# Patient Record
Sex: FEMALE | Race: BLACK OR AFRICAN AMERICAN | NOT HISPANIC OR LATINO | URBAN - METROPOLITAN AREA
[De-identification: names, ages, dates, MRNs, and addresses within clinical notes are randomized per-mention and may not be internally consistent; named-entity substitution may affect disease eponyms.]

---

## 2023-06-27 ENCOUNTER — EMERGENCY (EMERGENCY)
Facility: HOSPITAL | Age: 58
LOS: 1 days | Discharge: HOME | End: 2023-06-29
Attending: STUDENT IN AN ORGANIZED HEALTH CARE EDUCATION/TRAINING PROGRAM | Admitting: HOSPITALIST
Payer: MEDICAID

## 2023-06-27 VITALS
RESPIRATION RATE: 26 BRPM | DIASTOLIC BLOOD PRESSURE: 96 MMHG | OXYGEN SATURATION: 94 % | SYSTOLIC BLOOD PRESSURE: 174 MMHG | HEIGHT: 69 IN | TEMPERATURE: 97 F | HEART RATE: 98 BPM | WEIGHT: 214.95 LBS

## 2023-06-27 DIAGNOSIS — J45.901 UNSPECIFIED ASTHMA WITH (ACUTE) EXACERBATION: ICD-10-CM

## 2023-06-27 DIAGNOSIS — R06.2 WHEEZING: ICD-10-CM

## 2023-06-27 DIAGNOSIS — R06.02 SHORTNESS OF BREATH: ICD-10-CM

## 2023-06-27 DIAGNOSIS — R03.0 ELEVATED BLOOD-PRESSURE READING, WITHOUT DIAGNOSIS OF HYPERTENSION: ICD-10-CM

## 2023-06-27 DIAGNOSIS — M79.89 OTHER SPECIFIED SOFT TISSUE DISORDERS: ICD-10-CM

## 2023-06-27 DIAGNOSIS — Z82.5 FAMILY HISTORY OF ASTHMA AND OTHER CHRONIC LOWER RESPIRATORY DISEASES: ICD-10-CM

## 2023-06-27 DIAGNOSIS — Z20.822 CONTACT WITH AND (SUSPECTED) EXPOSURE TO COVID-19: ICD-10-CM

## 2023-06-27 PROBLEM — Z00.00 ENCOUNTER FOR PREVENTIVE HEALTH EXAMINATION: Status: ACTIVE | Noted: 2023-06-27

## 2023-06-27 LAB
ALBUMIN SERPL ELPH-MCNC: 3.8 G/DL — SIGNIFICANT CHANGE UP (ref 3.3–5)
ALP SERPL-CCNC: 87 U/L — SIGNIFICANT CHANGE UP (ref 40–120)
ALT FLD-CCNC: 38 U/L — SIGNIFICANT CHANGE UP (ref 12–78)
ANION GAP SERPL CALC-SCNC: 4 MMOL/L — LOW (ref 5–17)
AST SERPL-CCNC: 40 U/L — HIGH (ref 15–37)
BASOPHILS # BLD AUTO: 0.07 K/UL — SIGNIFICANT CHANGE UP (ref 0–0.2)
BASOPHILS NFR BLD AUTO: 0.8 % — SIGNIFICANT CHANGE UP (ref 0–2)
BILIRUB SERPL-MCNC: 0.3 MG/DL — SIGNIFICANT CHANGE UP (ref 0.2–1.2)
BUN SERPL-MCNC: 13 MG/DL — SIGNIFICANT CHANGE UP (ref 7–23)
CALCIUM SERPL-MCNC: 9.4 MG/DL — SIGNIFICANT CHANGE UP (ref 8.5–10.1)
CHLORIDE SERPL-SCNC: 102 MMOL/L — SIGNIFICANT CHANGE UP (ref 96–108)
CO2 SERPL-SCNC: 31 MMOL/L — SIGNIFICANT CHANGE UP (ref 22–31)
CREAT SERPL-MCNC: 0.94 MG/DL — SIGNIFICANT CHANGE UP (ref 0.5–1.3)
EGFR: 70 ML/MIN/1.73M2 — SIGNIFICANT CHANGE UP
EOSINOPHIL # BLD AUTO: 0.95 K/UL — HIGH (ref 0–0.5)
EOSINOPHIL NFR BLD AUTO: 10.3 % — HIGH (ref 0–6)
GLUCOSE SERPL-MCNC: 128 MG/DL — HIGH (ref 70–99)
HCT VFR BLD CALC: 48.4 % — HIGH (ref 34.5–45)
HGB BLD-MCNC: 15.5 G/DL — SIGNIFICANT CHANGE UP (ref 11.5–15.5)
IMM GRANULOCYTES NFR BLD AUTO: 0.4 % — SIGNIFICANT CHANGE UP (ref 0–0.9)
LYMPHOCYTES # BLD AUTO: 2.56 K/UL — SIGNIFICANT CHANGE UP (ref 1–3.3)
LYMPHOCYTES # BLD AUTO: 27.8 % — SIGNIFICANT CHANGE UP (ref 13–44)
MAGNESIUM SERPL-MCNC: 2.2 MG/DL — SIGNIFICANT CHANGE UP (ref 1.6–2.6)
MCHC RBC-ENTMCNC: 30 PG — SIGNIFICANT CHANGE UP (ref 27–34)
MCHC RBC-ENTMCNC: 32 G/DL — SIGNIFICANT CHANGE UP (ref 32–36)
MCV RBC AUTO: 93.8 FL — SIGNIFICANT CHANGE UP (ref 80–100)
MONOCYTES # BLD AUTO: 1.1 K/UL — HIGH (ref 0–0.9)
MONOCYTES NFR BLD AUTO: 12 % — SIGNIFICANT CHANGE UP (ref 2–14)
NEUTROPHILS # BLD AUTO: 4.48 K/UL — SIGNIFICANT CHANGE UP (ref 1.8–7.4)
NEUTROPHILS NFR BLD AUTO: 48.7 % — SIGNIFICANT CHANGE UP (ref 43–77)
NRBC # BLD: 0 /100 WBCS — SIGNIFICANT CHANGE UP (ref 0–0)
NT-PROBNP SERPL-SCNC: 16 PG/ML — SIGNIFICANT CHANGE UP (ref 0–125)
PLATELET # BLD AUTO: 230 K/UL — SIGNIFICANT CHANGE UP (ref 150–400)
POTASSIUM SERPL-MCNC: 5.2 MMOL/L — SIGNIFICANT CHANGE UP (ref 3.5–5.3)
POTASSIUM SERPL-SCNC: 5.2 MMOL/L — SIGNIFICANT CHANGE UP (ref 3.5–5.3)
PROT SERPL-MCNC: 8.3 GM/DL — SIGNIFICANT CHANGE UP (ref 6–8.3)
RAPID RVP RESULT: SIGNIFICANT CHANGE UP
RBC # BLD: 5.16 M/UL — SIGNIFICANT CHANGE UP (ref 3.8–5.2)
RBC # FLD: 13.6 % — SIGNIFICANT CHANGE UP (ref 10.3–14.5)
SARS-COV-2 RNA SPEC QL NAA+PROBE: SIGNIFICANT CHANGE UP
SODIUM SERPL-SCNC: 137 MMOL/L — SIGNIFICANT CHANGE UP (ref 135–145)
TROPONIN I, HIGH SENSITIVITY RESULT: 46.6 NG/L — SIGNIFICANT CHANGE UP
WBC # BLD: 9.2 K/UL — SIGNIFICANT CHANGE UP (ref 3.8–10.5)
WBC # FLD AUTO: 9.2 K/UL — SIGNIFICANT CHANGE UP (ref 3.8–10.5)

## 2023-06-27 PROCEDURE — 99222 1ST HOSP IP/OBS MODERATE 55: CPT

## 2023-06-27 PROCEDURE — 99233 SBSQ HOSP IP/OBS HIGH 50: CPT

## 2023-06-27 PROCEDURE — 99285 EMERGENCY DEPT VISIT HI MDM: CPT

## 2023-06-27 PROCEDURE — 71045 X-RAY EXAM CHEST 1 VIEW: CPT | Mod: 26

## 2023-06-27 RX ORDER — BUDESONIDE AND FORMOTEROL FUMARATE DIHYDRATE 160; 4.5 UG/1; UG/1
2 AEROSOL RESPIRATORY (INHALATION)
Refills: 0 | Status: DISCONTINUED | OUTPATIENT
Start: 2023-06-27 | End: 2023-06-29

## 2023-06-27 RX ORDER — ACETAMINOPHEN 500 MG
650 TABLET ORAL EVERY 6 HOURS
Refills: 0 | Status: DISCONTINUED | OUTPATIENT
Start: 2023-06-27 | End: 2023-06-29

## 2023-06-27 RX ORDER — IPRATROPIUM/ALBUTEROL SULFATE 18-103MCG
3 AEROSOL WITH ADAPTER (GRAM) INHALATION EVERY 4 HOURS
Refills: 0 | Status: DISCONTINUED | OUTPATIENT
Start: 2023-06-27 | End: 2023-06-28

## 2023-06-27 RX ORDER — IPRATROPIUM/ALBUTEROL SULFATE 18-103MCG
2 AEROSOL WITH ADAPTER (GRAM) INHALATION
Refills: 0 | DISCHARGE

## 2023-06-27 RX ORDER — IPRATROPIUM/ALBUTEROL SULFATE 18-103MCG
3 AEROSOL WITH ADAPTER (GRAM) INHALATION
Refills: 0 | Status: COMPLETED | OUTPATIENT
Start: 2023-06-27 | End: 2023-06-27

## 2023-06-27 RX ORDER — AMLODIPINE BESYLATE 2.5 MG/1
5 TABLET ORAL ONCE
Refills: 0 | Status: COMPLETED | OUTPATIENT
Start: 2023-06-27 | End: 2023-06-27

## 2023-06-27 RX ORDER — LANOLIN ALCOHOL/MO/W.PET/CERES
3 CREAM (GRAM) TOPICAL AT BEDTIME
Refills: 0 | Status: DISCONTINUED | OUTPATIENT
Start: 2023-06-27 | End: 2023-06-29

## 2023-06-27 RX ORDER — MAGNESIUM SULFATE 500 MG/ML
2 VIAL (ML) INJECTION ONCE
Refills: 0 | Status: COMPLETED | OUTPATIENT
Start: 2023-06-27 | End: 2023-06-27

## 2023-06-27 RX ADMIN — Medication 3 MILLILITER(S): at 05:04

## 2023-06-27 RX ADMIN — Medication 3 MILLILITER(S): at 11:30

## 2023-06-27 RX ADMIN — Medication 125 MILLIGRAM(S): at 04:58

## 2023-06-27 RX ADMIN — Medication 3 MILLILITER(S): at 16:00

## 2023-06-27 RX ADMIN — Medication 3 MILLILITER(S): at 04:47

## 2023-06-27 RX ADMIN — Medication 100 MILLIGRAM(S): at 19:56

## 2023-06-27 RX ADMIN — Medication 3 MILLILITER(S): at 05:00

## 2023-06-27 RX ADMIN — Medication 150 GRAM(S): at 05:16

## 2023-06-27 RX ADMIN — Medication 3 MILLILITER(S): at 07:01

## 2023-06-27 RX ADMIN — Medication 20 MILLIGRAM(S): at 21:19

## 2023-06-27 RX ADMIN — Medication 100 MILLIGRAM(S): at 17:07

## 2023-06-27 RX ADMIN — Medication 3 MILLILITER(S): at 07:58

## 2023-06-27 RX ADMIN — Medication 20 MILLIGRAM(S): at 17:07

## 2023-06-27 RX ADMIN — Medication 3 MILLILITER(S): at 08:53

## 2023-06-27 RX ADMIN — AMLODIPINE BESYLATE 5 MILLIGRAM(S): 2.5 TABLET ORAL at 07:00

## 2023-06-27 NOTE — H&P ADULT - PROBLEM SELECTOR PLAN 2
Likely related respiratory distress  If asthma is control and BP remain elevated, will consider BP med

## 2023-06-27 NOTE — ED ADULT TRIAGE NOTE - CHIEF COMPLAINT QUOTE
asthma exacerbation never intubated has being sob x 2 weeks , 2 nebulizer given by EMS. Unable to speaks full sentences.

## 2023-06-27 NOTE — ED PROVIDER NOTE - OBJECTIVE STATEMENT
58 yo F PMH asthma, never intubated or ICU stays, here for 2 weeks of feeling unwell and asthma exacerbation despite nebs and prednisone. Pt saturating 90-92% on room air with EMS. Pt speaking full sentences. Pt with b/l LE edema. Denies chest pain. Pt denies hx htn but hypertensive 170s/110s with ems and here 190s/90s. Wheezing on exam but will need to r/o APE. No crackles on lung exam. B/l le equally edematous without overlying cellulitis or calf tenderness. No hx PE/DVT.

## 2023-06-27 NOTE — H&P ADULT - ASSESSMENT
57 years old female with h/o asthma present to ED with complain of SOB. Patient reported SOB for 3 weeks. Went to urgent care, treated with steroid for 5 days, felt a litter better but then gradually worsened for last 2 weeks, associated with wheezing and not relieved with Combivent use at home. No fever or chills. No known sick contact.  Slightly hypertensive, afebrile, sat well with NC. CXR image reviewed, no focal consolidation. No leukocytosis, plt 230, K 5.2, Cr 0.94, glucose 128, hsTnT 46.6, proBNP 16. RVP negative. EKG  ( I personally review) with NSR, VR 76, LVH    admitted with asthma exacerbation

## 2023-06-27 NOTE — H&P ADULT - HISTORY OF PRESENT ILLNESS
57 years old female with h/o asthma present to ED with complain of SOB. Patient reported SOB for 3 weeks. Went to urgent care, treated with steroid for 5 days, felt a litter better but then gradually worsened for last 2 weeks, associated with wheezing and not relieved with Combivent use at home. No fever or chills. No known sick contact.  Slightly hypertensive, afebrile, sat well with NC. CXR image reviewed, no focal consolidation. No leukocytosis, plt 230, K 5.2, Cr 0.94, glucose 128, hsTnT 46.6, proBNP 16. RVP negative. EKG with NSR, VR 76, LVH    SH: no toxic habits  FH: family h/o asthma

## 2023-06-27 NOTE — ED PROVIDER NOTE - TEMPLATE, MLM
Pt aware    ----- Message from Marko Calvert MD sent at 12/28/2018  9:37 AM EST -----  The blood work is essentially all normal and PSA level is pending currently  Will call with results when available  Thanks 
Respiratory

## 2023-06-27 NOTE — CONSULT NOTE ADULT - ASSESSMENT
Initial evaluation/Pulmonary Critical Care consultation requested on  6/27/2023 by Dr CHEN    from Dr Lobo   Patient examined chart reviewed    HOSPITAL ADMISSION   PATIENT CAME  FROM (if information available)      REASON FOR VISIT  .. Management of problems listed below      NOTEWORTHY FINDINGS.    REVIEW OF SYMPTOMS   Able to give ROS  Yes     RELIABILITY +/-   CONSTITUTIONAL Weakness Yes    ENDOCRINE  No heat or cold intolerance    ALLERGY No hives  Sore throat No stridor  RESP Shortness of breath YES   NEURO New weakness No   CARDIAC   Palpitations No         PHYSICAL EXAM    HEENT Unremarkable  atraumatic   RESP Fair air entry  Harsh breath sound   CARDIAC S1 S2 No S3     NO JVD    ABDOMEN No hepatosplenomegaly   PEDAL EDEMA present No calf tenderness  NO rash       GENERAL DATA .     GOC.    .. 6/27/2023 full code  ICU STAY.   .. none  COVID.   ..  scv2 6/27/2023 scv2 (-)     BEST PRACTICE ISSUES.    HOB ELEVATN.   .. Yes  DVT PPLX.   ..      STRESS ULCER PPLX.   ..      INFECTION PPLX.   ..    SP SW ANEL.    ..        DIET.    ..  6/27/2023 regl   IV fl.  ..      PROCEDURES.  ..   PAST PROCEDURES.    ..     GENERAL DATA .     ALLGY.  ..    6/27/2023 nka                     WT.  ..   6/27/2023 97   BMI.  ..   6/27/2023 31      ABGS.    VS/ PO/IO/ VENT/ DRIPS.   6/27/2023 afeb 96 160/80   6/27/2023 ra 100%      CC/DOA.  . 6/27/2023 asthma exacerbation never intubated has being sob x 2 weeks , 2 nebulizer given by EMS. Unable to speaks full sentences.    PRESENTATION.  . 6/27/2023 58 yo F PMH asthma, never intubated or ICU stays, here for 2 weeks of feeling unwell and asthma exacerbation despite nebs and prednisone. Pt saturating 90-92% on room air with EMS. Pt speaking full sentences. Pt with b/l LE edema. Denies chest pain. Pt denies hx htn but hypertensive 170s/110s with ems and here 190s/90s. Wheezing on exam but will need to r/o APE. No crackles on lung exam. B/l le equally edematous without overlying cellulitis or calf tenderness. No hx PE/DVT.    PMH.  . Hytn     HOME MEDS.   . Combiveny     CC/DOA.  . 6/27/2023 Asthma exacerbation Pulm consulted     PROBLEMS/ASSESSMENT/RECOMMENDATIONS (A/R).  PULMONARY.  . GAS EXCHANGE.  .. A/R. Monitor and target po 90-95%    . ASTHMA EX 6/27/2023  .. 6/27/2023 duoneb.6   .. 6/27/2023 symbicort   .. 6/27/2023 solumed 20.3   .. 6/27/2023 A/R Cont present rx     ID.  . INFECTION.  .. w 6/27/2023 w 9.2   .. cxr 6/27/2023 old l rib fx   . MICRO.  .. RVP 6/27/2023 Rvp (-)    . PLAN.  .. No active infection suspecetd     CARDIO.  . CAD.   .. Tr 6/27/2023 Tr 46   .. A/R Continue to monitor     HEMAT.  .. Hb 6/27/2023 Hb 15   .. A/R continue to monitor     RENAL   .. nA 6/27/2023 nA 137   .. k 6/27/2023 k 5.2  .. CO2 6/27/2023 CO2 31   .. Cr 6/27/2023 Cr .9   .. A/R continue to monitor       . POST DISCHARGE  RECOMMENDATIONS (A/R).  .. ASTHMA 6/27/2023 Pt given my card and advised to see me as outpt for pulm followup        . CURRENT PROBLEMS.   .. ASTHMA EX       TIME SPENT   About 55 minutes aggregate care time spent on encounter; activities included   direct patient care, counseling and/or coordinating care reviewing notes, lab data/ imaging , discussion with multidisciplinary team/ patient  /family and explaining in detail risks, benefits, alternatives  of the recommendations     NIRANJAN RICHARDS 57 f 6/27/2023 1964 DR CIERRA CASTAÑEDA

## 2023-06-27 NOTE — CONSULT NOTE ADULT - SUBJECTIVE AND OBJECTIVE BOX
CHIEF COMPLAINT/ REASON FOR VISIT  .. Patient was seen to address the  issue listed under PROBLEM LIST which is located toward bottom of this note     MAURICE UREÑA    LVS ERHO TELE 02    Allergies    No Known Allergies    Intolerances        PAST MEDICAL & SURGICAL HISTORY:  Asthma          FAMILY HISTORY:      Home Medications:  Combivent 18 mcg-103 mcg-/inh inhalation aerosol: 2 inhaler(s) inhaled 4 times a day as needed for  bronchospasm (27 Jun 2023 10:00)      MEDICATIONS  (STANDING):  albuterol/ipratropium for Nebulization 3 milliLiter(s) Nebulizer every 4 hours  budesonide 160 MICROgram(s)/formoterol 4.5 MICROgram(s) Inhaler 2 Puff(s) Inhalation two times a day  methylPREDNISolone sodium succinate Injectable 20 milliGRAM(s) IV Push every 8 hours    MEDICATIONS  (PRN):  acetaminophen     Tablet .. 650 milliGRAM(s) Oral every 6 hours PRN Mild Pain (1 - 3), Moderate Pain (4 - 6)  melatonin 3 milliGRAM(s) Oral at bedtime PRN Insomnia      Diet, Regular (06-27-23 @ 09:57) [Active]          Vital Signs Last 24 Hrs  T(C): 36.1 (27 Jun 2023 04:32), Max: 36.1 (27 Jun 2023 04:32)  T(F): 97 (27 Jun 2023 04:32), Max: 97 (27 Jun 2023 04:32)  HR: 96 (27 Jun 2023 07:20) (69 - 98)  BP: 144/66 (27 Jun 2023 07:20) (144/66 - 174/96)  BP(mean): 92 (27 Jun 2023 07:20) (92 - 92)  RR: 17 (27 Jun 2023 07:20) (17 - 26)  SpO2: 98% (27 Jun 2023 07:20) (94% - 99%)    Parameters below as of 27 Jun 2023 07:20  Patient On (Oxygen Delivery Method): mask, aerosol  O2 Flow (L/min): 10                LABS:                        15.5   9.20  )-----------( 230      ( 27 Jun 2023 04:55 )             48.4     06-27    137  |  102  |  13  ----------------------------<  128<H>  5.2   |  31  |  0.94    Ca    9.4      27 Jun 2023 04:55  Mg     2.2     06-27    TPro  8.3  /  Alb  3.8  /  TBili  0.3  /  DBili  x   /  AST  40<H>  /  ALT  38  /  AlkPhos  87  06-27      Urinalysis Basic - ( 27 Jun 2023 04:55 )    Color: x / Appearance: x / SG: x / pH: x  Gluc: 128 mg/dL / Ketone: x  / Bili: x / Urobili: x   Blood: x / Protein: x / Nitrite: x   Leuk Esterase: x / RBC: x / WBC x   Sq Epi: x / Non Sq Epi: x / Bacteria: x            WBC:  WBC Count: 9.20 K/uL (06-27 @ 04:55)      MICROBIOLOGY:  RECENT CULTURES:                  Sodium:  Sodium: 137 mmol/L (06-27 @ 04:55)      0.94 mg/dL 06-27 @ 04:55      Hemoglobin:  Hemoglobin: 15.5 g/dL (06-27 @ 04:55)      Platelets: Platelet Count - Automated: 230 K/uL (06-27 @ 04:55)      LIVER FUNCTIONS - ( 27 Jun 2023 04:55 )  Alb: 3.8 g/dL / Pro: 8.3 gm/dL / ALK PHOS: 87 U/L / ALT: 38 U/L / AST: 40 U/L / GGT: x             Urinalysis Basic - ( 27 Jun 2023 04:55 )    Color: x / Appearance: x / SG: x / pH: x  Gluc: 128 mg/dL / Ketone: x  / Bili: x / Urobili: x   Blood: x / Protein: x / Nitrite: x   Leuk Esterase: x / RBC: x / WBC x   Sq Epi: x / Non Sq Epi: x / Bacteria: x        RADIOLOGY & ADDITIONAL STUDIES:      MICROBIOLOGY:  RECENT CULTURES:

## 2023-06-27 NOTE — ED PROVIDER NOTE - CLINICAL SUMMARY MEDICAL DECISION MAKING FREE TEXT BOX
Pt with asthma exacerbation but also noted to be hypertensive and b/l le edema. Will r/o pulmonary edema and end organ damage from elevated BPs. Will trend BPs. Pt with asthma exacerbation but also noted to be hypertensive and b/l le edema. Will r/o pulmonary edema and end organ damage from elevated BPs. Will trend BPs.  Pt reports feeling better and seen sittign up eating an orange, however still moderate wheezing b/l and peak flow 180. Will redose nebs (pt s/p 3 combi, mag, solumedrol) and reassess for dispo. If no significant improvement will need obs admission. Pt still moderately hypertensive. No signs of end organ damage. Will start on amlodipine 5mg. Pt with asthma exacerbation but also noted to be hypertensive and b/l le edema. Will r/o pulmonary edema and end organ damage from elevated BPs. Will trend BPs.  Pt reports feeling better and seen sittign up eating an orange, however still moderate wheezing b/l and peak flow 180. Will redose nebs (pt s/p 3 combi, mag, solumedrol) and reassess for dispo. If no significant improvement will need obs admission. Pt still moderately hypertensive. No signs of end organ damage. Will start on amlodipine 5mg.    Pt again audibly wheezing and when saturating 98% when resting but when moving around desaturates to 90% room air. Will obs

## 2023-06-27 NOTE — ED PROVIDER NOTE - NEURO NEGATIVE STATEMENT, MLM
Goals: 1. Use a protein shake from the Top 10 list- use the Premier shake for breakfast or the new high performance shake- Equate- 30 grams of protein, less than 7 grams of carbohydrate. 2. Replace carbohydrate snacks with protein snacks  3. Review packet for recommended foods and protein snack options. 4. Work to decrease totals for [employer] by about 25% per day. 5. Work to start exercising- chair exercises- on Weekends for sure. no loss of consciousness, no gait abnormality, no headache, no sensory deficits, and no weakness.

## 2023-06-27 NOTE — H&P ADULT - NSHPPHYSICALEXAM_GEN_ALL_CORE
CONSTITUTIONAL: alert and cooperative, mod respiratory distress.  EYES: PERRL, no scleral icterus  ENT: Mucosa moist, tongue normal  NECK: Neck supple, trachea midline, non-tender  CARDIAC: Normal S1 and S2. Regular rate and rhythms. No Pedal edema. Peripheral pulses intact  LUNGS: Equal air entry both lungs. Diffused expiratory wheezing. Increase respiratory effort.   ABDOMEN: Soft, nondistended, nontender. No guarding or rebound tenderness. No hepatomegaly or splenomegaly. Bowel sound normal.  MUSCULOSKELETAL: Normocephalic, atraumatic. No significant deformity or joint abnormality  NEUROLOGICAL: No gross motor or sensory deficits  SKIN: no lesions or eruptions. Normal turgor  PSYCHIATRIC: A&O x 3, appropriate mood and affect

## 2023-06-27 NOTE — ED PROVIDER NOTE - PHYSICAL EXAMINATION
General: well appearing in nad, sitting up in stretcher comfortably  HEENT: NC/AT, PERRL  Cards: RRR no m/r/g  Pulm: diffuse wheezing b/l  Abd: soft, nd/nt no rebound or guarding  Ext: 2+ pitting edema b/l mid shins. dp/pt 2+ pulses b/l  Neuro: Awake, Alert x3, face symmetric, speaking full sentences, moving all extremities

## 2023-06-27 NOTE — H&P ADULT - MLM HIDDEN
yes Include Z78.9 (Other Specified Conditions Influencing Health Status) As An Associated Diagnosis?: No

## 2023-06-27 NOTE — H&P ADULT - PROBLEM SELECTOR PLAN 1
SOB, dry cough, wheezing  Borderline hypoxic, sat 90% at RA, improved with 2L NC  CXR image reviewed  ( I personally review) , no focal consolidation  RVP negative  Still have bilateral diffused expiratory wheezing and mod respiratory distress  Received IV solumedrol 125mg in ED  continue IV solumedrol 20mg q8hr, if improve, transition to oral prednisone tomorrow  Duoneb q4hr, if improved, transition to q6hr hr  Add Symbicort  Pulmonary consulted--> Dr Lobo, will benefit from outpatient pul follow up  Closely monitor respiratory status, wean off NC oxygen as tolerate

## 2023-06-28 PROBLEM — J45.909 UNSPECIFIED ASTHMA, UNCOMPLICATED: Chronic | Status: ACTIVE | Noted: 2023-06-27

## 2023-06-28 LAB
A1C WITH ESTIMATED AVERAGE GLUCOSE RESULT: 6.3 % — HIGH (ref 4–5.6)
ALBUMIN SERPL ELPH-MCNC: 3.5 G/DL — SIGNIFICANT CHANGE UP (ref 3.3–5)
ALP SERPL-CCNC: 81 U/L — SIGNIFICANT CHANGE UP (ref 40–120)
ALT FLD-CCNC: 35 U/L — SIGNIFICANT CHANGE UP (ref 12–78)
ANION GAP SERPL CALC-SCNC: 5 MMOL/L — SIGNIFICANT CHANGE UP (ref 5–17)
AST SERPL-CCNC: 24 U/L — SIGNIFICANT CHANGE UP (ref 15–37)
BILIRUB SERPL-MCNC: 0.3 MG/DL — SIGNIFICANT CHANGE UP (ref 0.2–1.2)
BUN SERPL-MCNC: 14 MG/DL — SIGNIFICANT CHANGE UP (ref 7–23)
CALCIUM SERPL-MCNC: 9.2 MG/DL — SIGNIFICANT CHANGE UP (ref 8.5–10.1)
CHLORIDE SERPL-SCNC: 104 MMOL/L — SIGNIFICANT CHANGE UP (ref 96–108)
CHOLEST SERPL-MCNC: 181 MG/DL — SIGNIFICANT CHANGE UP
CO2 SERPL-SCNC: 29 MMOL/L — SIGNIFICANT CHANGE UP (ref 22–31)
CREAT SERPL-MCNC: 0.85 MG/DL — SIGNIFICANT CHANGE UP (ref 0.5–1.3)
EGFR: 79 ML/MIN/1.73M2 — SIGNIFICANT CHANGE UP
ESTIMATED AVERAGE GLUCOSE: 134 MG/DL — HIGH (ref 68–114)
GLUCOSE SERPL-MCNC: 130 MG/DL — HIGH (ref 70–99)
HCT VFR BLD CALC: 45.4 % — HIGH (ref 34.5–45)
HCV AB S/CO SERPL IA: 0.12 S/CO — SIGNIFICANT CHANGE UP (ref 0–0.99)
HCV AB SERPL-IMP: SIGNIFICANT CHANGE UP
HDLC SERPL-MCNC: 80 MG/DL — SIGNIFICANT CHANGE UP
HGB BLD-MCNC: 14.9 G/DL — SIGNIFICANT CHANGE UP (ref 11.5–15.5)
LIPID PNL WITH DIRECT LDL SERPL: 87 MG/DL — SIGNIFICANT CHANGE UP
MAGNESIUM SERPL-MCNC: 2.4 MG/DL — SIGNIFICANT CHANGE UP (ref 1.6–2.6)
MCHC RBC-ENTMCNC: 30.4 PG — SIGNIFICANT CHANGE UP (ref 27–34)
MCHC RBC-ENTMCNC: 32.8 G/DL — SIGNIFICANT CHANGE UP (ref 32–36)
MCV RBC AUTO: 92.7 FL — SIGNIFICANT CHANGE UP (ref 80–100)
NON HDL CHOLESTEROL: 101 MG/DL — SIGNIFICANT CHANGE UP
NRBC # BLD: 0 /100 WBCS — SIGNIFICANT CHANGE UP (ref 0–0)
PHOSPHATE SERPL-MCNC: 3.3 MG/DL — SIGNIFICANT CHANGE UP (ref 2.5–4.5)
PLATELET # BLD AUTO: 303 K/UL — SIGNIFICANT CHANGE UP (ref 150–400)
POTASSIUM SERPL-MCNC: 4.4 MMOL/L — SIGNIFICANT CHANGE UP (ref 3.5–5.3)
POTASSIUM SERPL-SCNC: 4.4 MMOL/L — SIGNIFICANT CHANGE UP (ref 3.5–5.3)
PROT SERPL-MCNC: 7.8 GM/DL — SIGNIFICANT CHANGE UP (ref 6–8.3)
RBC # BLD: 4.9 M/UL — SIGNIFICANT CHANGE UP (ref 3.8–5.2)
RBC # FLD: 14 % — SIGNIFICANT CHANGE UP (ref 10.3–14.5)
SODIUM SERPL-SCNC: 138 MMOL/L — SIGNIFICANT CHANGE UP (ref 135–145)
TRIGL SERPL-MCNC: 68 MG/DL — SIGNIFICANT CHANGE UP
WBC # BLD: 15.02 K/UL — HIGH (ref 3.8–10.5)
WBC # FLD AUTO: 15.02 K/UL — HIGH (ref 3.8–10.5)

## 2023-06-28 PROCEDURE — 99282 EMERGENCY DEPT VISIT SF MDM: CPT

## 2023-06-28 PROCEDURE — 99232 SBSQ HOSP IP/OBS MODERATE 35: CPT

## 2023-06-28 RX ORDER — MONTELUKAST 4 MG/1
10 TABLET, CHEWABLE ORAL DAILY
Refills: 0 | Status: DISCONTINUED | OUTPATIENT
Start: 2023-06-28 | End: 2023-06-29

## 2023-06-28 RX ORDER — IPRATROPIUM/ALBUTEROL SULFATE 18-103MCG
3 AEROSOL WITH ADAPTER (GRAM) INHALATION EVERY 6 HOURS
Refills: 0 | Status: DISCONTINUED | OUTPATIENT
Start: 2023-06-28 | End: 2023-06-29

## 2023-06-28 RX ORDER — TIOTROPIUM BROMIDE 18 UG/1
2 CAPSULE ORAL; RESPIRATORY (INHALATION) DAILY
Refills: 0 | Status: DISCONTINUED | OUTPATIENT
Start: 2023-06-28 | End: 2023-06-29

## 2023-06-28 RX ORDER — GUAIFENESIN/DEXTROMETHORPHAN 600MG-30MG
10 TABLET, EXTENDED RELEASE 12 HR ORAL EVERY 6 HOURS
Refills: 0 | Status: DISCONTINUED | OUTPATIENT
Start: 2023-06-28 | End: 2023-06-28

## 2023-06-28 RX ADMIN — Medication 3 MILLILITER(S): at 17:56

## 2023-06-28 RX ADMIN — Medication 40 MILLIGRAM(S): at 21:56

## 2023-06-28 RX ADMIN — Medication 3 MILLILITER(S): at 23:39

## 2023-06-28 RX ADMIN — Medication 20 MILLIGRAM(S): at 13:43

## 2023-06-28 RX ADMIN — Medication 20 MILLIGRAM(S): at 05:20

## 2023-06-28 RX ADMIN — TIOTROPIUM BROMIDE 2 PUFF(S): 18 CAPSULE ORAL; RESPIRATORY (INHALATION) at 16:49

## 2023-06-28 RX ADMIN — Medication 3 MILLILITER(S): at 11:55

## 2023-06-28 RX ADMIN — Medication 3 MILLILITER(S): at 05:30

## 2023-06-28 RX ADMIN — Medication 100 MILLIGRAM(S): at 13:46

## 2023-06-28 RX ADMIN — BUDESONIDE AND FORMOTEROL FUMARATE DIHYDRATE 2 PUFF(S): 160; 4.5 AEROSOL RESPIRATORY (INHALATION) at 17:42

## 2023-06-28 RX ADMIN — MONTELUKAST 10 MILLIGRAM(S): 4 TABLET, CHEWABLE ORAL at 11:56

## 2023-06-28 RX ADMIN — BUDESONIDE AND FORMOTEROL FUMARATE DIHYDRATE 2 PUFF(S): 160; 4.5 AEROSOL RESPIRATORY (INHALATION) at 05:21

## 2023-06-28 NOTE — PROGRESS NOTE ADULT - PROBLEM SELECTOR PLAN 1
SOB, dry cough, wheezing although NOW ON ROOM AIR  RVP negative  Duoneb q4hr, if improved, transition to q6hr hr  Add Symbicort and sprivia  increase steroid to 40mg iv Q8h

## 2023-06-28 NOTE — PROGRESS NOTE ADULT - NSPROGADDITIONALINFOA_GEN_ALL_CORE
patient refusing  discharge  has some wheezing  refusing mucinex   will dc tele / pulse ox  monitor 24 hours

## 2023-06-29 ENCOUNTER — TRANSCRIPTION ENCOUNTER (OUTPATIENT)
Age: 58
End: 2023-06-29

## 2023-06-29 VITALS — OXYGEN SATURATION: 98 %

## 2023-06-29 PROCEDURE — 99232 SBSQ HOSP IP/OBS MODERATE 35: CPT

## 2023-06-29 PROCEDURE — 99282 EMERGENCY DEPT VISIT SF MDM: CPT

## 2023-06-29 RX ORDER — SODIUM CHLORIDE 0.65 %
2 AEROSOL, SPRAY (ML) NASAL ONCE
Refills: 0 | Status: COMPLETED | OUTPATIENT
Start: 2023-06-29 | End: 2023-06-29

## 2023-06-29 RX ORDER — BUDESONIDE AND FORMOTEROL FUMARATE DIHYDRATE 160; 4.5 UG/1; UG/1
2 AEROSOL RESPIRATORY (INHALATION)
Qty: 1 | Refills: 0
Start: 2023-06-29 | End: 2023-07-28

## 2023-06-29 RX ORDER — PANTOPRAZOLE SODIUM 20 MG/1
40 TABLET, DELAYED RELEASE ORAL
Refills: 0 | Status: DISCONTINUED | OUTPATIENT
Start: 2023-06-29 | End: 2023-06-29

## 2023-06-29 RX ORDER — MONTELUKAST 4 MG/1
1 TABLET, CHEWABLE ORAL
Qty: 30 | Refills: 3
Start: 2023-06-29 | End: 2023-10-26

## 2023-06-29 RX ORDER — SUCRALFATE 1 G
1 TABLET ORAL EVERY 6 HOURS
Refills: 0 | Status: DISCONTINUED | OUTPATIENT
Start: 2023-06-29 | End: 2023-06-29

## 2023-06-29 RX ORDER — TIOTROPIUM BROMIDE 18 UG/1
2 CAPSULE ORAL; RESPIRATORY (INHALATION)
Qty: 1 | Refills: 0
Start: 2023-06-29 | End: 2023-07-28

## 2023-06-29 RX ORDER — PANTOPRAZOLE SODIUM 20 MG/1
1 TABLET, DELAYED RELEASE ORAL
Qty: 14 | Refills: 0
Start: 2023-06-29 | End: 2023-07-12

## 2023-06-29 RX ORDER — MAGNESIUM SULFATE 500 MG/ML
2 VIAL (ML) INJECTION ONCE
Refills: 0 | Status: COMPLETED | OUTPATIENT
Start: 2023-06-29 | End: 2023-06-29

## 2023-06-29 RX ORDER — PANTOPRAZOLE SODIUM 20 MG/1
1 TABLET, DELAYED RELEASE ORAL
Qty: 56 | Refills: 0
Start: 2023-06-29 | End: 2023-07-26

## 2023-06-29 RX ORDER — SUCRALFATE 1 G
1 TABLET ORAL
Qty: 112 | Refills: 0
Start: 2023-06-29 | End: 2023-07-26

## 2023-06-29 RX ADMIN — Medication 40 MILLIGRAM(S): at 05:25

## 2023-06-29 RX ADMIN — Medication 3 MILLILITER(S): at 11:47

## 2023-06-29 RX ADMIN — PANTOPRAZOLE SODIUM 40 MILLIGRAM(S): 20 TABLET, DELAYED RELEASE ORAL at 10:17

## 2023-06-29 RX ADMIN — MONTELUKAST 10 MILLIGRAM(S): 4 TABLET, CHEWABLE ORAL at 10:17

## 2023-06-29 RX ADMIN — BUDESONIDE AND FORMOTEROL FUMARATE DIHYDRATE 2 PUFF(S): 160; 4.5 AEROSOL RESPIRATORY (INHALATION) at 05:26

## 2023-06-29 RX ADMIN — Medication 25 GRAM(S): at 11:44

## 2023-06-29 RX ADMIN — TIOTROPIUM BROMIDE 2 PUFF(S): 18 CAPSULE ORAL; RESPIRATORY (INHALATION) at 10:29

## 2023-06-29 RX ADMIN — Medication 3 MILLILITER(S): at 05:52

## 2023-06-29 RX ADMIN — Medication 1 GRAM(S): at 11:45

## 2023-06-29 RX ADMIN — Medication 2 SPRAY(S): at 14:00

## 2023-06-29 NOTE — DISCHARGE NOTE PROVIDER - NSDCMRMEDTOKEN_GEN_ALL_CORE_FT
benzonatate 100 mg oral capsule: 1 cap(s) orally 3 times a day as needed for Cough  budesonide-formoterol 160 mcg-4.5 mcg/inh inhalation aerosol: 2 puff(s) inhaled 2 times a day  Combivent 18 mcg-103 mcg-/inh inhalation aerosol: 2 inhaler(s) inhaled 4 times a day as needed for  bronchospasm  guaiFENesin 600 mg oral tablet, extended release: 1 tab(s) orally every 12 hours  montelukast 10 mg oral tablet: 1 tab(s) orally once a day  predniSONE 10 mg oral tablet: 1 tab(s) orally once a day Taper dose  : 40mg daily x 3 days, 30mg daily x 3 days; 20mg daily x 3 days; 10mg daily x 3 days  Protonix 40 mg oral delayed release tablet: 1 tab(s) orally once a day  tiotropium 2.5 mcg/inh inhalation aerosol: 2 puff(s) inhaled once a day

## 2023-06-29 NOTE — DISCHARGE NOTE PROVIDER - NSDCFUSCHEDAPPT_GEN_ALL_CORE_FT
Ricki Lobo Physician [facility]  [address]  Scheduled Appointment: 08/23/2023    Ricki Lobo [facility]  [address]  Scheduled Appointment: 09/20/2023

## 2023-06-29 NOTE — DISCHARGE NOTE PROVIDER - NSDCCPCAREPLAN_GEN_ALL_CORE_FT
PRINCIPAL DISCHARGE DIAGNOSIS  Diagnosis: Acute asthma exacerbation  Assessment and Plan of Treatment: You have been evaluated for an acute asthma exacerbation  We have consulted the Pulmonologist  Please take the following new medications  1. Prednisone taper outlined in the instructiojns  2. Symbicort Inhaler: 2 puffs Twice a day  3. Spiriva Capsule: once a day  4. Asthmanex at night  5. protonix 40mg daily while taking the steroids.

## 2023-06-29 NOTE — PROGRESS NOTE ADULT - SUBJECTIVE AND OBJECTIVE BOX
CHIEF COMPLAINT/ REASON FOR VISIT  .. Patient was seen to address the  issue listed under PROBLEM LIST which is located toward bottom of this note     MAURICE UREÑA    S 2D 266 D    Allergies    No Known Allergies    Intolerances        PAST MEDICAL & SURGICAL HISTORY:  Asthma          FAMILY HISTORY:      Home Medications:  Combivent 18 mcg-103 mcg-/inh inhalation aerosol: 2 inhaler(s) inhaled 4 times a day as needed for  bronchospasm (27 Jun 2023 10:00)      MEDICATIONS  (STANDING):  albuterol/ipratropium for Nebulization 3 milliLiter(s) Nebulizer every 6 hours  budesonide 160 MICROgram(s)/formoterol 4.5 MICROgram(s) Inhaler 2 Puff(s) Inhalation two times a day  guaiFENesin  milliGRAM(s) Oral every 12 hours  hydrocodone/homatropine Syrup 5 milliLiter(s) Oral every 12 hours  methylPREDNISolone sodium succinate Injectable 40 milliGRAM(s) IV Push every 8 hours  montelukast 10 milliGRAM(s) Oral daily  tiotropium 2.5 MICROgram(s) Inhaler 2 Puff(s) Inhalation daily    MEDICATIONS  (PRN):  acetaminophen     Tablet .. 650 milliGRAM(s) Oral every 6 hours PRN Mild Pain (1 - 3), Moderate Pain (4 - 6)  benzonatate 100 milliGRAM(s) Oral three times a day PRN Cough  melatonin 3 milliGRAM(s) Oral at bedtime PRN Insomnia      Diet, Regular (06-27-23 @ 09:57) [Active]          Vital Signs Last 24 Hrs  T(C): 36.9 (29 Jun 2023 04:54), Max: 36.9 (29 Jun 2023 04:54)  T(F): 98.5 (29 Jun 2023 04:54), Max: 98.5 (29 Jun 2023 04:54)  HR: 80 (29 Jun 2023 06:00) (76 - 85)  BP: 149/72 (29 Jun 2023 04:54) (134/75 - 149/72)  BP(mean): --  RR: 18 (29 Jun 2023 04:54) (18 - 18)  SpO2: 99% (29 Jun 2023 06:00) (95% - 99%)    Parameters below as of 29 Jun 2023 04:54  Patient On (Oxygen Delivery Method): nasal cannula          06-28-23 @ 07:01  -  06-29-23 @ 07:00  --------------------------------------------------------  IN: 1200 mL / OUT: 300 mL / NET: 900 mL              LABS:                        14.9   15.02 )-----------( 303      ( 28 Jun 2023 06:45 )             45.4     06-28    138  |  104  |  14  ----------------------------<  130<H>  4.4   |  29  |  0.85    Ca    9.2      28 Jun 2023 06:45  Phos  3.3     06-28  Mg     2.4     06-28    TPro  7.8  /  Alb  3.5  /  TBili  0.3  /  DBili  x   /  AST  24  /  ALT  35  /  AlkPhos  81  06-28      Urinalysis Basic - ( 28 Jun 2023 06:45 )    Color: x / Appearance: x / SG: x / pH: x  Gluc: 130 mg/dL / Ketone: x  / Bili: x / Urobili: x   Blood: x / Protein: x / Nitrite: x   Leuk Esterase: x / RBC: x / WBC x   Sq Epi: x / Non Sq Epi: x / Bacteria: x            WBC:  WBC Count: 15.02 K/uL (06-28 @ 06:45)  WBC Count: 9.20 K/uL (06-27 @ 04:55)      MICROBIOLOGY:  RECENT CULTURES:                  Sodium:  Sodium: 138 mmol/L (06-28 @ 06:45)  Sodium: 137 mmol/L (06-27 @ 04:55)      0.85 mg/dL 06-28 @ 06:45  0.94 mg/dL 06-27 @ 04:55      Hemoglobin:  Hemoglobin: 14.9 g/dL (06-28 @ 06:45)  Hemoglobin: 15.5 g/dL (06-27 @ 04:55)      Platelets: Platelet Count - Automated: 303 K/uL (06-28 @ 06:45)  Platelet Count - Automated: 230 K/uL (06-27 @ 04:55)      LIVER FUNCTIONS - ( 28 Jun 2023 06:45 )  Alb: 3.5 g/dL / Pro: 7.8 gm/dL / ALK PHOS: 81 U/L / ALT: 35 U/L / AST: 24 U/L / GGT: x             Urinalysis Basic - ( 28 Jun 2023 06:45 )    Color: x / Appearance: x / SG: x / pH: x  Gluc: 130 mg/dL / Ketone: x  / Bili: x / Urobili: x   Blood: x / Protein: x / Nitrite: x   Leuk Esterase: x / RBC: x / WBC x   Sq Epi: x / Non Sq Epi: x / Bacteria: x        RADIOLOGY & ADDITIONAL STUDIES:      MICROBIOLOGY:  RECENT CULTURES:          
    CHIEF COMPLAINT/ REASON FOR VISIT  .. Patient was seen to address the  issue listed under PROBLEM LIST which is located toward bottom of this note     MAURICE UREÑA    S 2D 266 D    Allergies    No Known Allergies    Intolerances        PAST MEDICAL & SURGICAL HISTORY:  Asthma          FAMILY HISTORY:      Home Medications:  Combivent 18 mcg-103 mcg-/inh inhalation aerosol: 2 inhaler(s) inhaled 4 times a day as needed for  bronchospasm (27 Jun 2023 10:00)      MEDICATIONS  (STANDING):  albuterol/ipratropium for Nebulization 3 milliLiter(s) Nebulizer every 4 hours  budesonide 160 MICROgram(s)/formoterol 4.5 MICROgram(s) Inhaler 2 Puff(s) Inhalation two times a day  methylPREDNISolone sodium succinate Injectable 20 milliGRAM(s) IV Push every 8 hours    MEDICATIONS  (PRN):  acetaminophen     Tablet .. 650 milliGRAM(s) Oral every 6 hours PRN Mild Pain (1 - 3), Moderate Pain (4 - 6)  benzonatate 100 milliGRAM(s) Oral three times a day PRN Cough  melatonin 3 milliGRAM(s) Oral at bedtime PRN Insomnia      Diet, Regular (06-27-23 @ 09:57) [Active]          Vital Signs Last 24 Hrs  T(C): 36.6 (28 Jun 2023 05:04), Max: 36.7 (27 Jun 2023 11:18)  T(F): 97.8 (28 Jun 2023 05:04), Max: 98.1 (27 Jun 2023 11:18)  HR: 79 (28 Jun 2023 05:04) (79 - 99)  BP: 146/79 (28 Jun 2023 05:04) (136/79 - 168/86)  BP(mean): 130 (27 Jun 2023 11:18) (92 - 130)  RR: 18 (28 Jun 2023 05:04) (17 - 24)  SpO2: 96% (28 Jun 2023 05:04) (94% - 100%)    Parameters below as of 28 Jun 2023 05:04  Patient On (Oxygen Delivery Method): nasal cannula  O2 Flow (L/min): 4                LABS:                        15.5   9.20  )-----------( 230      ( 27 Jun 2023 04:55 )             48.4     06-27    137  |  102  |  13  ----------------------------<  128<H>  5.2   |  31  |  0.94    Ca    9.4      27 Jun 2023 04:55  Mg     2.2     06-27    TPro  8.3  /  Alb  3.8  /  TBili  0.3  /  DBili  x   /  AST  40<H>  /  ALT  38  /  AlkPhos  87  06-27      Urinalysis Basic - ( 27 Jun 2023 04:55 )    Color: x / Appearance: x / SG: x / pH: x  Gluc: 128 mg/dL / Ketone: x  / Bili: x / Urobili: x   Blood: x / Protein: x / Nitrite: x   Leuk Esterase: x / RBC: x / WBC x   Sq Epi: x / Non Sq Epi: x / Bacteria: x            WBC:  WBC Count: 9.20 K/uL (06-27 @ 04:55)      MICROBIOLOGY:  RECENT CULTURES:                  Sodium:  Sodium: 137 mmol/L (06-27 @ 04:55)      0.94 mg/dL 06-27 @ 04:55      Hemoglobin:  Hemoglobin: 15.5 g/dL (06-27 @ 04:55)      Platelets: Platelet Count - Automated: 230 K/uL (06-27 @ 04:55)      LIVER FUNCTIONS - ( 27 Jun 2023 04:55 )  Alb: 3.8 g/dL / Pro: 8.3 gm/dL / ALK PHOS: 87 U/L / ALT: 38 U/L / AST: 40 U/L / GGT: x             Urinalysis Basic - ( 27 Jun 2023 04:55 )    Color: x / Appearance: x / SG: x / pH: x  Gluc: 128 mg/dL / Ketone: x  / Bili: x / Urobili: x   Blood: x / Protein: x / Nitrite: x   Leuk Esterase: x / RBC: x / WBC x   Sq Epi: x / Non Sq Epi: x / Bacteria: x        RADIOLOGY & ADDITIONAL STUDIES:      MICROBIOLOGY:  RECENT CULTURES:          
Patient seen and examined  reports she doesnt feel well, reports wheezing cough  refusing discharge. also refusing cough   OFF 02.   wheezing on exam  Review of Systems:  General:denies fever chills, headache, weakness  HEENT: denies blurry vision,diffculty swallowing, difficulty hearing, tinnitus  Cardiovascular: denies chest pain  ,palpitations  Pulmonary:denies shortness of breath, cough, wheezing, hemoptysis  Gastrointestinal: denies abdominal pain, constipation, diarrhea,nausea , vomiting, hematochezia  : denies hematuria, dysuria, or incontinence  Neurological: denies weakness, numbness , tingling, dizziness, tremors  MSK: denies muscle pain, difficulty ambulating, swelling, back pain  skin: denies skin rash, itching, burning, or  skin lesions  Psychiatrical: denies mood disturbances, anxierty, feeling depressed, depression , or difficulty sleeping    Objective:  Vitals  T(C): 36.7 (06-28-23 @ 16:25), Max: 36.7 (06-28-23 @ 16:25)  HR: 84 (06-28-23 @ 17:56) (79 - 99)  BP: 134/75 (06-28-23 @ 16:25) (134/75 - 146/79)  RR: 18 (06-28-23 @ 16:25) (18 - 22)  SpO2: 96% (06-28-23 @ 17:56) (94% - 98%)    Physical Exam:  General: comfortable, no acute distress  HEENT: Atraumatic, no LAD, trachea midline, PERRLA  Cardiovascular: normal s1s2, no murmurs, gallops or fricition rubs  Pulmonary: ++wheezing , rhonchi  Gastrointestinal: soft non tender non distended, no masses felt, no organomegally  Muscloskeletal: no lower extremity edema, intact bilateral lower extremity pulses  Neurological: CN II-12 intact. No focal weakness  Psychiatrical: normal mood, cooperative  SKIN: no rash, lesions or ulcers    Labs:                          14.9   15.02 )-----------( 303      ( 28 Jun 2023 06:45 )             45.4     06-28    138  |  104  |  14  ----------------------------<  130<H>  4.4   |  29  |  0.85    Ca    9.2      28 Jun 2023 06:45  Phos  3.3     06-28  Mg     2.4     06-28    TPro  7.8  /  Alb  3.5  /  TBili  0.3  /  DBili  x   /  AST  24  /  ALT  35  /  AlkPhos  81  06-28    LIVER FUNCTIONS - ( 28 Jun 2023 06:45 )  Alb: 3.5 g/dL / Pro: 7.8 gm/dL / ALK PHOS: 81 U/L / ALT: 35 U/L / AST: 24 U/L / GGT: x                 Active Medications  MEDICATIONS  (STANDING):  albuterol/ipratropium for Nebulization 3 milliLiter(s) Nebulizer every 6 hours  budesonide 160 MICROgram(s)/formoterol 4.5 MICROgram(s) Inhaler 2 Puff(s) Inhalation two times a day  guaiFENesin  milliGRAM(s) Oral every 12 hours  hydrocodone/homatropine Syrup 5 milliLiter(s) Oral every 12 hours  methylPREDNISolone sodium succinate Injectable 40 milliGRAM(s) IV Push every 8 hours  montelukast 10 milliGRAM(s) Oral daily  tiotropium 2.5 MICROgram(s) Inhaler 2 Puff(s) Inhalation daily    MEDICATIONS  (PRN):  acetaminophen     Tablet .. 650 milliGRAM(s) Oral every 6 hours PRN Mild Pain (1 - 3), Moderate Pain (4 - 6)  benzonatate 100 milliGRAM(s) Oral three times a day PRN Cough  melatonin 3 milliGRAM(s) Oral at bedtime PRN Insomnia

## 2023-06-29 NOTE — DISCHARGE NOTE NURSING/CASE MANAGEMENT/SOCIAL WORK - PATIENT PORTAL LINK FT
You can access the FollowMyHealth Patient Portal offered by [facility] by registering at the following website: [email or web address]. By joining [facility]’s FollowMyHealth portal, you will also be able to view your health information using other applications (apps) compatible with our system.

## 2023-06-29 NOTE — PROGRESS NOTE ADULT - ASSESSMENT
REASON FOR VISIT  .. Management of problems listed below      NOTEWORTHY FINDINGS.    REVIEW OF SYMPTOMS   Able to give ROS  Yes     RELIABILITY +/-   CONSTITUTIONAL Weakness Yes    ENDOCRINE  No heat or cold intolerance    ALLERGY No hives  Sore throat No stridor  RESP Shortness of breath YES   NEURO New weakness No   CARDIAC   Palpitations No         PHYSICAL EXAM    HEENT Unremarkable  atraumatic   RESP Fair air entry  Harsh breath sound   CARDIAC S1 S2 No S3     NO JVD    ABDOMEN No hepatosplenomegaly   PEDAL EDEMA present No calf tenderness  NO rash       GENERAL DATA .     GOC.    .. 6/27/2023 full code  ICU STAY.   .. none  COVID.   ..  scv2 6/27/2023 scv2 (-)     BEST PRACTICE ISSUES.    HOB ELEVATN.   .. Yes  DVT PPLX.   ..      STRESS ULCER PPLX.   ..      INFECTION PPLX.   ..    SP SW ANEL.    ..        DIET.    ..  6/27/2023 regl   IV fl.  ..      PROCEDURES.  ..   PAST PROCEDURES.    ..     GENERAL DATA .     ALLGY.  ..    6/27/2023 nka                     WT.  ..   6/27/2023 97   BMI.  ..   6/27/2023 31          ABGS.    VS/ PO/IO/ VENT/ DRIPS.  6/29/2023 afeb 80 140/70   6/29/2023 ra 96%    CC/DOA.  . 6/27/2023 asthma exacerbation never intubated has being sob x 2 weeks , 2 nebulizer given by EMS. Unable to speaks full sentences.    PRESENTATION.  . 6/27/2023 56 yo F PMH asthma, never intubated or ICU stays, here for 2 weeks of feeling unwell and asthma exacerbation despite nebs and prednisone. Pt saturating 90-92% on room air with EMS. Pt speaking full sentences. Pt with b/l LE edema. Denies chest pain. Pt denies hx htn but hypertensive 170s/110s with ems and here 190s/90s. Wheezing on exam but will need to r/o APE. No crackles on lung exam. B/l le equally edematous without overlying cellulitis or calf tenderness. No hx PE/DVT.    PMH.  . Hytn     HOME MEDS.   . Combiveny       CC/DOA.  . 6/27/2023 Asthma exacerbation Pulm consulted     PROBLEMS/ASSESSMENT/RECOMMENDATIONS (A/R).  PULMONARY.  . GAS EXCHANGE.  .. A/R.   .. 6/29/2023 Monitor and target po 90-95%    . ASTHMA EX 6/27/2023  .. 6/27/2023 duoneb.6   .. 6/27/2023 symbicort   .. 6/27/2023 solumed 20.3 -> solumed 40.3 Dr Castañeda  .. 6/28/2023 montelukast 10   .. 6/28/2023 spiriva    .. A/R  .. 6/29/2023 Cont present rx     ID.  . INFECTION.  .. w 6/27-6/28/2023 w 9.2- 15    .. cxr 6/27/2023 old l rib fx   . MICRO.  .. RVP 6/27/2023 Rvp (-)    . PLAN.  .. 6/29/2023 No active infection suspecetd     CARDIO.  . CAD.   .. Tr 6/27/2023 Tr 46   .. A/R   .. 6/29/2023 Continue to monitor     HEMAT.  .. Hb 6/27-6/28/2023 Hb 15 - 14.9   .. A/R  .. 6/29/2023 continue to monitor     RENAL   .. nA 6/27/2023 nA 137   .. k 6/27-6/28/2023 k 5.2- 4.4   .. CO2 6/27/2023 CO2 31   .. Cr 6/27/2023 Cr .9   .. A/R   .. 6/29/2023 continue to monitor       . POST DISCHARGE  RECOMMENDATIONS (A/R).  .. ASTHMA 6/27/2023 Pt given my card and advised to see me as outpt for pulm followup        . CURRENT PROBLEMS.   .. ASTHMA EX       TIME SPENT   About 36 minutes aggregate care time spent on encounter; activities included   direct patient care, counseling and/or coordinating care reviewing notes, lab data/ imaging , discussion with multidisciplinary team/ patient  /family and explaining in detail risks, benefits, alternatives  of the recommendations     NIRANJAN RICHARDS 57 f 6/27/2023 1965 DR CIERRA CASTAÑEDA   
  REASON FOR VISIT  .. Management of problems listed below      NOTEWORTHY FINDINGS.    REVIEW OF SYMPTOMS   Able to give ROS  Yes     RELIABILITY +/-   CONSTITUTIONAL Weakness Yes    ENDOCRINE  No heat or cold intolerance    ALLERGY No hives  Sore throat No stridor  RESP Shortness of breath YES   NEURO New weakness No   CARDIAC   Palpitations No         PHYSICAL EXAM    HEENT Unremarkable  atraumatic   RESP Fair air entry  Harsh breath sound   CARDIAC S1 S2 No S3     NO JVD    ABDOMEN No hepatosplenomegaly   PEDAL EDEMA present No calf tenderness  NO rash       GENERAL DATA .     GOC.    .. 6/27/2023 full code  ICU STAY.   .. none  COVID.   ..  scv2 6/27/2023 scv2 (-)     BEST PRACTICE ISSUES.    HOB ELEVATN.   .. Yes  DVT PPLX.   ..      STRESS ULCER PPLX.   ..      INFECTION PPLX.   ..    SP SW ANEL.    ..        DIET.    ..  6/27/2023 regl   IV fl.  ..      PROCEDURES.  ..   PAST PROCEDURES.    ..     GENERAL DATA .     ALLGY.  ..    6/27/2023 nka                     WT.  ..   6/27/2023 97   BMI.  ..   6/27/2023 31        ABGS.    VS/ PO/IO/ VENT/ DRIPS.  6/28/2023 afeb 79 140/70   6/28/2023 4l 96%      CC/DOA.  . 6/27/2023 asthma exacerbation never intubated has being sob x 2 weeks , 2 nebulizer given by EMS. Unable to speaks full sentences.    PRESENTATION.  . 6/27/2023 56 yo F PMH asthma, never intubated or ICU stays, here for 2 weeks of feeling unwell and asthma exacerbation despite nebs and prednisone. Pt saturating 90-92% on room air with EMS. Pt speaking full sentences. Pt with b/l LE edema. Denies chest pain. Pt denies hx htn but hypertensive 170s/110s with ems and here 190s/90s. Wheezing on exam but will need to r/o APE. No crackles on lung exam. B/l le equally edematous without overlying cellulitis or calf tenderness. No hx PE/DVT.    PMH.  . Hytn     HOME MEDS.   . Combiveny       CC/DOA.  . 6/27/2023 Asthma exacerbation Pulm consulted     PROBLEMS/ASSESSMENT/RECOMMENDATIONS (A/R).  PULMONARY.  . GAS EXCHANGE.  .. A/R.   .. 6/28/2023 Monitor and target po 90-95%    . ASTHMA EX 6/27/2023  .. 6/27/2023 duoneb.6   .. 6/27/2023 symbicort   .. 6/27/2023 solumed 20.3 -> solumed 40.3 Dr Castañeda  .. 6/28/2023 montelukast 10   .. 6/28/2023 spiriva    .. A/R  .. 6/28/2023 Cont present rx     ID.  . INFECTION.  .. w 6/27-6/28/2023 w 9.2- 15    .. cxr 6/27/2023 old l rib fx   . MICRO.  .. RVP 6/27/2023 Rvp (-)    . PLAN.  .. 6/28/2023 No active infection suspecetd     CARDIO.  . CAD.   .. Tr 6/27/2023 Tr 46   .. A/R   .. 6/28/2023 Continue to monitor     HEMAT.  .. Hb 6/27-6/28/2023 Hb 15 - 14.9   .. A/R  .. 6/28/2023 continue to monitor     RENAL   .. nA 6/27/2023 nA 137   .. k 6/27-6/28/2023 k 5.2- 4.4   .. CO2 6/27/2023 CO2 31   .. Cr 6/27/2023 Cr .9   .. A/R   .. 6/28/2023 continue to monitor         . POST DISCHARGE  RECOMMENDATIONS (A/R).  .. ASTHMA 6/27/2023 Pt given my card and advised to see me as outpt for pulm followup        . CURRENT PROBLEMS.   .. ASTHMA EX       TIME SPENT   About 36 minutes aggregate care time spent on encounter; activities included   direct patient care, counseling and/or coordinating care reviewing notes, lab data/ imaging , discussion with multidisciplinary team/ patient  /family and explaining in detail risks, benefits, alternatives  of the recommendations     NIRANJAN RICHARDS 57 f 6/27/2023 1965 DR CIERRA CASTAÑEDA   
57 years old female with h/o asthma present to ED with complain of SOB. Patient reported SOB for 3 weeks. Went to urgent care, treated with steroid for 5 days, felt a litter better but then gradually worsened for last 2 weeks, associated with wheezing and not relieved with Combivent use at home. No fever or chills. No known sick contact.  Slightly hypertensive, afebrile, sat well with NC. CXR image reviewed, no focal consolidation. No leukocytosis, plt 230, K 5.2, Cr 0.94, glucose 128, hsTnT 46.6, proBNP 16. RVP negative. EKG  ( I personally review) with NSR, VR 76, LVH    admitted with asthma exacerbation

## 2023-06-29 NOTE — DISCHARGE NOTE PROVIDER - HOSPITAL COURSE
57 years old female with h/o asthma present to ED with complain of SOB. Patient reported SOB for 3 weeks. Went to urgent care, treated with steroid for 5 days, felt a litter better but then gradually worsened for last 2 weeks, associated with wheezing and not relieved with Combivent use at home. No fever or chills. No known sick contact.  Slightly hypertensive, afebrile, sat well with NC. CXR image reviewed, no focal consolidation. No leukocytosis, plt 230, K 5.2, Cr 0.94, glucose 128, hsTnT 46.6, proBNP 16. RVP negative. EKG  ( I personally review) with NSR, VR 76, LVH    admitted with asthma exacerbation       Problem/Plan - 1:  ·  Problem: Acute asthma exacerbation.   ·  Plan: SOB, dry cough, wheezing although NOW ON ROOM AIR  RVP negative  Duoneb q4hr, if improved, transition to q6hr hr  Add Symbicort and sprivia  increase steroid to 40mg iv Q8h.     Problem/Plan - 2:  ·  Problem: Elevated BP without diagnosis of hypertension.   ·  Plan: Likely related respiratory distress  stable.       Additional Information:  Additional Information: patient refusing  discharge  has some wheezing  refusing mucinex   will dc tele / pulse ox  monitor 24 hours

## 2023-08-23 ENCOUNTER — APPOINTMENT (OUTPATIENT)
Dept: PULMONOLOGY | Facility: CLINIC | Age: 58
End: 2023-08-23

## 2023-09-20 ENCOUNTER — APPOINTMENT (OUTPATIENT)
Dept: PULMONOLOGY | Facility: CLINIC | Age: 58
End: 2023-09-20

## 2023-10-25 ENCOUNTER — APPOINTMENT (OUTPATIENT)
Dept: PULMONOLOGY | Facility: CLINIC | Age: 58
End: 2023-10-25

## 2024-10-02 ENCOUNTER — TRANSCRIPTION ENCOUNTER (OUTPATIENT)
Age: 59
End: 2024-10-02

## 2024-10-07 ENCOUNTER — APPOINTMENT (OUTPATIENT)
Dept: PULMONOLOGY | Facility: CLINIC | Age: 59
End: 2024-10-07
Payer: MEDICAID

## 2024-10-07 DIAGNOSIS — R73.03 PREDIABETES.: ICD-10-CM

## 2024-10-07 DIAGNOSIS — J45.21 MILD INTERMITTENT ASTHMA WITH (ACUTE) EXACERBATION: ICD-10-CM

## 2024-10-07 DIAGNOSIS — Z86.39 PERSONAL HISTORY OF OTHER ENDOCRINE, NUTRITIONAL AND METABOLIC DISEASE: ICD-10-CM

## 2024-10-07 DIAGNOSIS — J45.20 MILD INTERMITTENT ASTHMA, UNCOMPLICATED: ICD-10-CM

## 2024-10-07 PROCEDURE — 99496 TRANSJ CARE MGMT HIGH F2F 7D: CPT

## 2024-10-07 RX ORDER — BUDESONIDE AND FORMOTEROL FUMARATE DIHYDRATE 160; 4.5 UG/1; UG/1
160-4.5 AEROSOL RESPIRATORY (INHALATION) TWICE DAILY
Qty: 1 | Refills: 1 | Status: ACTIVE | COMMUNITY
Start: 2024-10-07 | End: 1900-01-01

## 2024-11-21 ENCOUNTER — NON-APPOINTMENT (OUTPATIENT)
Age: 59
End: 2024-11-21

## 2024-12-02 ENCOUNTER — RX RENEWAL (OUTPATIENT)
Age: 59
End: 2024-12-02

## 2025-03-28 ENCOUNTER — INPATIENT (INPATIENT)
Facility: HOSPITAL | Age: 60
LOS: 20 days | Discharge: HOME HEALTH | End: 2025-04-18
Attending: STUDENT IN AN ORGANIZED HEALTH CARE EDUCATION/TRAINING PROGRAM | Admitting: STUDENT IN AN ORGANIZED HEALTH CARE EDUCATION/TRAINING PROGRAM
Payer: MEDICAID

## 2025-03-28 VITALS
DIASTOLIC BLOOD PRESSURE: 74 MMHG | RESPIRATION RATE: 24 BRPM | OXYGEN SATURATION: 92 % | SYSTOLIC BLOOD PRESSURE: 87 MMHG | HEART RATE: 124 BPM

## 2025-03-28 DIAGNOSIS — J45.901 UNSPECIFIED ASTHMA WITH (ACUTE) EXACERBATION: ICD-10-CM

## 2025-03-28 DIAGNOSIS — Z78.9 OTHER SPECIFIED HEALTH STATUS: Chronic | ICD-10-CM

## 2025-03-28 LAB
ADD ON TEST-SPECIMEN IN LAB: SIGNIFICANT CHANGE UP
ADD ON TEST-SPECIMEN IN LAB: SIGNIFICANT CHANGE UP
ALBUMIN SERPL ELPH-MCNC: 3.8 G/DL — SIGNIFICANT CHANGE UP (ref 3.3–5)
ALP SERPL-CCNC: 77 U/L — SIGNIFICANT CHANGE UP (ref 40–120)
ALT FLD-CCNC: 58 U/L — HIGH (ref 4–33)
ANION GAP SERPL CALC-SCNC: 14 MMOL/L — SIGNIFICANT CHANGE UP (ref 7–14)
APPEARANCE UR: ABNORMAL
APTT BLD: 39.4 SEC — HIGH (ref 24.5–35.6)
AST SERPL-CCNC: 77 U/L — HIGH (ref 4–32)
B PERT DNA SPEC QL NAA+PROBE: SIGNIFICANT CHANGE UP
B PERT+PARAPERT DNA PNL SPEC NAA+PROBE: SIGNIFICANT CHANGE UP
BACTERIA # UR AUTO: NEGATIVE /HPF — SIGNIFICANT CHANGE UP
BASOPHILS # BLD AUTO: 0.14 K/UL — SIGNIFICANT CHANGE UP (ref 0–0.2)
BASOPHILS NFR BLD AUTO: 1.2 % — SIGNIFICANT CHANGE UP (ref 0–2)
BILIRUB SERPL-MCNC: <0.2 MG/DL — SIGNIFICANT CHANGE UP (ref 0.2–1.2)
BILIRUB UR-MCNC: NEGATIVE — SIGNIFICANT CHANGE UP
BLOOD GAS ARTERIAL - LYTES,HGB,ICA,LACT RESULT: SIGNIFICANT CHANGE UP
BLOOD GAS ARTERIAL COMPREHENSIVE RESULT: SIGNIFICANT CHANGE UP
BLOOD GAS ARTERIAL COMPREHENSIVE RESULT: SIGNIFICANT CHANGE UP
BUN SERPL-MCNC: 8 MG/DL — SIGNIFICANT CHANGE UP (ref 7–23)
C PNEUM DNA SPEC QL NAA+PROBE: SIGNIFICANT CHANGE UP
CALCIUM SERPL-MCNC: 8.7 MG/DL — SIGNIFICANT CHANGE UP (ref 8.4–10.5)
CAST: 4 /LPF — SIGNIFICANT CHANGE UP (ref 0–4)
CHLORIDE SERPL-SCNC: 101 MMOL/L — SIGNIFICANT CHANGE UP (ref 98–107)
CO2 SERPL-SCNC: 18 MMOL/L — LOW (ref 22–31)
COLOR SPEC: YELLOW — SIGNIFICANT CHANGE UP
CREAT SERPL-MCNC: 0.92 MG/DL — SIGNIFICANT CHANGE UP (ref 0.5–1.3)
DIFF PNL FLD: ABNORMAL
EGFR: 71 ML/MIN/1.73M2 — SIGNIFICANT CHANGE UP
EGFR: 71 ML/MIN/1.73M2 — SIGNIFICANT CHANGE UP
EOSINOPHIL # BLD AUTO: 0.55 K/UL — HIGH (ref 0–0.5)
EOSINOPHIL NFR BLD AUTO: 4.9 % — SIGNIFICANT CHANGE UP (ref 0–6)
FLUAV AG NPH QL: SIGNIFICANT CHANGE UP
FLUAV SUBTYP SPEC NAA+PROBE: SIGNIFICANT CHANGE UP
FLUBV AG NPH QL: SIGNIFICANT CHANGE UP
FLUBV RNA SPEC QL NAA+PROBE: SIGNIFICANT CHANGE UP
GAS PNL BLDV: SIGNIFICANT CHANGE UP
GLUCOSE BLDC GLUCOMTR-MCNC: 244 MG/DL — HIGH (ref 70–99)
GLUCOSE BLDC GLUCOMTR-MCNC: 370 MG/DL — HIGH (ref 70–99)
GLUCOSE SERPL-MCNC: 359 MG/DL — HIGH (ref 70–99)
GLUCOSE UR QL: NEGATIVE MG/DL — SIGNIFICANT CHANGE UP
HADV DNA SPEC QL NAA+PROBE: SIGNIFICANT CHANGE UP
HCOV 229E RNA SPEC QL NAA+PROBE: SIGNIFICANT CHANGE UP
HCOV HKU1 RNA SPEC QL NAA+PROBE: SIGNIFICANT CHANGE UP
HCOV NL63 RNA SPEC QL NAA+PROBE: SIGNIFICANT CHANGE UP
HCOV OC43 RNA SPEC QL NAA+PROBE: SIGNIFICANT CHANGE UP
HCT VFR BLD CALC: 48.4 % — HIGH (ref 34.5–45)
HGB BLD-MCNC: 15.2 G/DL — SIGNIFICANT CHANGE UP (ref 11.5–15.5)
HMPV RNA SPEC QL NAA+PROBE: SIGNIFICANT CHANGE UP
HPIV1 RNA SPEC QL NAA+PROBE: SIGNIFICANT CHANGE UP
HPIV2 RNA SPEC QL NAA+PROBE: SIGNIFICANT CHANGE UP
HPIV3 RNA SPEC QL NAA+PROBE: SIGNIFICANT CHANGE UP
HPIV4 RNA SPEC QL NAA+PROBE: SIGNIFICANT CHANGE UP
IANC: 7.42 K/UL — HIGH (ref 1.8–7.4)
IMM GRANULOCYTES NFR BLD AUTO: 2.9 % — HIGH (ref 0–0.9)
INR BLD: 1.02 RATIO — SIGNIFICANT CHANGE UP (ref 0.85–1.16)
KETONES UR-MCNC: ABNORMAL MG/DL
LEUKOCYTE ESTERASE UR-ACNC: NEGATIVE — SIGNIFICANT CHANGE UP
LYMPHOCYTES # BLD AUTO: 2.31 K/UL — SIGNIFICANT CHANGE UP (ref 1–3.3)
LYMPHOCYTES # BLD AUTO: 20.6 % — SIGNIFICANT CHANGE UP (ref 13–44)
M PNEUMO DNA SPEC QL NAA+PROBE: SIGNIFICANT CHANGE UP
MCHC RBC-ENTMCNC: 30.7 PG — SIGNIFICANT CHANGE UP (ref 27–34)
MCHC RBC-ENTMCNC: 31.4 G/DL — LOW (ref 32–36)
MCV RBC AUTO: 97.8 FL — SIGNIFICANT CHANGE UP (ref 80–100)
MONOCYTES # BLD AUTO: 0.46 K/UL — SIGNIFICANT CHANGE UP (ref 0–0.9)
MONOCYTES NFR BLD AUTO: 4.1 % — SIGNIFICANT CHANGE UP (ref 2–14)
NEUTROPHILS # BLD AUTO: 7.42 K/UL — HIGH (ref 1.8–7.4)
NEUTROPHILS NFR BLD AUTO: 66.3 % — SIGNIFICANT CHANGE UP (ref 43–77)
NITRITE UR-MCNC: NEGATIVE — SIGNIFICANT CHANGE UP
NRBC # BLD AUTO: 0 K/UL — SIGNIFICANT CHANGE UP (ref 0–0)
NRBC # FLD: 0 K/UL — SIGNIFICANT CHANGE UP (ref 0–0)
NRBC BLD AUTO-RTO: 0 /100 WBCS — SIGNIFICANT CHANGE UP (ref 0–0)
PH UR: 7.5 — SIGNIFICANT CHANGE UP (ref 5–8)
PLATELET # BLD AUTO: 225 K/UL — SIGNIFICANT CHANGE UP (ref 150–400)
POTASSIUM SERPL-MCNC: SIGNIFICANT CHANGE UP MMOL/L (ref 3.5–5.3)
POTASSIUM SERPL-SCNC: SIGNIFICANT CHANGE UP MMOL/L (ref 3.5–5.3)
PROT SERPL-MCNC: 7.7 G/DL — SIGNIFICANT CHANGE UP (ref 6–8.3)
PROT UR-MCNC: 300 MG/DL
PROTHROM AB SERPL-ACNC: 12.1 SEC — SIGNIFICANT CHANGE UP (ref 9.9–13.4)
RAPID RVP RESULT: SIGNIFICANT CHANGE UP
RBC # BLD: 4.95 M/UL — SIGNIFICANT CHANGE UP (ref 3.8–5.2)
RBC # FLD: 14.3 % — SIGNIFICANT CHANGE UP (ref 10.3–14.5)
RBC CASTS # UR COMP ASSIST: 20 /HPF — HIGH (ref 0–4)
REVIEW: SIGNIFICANT CHANGE UP
RSV RNA NPH QL NAA+NON-PROBE: SIGNIFICANT CHANGE UP
RSV RNA SPEC QL NAA+PROBE: SIGNIFICANT CHANGE UP
RV+EV RNA SPEC QL NAA+PROBE: SIGNIFICANT CHANGE UP
SARS-COV-2 RNA SPEC QL NAA+PROBE: SIGNIFICANT CHANGE UP
SARS-COV-2 RNA SPEC QL NAA+PROBE: SIGNIFICANT CHANGE UP
SODIUM SERPL-SCNC: 133 MMOL/L — LOW (ref 135–145)
SOURCE RESPIRATORY: SIGNIFICANT CHANGE UP
SP GR SPEC: 1.02 — SIGNIFICANT CHANGE UP (ref 1–1.03)
SQUAMOUS # UR AUTO: 0 /HPF — SIGNIFICANT CHANGE UP (ref 0–5)
TROPONIN T, HIGH SENSITIVITY RESULT: 53 NG/L — CRITICAL HIGH
UROBILINOGEN FLD QL: 0.2 MG/DL — SIGNIFICANT CHANGE UP (ref 0.2–1)
WBC # BLD: 11.21 K/UL — HIGH (ref 3.8–10.5)
WBC # FLD AUTO: 11.21 K/UL — HIGH (ref 3.8–10.5)
WBC UR QL: 2 /HPF — SIGNIFICANT CHANGE UP (ref 0–5)

## 2025-03-28 RX ORDER — FENTANYL CITRATE-0.9 % NACL/PF 100MCG/2ML
0.5 SYRINGE (ML) INTRAVENOUS
Qty: 2500 | Refills: 0 | Status: DISCONTINUED | OUTPATIENT
Start: 2025-03-28 | End: 2025-03-30

## 2025-03-28 RX ORDER — PROPOFOL 10 MG/ML
50 INJECTION, EMULSION INTRAVENOUS ONCE
Refills: 0 | Status: DISCONTINUED | OUTPATIENT
Start: 2025-03-28 | End: 2025-03-31

## 2025-03-28 RX ORDER — ALBUTEROL SULFATE 2.5 MG/3ML
10 VIAL, NEBULIZER (ML) INHALATION
Qty: 100 | Refills: 0 | Status: DISCONTINUED | OUTPATIENT
Start: 2025-03-28 | End: 2025-03-29

## 2025-03-28 RX ORDER — INSULIN LISPRO 100 U/ML
INJECTION, SOLUTION INTRAVENOUS; SUBCUTANEOUS
Refills: 0 | Status: DISCONTINUED | OUTPATIENT
Start: 2025-03-28 | End: 2025-03-30

## 2025-03-28 RX ORDER — DEXTROSE 50 % IN WATER 50 %
25 SYRINGE (ML) INTRAVENOUS ONCE
Refills: 0 | Status: DISCONTINUED | OUTPATIENT
Start: 2025-03-28 | End: 2025-04-18

## 2025-03-28 RX ORDER — SODIUM CHLORIDE 9 G/1000ML
1000 INJECTION, SOLUTION INTRAVENOUS
Refills: 0 | Status: DISCONTINUED | OUTPATIENT
Start: 2025-03-28 | End: 2025-04-18

## 2025-03-28 RX ORDER — DEXTROSE 50 % IN WATER 50 %
15 SYRINGE (ML) INTRAVENOUS ONCE
Refills: 0 | Status: DISCONTINUED | OUTPATIENT
Start: 2025-03-28 | End: 2025-04-18

## 2025-03-28 RX ORDER — CEFTRIAXONE 500 MG/1
1000 INJECTION, POWDER, FOR SOLUTION INTRAMUSCULAR; INTRAVENOUS EVERY 24 HOURS
Refills: 0 | Status: DISCONTINUED | OUTPATIENT
Start: 2025-03-28 | End: 2025-03-28

## 2025-03-28 RX ORDER — CISATRACURIUM BESYLATE 10 MG/5ML
3 INJECTION, SOLUTION INTRAVENOUS
Qty: 200 | Refills: 0 | Status: DISCONTINUED | OUTPATIENT
Start: 2025-03-28 | End: 2025-03-30

## 2025-03-28 RX ORDER — PROPOFOL 10 MG/ML
20 INJECTION, EMULSION INTRAVENOUS
Qty: 1000 | Refills: 0 | Status: DISCONTINUED | OUTPATIENT
Start: 2025-03-28 | End: 2025-03-28

## 2025-03-28 RX ORDER — MAGNESIUM SULFATE 500 MG/ML
2 SYRINGE (ML) INJECTION ONCE
Refills: 0 | Status: COMPLETED | OUTPATIENT
Start: 2025-03-28 | End: 2025-03-28

## 2025-03-28 RX ORDER — PROPOFOL 10 MG/ML
50.07 INJECTION, EMULSION INTRAVENOUS
Qty: 1000 | Refills: 0 | Status: DISCONTINUED | OUTPATIENT
Start: 2025-03-28 | End: 2025-04-07

## 2025-03-28 RX ORDER — INFLUENZA A VIRUS A/IDAHO/07/2018 (H1N1) ANTIGEN (MDCK CELL DERIVED, PROPIOLACTONE INACTIVATED, INFLUENZA A VIRUS A/INDIANA/08/2018 (H3N2) ANTIGEN (MDCK CELL DERIVED, PROPIOLACTONE INACTIVATED), INFLUENZA B VIRUS B/SINGAPORE/INFTT-16-0610/2016 ANTIGEN (MDCK CELL DERIVED, PROPIOLACTONE INACTIVATED), INFLUENZA B VIRUS B/IOWA/06/2017 ANTIGEN (MDCK CELL DERIVED, PROPIOLACTONE INACTIVATED) 15; 15; 15; 15 UG/.5ML; UG/.5ML; UG/.5ML; UG/.5ML
0.5 INJECTION, SUSPENSION INTRAMUSCULAR ONCE
Refills: 0 | Status: DISCONTINUED | OUTPATIENT
Start: 2025-03-28 | End: 2025-04-18

## 2025-03-28 RX ORDER — ROCURONIUM BROMIDE 10 MG/ML
100 INJECTION, SOLUTION INTRAVENOUS ONCE
Refills: 0 | Status: COMPLETED | OUTPATIENT
Start: 2025-03-28 | End: 2025-03-28

## 2025-03-28 RX ORDER — CEFTRIAXONE 500 MG/1
1000 INJECTION, POWDER, FOR SOLUTION INTRAMUSCULAR; INTRAVENOUS EVERY 24 HOURS
Refills: 0 | Status: COMPLETED | OUTPATIENT
Start: 2025-03-29 | End: 2025-04-02

## 2025-03-28 RX ORDER — NOREPINEPHRINE BITARTRATE 8 MG
0.05 SOLUTION INTRAVENOUS
Qty: 16 | Refills: 0 | Status: DISCONTINUED | OUTPATIENT
Start: 2025-03-28 | End: 2025-03-30

## 2025-03-28 RX ORDER — PROPOFOL 10 MG/ML
20 INJECTION, EMULSION INTRAVENOUS
Qty: 500 | Refills: 0 | Status: DISCONTINUED | OUTPATIENT
Start: 2025-03-28 | End: 2025-03-28

## 2025-03-28 RX ORDER — INSULIN LISPRO 100 U/ML
INJECTION, SOLUTION INTRAVENOUS; SUBCUTANEOUS AT BEDTIME
Refills: 0 | Status: DISCONTINUED | OUTPATIENT
Start: 2025-03-28 | End: 2025-03-30

## 2025-03-28 RX ORDER — GLUCAGON 3 MG/1
1 POWDER NASAL ONCE
Refills: 0 | Status: DISCONTINUED | OUTPATIENT
Start: 2025-03-28 | End: 2025-04-18

## 2025-03-28 RX ORDER — AZITHROMYCIN 250 MG
500 CAPSULE ORAL ONCE
Refills: 0 | Status: COMPLETED | OUTPATIENT
Start: 2025-03-28 | End: 2025-03-28

## 2025-03-28 RX ORDER — BUDESONIDE 90 UG/1
0.5 AEROSOL, POWDER RESPIRATORY (INHALATION)
Refills: 0 | Status: DISCONTINUED | OUTPATIENT
Start: 2025-03-28 | End: 2025-04-15

## 2025-03-28 RX ORDER — KETAMINE HCL IN 0.9 % NACL 50 MG/5 ML
0.25 SYRINGE (ML) INTRAVENOUS
Qty: 1000 | Refills: 0 | Status: DISCONTINUED | OUTPATIENT
Start: 2025-03-28 | End: 2025-04-02

## 2025-03-28 RX ORDER — LEVOTHYROXINE SODIUM 300 MCG
1 TABLET ORAL
Refills: 0 | DISCHARGE

## 2025-03-28 RX ORDER — METFORMIN HYDROCHLORIDE 850 MG/1
1 TABLET ORAL
Refills: 0 | DISCHARGE

## 2025-03-28 RX ORDER — DEXTROSE 50 % IN WATER 50 %
12.5 SYRINGE (ML) INTRAVENOUS ONCE
Refills: 0 | Status: DISCONTINUED | OUTPATIENT
Start: 2025-03-28 | End: 2025-04-18

## 2025-03-28 RX ORDER — IPRATROPIUM BROMIDE AND ALBUTEROL SULFATE .5; 2.5 MG/3ML; MG/3ML
1 SOLUTION RESPIRATORY (INHALATION)
Refills: 0 | DISCHARGE

## 2025-03-28 RX ORDER — AZITHROMYCIN 250 MG
CAPSULE ORAL
Refills: 0 | Status: COMPLETED | OUTPATIENT
Start: 2025-03-30 | End: 2025-03-30

## 2025-03-28 RX ORDER — METHYLPREDNISOLONE ACETATE 80 MG/ML
60 INJECTION, SUSPENSION INTRA-ARTICULAR; INTRALESIONAL; INTRAMUSCULAR; SOFT TISSUE EVERY 12 HOURS
Refills: 0 | Status: DISCONTINUED | OUTPATIENT
Start: 2025-03-28 | End: 2025-03-31

## 2025-03-28 RX ORDER — AZITHROMYCIN 250 MG
500 CAPSULE ORAL EVERY 24 HOURS
Refills: 0 | Status: COMPLETED | OUTPATIENT
Start: 2025-03-29 | End: 2025-03-30

## 2025-03-28 RX ORDER — HEPARIN SODIUM 1000 [USP'U]/ML
5000 INJECTION INTRAVENOUS; SUBCUTANEOUS EVERY 8 HOURS
Refills: 0 | Status: DISCONTINUED | OUTPATIENT
Start: 2025-03-28 | End: 2025-04-03

## 2025-03-28 RX ADMIN — Medication 7 UNIT(S): at 23:17

## 2025-03-28 RX ADMIN — Medication 1000 MILLILITER(S): at 17:29

## 2025-03-28 RX ADMIN — Medication 8 MG/HR: at 22:05

## 2025-03-28 RX ADMIN — Medication 500 MICROGRAM(S): at 22:03

## 2025-03-28 RX ADMIN — Medication 2.33 MG/KG/HR: at 20:06

## 2025-03-28 RX ADMIN — INSULIN LISPRO 3: 100 INJECTION, SOLUTION INTRAVENOUS; SUBCUTANEOUS at 22:46

## 2025-03-28 RX ADMIN — Medication 150 GRAM(S): at 22:46

## 2025-03-28 RX ADMIN — CISATRACURIUM BESYLATE 18 MICROGRAM(S)/KG/MIN: 10 INJECTION, SOLUTION INTRAVENOUS at 19:29

## 2025-03-28 RX ADMIN — Medication 4.66 MICROGRAM(S)/KG/HR: at 18:53

## 2025-03-28 RX ADMIN — BUDESONIDE 0.5 MILLIGRAM(S): 90 AEROSOL, POWDER RESPIRATORY (INHALATION) at 22:03

## 2025-03-28 RX ADMIN — Medication 500 MICROGRAM(S): at 19:24

## 2025-03-28 RX ADMIN — ROCURONIUM BROMIDE 100 MILLIGRAM(S): 10 INJECTION, SOLUTION INTRAVENOUS at 17:29

## 2025-03-28 RX ADMIN — CISATRACURIUM BESYLATE 18 MICROGRAM(S)/KG/MIN: 10 INJECTION, SOLUTION INTRAVENOUS at 18:11

## 2025-03-28 RX ADMIN — CEFTRIAXONE 100 MILLIGRAM(S): 500 INJECTION, POWDER, FOR SOLUTION INTRAMUSCULAR; INTRAVENOUS at 19:55

## 2025-03-28 RX ADMIN — HEPARIN SODIUM 5000 UNIT(S): 1000 INJECTION INTRAVENOUS; SUBCUTANEOUS at 22:45

## 2025-03-28 RX ADMIN — Medication 250 MILLIGRAM(S): at 19:54

## 2025-03-28 RX ADMIN — METHYLPREDNISOLONE ACETATE 60 MILLIGRAM(S): 80 INJECTION, SUSPENSION INTRA-ARTICULAR; INTRALESIONAL; INTRAMUSCULAR; SOFT TISSUE at 18:53

## 2025-03-28 RX ADMIN — PROPOFOL 28 MICROGRAM(S)/KG/MIN: 10 INJECTION, EMULSION INTRAVENOUS at 19:28

## 2025-03-28 RX ADMIN — PROPOFOL 12 MICROGRAM(S)/KG/MIN: 10 INJECTION, EMULSION INTRAVENOUS at 18:11

## 2025-03-28 RX ADMIN — INSULIN LISPRO 2: 100 INJECTION, SOLUTION INTRAVENOUS; SUBCUTANEOUS at 19:58

## 2025-03-28 RX ADMIN — Medication 4.66 MICROGRAM(S)/KG/HR: at 19:29

## 2025-03-28 NOTE — H&P ADULT - HISTORY OF PRESENT ILLNESS
59[facility] with DM, HTN, [facility] worsening SOB x few daysrequiring in field intubation by EMS with manual ventilation.     Per chart review, afebrile in ED. -124. SBP 87/74 and repeat 143/90. On MV RR 22-24 with Spo2 %. WBC 11.21. POCT 331. AST 77 and ALT 58. VBG pH 6/98/125/189/29/ 95.6 saturation. In ED got NS 1 L x1, continous albuterol nebulizer 4 mL/hr, iptratropimum 500 mg nebulizer x1, rocurinium 100 mg IV x1. Started on propofol drip 12 mL/hr and cisatrocium infusion 18 mL/hr.     Patient unable to engage in interview.     Per collateral with daughter, patient had been having worsening SOB for past few days and some cough. Has been using inhalers and prednisone without improvement. However SOB got worse and on day of presentation was unable to perform ADLs with significant dyspnea and daughter called EMS and when EMS was present, patient had LOC and was intubated on site and had IO placed. Denies fever, chills, nausea/vomit, diarrhea/constipation. Patient had 2 past inpatient admissions for asthma exacerbations within past 3 years but has no previous history of intubation.  59F with DM, HTN, hypothyroid. [facility] worsening SOB x few daysrequiring in field intubation by EMS with manual ventilation.     Per chart review, afebrile in ED. -124. SBP 87/74 and repeat 143/90. On MV RR 22-24 with Spo2 %. WBC 11.21. POCT 331. AST 77 and ALT 58. VBG pH 6/98/125/189/29/ 95.6 saturation. In ED got NS 1 L x1, continous albuterol nebulizer 4 mL/hr, iptratropimum 500 mg nebulizer x1, rocurinium 100 mg IV x1. Started on propofol drip 12 mL/hr and cisatrocium infusion 18 mL/hr.     Patient unable to engage in interview.     Per collateral with daughter, patient had been having worsening SOB for past few days and some cough. Has been using inhalers and prednisone without improvement. However SOB got worse and on day of presentation was unable to perform ADLs with significant dyspnea and daughter called EMS and when EMS was present, patient had LOC and was intubated on site and had IO placed. Denies fever, chills, nausea/vomit, diarrhea/constipation. Patient had 2 past inpatient admissions for asthma exacerbations within past 3 years but has no previous history of intubation.

## 2025-03-28 NOTE — H&P ADULT - ATTENDING COMMENTS
59F with HTN, DM2, hypothyroid, and asthma here for status asthmaticus, found to be in acute hypercapnic respiratory failure.  vent support, serial blood gas, titrate vent settings as needed, serial blood gas. A line placed  sedated with propofol, fentanyl, ketamine, paralyzed with nimbex   continuous nebs, atrovent, steroids, Pulmicort  ceftriaxone and azithro, Full RVP, pancx, procal, trend lactate given continuous nebs  ecmo aware   TTE, trend CE  jonas strict I and O  RISS   DVT ppx heparin   Full code

## 2025-03-28 NOTE — H&P ADULT - NSICDXPASTMEDICALHX_GEN_ALL_CORE_FT
PAST MEDICAL HISTORY:  Asthma     Diabetes mellitus     Ectopic pregnancy     Hypertension      PAST MEDICAL HISTORY:  Asthma     Diabetes mellitus     Ectopic pregnancy     Hypertension     Hypothyroid

## 2025-03-28 NOTE — ED PROVIDER NOTE - ATTENDING CONTRIBUTION TO CARE
59F h/o DM HTN Asthma p/w notification BIBEMS.  Per EMS pt was feeling SOB the last 3 days and was using nebs frequently.  Pt had visited [facility] 1 month ago (different MRN), and was discharged.  When EMS called pt was hypoxic to 85% on nebulizer.  Pt received IM epi.  Pt became altered and EMS intubated pt due to "swollen airway" - they report no rash and no history of anaphylaxis.  EMS reports NKDA.  Intubation was difficult and they were only able to get a 6.5Fr ETT in.  Her capnography at the time was 99.  They also gave magnesium 2g, dexamethasone 12mg, IM versed, etomidate 30mg for intubation, fentanyl 100mcg after intubation.  EMS reports FS was 350.  They also obtained EKG and were concerned due to possible МАРИНА v1v2 with STD laterally.  EKG here ST at 123 no марина.  mild STD v4-v6.  qtc 481.  Likely demand ischemia.  On exam intubated and sedated.  Tachypneic to 30-40.  Sat adequate.  Somewhat difficult bagging.  Bilat breath rise and fall, diffuse wheezing. Nontender abd, no LE edema.  Impression severe asthma exacerbation.  AMS likely r/t CO2 narcosis.  Pt placed on vent with high peak pressures ~ 100. MICU called immediately.  CXR clear lungs - ETT looks in place.  MICU recc 1:3 I : E ratio, low RR.  Rx fluids.  If BP low would consider Epi gtt.  Admit to MICU.  MICU will perform tube exchange.    NOTE INCOMPLETE 59F h/o DM HTN Asthma p/w notification BIBEMS.  Per EMS pt was feeling SOB the last 3 days and was using nebs frequently.  Pt had visited [facility] 1 month ago (different MRN), and was discharged.  When EMS called pt was hypoxic to 85% on nebulizer.  Pt received IM epi.  Pt became altered and EMS intubated pt due to "swollen airway" - they report no rash and no history of anaphylaxis.  EMS reports NKDA.  Intubation was difficult and they were only able to get a 6.5Fr ETT in.  Her capnography at the time was 99.  They also gave magnesium 2g, dexamethasone 12mg, IM versed, etomidate 30mg for intubation, fentanyl 100mcg after intubation.  EMS reports FS was 350.  They also obtained EKG and were concerned due to possible МАРИНА v1v2 with STD laterally.  EKG here ST at 123 no марина.  mild STD v4-v6.  qtc 481.  Likely demand ischemia.  On exam intubated and sedated.  Tachypneic to 30-40.  Sat adequate.  Somewhat difficult bagging.  Bilat breath rise and fall, diffuse wheezing. Nontender abd, no LE edema.  Impression severe asthma exacerbation.  AMS likely r/t CO2 narcosis.  Pt placed on vent with high peak pressures ~ 100. MICU called immediately.  CXR clear lungs - ETT looks in place.  MICU recc 1:3 I : E ratio, low RR.  Rx fluids.  If BP low would consider Epi gtt.  MICU recc paralysis and sedation to facilitate ventilation, done with MICU in room.  Continuous nebs via ETT.  Admit to MICU.  MICU will perform tube exchange.    VS:  tachycardia tachypnea    GEN - Intubated, sedated, no purposeful movement, no response to voice.  Eyes closed.  Withdraws all 4 to pain.  Moderately increased respiratory effort despite intubation.  Difficult to bag.    HEAD - NC/AT     ENT - PEERL, EOMI, mucous membranes    moist , no discharge      NECK: Neck supple, non-tender without lymphadenopathy, no masses, no JVD  PULM -Bilateral wheezing,  symmetric breath sounds  COR -  normal heart sounds    ABD - , ND, NT, soft,  BACK - no CVA tenderness, nontender spine     EXTREMS - no edema, no deformity, warm and well perfused    SKIN - no rash    or bruising      NEUROLOGIC - Intubated, sedated, no purposeful movement, no response to voice.  Eyes closed.  Withdraws all 4 to pain.    Upon my evaluation, this patient had a high probability of imminent or life-threatening deterioration due to status asthmaticus with hypercarbic respiratory failure, which required my direct attention, intervention, and personal management.  The patient has a  medical condition that impairs one or more vital organ systems.  Frequent personal assessment and adjustment of medical interventions was performed.      I have personally provided 70 minutes of critical care time exclusive of time spent on separately billable procedures. Time includes review of laboratory data, radiology results, discussion with consultants, patient and family; monitoring for potential decompensation, as well as time spent retrieving data and reviewing the chart and documenting the visit. Interventions were performed as documented above.

## 2025-03-28 NOTE — ED PROVIDER NOTE - CARE PLAN
1 Principal Discharge DX:	Asthma without status asthmaticus with acute exacerbation   Principal Discharge DX:	Unspecified asthma with status asthmaticus  Secondary Diagnosis:	Acute respiratory failure with hypercapnia

## 2025-03-28 NOTE — ED PROVIDER NOTE - PHYSICAL EXAMINATION
VS:  tachycardia tachypnea    GEN - Intubated, sedated, no purposeful movement, no response to voice.  Eyes closed.  Withdraws all 4 to pain.  Moderately increased respiratory effort despite intubation.  Difficult to bag.    HEAD - NC/AT     ENT - PEERL, EOMI, mucous membranes    moist , no discharge      NECK: Neck supple, non-tender without lymphadenopathy, no masses, no JVD  PULM -Bilateral wheezing,  symmetric breath sounds  COR -  normal heart sounds    ABD - , ND, NT, soft,  BACK - no CVA tenderness, nontender spine     EXTREMS - no edema, no deformity, warm and well perfused    SKIN - no rash    or bruising      NEUROLOGIC - Intubated, sedated, no purposeful movement, no response to voice.  Eyes closed.  Withdraws all 4 to pain.

## 2025-03-28 NOTE — ED ADULT TRIAGE NOTE - CHIEF COMPLAINT QUOTE
notification for SOB and seizure like activity after receiving 0.3 mg of epi IM for Asthma exacerbation, pt is intubated, manual ventilations by EMS ongoing, sent directly to [facility]

## 2025-03-28 NOTE — ED PROVIDER NOTE - OBJECTIVE STATEMENT
59F h/o DM HTN Asthma p/w notification BIBEMS.  Per EMS pt was feeling SOB the last 3 days and was using nebs frequently.  Pt had visited [facility] 1 month ago (different MRN), and was discharged.  When EMS called pt was hypoxic to 85% on nebulizer.  Pt received IM epi.  Pt became altered and EMS intubated pt due to "swollen airway" - they report no rash and no history of anaphylaxis.  EMS reports NKDA.  Intubation was difficult and they were only able to get a 6.5Fr ETT in.  Her capnography at the time was 99.  They also gave magnesium 2g, dexamethasone 12mg, IM versed, etomidate 30mg for intubation, fentanyl 100mcg after intubation.  EMS reports FS was 350.  They also obtained EKG and were concerned due to possible МАРИНА v1v2 with STD laterally.  EKG here ST at 123 no марина.  mild STD v4-v6.  qtc 481.  Likely demand ischemia.  On exam intubated and sedated.  Tachypneic to 30-40.  Sat adequate.  Somewhat difficult bagging.  Bilat breath rise and fall, diffuse wheezing. Nontender abd, no LE edema.  Impression severe asthma exacerbation.  AMS likely r/t CO2 narcosis.  Pt placed on vent with high peak pressures ~ 100. MICU called immediately.  CXR clear lungs - ETT looks in place.  MICU recc 1:3 I : E ratio, low RR.  Rx fluids.  If BP low would consider Epi gtt.  Admit to MICU.  MICU will perform tube exchange.    NOTE INCOMPLETE 59F h/o DM HTN Asthma p/w notification BIBEMS.  Per EMS pt was feeling SOB the last 3 days and was using nebs frequently.  Pt had visited [facility] 1 month ago (different MRN), and was discharged.  When EMS called pt was hypoxic to 85% on nebulizer.  Pt received IM epi.  Pt became altered and EMS intubated pt due to "swollen airway" - they report no rash and no history of anaphylaxis.  EMS reports NKDA.  Intubation was difficult and they were only able to get a 6.5Fr ETT in.  Her capnography at the time was 99.  They also gave magnesium 2g, dexamethasone 12mg, IM versed, etomidate 30mg for intubation, fentanyl 100mcg after intubation.  EMS reports FS was 350.  They also obtained EKG and were concerned due to possible МАРИНА v1v2 with STD laterally.  EKG here ST at 123 no марина.  mild STD v4-v6.  qtc 481.  Likely demand ischemia.  On exam intubated and sedated.  Tachypneic to 30-40.  Sat adequate.  Somewhat difficult bagging.  Bilat breath rise and fall, diffuse wheezing. Nontender abd, no LE edema.  Impression severe asthma exacerbation.  AMS likely r/t CO2 narcosis.  Pt placed on vent with high peak pressures ~ 100. MICU called immediately.  CXR clear lungs - ETT looks in place.  MICU recc 1:3 I : E ratio, low RR.  Rx fluids.  If BP low would consider Epi gtt.  Admit to MICU.  MICU will perform tube exchange.

## 2025-03-28 NOTE — H&P ADULT - ASSESSMENT
59F with HTN, DM2 and asthma here for status asthmaticus, unknown etiology.     NEUROLOGIC   Not at baseline mental status. On sedation and medical paralysis.   -Baseline AAOx3   -C/w propofol; wean as tolerated   -C/w cisatracurium; wean as tolerated     CARDIOVASCULAR   -JS     #HTN   Normotensive.     RESPIRATORY   #Status asthmaticus   Intubated.   -C/w mechanical ventilator  -Daily spontaneous breathing trials   -Initiate methylprednisolone 32 mg IV qd (start 3/28)   -C/w albuterol-ipratropium 3 mL nebulizer q4 for bronchodilation      GASTROINTESTINAL   -JS     GENITOURINARY/RENAL   -JS     ENDOCRINE   #DM   -NPO   -Low ISS, q6 fingersticks     INFECTIOUS DISEASE   Not meeting criteria for SIRS. However unclear trigger for asthma.   -WBC on admission 11.21 and afebrile   -F/u blood cx   -F/u UA and urine cx   -Negative viral panel     HEMATOLOGIC/ONCOLOGIC   -JS     PROPHYLACTIC   -DVT ppx: Enoxaparin 40 mg sq qd     ETHICS   -HCP daughter   -Full code    59F with HTN, DM2 and asthma here for status asthmaticus, unknown etiology.     NEUROLOGIC   Not at baseline mental status. On sedation and medical paralysis.   -Baseline AAOx3   -C/w propofol; wean as tolerated   -C/w cisatracurium; wean as tolerated     CARDIOVASCULAR   -JS     #HTN   Normotensive.     RESPIRATORY   #Status asthmaticus   Intubated.   -C/w mechanical ventilator  -Daily spontaneous breathing trials   -Initiate methylprednisolone 32 mg IV qd (start 3/28)   -C/w albuterol-ipratropium 3 mL nebulizer q4 for bronchodilation      GASTROINTESTINAL   -JS     GENITOURINARY/RENAL   #Respiratory acidosis 2/2 hypercarbia   -On admission, VBG pH 6.98 and PCO2 125   -Treat with mechanical ventilation for exhalation     ENDOCRINE   #DM   -NPO   -Low ISS, q6 fingersticks     INFECTIOUS DISEASE   Not meeting criteria for SIRS. However unclear trigger for asthma.   -WBC on admission 11.21 and afebrile   -F/u blood cx   -F/u UA and urine cx   -Negative viral panel     HEMATOLOGIC/ONCOLOGIC   -JS     PROPHYLACTIC   -DVT ppx: Enoxaparin 40 mg sq qd     ETHICS   -HCP daughter   -Full code    59F with HTN, DM2, hypothyroid, and asthma here for status asthmaticus, unknown etiology.     NEUROLOGIC   Not at baseline mental status. On sedation and medical paralysis.   -Baseline AAOx3   -C/w propofol; wean as tolerated   -C/w cisatracurium; wean as tolerated   -C/w fentanyl; wean as tolerated   -C/w ketamine; wean as tolerated     CARDIOVASCULAR   -JS     #HTN   Normotensive.   -Hold home lisionpril 10 mg po qd   -S/p 1 L bolus in ED     RESPIRATORY   #Status asthmaticus   Intubated.   -C/w mechanical ventilator  -Daily spontaneous breathing trials   -Initiate methylprednisolone 60 mg IV qd (start 3/28)   -C/w albuterol nebulizer continous     GASTROINTESTINAL   -JS   -NPO     GENITOURINARY/RENAL   #Respiratory acidosis 2/2 hypercarbia   -On admission, VBG pH 6.98 and PCO2 125   -Treat with mechanical ventilation for exhalation   -Serial ABGs     ENDOCRINE   #DM   -NPO   -Low ISS, q6 fingersticks   -Hold home metformin 500 mg po qd     #Hypothyroid   -Hold home levothyroxine 75 cg po qd     INFECTIOUS DISEASE   Not meeting criteria for SIRS. However unclear trigger for asthma.   -WBC on admission 11.21 and afebrile   -F/u blood cx   -F/u UA and urine cx   -Negative viral panel   -Empiric ceftriaxone 1000 mg IV qd x 5 days (started 3/28)   -Empiric azithromycin 500 mg IV qd x 3 days (started 3/28)     HEMATOLOGIC/ONCOLOGIC   -JS     PROPHYLACTIC   -DVT ppx: heparin 5000U sq q8     ETHICS   -HCP daughter   -Full code    59F with HTN, DM2, hypothyroid, and asthma here for status asthmaticus, unknown etiology.     NEUROLOGIC   Not at baseline mental status. On sedation and medical paralysis.   -Baseline AAOx3   -C/w propofol; wean as tolerated   -C/w cisatracurium; wean as tolerated   -C/w fentanyl; wean as tolerated   -C/w ketamine; wean as tolerated     CARDIOVASCULAR   -JS     #HTN   Normotensive.   -Hold home lisionpril 10 mg po qd   -S/p 1 L bolus in ED     RESPIRATORY   #Status asthmaticus   Intubated.   -C/w mechanical ventilator  -Daily spontaneous breathing trials   -Initiate methylprednisolone 60 mg IV q12h(start 3/28)   -C/w albuterol nebulizer continous     GASTROINTESTINAL   -JS   -NPO     GENITOURINARY/RENAL   #Respiratory acidosis 2/2 hypercarbia   -On admission, VBG pH 6.98 and PCO2 125   -Treat with mechanical ventilation for exhalation   -Serial ABGs     ENDOCRINE   #DM   -NPO   -Low ISS, q6 fingersticks   -Hold home metformin 500 mg po qd     #Hypothyroid   -Hold home levothyroxine 75 cg po qd     INFECTIOUS DISEASE   Not meeting criteria for SIRS. However unclear trigger for asthma.   -WBC on admission 11.21 and afebrile   -F/u blood cx   -F/u UA and urine cx   -Negative viral panel   -Empiric ceftriaxone 1000 mg IV qd x 5 days (started 3/28)   -Empiric azithromycin 500 mg IV qd x 3 days (started 3/28)     HEMATOLOGIC/ONCOLOGIC   -JS     PROPHYLACTIC   -DVT ppx: heparin 5000U sq q8     ETHICS   -HCP daughter   -Full code

## 2025-03-28 NOTE — ED PROVIDER NOTE - PRINCIPAL DIAGNOSIS
Asthma without status asthmaticus with acute exacerbation Unspecified asthma with status asthmaticus

## 2025-03-28 NOTE — ED PROVIDER NOTE - CLINICAL SUMMARY MEDICAL DECISION MAKING FREE TEXT BOX
59F h/o DM HTN Asthma p/w notification BIBEMS.  Per EMS pt was feeling SOB the last 3 days and was using nebs frequently.  Pt had visited [facility] 1 month ago (different MRN), and was discharged.  When EMS called pt was hypoxic to 85% on nebulizer.  Pt received IM epi.  Pt became altered and EMS intubated pt due to "swollen airway" - they report no rash and no history of anaphylaxis.  EMS reports NKDA.  Intubation was difficult and they were only able to get a 6.5Fr ETT in.  Her capnography at the time was 99.  They also gave magnesium 2g, dexamethasone 12mg, IM versed, etomidate 30mg for intubation, fentanyl 100mcg after intubation.  EMS reports FS was 350.  They also obtained EKG and were concerned due to possible МАРИНА v1v2 with STD laterally.  EKG here ST at 123 no марина.  mild STD v4-v6.  qtc 481.  Likely demand ischemia.  On exam intubated and sedated.  Tachypneic to 30-40.  Sat adequate.  Somewhat difficult bagging.  Bilat breath rise and fall, diffuse wheezing. Nontender abd, no LE edema.  Impression severe asthma exacerbation.  AMS likely r/t CO2 narcosis.  Pt placed on vent with high peak pressures ~ 100. MICU called immediately.  CXR clear lungs - ETT looks in place.  MICU recc 1:3 I : E ratio, low RR.  Rx fluids.  If BP low would consider Epi gtt.  Admit to MICU.  MICU will perform tube exchange.    NOTE INCOMPLETE 59F h/o DM HTN Asthma p/w notification BIBEMS.  Per EMS pt was feeling SOB the last 3 days and was using nebs frequently.  Pt had visited [facility] 1 month ago (different MRN), and was discharged.  When EMS called pt was hypoxic to 85% on nebulizer.  Pt received IM epi.  Pt became altered and EMS intubated pt due to "swollen airway" - they report no rash and no history of anaphylaxis.  EMS reports NKDA.  Intubation was difficult and they were only able to get a 6.5Fr ETT in.  Her capnography at the time was 99.  They also gave magnesium 2g, dexamethasone 12mg, IM versed, etomidate 30mg for intubation, fentanyl 100mcg after intubation.  EMS reports FS was 350.  They also obtained EKG and were concerned due to possible МАРИНА v1v2 with STD laterally.  EKG here ST at 123 no марина.  mild STD v4-v6.  qtc 481.  Likely demand ischemia.  On exam intubated and sedated.  Tachypneic to 30-40.  Sat adequate.  Somewhat difficult bagging.  Bilat breath rise and fall, diffuse wheezing. Nontender abd, no LE edema.  Impression severe asthma exacerbation.  AMS likely r/t CO2 narcosis.  Pt placed on vent with high peak pressures ~ 100. MICU called immediately.  CXR clear lungs - ETT looks in place.  MICU recc 1:3 I : E ratio, low RR.  Rx fluids.  If BP low would consider Epi gtt.  Admit to MICU.  MICU will perform tube exchange.

## 2025-03-28 NOTE — ED ADULT NURSE NOTE - OBJECTIVE STATEMENT
pt received to [facility] , a&ox 4 at baseline arrives intubated and sedated  , ambulatory  at baseline , phx of HTN DM 2 and Asthma  , p/w SOB x 3 day with self medication of nebulization with out improvement  .  pt IO placed L tibia given duoneb, 0.3 epi IM, 12mg decadron, 30mg etomidate, 2 mag iv, 100mcg fentanyl, 5mg versed in the field . pt was intubated in the field with ET tube 6.5 with 22 at the lip. NSR on cardiac monitor.  IV placed R thumb and L foot by RN . Labs collected and sent. EKG in chart. pending lab R and   MICU eval .

## 2025-03-28 NOTE — H&P ADULT - NSHPLABSRESULTS_GEN_ALL_CORE
15.2   11.21 )-----------( 225      ( 28 Mar 2025 16:39 )             48.4       03-28    133[L]  |  101  |  8   ----------------------------<  359[H]  TNP   |  18[L]  |  0.92    Ca    8.7      28 Mar 2025 16:39    TPro  7.7  /  Alb  3.8  /  TBili  <0.2  /  DBili  x   /  AST  77[H]  /  ALT  58[H]  /  AlkPhos  77  03-28              Urinalysis Basic - ( 28 Mar 2025 16:39 )    Color: x / Appearance: x / SG: x / pH: x  Gluc: 359 mg/dL / Ketone: x  / Bili: x / Urobili: x   Blood: x / Protein: x / Nitrite: x   Leuk Esterase: x / RBC: x / WBC x   Sq Epi: x / Non Sq Epi: x / Bacteria: x        PT/INR - ( 28 Mar 2025 16:39 )   PT: 12.1 sec;   INR: 1.02 ratio         PTT - ( 28 Mar 2025 16:39 )  PTT:39.4 sec    Lactate Trend            CAPILLARY BLOOD GLUCOSE      POCT Blood Glucose.: 331 mg/dL (28 Mar 2025 17:03)

## 2025-03-28 NOTE — H&P ADULT - NSHPPHYSICALEXAM_GEN_ALL_CORE
General: intubated, sedation, overweight body habitus   Neuro: sedated   HEENT: intubated   Heart: S1/S2   Lungs: diffuse wheezing   Abd: soft, nonTTP, nondistended   Ext: no pretibial edema   Lines/tubes/drains: A line and central line   Psych: limited

## 2025-03-28 NOTE — ED PROVIDER NOTE - NSICDXPASTMEDICALHX_GEN_ALL_CORE_FT
PAST MEDICAL HISTORY:  Asthma     Diabetes mellitus     Ectopic pregnancy     Hypertension     Hypothyroid

## 2025-03-29 ENCOUNTER — RESULT REVIEW (OUTPATIENT)
Age: 60
End: 2025-03-29

## 2025-03-29 LAB
ADD ON TEST-SPECIMEN IN LAB: SIGNIFICANT CHANGE UP
ADD ON TEST-SPECIMEN IN LAB: SIGNIFICANT CHANGE UP
ALBUMIN SERPL ELPH-MCNC: 3.5 G/DL — SIGNIFICANT CHANGE UP (ref 3.3–5)
ALBUMIN SERPL ELPH-MCNC: 4.1 G/DL — SIGNIFICANT CHANGE UP (ref 3.3–5)
ALBUMIN SERPL ELPH-MCNC: 4.1 G/DL — SIGNIFICANT CHANGE UP (ref 3.3–5)
ALP SERPL-CCNC: 55 U/L — SIGNIFICANT CHANGE UP (ref 40–120)
ALP SERPL-CCNC: 67 U/L — SIGNIFICANT CHANGE UP (ref 40–120)
ALP SERPL-CCNC: 72 U/L — SIGNIFICANT CHANGE UP (ref 40–120)
ALT FLD-CCNC: 48 U/L — HIGH (ref 4–33)
ALT FLD-CCNC: 60 U/L — HIGH (ref 4–33)
ALT FLD-CCNC: 62 U/L — HIGH (ref 4–33)
ANION GAP SERPL CALC-SCNC: 11 MMOL/L — SIGNIFICANT CHANGE UP (ref 7–14)
ANION GAP SERPL CALC-SCNC: 19 MMOL/L — HIGH (ref 7–14)
ANION GAP SERPL CALC-SCNC: 19 MMOL/L — HIGH (ref 7–14)
APTT BLD: 28.7 SEC — SIGNIFICANT CHANGE UP (ref 24.5–35.6)
AST SERPL-CCNC: 36 U/L — HIGH (ref 4–32)
AST SERPL-CCNC: 60 U/L — HIGH (ref 4–32)
AST SERPL-CCNC: 73 U/L — HIGH (ref 4–32)
B-OH-BUTYR SERPL-SCNC: <0 MMOL/L — SIGNIFICANT CHANGE UP (ref 0–0.4)
BILIRUB SERPL-MCNC: 0.2 MG/DL — SIGNIFICANT CHANGE UP (ref 0.2–1.2)
BILIRUB SERPL-MCNC: 0.3 MG/DL — SIGNIFICANT CHANGE UP (ref 0.2–1.2)
BILIRUB SERPL-MCNC: <0.2 MG/DL — SIGNIFICANT CHANGE UP (ref 0.2–1.2)
BLD GP AB SCN SERPL QL: NEGATIVE — SIGNIFICANT CHANGE UP
BLOOD GAS ARTERIAL COMPREHENSIVE RESULT: SIGNIFICANT CHANGE UP
BUN SERPL-MCNC: 10 MG/DL — SIGNIFICANT CHANGE UP (ref 7–23)
BUN SERPL-MCNC: 10 MG/DL — SIGNIFICANT CHANGE UP (ref 7–23)
BUN SERPL-MCNC: 9 MG/DL — SIGNIFICANT CHANGE UP (ref 7–23)
CALCIUM SERPL-MCNC: 8 MG/DL — LOW (ref 8.4–10.5)
CALCIUM SERPL-MCNC: 8.4 MG/DL — SIGNIFICANT CHANGE UP (ref 8.4–10.5)
CALCIUM SERPL-MCNC: 8.5 MG/DL — SIGNIFICANT CHANGE UP (ref 8.4–10.5)
CHLORIDE SERPL-SCNC: 100 MMOL/L — SIGNIFICANT CHANGE UP (ref 98–107)
CHLORIDE SERPL-SCNC: 111 MMOL/L — HIGH (ref 98–107)
CHLORIDE SERPL-SCNC: 99 MMOL/L — SIGNIFICANT CHANGE UP (ref 98–107)
CK MB BLD-MCNC: 3.5 % — HIGH (ref 0–2.5)
CK MB CFR SERPL CALC: 11.7 NG/ML — HIGH
CK MB CFR SERPL CALC: 8.8 NG/ML — HIGH
CK SERPL-CCNC: 252 U/L — HIGH (ref 25–170)
CO2 SERPL-SCNC: 17 MMOL/L — LOW (ref 22–31)
CO2 SERPL-SCNC: 18 MMOL/L — LOW (ref 22–31)
CO2 SERPL-SCNC: 19 MMOL/L — LOW (ref 22–31)
CREAT SERPL-MCNC: 0.92 MG/DL — SIGNIFICANT CHANGE UP (ref 0.5–1.3)
CREAT SERPL-MCNC: 1.13 MG/DL — SIGNIFICANT CHANGE UP (ref 0.5–1.3)
CREAT SERPL-MCNC: 1.16 MG/DL — SIGNIFICANT CHANGE UP (ref 0.5–1.3)
EGFR: 54 ML/MIN/1.73M2 — LOW
EGFR: 54 ML/MIN/1.73M2 — LOW
EGFR: 56 ML/MIN/1.73M2 — LOW
EGFR: 56 ML/MIN/1.73M2 — LOW
EGFR: 71 ML/MIN/1.73M2 — SIGNIFICANT CHANGE UP
EGFR: 71 ML/MIN/1.73M2 — SIGNIFICANT CHANGE UP
GLUCOSE BLDC GLUCOMTR-MCNC: 125 MG/DL — HIGH (ref 70–99)
GLUCOSE BLDC GLUCOMTR-MCNC: 188 MG/DL — HIGH (ref 70–99)
GLUCOSE BLDC GLUCOMTR-MCNC: 226 MG/DL — HIGH (ref 70–99)
GLUCOSE SERPL-MCNC: 134 MG/DL — HIGH (ref 70–99)
GLUCOSE SERPL-MCNC: 239 MG/DL — HIGH (ref 70–99)
GLUCOSE SERPL-MCNC: 428 MG/DL — HIGH (ref 70–99)
HCT VFR BLD CALC: 44.7 % — SIGNIFICANT CHANGE UP (ref 34.5–45)
HGB BLD-MCNC: 13.9 G/DL — SIGNIFICANT CHANGE UP (ref 11.5–15.5)
INR BLD: 0.98 RATIO — SIGNIFICANT CHANGE UP (ref 0.85–1.16)
LEGIONELLA AG UR QL: NEGATIVE — SIGNIFICANT CHANGE UP
MAGNESIUM SERPL-MCNC: 2.7 MG/DL — HIGH (ref 1.6–2.6)
MAGNESIUM SERPL-MCNC: 3.2 MG/DL — HIGH (ref 1.6–2.6)
MCHC RBC-ENTMCNC: 31 PG — SIGNIFICANT CHANGE UP (ref 27–34)
MCHC RBC-ENTMCNC: 31.1 G/DL — LOW (ref 32–36)
MCV RBC AUTO: 99.8 FL — SIGNIFICANT CHANGE UP (ref 80–100)
NRBC # BLD AUTO: 0 K/UL — SIGNIFICANT CHANGE UP (ref 0–0)
NRBC # FLD: 0 K/UL — SIGNIFICANT CHANGE UP (ref 0–0)
NRBC BLD AUTO-RTO: 0 /100 WBCS — SIGNIFICANT CHANGE UP (ref 0–0)
NT-PROBNP SERPL-SCNC: 2281 PG/ML — HIGH
PHOSPHATE SERPL-MCNC: 2.9 MG/DL — SIGNIFICANT CHANGE UP (ref 2.5–4.5)
PHOSPHATE SERPL-MCNC: 3.8 MG/DL — SIGNIFICANT CHANGE UP (ref 2.5–4.5)
PLATELET # BLD AUTO: 258 K/UL — SIGNIFICANT CHANGE UP (ref 150–400)
POTASSIUM SERPL-MCNC: 3.4 MMOL/L — LOW (ref 3.5–5.3)
POTASSIUM SERPL-MCNC: 3.8 MMOL/L — SIGNIFICANT CHANGE UP (ref 3.5–5.3)
POTASSIUM SERPL-MCNC: 4.2 MMOL/L — SIGNIFICANT CHANGE UP (ref 3.5–5.3)
POTASSIUM SERPL-SCNC: 3.4 MMOL/L — LOW (ref 3.5–5.3)
POTASSIUM SERPL-SCNC: 3.8 MMOL/L — SIGNIFICANT CHANGE UP (ref 3.5–5.3)
POTASSIUM SERPL-SCNC: 4.2 MMOL/L — SIGNIFICANT CHANGE UP (ref 3.5–5.3)
PROT SERPL-MCNC: 6 G/DL — SIGNIFICANT CHANGE UP (ref 6–8.3)
PROT SERPL-MCNC: 6.9 G/DL — SIGNIFICANT CHANGE UP (ref 6–8.3)
PROT SERPL-MCNC: 7 G/DL — SIGNIFICANT CHANGE UP (ref 6–8.3)
PROTHROM AB SERPL-ACNC: 11.7 SEC — SIGNIFICANT CHANGE UP (ref 9.9–13.4)
RBC # BLD: 4.48 M/UL — SIGNIFICANT CHANGE UP (ref 3.8–5.2)
RBC # FLD: 14 % — SIGNIFICANT CHANGE UP (ref 10.3–14.5)
RH IG SCN BLD-IMP: POSITIVE — SIGNIFICANT CHANGE UP
SODIUM SERPL-SCNC: 136 MMOL/L — SIGNIFICANT CHANGE UP (ref 135–145)
SODIUM SERPL-SCNC: 136 MMOL/L — SIGNIFICANT CHANGE UP (ref 135–145)
SODIUM SERPL-SCNC: 141 MMOL/L — SIGNIFICANT CHANGE UP (ref 135–145)
TROPONIN T, HIGH SENSITIVITY RESULT: 180 NG/L — CRITICAL HIGH
TROPONIN T, HIGH SENSITIVITY RESULT: 254 NG/L — CRITICAL HIGH
WBC # BLD: 23.39 K/UL — HIGH (ref 3.8–10.5)
WBC # FLD AUTO: 23.39 K/UL — HIGH (ref 3.8–10.5)

## 2025-03-29 RX ORDER — ALBUTEROL SULFATE 2.5 MG/3ML
2.5 VIAL, NEBULIZER (ML) INHALATION
Refills: 0 | Status: DISCONTINUED | OUTPATIENT
Start: 2025-03-29 | End: 2025-03-29

## 2025-03-29 RX ORDER — ALBUTEROL SULFATE 2.5 MG/3ML
2.5 VIAL, NEBULIZER (ML) INHALATION ONCE
Refills: 0 | Status: COMPLETED | OUTPATIENT
Start: 2025-03-29 | End: 2025-03-29

## 2025-03-29 RX ORDER — MIDAZOLAM IN 0.9 % SOD.CHLORID 1 MG/ML
0.02 PLASTIC BAG, INJECTION (ML) INTRAVENOUS
Qty: 100 | Refills: 0 | Status: DISCONTINUED | OUTPATIENT
Start: 2025-03-29 | End: 2025-04-05

## 2025-03-29 RX ORDER — LEVOTHYROXINE SODIUM 300 MCG
55 TABLET ORAL AT BEDTIME
Refills: 0 | Status: DISCONTINUED | OUTPATIENT
Start: 2025-03-29 | End: 2025-03-31

## 2025-03-29 RX ORDER — ROCURONIUM BROMIDE 10 MG/ML
100 INJECTION, SOLUTION INTRAVENOUS ONCE
Refills: 0 | Status: COMPLETED | OUTPATIENT
Start: 2025-03-29 | End: 2025-03-29

## 2025-03-29 RX ORDER — SODIUM CHLORIDE 9 G/1000ML
500 INJECTION, SOLUTION INTRAVENOUS ONCE
Refills: 0 | Status: COMPLETED | OUTPATIENT
Start: 2025-03-29 | End: 2025-03-29

## 2025-03-29 RX ORDER — CISATRACURIUM BESYLATE 10 MG/5ML
20 INJECTION, SOLUTION INTRAVENOUS ONCE
Refills: 0 | Status: COMPLETED | OUTPATIENT
Start: 2025-03-29 | End: 2025-03-29

## 2025-03-29 RX ORDER — BUMETANIDE 1 MG/1
1 TABLET ORAL ONCE
Refills: 0 | Status: COMPLETED | OUTPATIENT
Start: 2025-03-29 | End: 2025-03-29

## 2025-03-29 RX ORDER — MIDAZOLAM IN 0.9 % SOD.CHLORID 1 MG/ML
4 PLASTIC BAG, INJECTION (ML) INTRAVENOUS ONCE
Refills: 0 | Status: DISCONTINUED | OUTPATIENT
Start: 2025-03-29 | End: 2025-03-29

## 2025-03-29 RX ADMIN — ROCURONIUM BROMIDE 100 MILLIGRAM(S): 10 INJECTION, SOLUTION INTRAVENOUS at 05:15

## 2025-03-29 RX ADMIN — BUMETANIDE 1 MILLIGRAM(S): 1 TABLET ORAL at 10:39

## 2025-03-29 RX ADMIN — PROPOFOL 28 MICROGRAM(S)/KG/MIN: 10 INJECTION, EMULSION INTRAVENOUS at 03:32

## 2025-03-29 RX ADMIN — Medication 7 UNIT(S): at 06:07

## 2025-03-29 RX ADMIN — Medication 2.33 MG/KG/HR: at 19:05

## 2025-03-29 RX ADMIN — CISATRACURIUM BESYLATE 20 MILLIGRAM(S): 10 INJECTION, SOLUTION INTRAVENOUS at 04:41

## 2025-03-29 RX ADMIN — Medication 1.86 MG/KG/HR: at 04:42

## 2025-03-29 RX ADMIN — Medication 500 MICROGRAM(S): at 19:12

## 2025-03-29 RX ADMIN — NOREPINEPHRINE BITARTRATE 4.37 MICROGRAM(S)/KG/MIN: 8 SOLUTION at 07:39

## 2025-03-29 RX ADMIN — Medication 4.66 MICROGRAM(S)/KG/HR: at 19:05

## 2025-03-29 RX ADMIN — METHYLPREDNISOLONE ACETATE 60 MILLIGRAM(S): 80 INJECTION, SUSPENSION INTRA-ARTICULAR; INTRALESIONAL; INTRAMUSCULAR; SOFT TISSUE at 05:09

## 2025-03-29 RX ADMIN — Medication 1 APPLICATION(S): at 05:08

## 2025-03-29 RX ADMIN — Medication 100 MILLIEQUIVALENT(S): at 07:45

## 2025-03-29 RX ADMIN — NOREPINEPHRINE BITARTRATE 4.37 MICROGRAM(S)/KG/MIN: 8 SOLUTION at 03:32

## 2025-03-29 RX ADMIN — Medication 100 MILLIEQUIVALENT(S): at 06:34

## 2025-03-29 RX ADMIN — Medication 100 MILLIEQUIVALENT(S): at 11:41

## 2025-03-29 RX ADMIN — Medication 2.5 MILLIGRAM(S): at 08:28

## 2025-03-29 RX ADMIN — Medication 500 MICROGRAM(S): at 09:41

## 2025-03-29 RX ADMIN — BUDESONIDE 0.5 MILLIGRAM(S): 90 AEROSOL, POWDER RESPIRATORY (INHALATION) at 08:28

## 2025-03-29 RX ADMIN — Medication 2.33 MG/KG/HR: at 07:42

## 2025-03-29 RX ADMIN — Medication 15 MILLILITER(S): at 05:09

## 2025-03-29 RX ADMIN — HEPARIN SODIUM 5000 UNIT(S): 1000 INJECTION INTRAVENOUS; SUBCUTANEOUS at 05:09

## 2025-03-29 RX ADMIN — BUDESONIDE 0.5 MILLIGRAM(S): 90 AEROSOL, POWDER RESPIRATORY (INHALATION) at 19:12

## 2025-03-29 RX ADMIN — Medication 4 MILLIGRAM(S): at 04:41

## 2025-03-29 RX ADMIN — Medication 1.86 MG/KG/HR: at 19:05

## 2025-03-29 RX ADMIN — INSULIN LISPRO 1: 100 INJECTION, SOLUTION INTRAVENOUS; SUBCUTANEOUS at 11:38

## 2025-03-29 RX ADMIN — PROPOFOL 28 MICROGRAM(S)/KG/MIN: 10 INJECTION, EMULSION INTRAVENOUS at 07:40

## 2025-03-29 RX ADMIN — CISATRACURIUM BESYLATE 18 MICROGRAM(S)/KG/MIN: 10 INJECTION, SOLUTION INTRAVENOUS at 07:58

## 2025-03-29 RX ADMIN — Medication 250 MILLIGRAM(S): at 17:58

## 2025-03-29 RX ADMIN — INSULIN LISPRO 2: 100 INJECTION, SOLUTION INTRAVENOUS; SUBCUTANEOUS at 06:07

## 2025-03-29 RX ADMIN — Medication 15 MILLILITER(S): at 17:58

## 2025-03-29 RX ADMIN — METHYLPREDNISOLONE ACETATE 60 MILLIGRAM(S): 80 INJECTION, SUSPENSION INTRA-ARTICULAR; INTRALESIONAL; INTRAMUSCULAR; SOFT TISSUE at 17:58

## 2025-03-29 RX ADMIN — Medication 2.5 MILLIGRAM(S): at 10:58

## 2025-03-29 RX ADMIN — Medication 100 MILLIEQUIVALENT(S): at 10:39

## 2025-03-29 RX ADMIN — Medication 105 MILLIGRAM(S): at 14:28

## 2025-03-29 RX ADMIN — Medication 2.5 MILLIGRAM(S): at 06:58

## 2025-03-29 RX ADMIN — Medication 1.86 MG/KG/HR: at 07:41

## 2025-03-29 RX ADMIN — Medication 500 MICROGRAM(S): at 15:35

## 2025-03-29 RX ADMIN — HEPARIN SODIUM 5000 UNIT(S): 1000 INJECTION INTRAVENOUS; SUBCUTANEOUS at 13:17

## 2025-03-29 RX ADMIN — NOREPINEPHRINE BITARTRATE 4.37 MICROGRAM(S)/KG/MIN: 8 SOLUTION at 19:05

## 2025-03-29 RX ADMIN — Medication 100 MILLIEQUIVALENT(S): at 04:42

## 2025-03-29 RX ADMIN — SODIUM CHLORIDE 1000 MILLILITER(S): 9 INJECTION, SOLUTION INTRAVENOUS at 01:51

## 2025-03-29 RX ADMIN — CEFTRIAXONE 100 MILLIGRAM(S): 500 INJECTION, POWDER, FOR SOLUTION INTRAMUSCULAR; INTRAVENOUS at 19:04

## 2025-03-29 RX ADMIN — Medication 2.5 MILLIGRAM(S): at 04:50

## 2025-03-29 RX ADMIN — Medication 500 MICROGRAM(S): at 04:50

## 2025-03-29 RX ADMIN — Medication 100 MILLIEQUIVALENT(S): at 12:54

## 2025-03-29 RX ADMIN — PROPOFOL 28 MICROGRAM(S)/KG/MIN: 10 INJECTION, EMULSION INTRAVENOUS at 19:04

## 2025-03-29 RX ADMIN — Medication 4.66 MICROGRAM(S)/KG/HR: at 07:40

## 2025-03-29 NOTE — DIETITIAN INITIAL EVALUATION ADULT - ADD RECOMMEND
1. Continue to monitor nutritional intake, labs, weights, BM, skin, clinical course. 2. Reinforce nutrition education as needed - RD remains available. 3. Follow pt as per protocol.

## 2025-03-29 NOTE — PROGRESS NOTE ADULT - SUBJECTIVE AND OBJECTIVE BOX
MICU PROGRESS NOTE     HISTORY OF PRESENT ILLNESS     Overnight, ETT tube echanged 6.2 to 7.5 due to high airway pressures. Albuterol continous to q2 changed. Afebrile, HR 80s-100s. SBPs 100s-130s. MV V A/C  mL PEEP 3 RR 24 FiO2 35%.     Seen and examined at bedside, patient unable to engage meaningfully in encounter. Appears comfortable.     ROS: All negative except as listed above.    VITAL SIGNS:  ICU Vital Signs Last 24 Hrs  T(C): 37.3 (29 Mar 2025 04:00), Max: 37.3 (29 Mar 2025 04:00)  T(F): 99.1 (29 Mar 2025 04:00), Max: 99.1 (29 Mar 2025 04:00)  HR: 99 (29 Mar 2025 06:59) (76 - 124)  BP: 122/64 (29 Mar 2025 00:00) (87/74 - 170/119)  BP(mean): 82 (29 Mar 2025 00:00) (80 - 104)  ABP: 112/61 (29 Mar 2025 06:00) (86/51 - 137/70)  ABP(mean): 77 (29 Mar 2025 06:00) (65 - 93)  RR: 24 (29 Mar 2025 06:00) (19 - 25)  SpO2: 99% (29 Mar 2025 06:59) (87% - 100%)    O2 Parameters below as of 29 Mar 2025 06:59  Patient On (Oxygen Delivery Method): ventilator          Mode: AC/ CMV (Assist Control/ Continuous Mandatory Ventilation), RR (machine): 24, TV (machine): 460, FiO2: 35, PEEP: 3, ITime: 0.8, MAP: 13, PIP: 36  Plateau pressure:   P/F ratio:     03-28 @ 07:01  -  03-29 @ 07:00  --------------------------------------------------------  IN: 1465.4 mL / OUT: 840 mL / NET: 625.4 mL      CAPILLARY BLOOD GLUCOSE      POCT Blood Glucose.: 226 mg/dL (29 Mar 2025 06:05)      ECG: reviewed.    PHYSICAL EXAM:    General: NAD, overweight habitus   Neuro: sedated, AAOx0, no grimace to noxious stimuli, no spontaneity   HEENT: PERRLA b/l, mildly moist mucous membranes  Chest: nonTTP   Heart: S1/S2, RRR    Lungs: diffuse expiratory wheezing, nonlabored breathing, MV   Abd: soft, nonTTP, nondistended   Ext: mild trace pitting edema pretibial  Pulses: radial 2+ b/l   Lines/tubes/drains: L IJV triple lumen, L axillary A line, jonas   Psych: unable to assess     MEDICATIONS:  MEDICATIONS  (STANDING):  albuterol   0.5% 2.5 milliGRAM(s) Nebulizer every 2 hours  azithromycin  IVPB      azithromycin  IVPB 500 milliGRAM(s) IV Intermittent every 24 hours  buDESOnide    Inhalation Suspension 0.5 milliGRAM(s) Inhalation two times a day  cefTRIAXone   IVPB 1000 milliGRAM(s) IV Intermittent every 24 hours  chlorhexidine 0.12% Liquid 15 milliLiter(s) Oral Mucosa every 12 hours  chlorhexidine 2% Cloths 1 Application(s) Topical <User Schedule>  cisatracurium Infusion 3 MICROgram(s)/kG/Min (18 mL/Hr) IV Continuous <Continuous>  dextrose 5%. 1000 milliLiter(s) (50 mL/Hr) IV Continuous <Continuous>  dextrose 5%. 1000 milliLiter(s) (100 mL/Hr) IV Continuous <Continuous>  dextrose 50% Injectable 25 Gram(s) IV Push once  dextrose 50% Injectable 12.5 Gram(s) IV Push once  dextrose 50% Injectable 25 Gram(s) IV Push once  fentaNYL   Infusion. 0.5 MICROgram(s)/kG/Hr (4.66 mL/Hr) IV Continuous <Continuous>  glucagon  Injectable 1 milliGRAM(s) IntraMuscular once  heparin   Injectable 5000 Unit(s) SubCutaneous every 8 hours  influenza   Vaccine 0.5 milliLiter(s) IntraMuscular once  insulin lispro (ADMELOG) corrective regimen sliding scale   SubCutaneous three times a day before meals  insulin lispro (ADMELOG) corrective regimen sliding scale   SubCutaneous at bedtime  insulin NPH human recombinant 7 Unit(s) SubCutaneous every 6 hours  ipratropium    for Nebulization 500 MICROGram(s) Nebulizer every 6 hours  ketamine Infusion. 0.25 mG/kG/Hr (2.33 mL/Hr) IV Continuous <Continuous>  methylPREDNISolone sodium succinate Injectable 60 milliGRAM(s) IV Push every 12 hours  midazolam Infusion 0.02 mG/kG/Hr (1.86 mL/Hr) IV Continuous <Continuous>  norepinephrine Infusion 0.05 MICROgram(s)/kG/Min (4.37 mL/Hr) IV Continuous <Continuous>  propofol Infusion 50.072 MICROgram(s)/kG/Min (28 mL/Hr) IV Continuous <Continuous>  propofol Injectable 50 milliGRAM(s) IV Push once    MEDICATIONS  (PRN):  dextrose Oral Gel 15 Gram(s) Oral once PRN Blood Glucose LESS THAN 70 milliGRAM(s)/deciliter      ALLERGIES:  Allergies    No Known Allergies    Intolerances        LABS:                        13.9   23.39 )-----------( 258      ( 29 Mar 2025 00:40 )             44.7     03-29    136  |  99  |  9   ----------------------------<  428[H]  3.4[L]   |  18[L]  |  0.92    Ca    8.4      29 Mar 2025 00:40  Phos  3.8     03-29  Mg     3.20     03-29    TPro  7.0  /  Alb  4.1  /  TBili  0.3  /  DBili  x   /  AST  73[H]  /  ALT  62[H]  /  AlkPhos  72  03-29    PT/INR - ( 29 Mar 2025 00:40 )   PT: 11.7 sec;   INR: 0.98 ratio         PTT - ( 29 Mar 2025 00:40 )  PTT:28.7 sec  Urinalysis Basic - ( 29 Mar 2025 00:40 )    Color: x / Appearance: x / SG: x / pH: x  Gluc: 428 mg/dL / Ketone: x  / Bili: x / Urobili: x   Blood: x / Protein: x / Nitrite: x   Leuk Esterase: x / RBC: x / WBC x   Sq Epi: x / Non Sq Epi: x / Bacteria: x      ABG:  pH, Arterial: 7.14 (03-29-25 @ 06:39)  pCO2, Arterial: 56 mmHg (03-29-25 @ 06:39)  pO2, Arterial: 158 mmHg (03-29-25 @ 06:39)  pH, Arterial: 7.09 (03-29-25 @ 05:30)  pCO2, Arterial: 70 mmHg (03-29-25 @ 05:30)  pO2, Arterial: 291 mmHg (03-29-25 @ 05:30)  pH, Arterial: 7.07 (03-29-25 @ 03:30)  pCO2, Arterial: 72 mmHg (03-29-25 @ 03:30)  pO2, Arterial: 101 mmHg (03-29-25 @ 03:30)  pH, Arterial: 7.12 (03-29-25 @ 00:40)  pCO2, Arterial: 68 mmHg (03-29-25 @ 00:40)  pO2, Arterial: 122 mmHg (03-29-25 @ 00:40)  pCO2, Arterial: 69 mmHg (03-28-25 @ 22:30)  pO2, Arterial: 130 mmHg (03-28-25 @ 22:30)  pH, Arterial: 7.12 (03-28-25 @ 22:30)  pO2, Arterial: 129 mmHg (03-28-25 @ 20:50)  pH, Arterial: 7.08 (03-28-25 @ 20:50)  pCO2, Arterial: 87 mmHg (03-28-25 @ 20:50)  pO2, Arterial: 280 mmHg (03-28-25 @ 14:50)  pH, Arterial: 7.10 (03-28-25 @ 14:50)  pCO2, Arterial: 90 mmHg (03-28-25 @ 14:50)      vBG:  pH, Venous: 6.98 (03-28-25 @ 16:39)  pO2, Venous: 189 mmHg (03-28-25 @ 16:39)  HCO3, Venous: 29 mmol/L (03-28-25 @ 16:39)  pCO2, Venous: 125 mmHg (03-28-25 @ 16:39)    Micro:     Urinalysis with Rflx Culture (collected 03-28-25 @ 16:34)         RADIOLOGY & ADDITIONAL TESTS: Reviewed.

## 2025-03-29 NOTE — DIETITIAN INITIAL EVALUATION ADULT - PERTINENT MEDS FT
MEDICATIONS  (STANDING):  azithromycin  IVPB      azithromycin  IVPB 500 milliGRAM(s) IV Intermittent every 24 hours  buDESOnide    Inhalation Suspension 0.5 milliGRAM(s) Inhalation two times a day  cefTRIAXone   IVPB 1000 milliGRAM(s) IV Intermittent every 24 hours  chlorhexidine 0.12% Liquid 15 milliLiter(s) Oral Mucosa every 12 hours  chlorhexidine 2% Cloths 1 Application(s) Topical <User Schedule>  cisatracurium Infusion 3 MICROgram(s)/kG/Min (18 mL/Hr) IV Continuous <Continuous>  dextrose 5%. 1000 milliLiter(s) (50 mL/Hr) IV Continuous <Continuous>  dextrose 5%. 1000 milliLiter(s) (100 mL/Hr) IV Continuous <Continuous>  dextrose 50% Injectable 25 Gram(s) IV Push once  dextrose 50% Injectable 12.5 Gram(s) IV Push once  dextrose 50% Injectable 25 Gram(s) IV Push once  fentaNYL   Infusion. 0.5 MICROgram(s)/kG/Hr (4.66 mL/Hr) IV Continuous <Continuous>  glucagon  Injectable 1 milliGRAM(s) IntraMuscular once  heparin   Injectable 5000 Unit(s) SubCutaneous every 8 hours  influenza   Vaccine 0.5 milliLiter(s) IntraMuscular once  insulin lispro (ADMELOG) corrective regimen sliding scale   SubCutaneous three times a day before meals  insulin lispro (ADMELOG) corrective regimen sliding scale   SubCutaneous at bedtime  insulin NPH human recombinant 7 Unit(s) SubCutaneous every 6 hours  ipratropium    for Nebulization 500 MICROGram(s) Nebulizer every 6 hours  ketamine Infusion. 0.25 mG/kG/Hr (2.33 mL/Hr) IV Continuous <Continuous>  levothyroxine Injectable 55 MICROGram(s) IV Push at bedtime  methylPREDNISolone sodium succinate Injectable 60 milliGRAM(s) IV Push every 12 hours  midazolam Infusion 0.02 mG/kG/Hr (1.86 mL/Hr) IV Continuous <Continuous>  norepinephrine Infusion 0.05 MICROgram(s)/kG/Min (4.37 mL/Hr) IV Continuous <Continuous>  propofol Infusion 50.072 MICROgram(s)/kG/Min (28 mL/Hr) IV Continuous <Continuous>  propofol Injectable 50 milliGRAM(s) IV Push once  thiamine IVPB 500 milliGRAM(s) IV Intermittent every 8 hours    MEDICATIONS  (PRN):  dextrose Oral Gel 15 Gram(s) Oral once PRN Blood Glucose LESS THAN 70 milliGRAM(s)/deciliter

## 2025-03-29 NOTE — DIETITIAN INITIAL EVALUATION ADULT - ENTERAL
Glucerna 1.5 @ goal rate of 50 mL/hr x 18 hrs  Please also order a multivitamin for micronutrient support.

## 2025-03-29 NOTE — PROGRESS NOTE ADULT - ATTENDING COMMENTS
59 year old female with past medical history of HTN, DM2, hypothyroid, admitted for status asthmaticus.    Found sedated, paralyzed on mechanical ventilation with peak pressures AutopEEP 15 improved ot 12 after I time adjustment.    Impression:  Status asthmaticus. Hypercapnia noted. ECMO team consulted overnight. No indication for ECMO at this time.  Acute hypoxemic and hypercapnic respiratory failure  High anion gap metabolic acidosis  Demand ischemia    Plan:  Mechanical ventilation. Optimize Ve and I:E  Solumedrol 60 mg q 12 hours  Continue q1 hour albuterol nebs  Albuterol ipratropium nebs q 6 hours  ABG  Empiric antibiotics  DVT prophylaxis heparin  GI prophylaxis PPI

## 2025-03-29 NOTE — PROGRESS NOTE ADULT - ASSESSMENT
59F with HTN, DM2, hypothyroid, and asthma here for status asthmaticus, unknown etiology.     NEUROLOGIC   Not at baseline mental status. On sedation and medical paralysis.   -Baseline AAOx3   -C/w propofol; wean as tolerated   -C/w cisatracurium; wean as tolerated   -C/w fentanyl; wean as tolerated   -C/w ketamine; wean as tolerated   -C/w midazolam; wean as tolerated     CARDIOVASCULAR   Hemodynamically stable. Requiring pressors.   -C/w levophed; wean as tolerated     #Elevated troponin   Uptrending tropnoins 50s to 252. No events on telemetry.   -Trend   -F/u EKG to r/o acute ischemic changes     #HTN   Normotensive.   -Hold home lisionpril 10 mg po qd   -S/p 1 L bolus in ED     RESPIRATORY   #Status asthmaticus   Intubated.   -C/w mechanical ventilator  -Daily spontaneous breathing trials   -C/w methylprednisolone 60 mg IV q12h(start 3/28)   -C/w albuterol nebulizer q2   -C/w ipratropium 500 mg nebulizer 16   -C/w budesonide 0.5 mcg neb BID   -Empiric abx for pna     GASTROINTESTINAL   -JS   -NPO     GENITOURINARY/RENAL   #Respiratory acidosis 2/2 hypercarbia   -On admission, VBG pH 6.98 and PCO2 125. Currently ABG ABG 7.05 and PCO2 70.   -Treat with mechanical ventilation for exhalation   -Serial ABGs     ENDOCRINE   #DM   -NPO   -Low ISS, q6 fingersticks   -Hold home metformin 500 mg po qd     #Hypothyroid   -Hold home levothyroxine 75 cg po qd     INFECTIOUS DISEASE   Not meeting criteria for SIRS. However unclear trigger for asthma.   -WBC on admission 11.21 and afebrile   -F/u blood cx   -F/u UA and urine cx   -Urine showing 300 proteinuria and 20 hematuria; no evidence of infection   -Negative viral panel   -Empiric ceftriaxone 1000 mg IV qd x 5 days (started 3/28)   -Empiric azithromycin 500 mg IV qd x 3 days (started 3/28)     HEMATOLOGIC/ONCOLOGIC   -JS     PROPHYLACTIC   -DVT ppx: heparin 5000U sq q8     ETHICS   -HCP daughter   -Full code    59F with HTN, DM2, hypothyroid, and asthma here for status asthmaticus, unknown etiology.     NEUROLOGIC   Not at baseline mental status. On sedation and medical paralysis.   -Baseline AAOx3   -C/w propofol; wean as tolerated   -C/w cisatracurium; wean as tolerated   -C/w fentanyl; wean as tolerated   -C/w ketamine; wean as tolerated   -C/w midazolam; wean as tolerated     CARDIOVASCULAR   Hemodynamically stable. Requiring pressors.   -C/w levophed; wean as tolerated     #Elevated troponin- 2/2 demand ischemia from vasopressors   Uptrending tropnoins 50s to 252 to 180s. No events on telemetry. Peaked and downtrending.   -MOnitor   -If increasing again or high clinical suspicion, can consider PE r/o       #HTN   Normotensive.   -Hold home lisionpril 10 mg po qd   -S/p 1 L bolus in ED     RESPIRATORY   #Status asthmaticus   Intubated.   -C/w mechanical ventilator  -Daily spontaneous breathing trials   -C/w methylprednisolone 60 mg IV q12h(start 3/28)   -Uptitrate albuterol nebulizer q2 to q1   -C/w ipratropium 500 mg nebulizer 16   -C/w budesonide 0.5 mcg neb BID   -Empiric abx for pna     GASTROINTESTINAL   -JS   -NPO   -Place OGT 3/29     GENITOURINARY/RENAL   #HAGMA 2/2 hypercarbia and lactic acidosis  -On admission, VBG pH 6.98 and PCO2 125. Currently ABG ABG 7.14 and PCO2 50s.   -Treat with mechanical ventilation for exhalation   -Serial ABGs   - Thiamine x 1     ENDOCRINE   #DM   -NPO   -Low ISS, q6 fingersticks   -Hold home metformin 500 mg po qd     #Hypothyroid   -Hold home levothyroxine 75 cg po qd   -Start levothyroxine 55 mg IV qd     INFECTIOUS DISEASE   Not meeting criteria for SIRS. However unclear trigger for asthma.   -WBC on admission 11.21 and afebrile   -F/u blood cx   -F/u urine cx   -Urine showing 300 proteinuria and 20 hematuria; no evidence of infection   -Negative viral panel   - Pending urine legionella   -Empiric ceftriaxone 1000 mg IV qd x 5 days (started 3/28)   -Empiric azithromycin 500 mg IV qd x 3 days (started 3/28)     HEMATOLOGIC/ONCOLOGIC   -JS     PROPHYLACTIC   -DVT ppx: heparin 5000U sq q8     ETHICS   -HCP daughter   -Full code    59F with HTN, DM2, hypothyroid, and asthma here for status asthmaticus, unknown etiology.     NEUROLOGIC   Not at baseline mental status. On sedation and medical paralysis.   -Baseline AAOx3   -C/w propofol; wean as tolerated   -C/w cisatracurium; wean as tolerated   -C/w fentanyl; wean as tolerated   -C/w ketamine; wean as tolerated   -C/w midazolam; wean as tolerated     CARDIOVASCULAR   Hemodynamically stable. Requiring pressors.   -C/w levophed; wean as tolerated     #Elevated troponin- 2/2 demand ischemia from vasopressors   Uptrending tropnoins 50s to 252 to 180s. No events on telemetry. Peaked and downtrending.   -MOnitor   -If increasing again or high clinical suspicion, can consider PE r/o       #HTN   Normotensive.   -Hold home lisionpril 10 mg po qd   -S/p 1 L bolus in ED     RESPIRATORY   #Status asthmaticus   Intubated.   -C/w mechanical ventilator  -Daily spontaneous breathing trials   -C/w methylprednisolone 60 mg IV q12h(start 3/28)   -Uptitrate albuterol nebulizer q2 to q1   -C/w ipratropium 500 mg nebulizer 16   -C/w budesonide 0.5 mcg neb BID   -Empiric abx for pna   -Pending CTA chest to r/o PE     GASTROINTESTINAL   -JS   -NPO   -Place OGT 3/29     GENITOURINARY/RENAL   #HAGMA 2/2 hypercarbia and lactic acidosis  -On admission, VBG pH 6.98 and PCO2 125. Currently ABG ABG 7.14 and PCO2 50s.   -Treat with mechanical ventilation for exhalation   -Serial ABGs   - Thiamine x 1     ENDOCRINE   #DM   -NPO   -Low ISS, q6 fingersticks   -Hold home metformin 500 mg po qd     #Hypothyroid   -Hold home levothyroxine 75 cg po qd   -Start levothyroxine 55 mg IV qd     INFECTIOUS DISEASE   Not meeting criteria for SIRS. However unclear trigger for asthma.   -WBC on admission 11.21 and afebrile   -F/u blood cx   -F/u urine cx   -Urine showing 300 proteinuria and 20 hematuria; no evidence of infection   -Negative viral panel   - Pending urine legionella   -Empiric ceftriaxone 1000 mg IV qd x 5 days (started 3/28)   -Empiric azithromycin 500 mg IV qd x 3 days (started 3/28)     HEMATOLOGIC/ONCOLOGIC   -JS     PROPHYLACTIC   -DVT ppx: heparin 5000U sq q8     ETHICS   -HCP daughter   -Full code

## 2025-03-29 NOTE — DIETITIAN INITIAL EVALUATION ADULT - PERTINENT LABORATORY DATA
03-29    136  |  100  |  10  ----------------------------<  239[H]  3.8   |  17[L]  |  1.16    Ca    8.5      29 Mar 2025 07:50  Phos  2.9     03-29  Mg     2.70     03-29    TPro  6.9  /  Alb  4.1  /  TBili  0.2  /  DBili  x   /  AST  60[H]  /  ALT  60[H]  /  AlkPhos  67  03-29  POCT Blood Glucose.: 188 mg/dL (03-29-25 @ 11:26)

## 2025-03-29 NOTE — DIETITIAN INITIAL EVALUATION ADULT - OTHER INFO
58 y/o female with hx DM, HTN and hypothyroidism admitted with acute exacerbation of asthma. Pt intubated with pressor support and sedated with fentanyl, ketamine, versed and propofol presently running @ 28 mL/hr and contributing approx 363 kcals over the past 24 hrs. Per Daughters at bedside, pt ate a generally good appetite/PO intake PTA, consuming a "low sugar diet" at baseline. Daughters confirmed NKFA of patient and denied difficulties chewing/swallowing. Pt lives at home with family and had been independent with ADLs PTA. Daughters reported a stable wt trend PTA with  and current admit wt of 205 lbs.    Pt presently NPO with IVF for hydration. Contacted MICU Resident who said that OGT had been placed and that enteral feeding would be initiated. Pt with hx DM and with elevated FS therefore would recommend the provision of Glucerna 1.5, started at 20 mL/hr and increased, as tolerated to goal of 50 mL/hr x 18 hrs. This would provide 1350 kcals, 74 gms protein, 683 mL free H2O in 900 mL total volume/day and would give pt 22 kcals/kg and 1.2 gms protein/kg of her IBW - 60.4 kg, with the addition of propofol sedation calories as titrated daily. Per RN, pt without any GI distress at present; no BMs as per nursing flowsheet records. Consider bowel regimen as appropriate. Labs notable for FS over the past 24 hrs 188 - 226 mg/dl with NPH and Admelog insulins ordered for coverage. Request Hb A1C lab taken to assess glycemic control PTA. Educated Daughters on need for enteral feeding with components of feeding to meet patient's nutrient needs, including vitamins and minerals. Additionally provided copy of "Meeting Nutrition Needs in the ICU" handout. Daughters were very appreciative of information distributed. Suggest giving pt a multivitamin for micronutrient support with enteral feeding as suggested. RDN services to remain available as needed.

## 2025-03-30 LAB
ALBUMIN SERPL ELPH-MCNC: 3.6 G/DL — SIGNIFICANT CHANGE UP (ref 3.3–5)
ALBUMIN SERPL ELPH-MCNC: 3.6 G/DL — SIGNIFICANT CHANGE UP (ref 3.3–5)
ALP SERPL-CCNC: 57 U/L — SIGNIFICANT CHANGE UP (ref 40–120)
ALP SERPL-CCNC: 58 U/L — SIGNIFICANT CHANGE UP (ref 40–120)
ALT FLD-CCNC: 47 U/L — HIGH (ref 4–33)
ALT FLD-CCNC: 49 U/L — HIGH (ref 4–33)
ANION GAP SERPL CALC-SCNC: 11 MMOL/L — SIGNIFICANT CHANGE UP (ref 7–14)
ANION GAP SERPL CALC-SCNC: 11 MMOL/L — SIGNIFICANT CHANGE UP (ref 7–14)
APTT BLD: 24.1 SEC — LOW (ref 24.5–35.6)
AST SERPL-CCNC: 28 U/L — SIGNIFICANT CHANGE UP (ref 4–32)
AST SERPL-CCNC: 29 U/L — SIGNIFICANT CHANGE UP (ref 4–32)
BILIRUB SERPL-MCNC: <0.2 MG/DL — SIGNIFICANT CHANGE UP (ref 0.2–1.2)
BILIRUB SERPL-MCNC: <0.2 MG/DL — SIGNIFICANT CHANGE UP (ref 0.2–1.2)
BLOOD GAS ARTERIAL COMPREHENSIVE RESULT: SIGNIFICANT CHANGE UP
BUN SERPL-MCNC: 12 MG/DL — SIGNIFICANT CHANGE UP (ref 7–23)
BUN SERPL-MCNC: 12 MG/DL — SIGNIFICANT CHANGE UP (ref 7–23)
CALCIUM SERPL-MCNC: 8.4 MG/DL — SIGNIFICANT CHANGE UP (ref 8.4–10.5)
CALCIUM SERPL-MCNC: 8.4 MG/DL — SIGNIFICANT CHANGE UP (ref 8.4–10.5)
CHLORIDE SERPL-SCNC: 106 MMOL/L — SIGNIFICANT CHANGE UP (ref 98–107)
CHLORIDE SERPL-SCNC: 109 MMOL/L — HIGH (ref 98–107)
CK MB BLD-MCNC: 2.7 % — HIGH (ref 0–2.5)
CK MB CFR SERPL CALC: 7.3 NG/ML — HIGH
CK SERPL-CCNC: 269 U/L — HIGH (ref 25–170)
CO2 SERPL-SCNC: 20 MMOL/L — LOW (ref 22–31)
CO2 SERPL-SCNC: 24 MMOL/L — SIGNIFICANT CHANGE UP (ref 22–31)
CREAT SERPL-MCNC: 1.21 MG/DL — SIGNIFICANT CHANGE UP (ref 0.5–1.3)
CREAT SERPL-MCNC: 1.21 MG/DL — SIGNIFICANT CHANGE UP (ref 0.5–1.3)
EGFR: 51 ML/MIN/1.73M2 — LOW
GAS PNL BLDA: SIGNIFICANT CHANGE UP
GAS PNL BLDA: SIGNIFICANT CHANGE UP
GLUCOSE BLDC GLUCOMTR-MCNC: 125 MG/DL — HIGH (ref 70–99)
GLUCOSE BLDC GLUCOMTR-MCNC: 131 MG/DL — HIGH (ref 70–99)
GLUCOSE BLDC GLUCOMTR-MCNC: 150 MG/DL — HIGH (ref 70–99)
GLUCOSE BLDC GLUCOMTR-MCNC: 94 MG/DL — SIGNIFICANT CHANGE UP (ref 70–99)
GLUCOSE SERPL-MCNC: 122 MG/DL — HIGH (ref 70–99)
GLUCOSE SERPL-MCNC: 158 MG/DL — HIGH (ref 70–99)
GRAM STN FLD: SIGNIFICANT CHANGE UP
HCT VFR BLD CALC: 39.2 % — SIGNIFICANT CHANGE UP (ref 34.5–45)
HCT VFR BLD CALC: 39.4 % — SIGNIFICANT CHANGE UP (ref 34.5–45)
HGB BLD-MCNC: 12.7 G/DL — SIGNIFICANT CHANGE UP (ref 11.5–15.5)
HGB BLD-MCNC: 12.8 G/DL — SIGNIFICANT CHANGE UP (ref 11.5–15.5)
INR BLD: 1.05 RATIO — SIGNIFICANT CHANGE UP (ref 0.85–1.16)
MAGNESIUM SERPL-MCNC: 2.3 MG/DL — SIGNIFICANT CHANGE UP (ref 1.6–2.6)
MAGNESIUM SERPL-MCNC: 2.5 MG/DL — SIGNIFICANT CHANGE UP (ref 1.6–2.6)
MCHC RBC-ENTMCNC: 30.6 PG — SIGNIFICANT CHANGE UP (ref 27–34)
MCHC RBC-ENTMCNC: 31.1 PG — SIGNIFICANT CHANGE UP (ref 27–34)
MCHC RBC-ENTMCNC: 32.2 G/DL — SIGNIFICANT CHANGE UP (ref 32–36)
MCHC RBC-ENTMCNC: 32.7 G/DL — SIGNIFICANT CHANGE UP (ref 32–36)
MCV RBC AUTO: 94.9 FL — SIGNIFICANT CHANGE UP (ref 80–100)
MCV RBC AUTO: 95.4 FL — SIGNIFICANT CHANGE UP (ref 80–100)
MRSA PCR RESULT.: SIGNIFICANT CHANGE UP
NRBC # BLD AUTO: 0 K/UL — SIGNIFICANT CHANGE UP (ref 0–0)
NRBC # BLD AUTO: 0 K/UL — SIGNIFICANT CHANGE UP (ref 0–0)
NRBC # FLD: 0 K/UL — SIGNIFICANT CHANGE UP (ref 0–0)
NRBC # FLD: 0 K/UL — SIGNIFICANT CHANGE UP (ref 0–0)
NRBC BLD AUTO-RTO: 0 /100 WBCS — SIGNIFICANT CHANGE UP (ref 0–0)
NRBC BLD AUTO-RTO: 0 /100 WBCS — SIGNIFICANT CHANGE UP (ref 0–0)
PHOSPHATE SERPL-MCNC: 1.6 MG/DL — LOW (ref 2.5–4.5)
PHOSPHATE SERPL-MCNC: 5 MG/DL — HIGH (ref 2.5–4.5)
PLATELET # BLD AUTO: 207 K/UL — SIGNIFICANT CHANGE UP (ref 150–400)
PLATELET # BLD AUTO: 225 K/UL — SIGNIFICANT CHANGE UP (ref 150–400)
POTASSIUM SERPL-MCNC: 4.5 MMOL/L — SIGNIFICANT CHANGE UP (ref 3.5–5.3)
POTASSIUM SERPL-MCNC: 4.8 MMOL/L — SIGNIFICANT CHANGE UP (ref 3.5–5.3)
POTASSIUM SERPL-SCNC: 4.5 MMOL/L — SIGNIFICANT CHANGE UP (ref 3.5–5.3)
POTASSIUM SERPL-SCNC: 4.8 MMOL/L — SIGNIFICANT CHANGE UP (ref 3.5–5.3)
PROT SERPL-MCNC: 6.1 G/DL — SIGNIFICANT CHANGE UP (ref 6–8.3)
PROT SERPL-MCNC: 6.4 G/DL — SIGNIFICANT CHANGE UP (ref 6–8.3)
PROTHROM AB SERPL-ACNC: 12.2 SEC — SIGNIFICANT CHANGE UP (ref 9.9–13.4)
RBC # BLD: 4.11 M/UL — SIGNIFICANT CHANGE UP (ref 3.8–5.2)
RBC # BLD: 4.15 M/UL — SIGNIFICANT CHANGE UP (ref 3.8–5.2)
RBC # FLD: 14.3 % — SIGNIFICANT CHANGE UP (ref 10.3–14.5)
RBC # FLD: 14.8 % — HIGH (ref 10.3–14.5)
S AUREUS DNA NOSE QL NAA+PROBE: SIGNIFICANT CHANGE UP
SODIUM SERPL-SCNC: 137 MMOL/L — SIGNIFICANT CHANGE UP (ref 135–145)
SODIUM SERPL-SCNC: 144 MMOL/L — SIGNIFICANT CHANGE UP (ref 135–145)
SPECIMEN SOURCE: SIGNIFICANT CHANGE UP
TROPONIN T, HIGH SENSITIVITY RESULT: 77 NG/L — CRITICAL HIGH
WBC # BLD: 26.01 K/UL — HIGH (ref 3.8–10.5)
WBC # BLD: 27.16 K/UL — HIGH (ref 3.8–10.5)
WBC # FLD AUTO: 26.01 K/UL — HIGH (ref 3.8–10.5)
WBC # FLD AUTO: 27.16 K/UL — HIGH (ref 3.8–10.5)

## 2025-03-30 RX ORDER — BUMETANIDE 1 MG/1
1 TABLET ORAL ONCE
Refills: 0 | Status: COMPLETED | OUTPATIENT
Start: 2025-03-30 | End: 2025-03-30

## 2025-03-30 RX ORDER — CISATRACURIUM BESYLATE 10 MG/5ML
1 INJECTION, SOLUTION INTRAVENOUS
Qty: 200 | Refills: 0 | Status: DISCONTINUED | OUTPATIENT
Start: 2025-03-30 | End: 2025-03-31

## 2025-03-30 RX ORDER — FENTANYL CITRATE-0.9 % NACL/PF 100MCG/2ML
0.5 SYRINGE (ML) INTRAVENOUS
Qty: 2500 | Refills: 0 | Status: DISCONTINUED | OUTPATIENT
Start: 2025-03-30 | End: 2025-04-01

## 2025-03-30 RX ORDER — IPRATROPIUM BROMIDE AND ALBUTEROL SULFATE .5; 2.5 MG/3ML; MG/3ML
3 SOLUTION RESPIRATORY (INHALATION) EVERY 6 HOURS
Refills: 0 | Status: DISCONTINUED | OUTPATIENT
Start: 2025-03-30 | End: 2025-03-30

## 2025-03-30 RX ORDER — NOREPINEPHRINE BITARTRATE 8 MG
0.05 SOLUTION INTRAVENOUS
Qty: 16 | Refills: 0 | Status: DISCONTINUED | OUTPATIENT
Start: 2025-03-30 | End: 2025-04-01

## 2025-03-30 RX ORDER — ALBUTEROL SULFATE 2.5 MG/3ML
2.5 VIAL, NEBULIZER (ML) INHALATION
Refills: 0 | Status: DISCONTINUED | OUTPATIENT
Start: 2025-03-30 | End: 2025-03-30

## 2025-03-30 RX ORDER — NOREPINEPHRINE BITARTRATE 8 MG
0.05 SOLUTION INTRAVENOUS
Qty: 8 | Refills: 0 | Status: DISCONTINUED | OUTPATIENT
Start: 2025-03-30 | End: 2025-03-30

## 2025-03-30 RX ORDER — CISATRACURIUM BESYLATE 10 MG/5ML
20 INJECTION, SOLUTION INTRAVENOUS ONCE
Refills: 0 | Status: COMPLETED | OUTPATIENT
Start: 2025-03-30 | End: 2025-03-30

## 2025-03-30 RX ORDER — ALBUTEROL SULFATE 2.5 MG/3ML
2.5 VIAL, NEBULIZER (ML) INHALATION
Refills: 0 | Status: COMPLETED | OUTPATIENT
Start: 2025-03-30 | End: 2025-03-30

## 2025-03-30 RX ORDER — FENTANYL CITRATE-0.9 % NACL/PF 100MCG/2ML
0.5 SYRINGE (ML) INTRAVENOUS
Qty: 5000 | Refills: 0 | Status: DISCONTINUED | OUTPATIENT
Start: 2025-03-30 | End: 2025-03-30

## 2025-03-30 RX ORDER — ALBUTEROL SULFATE 2.5 MG/3ML
7.5 VIAL, NEBULIZER (ML) INHALATION ONCE
Refills: 0 | Status: DISCONTINUED | OUTPATIENT
Start: 2025-03-30 | End: 2025-03-30

## 2025-03-30 RX ORDER — ALBUTEROL SULFATE 2.5 MG/3ML
10 VIAL, NEBULIZER (ML) INHALATION
Qty: 100 | Refills: 0 | Status: DISCONTINUED | OUTPATIENT
Start: 2025-03-30 | End: 2025-04-03

## 2025-03-30 RX ORDER — INSULIN LISPRO 100 U/ML
INJECTION, SOLUTION INTRAVENOUS; SUBCUTANEOUS EVERY 6 HOURS
Refills: 0 | Status: DISCONTINUED | OUTPATIENT
Start: 2025-03-30 | End: 2025-04-14

## 2025-03-30 RX ORDER — HYPROMELLOSE 0.4 %
1 DROPS OPHTHALMIC (EYE) DAILY
Refills: 0 | Status: DISCONTINUED | OUTPATIENT
Start: 2025-03-30 | End: 2025-04-11

## 2025-03-30 RX ADMIN — CEFTRIAXONE 100 MILLIGRAM(S): 500 INJECTION, POWDER, FOR SOLUTION INTRAMUSCULAR; INTRAVENOUS at 18:31

## 2025-03-30 RX ADMIN — PROPOFOL 28 MICROGRAM(S)/KG/MIN: 10 INJECTION, EMULSION INTRAVENOUS at 16:05

## 2025-03-30 RX ADMIN — Medication 2.5 MILLIGRAM(S): at 09:04

## 2025-03-30 RX ADMIN — Medication 85 MILLIMOLE(S): at 03:50

## 2025-03-30 RX ADMIN — NOREPINEPHRINE BITARTRATE 4.37 MICROGRAM(S)/KG/MIN: 8 SOLUTION at 16:07

## 2025-03-30 RX ADMIN — CISATRACURIUM BESYLATE 20 MILLIGRAM(S): 10 INJECTION, SOLUTION INTRAVENOUS at 12:50

## 2025-03-30 RX ADMIN — Medication 1.86 MG/KG/HR: at 21:07

## 2025-03-30 RX ADMIN — Medication 1 APPLICATION(S): at 05:15

## 2025-03-30 RX ADMIN — Medication 2.5 MILLIGRAM(S): at 09:03

## 2025-03-30 RX ADMIN — PROPOFOL 28 MICROGRAM(S)/KG/MIN: 10 INJECTION, EMULSION INTRAVENOUS at 13:58

## 2025-03-30 RX ADMIN — Medication 2.33 MG/KG/HR: at 08:59

## 2025-03-30 RX ADMIN — PROPOFOL 28 MICROGRAM(S)/KG/MIN: 10 INJECTION, EMULSION INTRAVENOUS at 10:12

## 2025-03-30 RX ADMIN — Medication 0.93 MICROGRAM(S)/KG/HR: at 13:58

## 2025-03-30 RX ADMIN — Medication 250 MILLIGRAM(S): at 21:06

## 2025-03-30 RX ADMIN — Medication 15 MILLILITER(S): at 05:16

## 2025-03-30 RX ADMIN — Medication 2.5 MILLIGRAM(S): at 12:12

## 2025-03-30 RX ADMIN — Medication 2.5 MILLIGRAM(S): at 13:02

## 2025-03-30 RX ADMIN — Medication 0.93 MICROGRAM(S)/KG/HR: at 10:12

## 2025-03-30 RX ADMIN — Medication 2.5 MILLIGRAM(S): at 16:37

## 2025-03-30 RX ADMIN — Medication 2.5 MILLIGRAM(S): at 09:11

## 2025-03-30 RX ADMIN — HEPARIN SODIUM 5000 UNIT(S): 1000 INJECTION INTRAVENOUS; SUBCUTANEOUS at 22:06

## 2025-03-30 RX ADMIN — Medication 0.93 MICROGRAM(S)/KG/HR: at 21:09

## 2025-03-30 RX ADMIN — PROPOFOL 28 MICROGRAM(S)/KG/MIN: 10 INJECTION, EMULSION INTRAVENOUS at 09:00

## 2025-03-30 RX ADMIN — METHYLPREDNISOLONE ACETATE 60 MILLIGRAM(S): 80 INJECTION, SUSPENSION INTRA-ARTICULAR; INTRALESIONAL; INTRAMUSCULAR; SOFT TISSUE at 05:16

## 2025-03-30 RX ADMIN — PROPOFOL 28 MICROGRAM(S)/KG/MIN: 10 INJECTION, EMULSION INTRAVENOUS at 09:32

## 2025-03-30 RX ADMIN — Medication 55 MICROGRAM(S): at 22:05

## 2025-03-30 RX ADMIN — Medication 2.33 MG/KG/HR: at 18:30

## 2025-03-30 RX ADMIN — BUMETANIDE 1 MILLIGRAM(S): 1 TABLET ORAL at 09:46

## 2025-03-30 RX ADMIN — METHYLPREDNISOLONE ACETATE 60 MILLIGRAM(S): 80 INJECTION, SUSPENSION INTRA-ARTICULAR; INTRALESIONAL; INTRAMUSCULAR; SOFT TISSUE at 17:46

## 2025-03-30 RX ADMIN — BUDESONIDE 0.5 MILLIGRAM(S): 90 AEROSOL, POWDER RESPIRATORY (INHALATION) at 07:41

## 2025-03-30 RX ADMIN — Medication 2.33 MG/KG/HR: at 16:06

## 2025-03-30 RX ADMIN — Medication 55 MICROGRAM(S): at 00:17

## 2025-03-30 RX ADMIN — PROPOFOL 28 MICROGRAM(S)/KG/MIN: 10 INJECTION, EMULSION INTRAVENOUS at 21:07

## 2025-03-30 RX ADMIN — Medication 105 MILLIGRAM(S): at 05:19

## 2025-03-30 RX ADMIN — Medication 1.86 MG/KG/HR: at 08:58

## 2025-03-30 RX ADMIN — BUDESONIDE 0.5 MILLIGRAM(S): 90 AEROSOL, POWDER RESPIRATORY (INHALATION) at 19:11

## 2025-03-30 RX ADMIN — Medication 1.86 MG/KG/HR: at 16:05

## 2025-03-30 RX ADMIN — Medication 2.5 MILLIGRAM(S): at 14:13

## 2025-03-30 RX ADMIN — HEPARIN SODIUM 5000 UNIT(S): 1000 INJECTION INTRAVENOUS; SUBCUTANEOUS at 00:19

## 2025-03-30 RX ADMIN — Medication 1 APPLICATION(S): at 13:56

## 2025-03-30 RX ADMIN — HEPARIN SODIUM 5000 UNIT(S): 1000 INJECTION INTRAVENOUS; SUBCUTANEOUS at 14:14

## 2025-03-30 RX ADMIN — Medication 2.5 MILLIGRAM(S): at 07:30

## 2025-03-30 RX ADMIN — Medication 2.5 MILLIGRAM(S): at 11:15

## 2025-03-30 RX ADMIN — Medication 2.5 MILLIGRAM(S): at 10:09

## 2025-03-30 RX ADMIN — Medication 15 MILLILITER(S): at 17:17

## 2025-03-30 RX ADMIN — Medication 500 MICROGRAM(S): at 09:04

## 2025-03-30 RX ADMIN — CISATRACURIUM BESYLATE 5.59 MICROGRAM(S)/KG/MIN: 10 INJECTION, SOLUTION INTRAVENOUS at 14:05

## 2025-03-30 RX ADMIN — Medication 500 MICROGRAM(S): at 19:10

## 2025-03-30 RX ADMIN — NOREPINEPHRINE BITARTRATE 4.37 MICROGRAM(S)/KG/MIN: 8 SOLUTION at 08:59

## 2025-03-30 RX ADMIN — Medication 2 MG/HR: at 17:20

## 2025-03-30 RX ADMIN — Medication 500 MICROGRAM(S): at 15:52

## 2025-03-30 RX ADMIN — HEPARIN SODIUM 5000 UNIT(S): 1000 INJECTION INTRAVENOUS; SUBCUTANEOUS at 05:15

## 2025-03-30 RX ADMIN — NOREPINEPHRINE BITARTRATE 4.37 MICROGRAM(S)/KG/MIN: 8 SOLUTION at 21:10

## 2025-03-30 RX ADMIN — Medication 1.86 MG/KG/HR: at 09:32

## 2025-03-30 RX ADMIN — Medication 2.5 MILLIGRAM(S): at 08:59

## 2025-03-30 RX ADMIN — INSULIN LISPRO 0: 100 INJECTION, SOLUTION INTRAVENOUS; SUBCUTANEOUS at 11:43

## 2025-03-30 RX ADMIN — Medication 40 MILLIGRAM(S): at 09:03

## 2025-03-30 RX ADMIN — Medication 2.33 MG/KG/HR: at 21:08

## 2025-03-30 RX ADMIN — Medication 500 MICROGRAM(S): at 03:37

## 2025-03-30 RX ADMIN — Medication 2.5 MILLIGRAM(S): at 15:52

## 2025-03-30 RX ADMIN — INSULIN LISPRO 0: 100 INJECTION, SOLUTION INTRAVENOUS; SUBCUTANEOUS at 17:47

## 2025-03-30 RX ADMIN — Medication 0.93 MICROGRAM(S)/KG/HR: at 16:06

## 2025-03-30 RX ADMIN — Medication 105 MILLIGRAM(S): at 00:17

## 2025-03-30 RX ADMIN — Medication 4.66 MICROGRAM(S)/KG/HR: at 08:59

## 2025-03-30 RX ADMIN — CISATRACURIUM BESYLATE 20 MILLIGRAM(S): 10 INJECTION, SOLUTION INTRAVENOUS at 04:37

## 2025-03-30 RX ADMIN — Medication 2.5 MILLIGRAM(S): at 04:50

## 2025-03-30 RX ADMIN — INSULIN LISPRO 0: 100 INJECTION, SOLUTION INTRAVENOUS; SUBCUTANEOUS at 00:19

## 2025-03-30 NOTE — PROGRESS NOTE ADULT - SUBJECTIVE AND OBJECTIVE BOX
***********************************************************************  Lars Reis M.D  Resident Physician  Department of Medicine  Available on [employer] Teams  ***********************************************************************  Patient is a 59y old  Female who presents with a chief complaint of Asthma with acute exacerbation     (29 Mar 2025 15:05)      OVERNIGHT EVENTS: Worsening tube leak requiring reintubation with 7.5mm tube. Patient desaturated to around 40% prior to intubation but improved significantly post intubation.     SUBJECTIVE: Patient seen and examined at bedside.     ADDITIONAL REVIEW OF SYSTEMS:    MEDICATIONS  (STANDING):  albuterol   0.5% 2.5 milliGRAM(s) Nebulizer every 2 hours  azithromycin  IVPB      azithromycin  IVPB 500 milliGRAM(s) IV Intermittent every 24 hours  buDESOnide    Inhalation Suspension 0.5 milliGRAM(s) Inhalation two times a day  cefTRIAXone   IVPB 1000 milliGRAM(s) IV Intermittent every 24 hours  chlorhexidine 0.12% Liquid 15 milliLiter(s) Oral Mucosa every 12 hours  chlorhexidine 2% Cloths 1 Application(s) Topical <User Schedule>  dextrose 5%. 1000 milliLiter(s) (50 mL/Hr) IV Continuous <Continuous>  dextrose 5%. 1000 milliLiter(s) (100 mL/Hr) IV Continuous <Continuous>  dextrose 50% Injectable 25 Gram(s) IV Push once  dextrose 50% Injectable 12.5 Gram(s) IV Push once  dextrose 50% Injectable 25 Gram(s) IV Push once  fentaNYL   Infusion. 0.5 MICROgram(s)/kG/Hr (4.66 mL/Hr) IV Continuous <Continuous>  glucagon  Injectable 1 milliGRAM(s) IntraMuscular once  heparin   Injectable 5000 Unit(s) SubCutaneous every 8 hours  influenza   Vaccine 0.5 milliLiter(s) IntraMuscular once  insulin lispro (ADMELOG) corrective regimen sliding scale   SubCutaneous three times a day before meals  insulin lispro (ADMELOG) corrective regimen sliding scale   SubCutaneous at bedtime  insulin NPH human recombinant 7 Unit(s) SubCutaneous every 6 hours  ipratropium    for Nebulization 500 MICROGram(s) Nebulizer every 6 hours  ketamine Infusion. 0.25 mG/kG/Hr (2.33 mL/Hr) IV Continuous <Continuous>  levothyroxine Injectable 55 MICROGram(s) IV Push at bedtime  methylPREDNISolone sodium succinate Injectable 60 milliGRAM(s) IV Push every 12 hours  midazolam Infusion 0.02 mG/kG/Hr (1.86 mL/Hr) IV Continuous <Continuous>  norepinephrine Infusion 0.05 MICROgram(s)/kG/Min (4.37 mL/Hr) IV Continuous <Continuous>  propofol Infusion 50.072 MICROgram(s)/kG/Min (28 mL/Hr) IV Continuous <Continuous>  propofol Injectable 50 milliGRAM(s) IV Push once  thiamine IVPB 500 milliGRAM(s) IV Intermittent every 8 hours    MEDICATIONS  (PRN):  dextrose Oral Gel 15 Gram(s) Oral once PRN Blood Glucose LESS THAN 70 milliGRAM(s)/deciliter      CAPILLARY BLOOD GLUCOSE      POCT Blood Glucose.: 150 mg/dL (30 Mar 2025 06:00)  POCT Blood Glucose.: 131 mg/dL (29 Mar 2025 23:56)  POCT Blood Glucose.: 125 mg/dL (29 Mar 2025 17:50)  POCT Blood Glucose.: 188 mg/dL (29 Mar 2025 11:26)    I&O's Summary    29 Mar 2025 07:01  -  30 Mar 2025 07:00  --------------------------------------------------------  IN: 3929.9 mL / OUT: 2460 mL / NET: 1469.9 mL        PHYSICAL EXAM:    Vital Signs Last 24 Hrs  T(C): 37.2 (30 Mar 2025 04:00), Max: 38.3 (29 Mar 2025 20:00)  T(F): 99 (30 Mar 2025 04:00), Max: 100.9 (29 Mar 2025 20:00)  HR: 90 (30 Mar 2025 06:00) (87 - 107)  BP: --  BP(mean): --  RR: 23 (30 Mar 2025 06:00) (20 - 25)  SpO2: 100% (30 Mar 2025 06:00) (95% - 100%)    Parameters below as of 30 Mar 2025 04:00  Patient On (Oxygen Delivery Method): ventilator    O2 Concentration (%): 100    CONSTITUTIONAL: NAD, well-developed, well-groomed  EYES: Conjunctiva and sclera clear  ENMT: Moist oral mucosa, no pharyngeal injection or exudates; normal dentition  NECK: Supple, no palpable masses; no thyromegaly  RESPIRATORY: Normal respiratory effort; lungs are clear to auscultation bilaterally  CARDIOVASCULAR: Regular rate and rhythm, normal S1 and S2, no murmur/rub/gallop  ABDOMEN: Soft, nontender to palpation, normoactive bowel sounds, no rebound/guarding  MUSCULOSKELETAL: No clubbing or cyanosis of digits; no joint swelling or tenderness to palpation  EXTREMITIES:  No lower extremity edema; Peripheral pulses are 2+ bilaterally  PSYCH: A+O to person, place, and time; affect appropriate  NEUROLOGY: no gross sensory deficits   SKIN: No rashes; no palpable lesions    LABS:                        12.7   27.16 )-----------( 225      ( 30 Mar 2025 00:50 )             39.4     03-30    137  |  106  |  12  ----------------------------<  158[H]  4.8   |  20[L]  |  1.21    Ca    8.4      30 Mar 2025 00:50  Phos  1.6     03-30  Mg     2.30     03-30    TPro  6.1  /  Alb  3.6  /  TBili  <0.2  /  DBili  x   /  AST  29  /  ALT  49[H]  /  AlkPhos  57  03-30    PT/INR - ( 30 Mar 2025 00:50 )   PT: 12.2 sec;   INR: 1.05 ratio         PTT - ( 30 Mar 2025 00:50 )  PTT:24.1 sec  CARDIAC MARKERS ( 30 Mar 2025 00:50 )  x     / x     / x     / x     / 7.3 ng/mL  CARDIAC MARKERS ( 29 Mar 2025 07:50 )  x     / x     / x     / x     / 11.7 ng/mL  CARDIAC MARKERS ( 29 Mar 2025 00:40 )  x     / x     / x     / x     / 8.8 ng/mL      Urinalysis Basic - ( 30 Mar 2025 00:50 )    Color: x / Appearance: x / SG: x / pH: x  Gluc: 158 mg/dL / Ketone: x  / Bili: x / Urobili: x   Blood: x / Protein: x / Nitrite: x   Leuk Esterase: x / RBC: x / WBC x   Sq Epi: x / Non Sq Epi: x / Bacteria: x        Culture - Blood (collected 28 Mar 2025 16:39)  Source: Blood Blood-Peripheral  Preliminary Report (29 Mar 2025 20:01):    No growth at 24 hours    Culture - Blood (collected 28 Mar 2025 16:34)  Source: Blood Blood-Peripheral  Preliminary Report (29 Mar 2025 20:01):    No growth at 24 hours    Urinalysis with Rflx Culture (collected 28 Mar 2025 16:34)        RADIOLOGY & ADDITIONAL TESTS:   Results Reviewed: Yes     ***********************************************************************  Lars Reis M.D  Resident Physician  Department of Medicine  Available on [employer] Teams  ***********************************************************************  Patient is a 59y old  Female who presents with a chief complaint of Asthma with acute exacerbation     (29 Mar 2025 15:05)      OVERNIGHT EVENTS: Worsening tube leak requiring reintubation with 7.5mm tube. Patient desaturated to around 40% prior to intubation but improved significantly post intubation. Needed nimbex for intubation.     SUBJECTIVE: Patient seen and examined at bedside.     ADDITIONAL REVIEW OF SYSTEMS:    MEDICATIONS  (STANDING):  albuterol   0.5% 2.5 milliGRAM(s) Nebulizer every 2 hours  azithromycin  IVPB      azithromycin  IVPB 500 milliGRAM(s) IV Intermittent every 24 hours  buDESOnide    Inhalation Suspension 0.5 milliGRAM(s) Inhalation two times a day  cefTRIAXone   IVPB 1000 milliGRAM(s) IV Intermittent every 24 hours  chlorhexidine 0.12% Liquid 15 milliLiter(s) Oral Mucosa every 12 hours  chlorhexidine 2% Cloths 1 Application(s) Topical <User Schedule>  dextrose 5%. 1000 milliLiter(s) (50 mL/Hr) IV Continuous <Continuous>  dextrose 5%. 1000 milliLiter(s) (100 mL/Hr) IV Continuous <Continuous>  dextrose 50% Injectable 25 Gram(s) IV Push once  dextrose 50% Injectable 12.5 Gram(s) IV Push once  dextrose 50% Injectable 25 Gram(s) IV Push once  fentaNYL   Infusion. 0.5 MICROgram(s)/kG/Hr (4.66 mL/Hr) IV Continuous <Continuous>  glucagon  Injectable 1 milliGRAM(s) IntraMuscular once  heparin   Injectable 5000 Unit(s) SubCutaneous every 8 hours  influenza   Vaccine 0.5 milliLiter(s) IntraMuscular once  insulin lispro (ADMELOG) corrective regimen sliding scale   SubCutaneous three times a day before meals  insulin lispro (ADMELOG) corrective regimen sliding scale   SubCutaneous at bedtime  insulin NPH human recombinant 7 Unit(s) SubCutaneous every 6 hours  ipratropium    for Nebulization 500 MICROGram(s) Nebulizer every 6 hours  ketamine Infusion. 0.25 mG/kG/Hr (2.33 mL/Hr) IV Continuous <Continuous>  levothyroxine Injectable 55 MICROGram(s) IV Push at bedtime  methylPREDNISolone sodium succinate Injectable 60 milliGRAM(s) IV Push every 12 hours  midazolam Infusion 0.02 mG/kG/Hr (1.86 mL/Hr) IV Continuous <Continuous>  norepinephrine Infusion 0.05 MICROgram(s)/kG/Min (4.37 mL/Hr) IV Continuous <Continuous>  propofol Infusion 50.072 MICROgram(s)/kG/Min (28 mL/Hr) IV Continuous <Continuous>  propofol Injectable 50 milliGRAM(s) IV Push once  thiamine IVPB 500 milliGRAM(s) IV Intermittent every 8 hours    MEDICATIONS  (PRN):  dextrose Oral Gel 15 Gram(s) Oral once PRN Blood Glucose LESS THAN 70 milliGRAM(s)/deciliter      CAPILLARY BLOOD GLUCOSE      POCT Blood Glucose.: 150 mg/dL (30 Mar 2025 06:00)  POCT Blood Glucose.: 131 mg/dL (29 Mar 2025 23:56)  POCT Blood Glucose.: 125 mg/dL (29 Mar 2025 17:50)  POCT Blood Glucose.: 188 mg/dL (29 Mar 2025 11:26)    I&O's Summary    29 Mar 2025 07:01  -  30 Mar 2025 07:00  --------------------------------------------------------  IN: 3929.9 mL / OUT: 2460 mL / NET: 1469.9 mL        PHYSICAL EXAM:    Vital Signs Last 24 Hrs  T(C): 37.2 (30 Mar 2025 04:00), Max: 38.3 (29 Mar 2025 20:00)  T(F): 99 (30 Mar 2025 04:00), Max: 100.9 (29 Mar 2025 20:00)  HR: 90 (30 Mar 2025 06:00) (87 - 107)  BP: --  BP(mean): --  RR: 23 (30 Mar 2025 06:00) (20 - 25)  SpO2: 100% (30 Mar 2025 06:00) (95% - 100%)    Parameters below as of 30 Mar 2025 04:00  Patient On (Oxygen Delivery Method): ventilator    O2 Concentration (%): 100    CONSTITUTIONAL: NAD, well-developed, well-groomed  EYES: Conjunctiva and sclera clear  ENMT: Moist oral mucosa, no pharyngeal injection or exudates; normal dentition  NECK: Supple, no palpable masses; no thyromegaly  RESPIRATORY: Normal respiratory effort; lungs are clear to auscultation bilaterally  CARDIOVASCULAR: Regular rate and rhythm, normal S1 and S2, no murmur/rub/gallop  ABDOMEN: Soft, nontender to palpation, normoactive bowel sounds, no rebound/guarding  MUSCULOSKELETAL: No clubbing or cyanosis of digits; no joint swelling or tenderness to palpation  EXTREMITIES:  No lower extremity edema; Peripheral pulses are 2+ bilaterally  PSYCH: A+O to person, place, and time; affect appropriate  NEUROLOGY: no gross sensory deficits   SKIN: No rashes; no palpable lesions    LABS:                        12.7   27.16 )-----------( 225      ( 30 Mar 2025 00:50 )             39.4     03-30    137  |  106  |  12  ----------------------------<  158[H]  4.8   |  20[L]  |  1.21    Ca    8.4      30 Mar 2025 00:50  Phos  1.6     03-30  Mg     2.30     03-30    TPro  6.1  /  Alb  3.6  /  TBili  <0.2  /  DBili  x   /  AST  29  /  ALT  49[H]  /  AlkPhos  57  03-30    PT/INR - ( 30 Mar 2025 00:50 )   PT: 12.2 sec;   INR: 1.05 ratio         PTT - ( 30 Mar 2025 00:50 )  PTT:24.1 sec  CARDIAC MARKERS ( 30 Mar 2025 00:50 )  x     / x     / x     / x     / 7.3 ng/mL  CARDIAC MARKERS ( 29 Mar 2025 07:50 )  x     / x     / x     / x     / 11.7 ng/mL  CARDIAC MARKERS ( 29 Mar 2025 00:40 )  x     / x     / x     / x     / 8.8 ng/mL      Urinalysis Basic - ( 30 Mar 2025 00:50 )    Color: x / Appearance: x / SG: x / pH: x  Gluc: 158 mg/dL / Ketone: x  / Bili: x / Urobili: x   Blood: x / Protein: x / Nitrite: x   Leuk Esterase: x / RBC: x / WBC x   Sq Epi: x / Non Sq Epi: x / Bacteria: x        Culture - Blood (collected 28 Mar 2025 16:39)  Source: Blood Blood-Peripheral  Preliminary Report (29 Mar 2025 20:01):    No growth at 24 hours    Culture - Blood (collected 28 Mar 2025 16:34)  Source: Blood Blood-Peripheral  Preliminary Report (29 Mar 2025 20:01):    No growth at 24 hours    Urinalysis with Rflx Culture (collected 28 Mar 2025 16:34)        RADIOLOGY & ADDITIONAL TESTS:   Results Reviewed: Yes

## 2025-03-30 NOTE — PROGRESS NOTE ADULT - ATTENDING COMMENTS
59 year old female with past medical history of HTN, DM2, hypothyroid, admitted for status asthmaticus 3/28. Complicated with shock. Also with tube exchange on early morning 3/30 secondary to balloon malfunction.     Found sedated, paralyzed on mechanical ventilation with peak pressures AutopEEP 15 improved ot 12 after I time adjustment.    Impression:  Status asthmaticus. Hypercapnia noted. ECMO team consulted overnight. No indication for ECMO at this time.  Acute hypoxemic and hypercapnic respiratory failure  Shock  High anion gap metabolic acidosis  Demand ischemia    Plan:  Mechanical ventilation. Optimize Ve and I:E  Solumedrol 60 mg q 12 hours  Continuous nebs  Albuterol ipratropium nebs q 6 hours  ABG  Empiric antibiotics  DVT prophylaxis heparin  GI prophylaxis PPI .     Patient is critically ill, requiring critical care services. 59 year old female with past medical history of HTN, DM2, hypothyroid, admitted for status asthmaticus 3/28. Complicated with shock. Also with tube exchange on early morning 3/30 secondary to cuff malfunction. Also complicated with subcutaneous emphysema.     Found sedated, paralyzed on mechanical ventilation with elevated peak pressure and Auto PEEP of 12, improved to 8 after adjusting I time and decreasing RR to 18    Later this morning, she was found with elevated peak pressures, not getting tidal volumes. Called at bedside. Bag ventilation done prior to my arrival, some resistance reported that resolved with suctioning. Bedside POCUS with bilateral lung sliding.  Subcutaneous emphysema noted to have increased. Peak 40-50s  decreased to low 40s. ETT noted to be 27 cms at the lip. Emergent bronchoscopy performed ETT noted to be in th right main bronchus. Retracted under direct visualization. Found with some mucoid secretions on the left lung but no mucus plugging. Paralysis resumed.    Currently sedated, paralysed on mechanical ventilation. Moving air with prolonged expiratory phase with expiratory wheezing. Flow scalar suggestive of airway obstruction. Subcutaneous emphysema on the chest.    Impression:  Status asthmaticus. ECMO team consulted the night of admission for hypercapnia. No indication for ECMO. Status asthmaticus improving  Acute hypoxemic and hypercapnic respiratory failure  Shock. Likely septic shock. Sepsis.   Pneumonia  Subcutaneous emphysema.  R/P pneumomediastinum. Needs CT chest but too unstable at this time to travel.    Asthma. Severe persistent. As per daughters, she has been hospitalized once a year in the past two years. First intubation.   Lactic acidosis, resolved.   Demand ischemia. Troponin trended down.    Plan:  Mechanical ventilation. Avoid dynamic hyperinflation.  Resume  tube feeds  Solumedrol 60 mg q 12 hours  Cisatracurium. TOF 2/4  Continuous nebs x4 hours and re assess  Switch to q 1 hour, if improved  Albuterol ipratropium nebs q 6 hours  Ceftriaxone  D/C Azithromycin  ILSS  ABG  Follow bronchial culture  CTA when stable.  Echo  DVT prophylaxis heparin  GI prophylaxis PPI .

## 2025-03-30 NOTE — PROCEDURE NOTE - NSBRONCHPROCDETAILS_GEN_A_CORE_FT
Bronchoscope inserted through ETT. ETT noted to deep abutting the hilario and direted to RMB. Airway evaluation revealed some secretions primarily in left main and left lower. Mucus therapeutically aspirated. Bronchial wash performed and sent for culture. ETT tube readjusted.  Bronchoscope then withdrawn from ETT.

## 2025-03-30 NOTE — PROCEDURE NOTE - NSPRE-BRON/TUBRISKASSES_GEN_ALL_CORE
Uterine leiomyoma, unspecified location I evaluated the patient prior to bronchoscopy procedure for active pulmonary/laryngeal M. tuberculosis disease and the risk and actions taken:    Low risk with routine standard of care measures followed.

## 2025-03-30 NOTE — PROGRESS NOTE ADULT - ASSESSMENT
59F with HTN, DM2, hypothyroid, and asthma here for status asthmaticus, unknown etiology.     NEUROLOGIC   Not at baseline mental status. On sedation and medical paralysis.   -Baseline AAOx3  -C/w propofol; wean as tolerated   -C/w cisatracurium; wean as tolerated   -C/w fentanyl; wean as tolerated   -C/w ketamine; wean as tolerated   -C/w midazolam; wean as tolerated       CARDIOVASCULAR   Hemodynamically stable. Requiring pressors.   -C/w levophed; wean as tolerated     #Elevated troponin- 2/2 demand ischemia from vasopressors   Uptrending tropnoins 50s to 252 to 180s. No events on telemetry. Peaked and downtrending.   -MOnitor   -If increasing again or high clinical suspicion, can consider PE r/o       #HTN   Normotensive.   -Hold home lisionpril 10 mg po qd   -S/p 1 L bolus in ED     RESPIRATORY   #Status asthmaticus   Intubated.   -C/w mechanical ventilator  -Daily spontaneous breathing trials   -C/w methylprednisolone 60 mg IV q12h(start 3/28)   -Uptitrate albuterol nebulizer q2 to q1   -C/w ipratropium 500 mg nebulizer 16   -C/w budesonide 0.5 mcg neb BID   -Empiric abx for pna   -Pending CTA chest to r/o PE     GASTROINTESTINAL   -JS   -NPO   -Place OGT 3/29     GENITOURINARY/RENAL   #HAGMA 2/2 hypercarbia and lactic acidosis  - On admission, VBG pH 6.98 and PCO2 125. Currently ABG ABG 7.14 and PCO2 50s.   - Treat with mechanical ventilation for exhalation   - Serial ABGs   - Thiamine x 1     ENDOCRINE   #DM   -NPO   -Low ISS, q6 fingersticks   -Hold home metformin 500 mg po qd     #Hypothyroid   -Hold home levothyroxine 75 cg po qd   -Start levothyroxine 55 mg IV qd     INFECTIOUS DISEASE   Not meeting criteria for SIRS. However unclear trigger for asthma.   -WBC on admission 11.21 and afebrile   -F/u blood cx   -F/u urine cx   -Urine showing 300 proteinuria and 20 hematuria; no evidence of infection   -Negative viral panel   - Pending urine legionella   -Empiric ceftriaxone 1000 mg IV qd x 5 days (started 3/28)   -Empiric azithromycin 500 mg IV qd x 3 days (started 3/28)     HEMATOLOGIC/ONCOLOGIC   -JS     PROPHYLACTIC   -DVT ppx: heparin 5000U sq q8     ETHICS   -HCP daughter   -Full code    59F with HTN, DM2, hypothyroid, and asthma here for status asthmaticus, unknown etiology.     NEUROLOGIC   Not at baseline mental status. On sedation and medical paralysis.   -Baseline AAOx3  -C/w propofol; wean as tolerated   -C/w fentanyl; wean as tolerated   -C/w ketamine; wean as tolerated   -C/w midazolam; wean as tolerated       CARDIOVASCULAR     # Vasoplegic Shock  Hemodynamically stable. Requiring pressors.   -C/w levophed; wean as tolerated     #Elevated troponin- 2/2 demand ischemia from vasopressors   Uptrending tropnoins 50s to 252 to 180s. No events on telemetry. Peaked and downtrending.   -Monitor   -If increasing again or high clinical suspicion, can consider PE r/o       #HTN   Normotensive.   -Hold home lisinopril 10 mg po qd       RESPIRATORY   #Status asthmaticus   Intubated.   -C/w mechanical ventilator  -Daily spontaneous breathing trials   -C/w methylprednisolone 60 mg IV q12h(start 3/28)   -Uptitrate albuterol nebulizer q2 to q1   -C/w ipratropium 500 mg nebulizer 16   -C/w budesonide 0.5 mcg neb BID   -Empiric abx for pna   - Holding off on CT angio for now    GASTROINTESTINAL   -JS   -NPO   -OGT 3/29     GENITOURINARY/RENAL   #HAGMA 2/2 hypercarbia and lactic acidosis (Resolved)  - On admission, VBG pH 6.98 and PCO2 125. Currently ABG ABG 7.14 and PCO2 50s.   - Treat with mechanical ventilation for exhalation   - Serial ABGs   - Thiamine x 1       ENDOCRINE   #DM   -NPO   -Low ISS, q6 fingersticks   -Hold home metformin 500 mg po qd     #Hypothyroid   -Hold home levothyroxine 75 cg po qd   -Start levothyroxine 55 mg IV qd     INFECTIOUS DISEASE   Not meeting criteria for SIRS. However unclear trigger for asthma.   -WBC on admission 11.21 and afebrile   -F/u blood cx   -F/u urine cx   -Urine showing 300 proteinuria and 20 hematuria; no evidence of infection   -Negative viral panel   - Pending urine legionella   -Empiric ceftriaxone 1000 mg IV qd x 5 days (started 3/28)   -Empiric azithromycin 500 mg IV qd x 3 days (started 3/28)     HEMATOLOGIC/ONCOLOGIC   -JS     PROPHYLACTIC   -DVT ppx: heparin 5000U sq q8     ETHICS   -HCP daughter   -Full code    59F with HTN, DM2, hypothyroid, and asthma here for status asthmaticus, unknown etiology.     NEUROLOGIC   Not at baseline mental status. On sedation and medical paralysis.   -Baseline AAOx3  -C/w propofol; wean as tolerated   -C/w fentanyl; wean as tolerated   -C/w ketamine; wean as tolerated   -C/w midazolam; wean as tolerated       CARDIOVASCULAR     # Vasoplegic Shock  Hemodynamically stable. Requiring pressors.   -C/w levophed; wean as tolerated     #Elevated troponin- 2/2 demand ischemia from vasopressors   Uptrending tropnoins 50s to 252 to 180s. No events on telemetry. Peaked and downtrending.   -Monitor   -If increasing again or high clinical suspicion, can consider PE r/o       #HTN   Normotensive.   -Hold home lisinopril 10 mg po qd       RESPIRATORY   #Status asthmaticus   Intubated.   -C/w mechanical ventilator  -Daily spontaneous breathing trials   -C/w methylprednisolone 60 mg IV q12h(start 3/28)   -Uptitrate albuterol nebulizer q2 to q1   -C/w ipratropium 500 mg nebulizer 16   -C/w budesonide 0.5 mcg neb BID   -Empiric abx for pna   - Holding off on CT angio for now    GASTROINTESTINAL   -JS   -NPO   -OGT 3/29     GENITOURINARY/RENAL   #HAGMA 2/2 hypercarbia and lactic acidosis (Resolved)  - On admission, VBG pH 6.98 and PCO2 125. Currently ABG ABG 7.14 and PCO2 50s.   - Treat with mechanical ventilation for exhalation   - Serial ABGs   - Thiamine x 1       ENDOCRINE   #DM   -NPO   -Low ISS, NPH 7u q6h, q6 fingersticks   -Hold home metformin 500 mg po qd     #Hypothyroid   -Hold home levothyroxine 75 cg po qd   -Start levothyroxine 55 mg IV qd     INFECTIOUS DISEASE   Not meeting criteria for SIRS. However unclear trigger for asthma.   -WBC on admission 11.21 and afebrile   -F/u blood cx   -F/u urine cx   -Urine showing 300 proteinuria and 20 hematuria; no evidence of infection   -Negative viral panel   - Pending urine legionella   -Empiric ceftriaxone 1000 mg IV qd x 5 days (started 3/28)   -Empiric azithromycin 500 mg IV qd x 3 days (started 3/28)     HEMATOLOGIC/ONCOLOGIC   -JS     PROPHYLACTIC   -DVT ppx: heparin 5000U sq q8     ETHICS   -HCP daughter   -Full code    59F with HTN, DM2, hypothyroid, and asthma here for status asthmaticus, unknown etiology.     NEUROLOGIC   Not at baseline mental status. On sedation and medical paralysis.   -Baseline AAOx3  -C/w propofol; wean as tolerated   -C/w fentanyl; wean as tolerated   -C/w ketamine; wean as tolerated   -C/w midazolam; wean as tolerated       CARDIOVASCULAR     # Vasoplegic Shock  Hemodynamically stable. Requiring pressors.   -C/w levophed; wean as tolerated     #Elevated troponin- 2/2 demand ischemia from vasopressors   Uptrending tropnoins 50s to 252 to 180s. No events on telemetry. Peaked and downtrending.   -Monitor   -If increasing again or high clinical suspicion, can consider PE r/o       #HTN   Normotensive.   -Hold home lisinopril 10 mg po qd       RESPIRATORY   #Status asthmaticus   Intubated.   -C/w mechanical ventilator  -Daily spontaneous breathing trials   -C/w methylprednisolone 60 mg IV q12h(start 3/28)   -Uptitrate albuterol nebulizer q2 to q1   -C/w ipratropium 500 mg nebulizer 16   -C/w duonebs q6h  -Empiric abx for pna   - Holding off on CT angio for now.     # Subq Emphysema   - Present s/p reintubation 03/30 night  - CXR top rule out pneumomediastinum     GASTROINTESTINAL   -JS   -NPO   -OGT 3/29     GENITOURINARY/RENAL   #HAGMA 2/2 hypercarbia and lactic acidosis (Resolved)  - On admission, VBG pH 6.98 and PCO2 125. Currently ABG ABG 7.14 and PCO2 50s.   - Treat with mechanical ventilation for exhalation   - Serial ABGs   - Thiamine x 1       ENDOCRINE   #DM   -NPO   -Low ISS, NPH 7u q6h, q6 fingersticks   -Hold home metformin 500 mg po qd     #Hypothyroid   -Hold home levothyroxine 75 cg po qd   -Start levothyroxine 55 mg IV qd     INFECTIOUS DISEASE   Not meeting criteria for SIRS. However unclear trigger for asthma.   -WBC on admission 11.21 and afebrile   -F/u blood cx   -F/u urine cx   -Urine showing 300 proteinuria and 20 hematuria; no evidence of infection   -Negative viral panel   - Pending urine legionella   -Empiric ceftriaxone 1000 mg IV qd x 5 days (started 3/28)   -Empiric azithromycin 500 mg IV qd x 3 days (started 3/28)     HEMATOLOGIC/ONCOLOGIC   -JS     PROPHYLACTIC   -DVT ppx: heparin 5000U sq q8     ETHICS   -HCP daughter   -Full code    59F with HTN, DM2, hypothyroid, and asthma here for status asthmaticus, unknown etiology.     NEUROLOGIC   Not at baseline mental status. On sedation and medical paralysis.   -Baseline AAOx3  -C/w propofol; wean as tolerated   -C/w fentanyl; wean as tolerated   -C/w ketamine; wean as tolerated   -C/w midazolam; wean as tolerated       CARDIOVASCULAR     # Vasoplegic Shock  Hemodynamically stable. Requiring pressors.   -C/w levophed; wean as tolerated     #Elevated troponin- 2/2 demand ischemia from vasopressors   Uptrending tropnoins 50s to 252 to 180s. No events on telemetry. Peaked and downtrending.   -Monitor   -If increasing again or high clinical suspicion, can consider PE r/o       #HTN   Normotensive.   -Hold home lisinopril 10 mg po qd       RESPIRATORY   #Status asthmaticus   Intubated.   -C/w mechanical ventilator  -Daily spontaneous breathing trials   -C/w methylprednisolone 60 mg IV q12h(start 3/28)   -Uptitrate albuterol nebulizer q2 to q1   -C/w ipratropium 500 mg nebulizer 16   -C/w duonebs q6h  -Empiric abx for pna   - Holding off on CT angio for now.     # Subq Emphysema   - Present s/p reintubation 03/30 night  - CXR top rule out pneumomediastinum   - Will consider CT chest if worsens    GASTROINTESTINAL   -JS   -NPO   -OGT 3/29     GENITOURINARY/RENAL   #HAGMA 2/2 hypercarbia and lactic acidosis (Resolved)  - On admission, VBG pH 6.98 and PCO2 125. Currently ABG ABG 7.14 and PCO2 50s.   - Treat with mechanical ventilation for exhalation   - Serial ABGs   - Thiamine x 1       # NAGMA  - unknown reason for decreased bicarb. Having adequate urine output but still net positive  - PCO2 wnl, but vent rate decreased and patient likely to retain more now    Plan:  - Will give bumex 1mg now aiming for goal of net even  - Repeat ABG      # Fluid status  - significant fluid intake in setting of multiple sedatives and pressor  - Will double concentrate levo and fentanyl  - Bumex 1mg x1 now and aim for fluid goal of net even   - Strict ins and outs      ENDOCRINE   #DM   -NPO   -Low ISS, NPH 7u q6h, q6 fingersticks   -Hold home metformin 500 mg po qd     #Hypothyroid   -Hold home levothyroxine 75 cg po qd   -Start levothyroxine 55 mg IV qd     INFECTIOUS DISEASE   Not meeting criteria for SIRS. However unclear trigger for asthma.   -WBC on admission 11.21 and afebrile   -F/u blood cx   -F/u urine cx   -Urine showing 300 proteinuria and 20 hematuria; no evidence of infection   -Negative viral panel   - Pending urine legionella   -Empiric ceftriaxone 1000 mg IV qd x 5 days (started 3/28)   -Empiric azithromycin 500 mg IV qd x 3 days (started 3/28)       # Steroid Use  - Patient on methylpred 60mg BID and will likely require long term taper when coming off  - Taper may exceed the 40mg qD for 4 weeks threshold and she may require bactrim ppx. Will follow    HEMATOLOGIC/ONCOLOGIC   -JS     PROPHYLACTIC   -DVT ppx: heparin 5000U sq q8     ETHICS   -HCP daughter   -Full code    59F with HTN, DM2, hypothyroid, and asthma here for status asthmaticus, unknown etiology.     NEUROLOGIC   Not at baseline mental status. On sedation and medical paralysis.   -Baseline AAOx3  -C/w propofol; wean as tolerated   -C/w fentanyl; wean as tolerated   -C/w ketamine; wean as tolerated   -C/w midazolam; wean as tolerated       CARDIOVASCULAR     # Vasoplegic Shock  Hemodynamically stable. Requiring pressors.   -C/w levophed; wean as tolerated     #Elevated troponin- 2/2 demand ischemia from vasopressors   Uptrending tropnoins 50s to 252 to 180s. No events on telemetry. Peaked and downtrending.   -Monitor   -If increasing again or high clinical suspicion, can consider PE r/o       #HTN   Normotensive.   -Hold home lisinopril 10 mg po qd       RESPIRATORY   #Status asthmaticus   Intubated.   -C/w mechanical ventilator  -Daily spontaneous breathing trials   -C/w methylprednisolone 60 mg IV q12h(start 3/28)   -Uptitrate albuterol nebulizer q2 to q1   -C/w ipratropium 500 mg nebulizer 16   -C/w duonebs q6h  -Empiric abx for pna   - Holding off on CT angio for now.     # Subcutaneous Emphysema   - Present s/p reintubation 03/30 night  - CXR top rule out pneumomediastinum   - Will consider CT chest if worsens    GASTROINTESTINAL   -JS     -OGT 3/29 --> Start trickle feeds. Nutrition consulted    GENITOURINARY/RENAL   #HAGMA 2/2 hypercarbia and lactic acidosis (Resolved)  - On admission, VBG pH 6.98 and PCO2 125. Currently ABG ABG 7.14 and PCO2 50s.   - Treat with mechanical ventilation for exhalation   - Serial ABGs   - Thiamine x 1       # NAGMA  - unknown reason for decreased bicarb. Having adequate urine output but still net positive  - PCO2 wnl, but vent rate decreased and patient likely to retain more now    Plan:  - Will give bumex 1mg now aiming for goal of net even  - Repeat ABG      # Fluid status  - significant fluid intake in setting of multiple sedatives and pressor  - Will double concentrate levo and fentanyl  - Bumex 1mg x1 now and aim for fluid goal of net even   - Strict ins and outs      ENDOCRINE   #DM   -NPO   -Low ISS, NPH 7u q6h, q6 fingersticks   -Hold home metformin 500 mg po qd       #Hypothyroid   -Hold home levothyroxine 75 cg po qd   -Start levothyroxine 55 mg IV qd       INFECTIOUS DISEASE   Not meeting criteria for SIRS. However unclear trigger for asthma.   -WBC on admission 11.21 and afebrile   -F/u blood cx   -F/u urine cx   -Urine showing 300 proteinuria and 20 hematuria; no evidence of infection   -Negative viral panel   - Pending urine legionella   -Empiric ceftriaxone 1000 mg IV qd x 5 days (started 3/28)   -Empiric azithromycin 500 mg IV qd x 3 days (started 3/28)       # Steroid Use  - Patient on methylpred 60mg BID and will likely require long term taper when coming off  - Taper may exceed the 40mg qD for 4 weeks threshold and she may require bactrim ppx. Will follow      HEMATOLOGIC/ONCOLOGIC   -JS     PROPHYLACTIC   -DVT ppx: heparin 5000U sq q8     ETHICS   -HCP daughter   -Full code

## 2025-03-31 LAB
ALBUMIN SERPL ELPH-MCNC: 3.3 G/DL — SIGNIFICANT CHANGE UP (ref 3.3–5)
ALP SERPL-CCNC: 60 U/L — SIGNIFICANT CHANGE UP (ref 40–120)
ALT FLD-CCNC: 38 U/L — HIGH (ref 4–33)
ANION GAP SERPL CALC-SCNC: 12 MMOL/L — SIGNIFICANT CHANGE UP (ref 7–14)
APTT BLD: 24.2 SEC — LOW (ref 24.5–35.6)
AST SERPL-CCNC: 22 U/L — SIGNIFICANT CHANGE UP (ref 4–32)
BILIRUB SERPL-MCNC: <0.2 MG/DL — SIGNIFICANT CHANGE UP (ref 0.2–1.2)
BLOOD GAS ARTERIAL COMPREHENSIVE RESULT: SIGNIFICANT CHANGE UP
BUN SERPL-MCNC: 11 MG/DL — SIGNIFICANT CHANGE UP (ref 7–23)
CALCIUM SERPL-MCNC: 8.3 MG/DL — LOW (ref 8.4–10.5)
CHLORIDE SERPL-SCNC: 111 MMOL/L — HIGH (ref 98–107)
CO2 SERPL-SCNC: 23 MMOL/L — SIGNIFICANT CHANGE UP (ref 22–31)
CREAT SERPL-MCNC: 1.12 MG/DL — SIGNIFICANT CHANGE UP (ref 0.5–1.3)
CULTURE RESULTS: SIGNIFICANT CHANGE UP
EGFR: 56 ML/MIN/1.73M2 — LOW
EGFR: 56 ML/MIN/1.73M2 — LOW
GAS PNL BLDA: SIGNIFICANT CHANGE UP
GLUCOSE BLDC GLUCOMTR-MCNC: 120 MG/DL — HIGH (ref 70–99)
GLUCOSE BLDC GLUCOMTR-MCNC: 120 MG/DL — HIGH (ref 70–99)
GLUCOSE BLDC GLUCOMTR-MCNC: 130 MG/DL — HIGH (ref 70–99)
GLUCOSE BLDC GLUCOMTR-MCNC: 133 MG/DL — HIGH (ref 70–99)
GLUCOSE SERPL-MCNC: 140 MG/DL — HIGH (ref 70–99)
HCT VFR BLD CALC: 36.6 % — SIGNIFICANT CHANGE UP (ref 34.5–45)
HGB BLD-MCNC: 11.5 G/DL — SIGNIFICANT CHANGE UP (ref 11.5–15.5)
INR BLD: 0.94 RATIO — SIGNIFICANT CHANGE UP (ref 0.85–1.16)
MAGNESIUM SERPL-MCNC: 2.5 MG/DL — SIGNIFICANT CHANGE UP (ref 1.6–2.6)
MCHC RBC-ENTMCNC: 30.8 PG — SIGNIFICANT CHANGE UP (ref 27–34)
MCHC RBC-ENTMCNC: 31.4 G/DL — LOW (ref 32–36)
MCV RBC AUTO: 98.1 FL — SIGNIFICANT CHANGE UP (ref 80–100)
NRBC # BLD AUTO: 0 K/UL — SIGNIFICANT CHANGE UP (ref 0–0)
NRBC # FLD: 0 K/UL — SIGNIFICANT CHANGE UP (ref 0–0)
NRBC BLD AUTO-RTO: 0 /100 WBCS — SIGNIFICANT CHANGE UP (ref 0–0)
PHOSPHATE SERPL-MCNC: 3.7 MG/DL — SIGNIFICANT CHANGE UP (ref 2.5–4.5)
PLATELET # BLD AUTO: 188 K/UL — SIGNIFICANT CHANGE UP (ref 150–400)
POTASSIUM SERPL-MCNC: 5.1 MMOL/L — SIGNIFICANT CHANGE UP (ref 3.5–5.3)
POTASSIUM SERPL-SCNC: 5.1 MMOL/L — SIGNIFICANT CHANGE UP (ref 3.5–5.3)
PROT SERPL-MCNC: 5.9 G/DL — LOW (ref 6–8.3)
PROTHROM AB SERPL-ACNC: 10.9 SEC — SIGNIFICANT CHANGE UP (ref 9.9–13.4)
RBC # BLD: 3.73 M/UL — LOW (ref 3.8–5.2)
RBC # FLD: 15.5 % — HIGH (ref 10.3–14.5)
S PNEUM AG UR QL: NEGATIVE — SIGNIFICANT CHANGE UP
SODIUM SERPL-SCNC: 146 MMOL/L — HIGH (ref 135–145)
SPECIMEN SOURCE: SIGNIFICANT CHANGE UP
WBC # BLD: 24.41 K/UL — HIGH (ref 3.8–10.5)
WBC # FLD AUTO: 24.41 K/UL — HIGH (ref 3.8–10.5)

## 2025-03-31 RX ORDER — ACETAMINOPHEN 500 MG/5ML
1000 LIQUID (ML) ORAL ONCE
Refills: 0 | Status: COMPLETED | OUTPATIENT
Start: 2025-03-31 | End: 2025-03-31

## 2025-03-31 RX ORDER — CYST/ALA/Q10/PHOS.SER/DHA/BROC 100-20-50
15 POWDER (GRAM) ORAL DAILY
Refills: 0 | Status: DISCONTINUED | OUTPATIENT
Start: 2025-03-31 | End: 2025-04-18

## 2025-03-31 RX ORDER — B1/B2/B3/B5/B6/B12/VIT C/FOLIC 500-0.5 MG
1 TABLET ORAL DAILY
Refills: 0 | Status: DISCONTINUED | OUTPATIENT
Start: 2025-03-31 | End: 2025-03-31

## 2025-03-31 RX ORDER — METHYLPREDNISOLONE ACETATE 80 MG/ML
60 INJECTION, SUSPENSION INTRA-ARTICULAR; INTRALESIONAL; INTRAMUSCULAR; SOFT TISSUE EVERY 8 HOURS
Refills: 0 | Status: DISCONTINUED | OUTPATIENT
Start: 2025-03-31 | End: 2025-04-03

## 2025-03-31 RX ORDER — NALOXEGOL OXALATE 12.5 MG/1
25 TABLET, FILM COATED ORAL DAILY
Refills: 0 | Status: DISCONTINUED | OUTPATIENT
Start: 2025-03-31 | End: 2025-04-01

## 2025-03-31 RX ORDER — SENNA 187 MG
10 TABLET ORAL DAILY
Refills: 0 | Status: DISCONTINUED | OUTPATIENT
Start: 2025-03-31 | End: 2025-04-12

## 2025-03-31 RX ORDER — LEVOTHYROXINE SODIUM 300 MCG
75 TABLET ORAL DAILY
Refills: 0 | Status: DISCONTINUED | OUTPATIENT
Start: 2025-03-31 | End: 2025-04-01

## 2025-03-31 RX ORDER — POLYETHYLENE GLYCOL 3350 17 G/17G
17 POWDER, FOR SOLUTION ORAL
Refills: 0 | Status: DISCONTINUED | OUTPATIENT
Start: 2025-03-31 | End: 2025-04-18

## 2025-03-31 RX ADMIN — Medication 0.93 MICROGRAM(S)/KG/HR: at 22:08

## 2025-03-31 RX ADMIN — PROPOFOL 28 MICROGRAM(S)/KG/MIN: 10 INJECTION, EMULSION INTRAVENOUS at 18:19

## 2025-03-31 RX ADMIN — Medication 1.86 MG/KG/HR: at 18:20

## 2025-03-31 RX ADMIN — PROPOFOL 28 MICROGRAM(S)/KG/MIN: 10 INJECTION, EMULSION INTRAVENOUS at 19:19

## 2025-03-31 RX ADMIN — PROPOFOL 28 MICROGRAM(S)/KG/MIN: 10 INJECTION, EMULSION INTRAVENOUS at 21:34

## 2025-03-31 RX ADMIN — Medication 1 APPLICATION(S): at 12:11

## 2025-03-31 RX ADMIN — Medication 7 UNIT(S): at 17:42

## 2025-03-31 RX ADMIN — Medication 40 MILLIGRAM(S): at 12:11

## 2025-03-31 RX ADMIN — Medication 0.93 MICROGRAM(S)/KG/HR: at 19:18

## 2025-03-31 RX ADMIN — Medication 2.33 MG/KG/HR: at 23:09

## 2025-03-31 RX ADMIN — Medication 1.86 MG/KG/HR: at 17:05

## 2025-03-31 RX ADMIN — HEPARIN SODIUM 5000 UNIT(S): 1000 INJECTION INTRAVENOUS; SUBCUTANEOUS at 05:39

## 2025-03-31 RX ADMIN — POLYETHYLENE GLYCOL 3350 17 GRAM(S): 17 POWDER, FOR SOLUTION ORAL at 17:40

## 2025-03-31 RX ADMIN — BUDESONIDE 0.5 MILLIGRAM(S): 90 AEROSOL, POWDER RESPIRATORY (INHALATION) at 21:38

## 2025-03-31 RX ADMIN — Medication 2.33 MG/KG/HR: at 19:18

## 2025-03-31 RX ADMIN — Medication 15 MILLILITER(S): at 05:26

## 2025-03-31 RX ADMIN — Medication 0.93 MICROGRAM(S)/KG/HR: at 12:15

## 2025-03-31 RX ADMIN — Medication 500 MICROGRAM(S): at 15:42

## 2025-03-31 RX ADMIN — Medication 7 UNIT(S): at 12:40

## 2025-03-31 RX ADMIN — Medication 400 MILLIGRAM(S): at 21:40

## 2025-03-31 RX ADMIN — CISATRACURIUM BESYLATE 5.59 MICROGRAM(S)/KG/MIN: 10 INJECTION, SOLUTION INTRAVENOUS at 12:15

## 2025-03-31 RX ADMIN — Medication 1000 MILLIGRAM(S): at 22:13

## 2025-03-31 RX ADMIN — CEFTRIAXONE 100 MILLIGRAM(S): 500 INJECTION, POWDER, FOR SOLUTION INTRAMUSCULAR; INTRAVENOUS at 18:20

## 2025-03-31 RX ADMIN — PROPOFOL 28 MICROGRAM(S)/KG/MIN: 10 INJECTION, EMULSION INTRAVENOUS at 12:16

## 2025-03-31 RX ADMIN — Medication 8 MG/HR: at 19:37

## 2025-03-31 RX ADMIN — Medication 500 MICROGRAM(S): at 03:55

## 2025-03-31 RX ADMIN — METHYLPREDNISOLONE ACETATE 60 MILLIGRAM(S): 80 INJECTION, SUSPENSION INTRA-ARTICULAR; INTRALESIONAL; INTRAMUSCULAR; SOFT TISSUE at 05:25

## 2025-03-31 RX ADMIN — NOREPINEPHRINE BITARTRATE 4.37 MICROGRAM(S)/KG/MIN: 8 SOLUTION at 19:18

## 2025-03-31 RX ADMIN — METHYLPREDNISOLONE ACETATE 60 MILLIGRAM(S): 80 INJECTION, SUSPENSION INTRA-ARTICULAR; INTRALESIONAL; INTRAMUSCULAR; SOFT TISSUE at 13:02

## 2025-03-31 RX ADMIN — Medication 10 MILLILITER(S): at 12:12

## 2025-03-31 RX ADMIN — NALOXEGOL OXALATE 25 MILLIGRAM(S): 12.5 TABLET, FILM COATED ORAL at 12:10

## 2025-03-31 RX ADMIN — Medication 1.86 MG/KG/HR: at 19:17

## 2025-03-31 RX ADMIN — Medication 15 MILLILITER(S): at 17:40

## 2025-03-31 RX ADMIN — Medication 1.86 MG/KG/HR: at 12:16

## 2025-03-31 RX ADMIN — BUDESONIDE 0.5 MILLIGRAM(S): 90 AEROSOL, POWDER RESPIRATORY (INHALATION) at 11:49

## 2025-03-31 RX ADMIN — Medication 8 MG/HR: at 10:45

## 2025-03-31 RX ADMIN — Medication 1 APPLICATION(S): at 05:27

## 2025-03-31 RX ADMIN — HEPARIN SODIUM 5000 UNIT(S): 1000 INJECTION INTRAVENOUS; SUBCUTANEOUS at 13:02

## 2025-03-31 RX ADMIN — HEPARIN SODIUM 5000 UNIT(S): 1000 INJECTION INTRAVENOUS; SUBCUTANEOUS at 21:34

## 2025-03-31 RX ADMIN — Medication 2.33 MG/KG/HR: at 17:04

## 2025-03-31 RX ADMIN — Medication 8 MG/HR: at 23:27

## 2025-03-31 RX ADMIN — Medication 500 MICROGRAM(S): at 11:49

## 2025-03-31 RX ADMIN — Medication 500 MICROGRAM(S): at 21:38

## 2025-03-31 RX ADMIN — NOREPINEPHRINE BITARTRATE 4.37 MICROGRAM(S)/KG/MIN: 8 SOLUTION at 17:45

## 2025-03-31 RX ADMIN — METHYLPREDNISOLONE ACETATE 60 MILLIGRAM(S): 80 INJECTION, SUSPENSION INTRA-ARTICULAR; INTRALESIONAL; INTRAMUSCULAR; SOFT TISSUE at 21:34

## 2025-03-31 RX ADMIN — Medication 75 MICROGRAM(S): at 12:13

## 2025-03-31 NOTE — PROGRESS NOTE ADULT - SUBJECTIVE AND OBJECTIVE BOX
MICU PROGRESS NOTE     HISTORY OF PRESENT ILLNESS     Yesterday had air leak and not pulling enough TV and got replacement of ETT that improved saturation and TV. Got bronchoscopy that showed secretions in L main and L lower lobes and mucus plugs were aspirated.     ROS: All negative except as listed above.    VITAL SIGNS:  ICU Vital Signs Last 24 Hrs  T(C): 36.2 (31 Mar 2025 04:00), Max: 37.3 (30 Mar 2025 16:00)  T(F): 97.2 (31 Mar 2025 04:00), Max: 99.1 (30 Mar 2025 16:00)  HR: 91 (31 Mar 2025 07:00) (77 - 104)  BP: --  BP(mean): --  ABP: 138/75 (31 Mar 2025 07:00) (87/52 - 151/79)  ABP(mean): 102 (31 Mar 2025 07:00) (66 - 109)  RR: 24 (31 Mar 2025 07:00) (16 - 35)  SpO2: 100% (31 Mar 2025 07:00) (95% - 100%)    O2 Parameters below as of 31 Mar 2025 07:00  Patient On (Oxygen Delivery Method): ventilator    O2 Concentration (%): 40      Mode: AC/ CMV (Assist Control/ Continuous Mandatory Ventilation), RR (machine): 24, TV (machine): 400, FiO2: 40, PEEP: 3, ITime: 1, MAP: 13, PIP: 31  Plateau pressure:   P/F ratio:     03-30 @ 07:01  -  03-31 @ 07:00  --------------------------------------------------------  IN: 2257.5 mL / OUT: 4455 mL / NET: -2197.5 mL      CAPILLARY BLOOD GLUCOSE      POCT Blood Glucose.: 120 mg/dL (31 Mar 2025 05:31)      ECG: reviewed.    PHYSICAL EXAM:    General: NAD, overweight habitus   Neuro: sedated, AAOx0, no grimace to noxious stimuli, no spontaneity   HEENT: PERRLA b/l, mildly moist mucous membranes  Chest: nonTTP   Heart: S1/S2, RRR    Lungs: diffuse expiratory wheezing, nonlabored breathing, MV   Abd: soft, nonTTP, nondistended   Ext: mild trace pitting edema pretibial  Pulses: radial 2+ b/l   Lines/tubes/drains: L IJV triple lumen, L axillary A line, jonas   Psych: unable to assess         MEDICATIONS  (STANDING):  albuterol Continuous Nebulization (Vibrating Mesh Nebulizer) 5 mG/Hr (2 mL/Hr) Continuous Inhalation. <Continuous>  buDESOnide    Inhalation Suspension 0.5 milliGRAM(s) Inhalation two times a day  cefTRIAXone   IVPB 1000 milliGRAM(s) IV Intermittent every 24 hours  chlorhexidine 0.12% Liquid 15 milliLiter(s) Oral Mucosa every 12 hours  chlorhexidine 2% Cloths 1 Application(s) Topical <User Schedule>  cisatracurium Infusion 1 MICROgram(s)/kG/Min (5.59 mL/Hr) IV Continuous <Continuous>  dextrose 5%. 1000 milliLiter(s) (50 mL/Hr) IV Continuous <Continuous>  dextrose 5%. 1000 milliLiter(s) (100 mL/Hr) IV Continuous <Continuous>  dextrose 50% Injectable 25 Gram(s) IV Push once  dextrose 50% Injectable 12.5 Gram(s) IV Push once  dextrose 50% Injectable 25 Gram(s) IV Push once  fentaNYL   Infusion..... 0.5 MICROgram(s)/kG/Hr (0.93 mL/Hr) IV Continuous <Continuous>  glucagon  Injectable 1 milliGRAM(s) IntraMuscular once  heparin   Injectable 5000 Unit(s) SubCutaneous every 8 hours  influenza   Vaccine 0.5 milliLiter(s) IntraMuscular once  insulin lispro (ADMELOG) corrective regimen sliding scale   SubCutaneous every 6 hours  insulin NPH human recombinant 7 Unit(s) SubCutaneous every 6 hours  ipratropium    for Nebulization 500 MICROGram(s) Nebulizer every 6 hours  ketamine Infusion. 0.25 mG/kG/Hr (2.33 mL/Hr) IV Continuous <Continuous>  levothyroxine Injectable 55 MICROGram(s) IV Push at bedtime  methylPREDNISolone sodium succinate Injectable 60 milliGRAM(s) IV Push every 8 hours  midazolam Infusion 0.02 mG/kG/Hr (1.86 mL/Hr) IV Continuous <Continuous>  norepinephrine Infusion 0.05 MICROgram(s)/kG/Min (4.37 mL/Hr) IV Continuous <Continuous>  pantoprazole  Injectable 40 milliGRAM(s) IV Push daily  petrolatum Ophthalmic Ointment 1 Application(s) Both EYES daily  propofol Infusion 50.072 MICROgram(s)/kG/Min (28 mL/Hr) IV Continuous <Continuous>  propofol Injectable 50 milliGRAM(s) IV Push once    MEDICATIONS  (PRN):  dextrose Oral Gel 15 Gram(s) Oral once PRN Blood Glucose LESS THAN 70 milliGRAM(s)/deciliter      ALLERGIES:  Allergies    No Known Allergies    Intolerances        LABS:                        11.5   24.41 )-----------( 188      ( 31 Mar 2025 01:20 )             36.6     03-31    146[H]  |  111[H]  |  11  ----------------------------<  140[H]  5.1   |  23  |  1.12    Ca    8.3[L]      31 Mar 2025 01:20  Phos  3.7     03-31  Mg     2.50     03-31    TPro  5.9[L]  /  Alb  3.3  /  TBili  <0.2  /  DBili  x   /  AST  22  /  ALT  38[H]  /  AlkPhos  60  03-31    PT/INR - ( 31 Mar 2025 01:20 )   PT: 10.9 sec;   INR: 0.94 ratio         PTT - ( 31 Mar 2025 01:20 )  PTT:24.2 sec  Urinalysis Basic - ( 31 Mar 2025 01:20 )    Color: x / Appearance: x / SG: x / pH: x  Gluc: 140 mg/dL / Ketone: x  / Bili: x / Urobili: x   Blood: x / Protein: x / Nitrite: x   Leuk Esterase: x / RBC: x / WBC x   Sq Epi: x / Non Sq Epi: x / Bacteria: x      ABG:  pH, Arterial: 7.35 (03-31-25 @ 06:45)  pCO2, Arterial: 50 mmHg (03-31-25 @ 06:45)  pO2, Arterial: 157 mmHg (03-31-25 @ 06:45)  pH, Arterial: 7.21 (03-31-25 @ 01:20)  pCO2, Arterial: 71 mmHg (03-31-25 @ 01:20)  pO2, Arterial: 137 mmHg (03-31-25 @ 01:20)  pH, Arterial: 7.22 (03-30-25 @ 15:30)  pCO2, Arterial: 67 mmHg (03-30-25 @ 15:30)  pO2, Arterial: 409 mmHg (03-30-25 @ 15:30)  pH, Arterial: 7.25 (03-30-25 @ 12:20)  pCO2, Arterial: 59 mmHg (03-30-25 @ 12:20)  pO2, Arterial: 136 mmHg (03-30-25 @ 12:20)  pH, Arterial: 7.26 (03-30-25 @ 09:30)  pCO2, Arterial: 54 mmHg (03-30-25 @ 09:30)  pO2, Arterial: 146 mmHg (03-30-25 @ 09:30)      vBG:    Micro:    Culture - Blood (collected 03-28-25 @ 16:39)  Source: Blood Blood-Peripheral  Preliminary Report (03-30-25 @ 20:01):    No growth at 48 Hours    Culture - Blood (collected 03-28-25 @ 16:34)  Source: Blood Blood-Peripheral  Preliminary Report (03-30-25 @ 20:01):    No growth at 48 Hours       Urinalysis with Rflx Culture (collected 03-28-25 @ 16:34)         RADIOLOGY & ADDITIONAL TESTS: Reviewed.

## 2025-03-31 NOTE — PROGRESS NOTE ADULT - ASSESSMENT
59F with HTN, DM2, hypothyroid, and asthma here for status asthmaticus, unknown etiology.     NEUROLOGIC   Not at baseline mental status. On sedation and medical paralysis.   -Baseline AAOx3  -C/w propofol; wean as tolerated   -C/w fentanyl; wean as tolerated   -C/w ketamine; wean as tolerated   -C/w midazolam; wean as tolerated       CARDIOVASCULAR     # Vasoplegic Shock  Hemodynamically stable. Requiring pressors.   -C/w levophed; wean as tolerated     #Elevated troponin- 2/2 demand ischemia from vasopressors   Uptrending tropnoins 50s to 252 to 180s. No events on telemetry. Peaked and downtrending.   -Monitor   -If increasing again or high clinical suspicion, can consider PE r/o       #HTN   Normotensive.   -Hold home lisinopril 10 mg po qd       RESPIRATORY   #Status asthmaticus   Intubated.   -C/w mechanical ventilator  -Daily spontaneous breathing trials   -C/w methylprednisolone 60 mg IV q12h(start 3/28)   -Uptitrate albuterol nebulizer q2 to q1   -C/w ipratropium 500 mg nebulizer 16   -C/w duonebs q6h  -Empiric abx for pna   - Holding off on CT angio for now.     # Subcutaneous Emphysema   - Present s/p reintubation 03/30 night  - CXR top rule out pneumomediastinum   - Will consider CT chest if worsens    GASTROINTESTINAL   -JS     -OGT 3/29 --> Start trickle feeds. Nutrition consulted    GENITOURINARY/RENAL   #HAGMA 2/2 hypercarbia and lactic acidosis (Resolved)  - On admission, VBG pH 6.98 and PCO2 125. Currently ABG ABG 7.14 and PCO2 50s.   - Treat with mechanical ventilation for exhalation   - Serial ABGs   - Thiamine x 1       # NAGMA  - unknown reason for decreased bicarb. Having adequate urine output but still net positive  - PCO2 wnl, but vent rate decreased and patient likely to retain more now    Plan:  - Will give bumex 1mg now aiming for goal of net even  - Repeat ABG      # Fluid status  - significant fluid intake in setting of multiple sedatives and pressor  - Will double concentrate levo and fentanyl  - Bumex 1mg x1 now and aim for fluid goal of net even   - Strict ins and outs      ENDOCRINE   #DM   -NPO   -Low ISS, NPH 7u q6h, q6 fingersticks   -Hold home metformin 500 mg po qd       #Hypothyroid   -Hold home levothyroxine 75 cg po qd   -Start levothyroxine 55 mg IV qd       INFECTIOUS DISEASE   Not meeting criteria for SIRS. However unclear trigger for asthma.   -WBC on admission 11.21 and afebrile   -F/u blood cx   -F/u urine cx   -Urine showing 300 proteinuria and 20 hematuria; no evidence of infection   -Negative viral panel   - Pending urine legionella   -Empiric ceftriaxone 1000 mg IV qd x 5 days (started 3/28)   -Empiric azithromycin 500 mg IV qd x 3 days (started 3/28)       # Steroid Use  - Patient on methylpred 60mg BID and will likely require long term taper when coming off  - Taper may exceed the 40mg qD for 4 weeks threshold and she may require bactrim ppx. Will follow      HEMATOLOGIC/ONCOLOGIC   -JS     PROPHYLACTIC   -DVT ppx: heparin 5000U sq q8     ETHICS   -HCP daughter   -Full code    59F with HTN, DM2, hypothyroid, and asthma here for status asthmaticus iso recent dental procedures.     NEUROLOGIC   Not at baseline mental status. On sedation and medical paralysis.   -Baseline AAOx3  -C/w propofol; wean as tolerated   -C/w fentanyl; wean as tolerated   -C/w ketamine; wean as tolerated   -C/w midazolam; wean as tolerated   -C/w cistracronium; wean as tolerated       CARDIOVASCULAR     # Vasoplegic Shock  Hemodynamically stable. Requiring pressors.   -C/w levophed; wean as tolerated     #Elevated troponin- 2/2 demand ischemia from vasopressors- RESOLVED   Plated tropnoins 50s to 252 to 180s. No events on telemetry. Peaked and downtrending.   -Monitor   -If increasing again or high clinical suspicion, can consider PE r/o       #HTN   Normotensive.   -Hold home lisinopril 10 mg po qd       RESPIRATORY   #Status asthmaticus   Intubated.   -C/w mechanical ventilator  -Daily spontaneous breathing trials   -C/w methylprednisolone 60 mg IV q12h(start 3/28)   -Uptitrate albuterol nebulizer continous to 20 mg/hr per RT    -C/w ipratropium 500 mg nebulizer 16   -C/w duonebs q6h  -Empiric abx for pna   - Holding off on CT angio for now.   -Pending BAL  -Bronchoscopy 3/30 showing secretions in L main and L lober with mucus aspirated out     # Subcutaneous Emphysema   - Present s/p reintubation 03/30 night  - CXR 3/30 showing subcutaneous emphysema without pneumomediastinum   - Will consider CT chest if worsens    GASTROINTESTINAL   -JS   -OGT 3/30 placed   -C/w TFs  Nutrition consulted    GENITOURINARY/RENAL   #HAGMA 2/2 hypercarbia and lactic acidosis- RESOLVED   - On admission, VBG pH 6.98 and PCO2 125. Currently ABG ABG 7.14 and PCO2 50s.   - Treat with mechanical ventilation for exhalation   - Serial ABGs   - S/p Thiamine x 1 for lactic acidosis         ENDOCRINE   #DM   -NPO   -Low ISS, NPH 7u q6h, q6 fingersticks   -Hold home metformin 500 mg po qd       #Hypothyroid   -Restart home levothyroxine 75 cg po qd       INFECTIOUS DISEASE   Not meeting criteria for SIRS. Unclear trigger for asthma.   -WBC on admission 11.21 and afebrile   -Blood cx  NGTD 3/28   -Sputum cx no PMNs or ogasnisms 3/30   -Urine showing 300 proteinuria and 20 hematuria; no evidence of infection   -Negative viral panel   - urine legionella negative   -F/u urine streptococcus   -Empiric ceftriaxone 1000 mg IV qd x 5 days (started 3/28-4/1)   -Empiric azithromycin 500 mg IV qd x 3 days (started 3/28-3/30)     HEMATOLOGIC/ONCOLOGIC   #Leukocytosis- 2/2 steroid induced   Low suspicion for infection source.   -Monitor     PROPHYLACTIC   -DVT ppx: heparin 5000U sq q8     ETHICS   -HCP daughter   -Full code

## 2025-03-31 NOTE — PROGRESS NOTE ADULT - ATTENDING COMMENTS
Patient is a 58 yo F w/ HTN, T2DM, hypothyroid, and asthma admitted to MICU for status asthmaticus    #Status Asthmaticus  #Acute hypercapnic and hypoxemic respiratory failure  #Hypernatremia  #Elevated Tropinemia  - c/w sedation and paralysis for now. WIll wean Fent gradually. If ABGs stable/improving will attempt to hold paralytic  - c/w mechanical ventilatory support, changed to square wave this AM, decreased I time and increased PEEP to 6 with AUto PEEP improved to 8  - c/w continuous Albuterol nebs, increased from 5mg to 7.5 mg  - c/w Iptratroprium nebs q6h  - c/w Solumedrol 60mg q8h for now. Monitor FSG  - Add bowel regimen with Movantik  - Complete course of empiric Ceft    Zachary Castillo MD  Pulmonary & Critical Care

## 2025-03-31 NOTE — CHART NOTE - NSCHARTNOTEFT_GEN_A_CORE
______________ CRITICAL CARE NUTRITION FOLLOW-UP ________________        --Medical Course--  59y  Female w Hx of HTN, DM2 and asthma.  Admitted to MICU for status asthmaticus.   Pt. with acute hypercapnic & hypoxemic respiratory failure, hypernatremia.  Pt. intubated/sedated (3/30).      Spoke with resident physicians.  No longer medically paralyzed with Nimbex (d/c'd today 3/31).  Propofol remains..... ~730 KCals to be provided by Propofol over 24hrs @ current rate, in addition to tube feedings.     No recent bowel movement since admission (x at least 3d).  Also sedated with Fentanyl.  Pt. started on bowel regimen with Senna, Miralax and Naloxegol.       --Nutrition Course--  Inadequate energy/nutrient intake x at least 3d.  Pt. receiving Glucerna 1.5 @ 40mL/hr via OGT which suboptimally meets previous and current kcal and protein estimates.   Prior nutrition recommendation was to increase goal rate with Glucerna 1.5 to 55mL/hr x 18hrs.    Considering           __________Diet Order_________  Diet, NPO with Tube Feed:   Tube Feeding Modality: Orogastric  Glucerna 1.5 Robert (GLUCERNA1.5RTH)  Total Volume for 24 Hours (mL): 720  Continuous  Starting Tube Feed Rate {mL per Hour}: 10  Increase Tube Feed Rate by (mL): 10     Every 4 hours  Until Goal Tube Feed Rate (mL per Hour): 40  Tube Feed Duration (in Hours): 18  Tube Feed Start Time: 11:00  Tube Feed Stop Time: 05:00 (03-30-25 @ 09:07) [Active]    Current TF provides:  720mL total volume  1080 kcals (+ Propofol KCals) = 1819 KCals  59g protein  546mL free water            Weight:      Height:  66" / 167.6cm     Ideal Body Weight: 130lbs / 59.1kg  94.2kg (3/31)  93.2kg (3/28 on admit)          _____Estimated Energy Needs (based on Ideal Body Weight)_____  20-25 KCals/kg = 1182- 1478 KCals/d  1.4-1.6g protein/kg =  83-95g protein/d      Edema: 1+ generalized and b/l hands and feet    Last bowel movement: None recent    Skin: No pressure injuries noted.     ________________________Pertinent Medications____________   MEDICATIONS  (STANDING):  albuterol Continuous Nebulization (Vibrating Mesh Nebulizer) 20 mG/Hr (8 mL/Hr) Continuous Inhalation. <Continuous>  buDESOnide    Inhalation Suspension 0.5 milliGRAM(s) Inhalation two times a day  cefTRIAXone   IVPB 1000 milliGRAM(s) IV Intermittent every 24 hours  chlorhexidine 0.12% Liquid 15 milliLiter(s) Oral Mucosa every 12 hours  chlorhexidine 2% Cloths 1 Application(s) Topical <User Schedule>  dextrose 5%. 1000 milliLiter(s) (50 mL/Hr) IV Continuous <Continuous>  dextrose 5%. 1000 milliLiter(s) (100 mL/Hr) IV Continuous <Continuous>  dextrose 50% Injectable 25 Gram(s) IV Push once  dextrose 50% Injectable 12.5 Gram(s) IV Push once  dextrose 50% Injectable 25 Gram(s) IV Push once  fentaNYL   Infusion..... 0.5 MICROgram(s)/kG/Hr (0.93 mL/Hr) IV Continuous <Continuous>  glucagon  Injectable 1 milliGRAM(s) IntraMuscular once  heparin   Injectable 5000 Unit(s) SubCutaneous every 8 hours  influenza   Vaccine 0.5 milliLiter(s) IntraMuscular once  insulin lispro (ADMELOG) corrective regimen sliding scale   SubCutaneous every 6 hours  insulin NPH human recombinant 7 Unit(s) SubCutaneous every 6 hours  ipratropium    for Nebulization 500 MICROGram(s) Nebulizer every 6 hours  ketamine Infusion. 0.25 mG/kG/Hr (2.33 mL/Hr) IV Continuous <Continuous>  levothyroxine 75 MICROGram(s) Oral daily  methylPREDNISolone sodium succinate Injectable 60 milliGRAM(s) IV Push every 8 hours  midazolam Infusion 0.02 mG/kG/Hr (1.86 mL/Hr) IV Continuous <Continuous>  naloxegol 25 milliGRAM(s) Oral daily  norepinephrine Infusion 0.05 MICROgram(s)/kG/Min (4.37 mL/Hr) IV Continuous <Continuous>  pantoprazole  Injectable 40 milliGRAM(s) IV Push daily  petrolatum Ophthalmic Ointment 1 Application(s) Both EYES daily  polyethylene glycol 3350 17 Gram(s) Oral two times a day  propofol Infusion 50.072 MICROgram(s)/kG/Min (28 mL/Hr) IV Continuous <Continuous>  senna Syrup 10 milliLiter(s) Oral daily    MEDICATIONS  (PRN):  dextrose Oral Gel 15 Gram(s) Oral once PRN Blood Glucose LESS THAN 70 milliGRAM(s)/deciliter          _________________________Pertinent Labs____________________     03-31    146[H]  |  111[H]  |  11  ----------------------------<  140[H]  5.1   |  23  |  1.12    Ca    8.3[L]      31 Mar 2025 01:20  Phos  3.7     03-31  Mg     2.50     03-31          CAPILLARY BLOOD GLUCOSE    POCT Blood Glucose.: 120 mg/dL (03-31-25 @ 12:25)  POCT Blood Glucose.: 120 mg/dL (03-31-25 @ 05:31)  POCT Blood Glucose.: 133 mg/dL (03-31-25 @ 00:02)  POCT Blood Glucose.: 94 mg/dL (03-30-25 @ 17:40)              PLAN/RECOMMENDATIONS:    1)  2) Obtain daily weights  3) Monitor tolerance to tube feeding, GI status, electrolytes, glucose    RDN remains available and will f/u PRN.          Alexandra Truong, ROSEMARY, CDN       pager [phone] or MS Teams ______________ CRITICAL CARE NUTRITION FOLLOW-UP ________________        --Medical Course--  59y  Female w Hx of HTN, DM2 and asthma.  Admitted to MICU for status asthmaticus.   Pt. with acute hypercapnic & hypoxemic respiratory failure, hypernatremia.  Pt. intubated/sedated (3/30).      Spoke with resident physicians.  No longer medically paralyzed with Nimbex (d/c'd today 3/31).  Propofol remains..... ~739 KCals to be provided by Propofol over 24hrs @ current rate, in addition to tube feedings.     No recent bowel movement since admission (x at least 3d).  Also sedated with Fentanyl.  Pt. started on bowel regimen with Senna, Miralax and Naloxegol.       --Nutrition Course--  Inadequate energy/nutrient intake x at least 3d.    Pt. receiving Glucerna 1.5 @ 40mL/hr via OGT which suboptimally meets previous and current kcal and protein estimates.   Prior nutrition recommendation was to increase goal rate with Glucerna 1.5 to 55mL/hr x 18hrs, as well as addition of multivitamin.     Considering amount calories provided by Propofol, at this time suggest decreasing enteral goal rate with Glucerna 1.5 to 35mL/hr x 18hrs (11a-5a) + Liquid Protein Supplement (LPS) 2 packets per day to provide:  630mL total volume  945 KCals (+ 200 KCals from LPS) = 1145 KCals (+ Propofol KCals) = 1884 total KCals  52g protein (+ 30g additional protein from LPS) = 82g protein/d  478mL free water      TF+ LPS will give 19 KCals/kg Ideal Body Weight (or ~32 KCals/kg Ideal Body Weight TF+LPS+Propofol) & ~1.4g protein/kg Ideal Body Weight.      Multivitamin also advise for micronutrient support.  Will contact resident physicians re: nutrition recommendations.  RN verbally updated by ROSEMARY.    __________Diet Order_________  Diet, NPO with Tube Feed:   Tube Feeding Modality: Orogastric  Glucerna 1.5 Robert (GLUCERNA1.5RTH)  Total Volume for 24 Hours (mL): 720  Continuous  Starting Tube Feed Rate {mL per Hour}: 10  Increase Tube Feed Rate by (mL): 10     Every 4 hours  Until Goal Tube Feed Rate (mL per Hour): 40  Tube Feed Duration (in Hours): 18  Tube Feed Start Time: 11:00  Tube Feed Stop Time: 05:00 (03-30-25 @ 09:07) [Active]    Current TF provides:  720mL total volume  1080 kcals (+ Propofol KCals) = 1819 KCals  59g protein  546mL free water            Weight:      Height:  66" / 167.6cm     Ideal Body Weight: 130lbs / 59.1kg  94.2kg (3/31)  93.2kg (3/28 on admit)          _____Estimated Energy Needs (based on Ideal Body Weight)_____  20-25 KCals/kg = 1182- 1478 KCals/d  1.4-1.6g protein/kg =  83-95g protein/d      Edema: 1+ generalized and b/l hands and feet    Last bowel movement: None recent    Skin: No pressure injuries noted.     ________________________Pertinent Medications____________   MEDICATIONS  (STANDING):  albuterol Continuous Nebulization (Vibrating Mesh Nebulizer) 20 mG/Hr (8 mL/Hr) Continuous Inhalation. <Continuous>  buDESOnide    Inhalation Suspension 0.5 milliGRAM(s) Inhalation two times a day  cefTRIAXone   IVPB 1000 milliGRAM(s) IV Intermittent every 24 hours  chlorhexidine 0.12% Liquid 15 milliLiter(s) Oral Mucosa every 12 hours  chlorhexidine 2% Cloths 1 Application(s) Topical <User Schedule>  dextrose 5%. 1000 milliLiter(s) (50 mL/Hr) IV Continuous <Continuous>  dextrose 5%. 1000 milliLiter(s) (100 mL/Hr) IV Continuous <Continuous>  dextrose 50% Injectable 25 Gram(s) IV Push once  dextrose 50% Injectable 12.5 Gram(s) IV Push once  dextrose 50% Injectable 25 Gram(s) IV Push once  fentaNYL   Infusion..... 0.5 MICROgram(s)/kG/Hr (0.93 mL/Hr) IV Continuous <Continuous>  glucagon  Injectable 1 milliGRAM(s) IntraMuscular once  heparin   Injectable 5000 Unit(s) SubCutaneous every 8 hours  influenza   Vaccine 0.5 milliLiter(s) IntraMuscular once  insulin lispro (ADMELOG) corrective regimen sliding scale   SubCutaneous every 6 hours  insulin NPH human recombinant 7 Unit(s) SubCutaneous every 6 hours  ipratropium    for Nebulization 500 MICROGram(s) Nebulizer every 6 hours  ketamine Infusion. 0.25 mG/kG/Hr (2.33 mL/Hr) IV Continuous <Continuous>  levothyroxine 75 MICROGram(s) Oral daily  methylPREDNISolone sodium succinate Injectable 60 milliGRAM(s) IV Push every 8 hours  midazolam Infusion 0.02 mG/kG/Hr (1.86 mL/Hr) IV Continuous <Continuous>  naloxegol 25 milliGRAM(s) Oral daily  norepinephrine Infusion 0.05 MICROgram(s)/kG/Min (4.37 mL/Hr) IV Continuous <Continuous>  pantoprazole  Injectable 40 milliGRAM(s) IV Push daily  petrolatum Ophthalmic Ointment 1 Application(s) Both EYES daily  polyethylene glycol 3350 17 Gram(s) Oral two times a day  propofol Infusion 50.072 MICROgram(s)/kG/Min (28 mL/Hr) IV Continuous <Continuous>  senna Syrup 10 milliLiter(s) Oral daily    MEDICATIONS  (PRN):  dextrose Oral Gel 15 Gram(s) Oral once PRN Blood Glucose LESS THAN 70 milliGRAM(s)/deciliter          _________________________Pertinent Labs____________________     03-31    146[H]  |  111[H]  |  11  ----------------------------<  140[H]  5.1   |  23  |  1.12    Ca    8.3[L]      31 Mar 2025 01:20  Phos  3.7     03-31  Mg     2.50     03-31          CAPILLARY BLOOD GLUCOSE    POCT Blood Glucose.: 120 mg/dL (03-31-25 @ 12:25)  POCT Blood Glucose.: 120 mg/dL (03-31-25 @ 05:31)  POCT Blood Glucose.: 133 mg/dL (03-31-25 @ 00:02)  POCT Blood Glucose.: 94 mg/dL (03-30-25 @ 17:40)          PLAN/RECOMMENDATIONS:    1) Decrease Glucerna 1.5 to 35mL/hr x 18hrs (11a-5a) + Liquid Protein Supplement (LPS) 2 packets per day  2) Obtain daily weights  3) Monitor tolerance to tube feeding, GI status, electrolytes, glucose  4) Maintain bowel regimen, at this time  5) Order multivitamin     RDN remains available and will f/u PRN.          Alexandra Truong, ROSEMARY, CDN       pager [phone] or MS Teams

## 2025-04-01 LAB
A1C WITH ESTIMATED AVERAGE GLUCOSE RESULT: 5.7 % — HIGH (ref 4–5.6)
ALBUMIN SERPL ELPH-MCNC: 3 G/DL — LOW (ref 3.3–5)
ALBUMIN SERPL ELPH-MCNC: 3.2 G/DL — LOW (ref 3.3–5)
ALP SERPL-CCNC: 61 U/L — SIGNIFICANT CHANGE UP (ref 40–120)
ALP SERPL-CCNC: 67 U/L — SIGNIFICANT CHANGE UP (ref 40–120)
ALT FLD-CCNC: 33 U/L — SIGNIFICANT CHANGE UP (ref 4–33)
ALT FLD-CCNC: 36 U/L — HIGH (ref 4–33)
ANION GAP SERPL CALC-SCNC: 9 MMOL/L — SIGNIFICANT CHANGE UP (ref 7–14)
ANION GAP SERPL CALC-SCNC: 9 MMOL/L — SIGNIFICANT CHANGE UP (ref 7–14)
APTT BLD: 23.3 SEC — LOW (ref 24.5–35.6)
AST SERPL-CCNC: 24 U/L — SIGNIFICANT CHANGE UP (ref 4–32)
AST SERPL-CCNC: 26 U/L — SIGNIFICANT CHANGE UP (ref 4–32)
BILIRUB SERPL-MCNC: <0.2 MG/DL — SIGNIFICANT CHANGE UP (ref 0.2–1.2)
BILIRUB SERPL-MCNC: <0.2 MG/DL — SIGNIFICANT CHANGE UP (ref 0.2–1.2)
BLD GP AB SCN SERPL QL: NEGATIVE — SIGNIFICANT CHANGE UP
BLOOD GAS ARTERIAL COMPREHENSIVE RESULT: SIGNIFICANT CHANGE UP
BLOOD GAS ARTERIAL COMPREHENSIVE RESULT: SIGNIFICANT CHANGE UP
BUN SERPL-MCNC: 16 MG/DL — SIGNIFICANT CHANGE UP (ref 7–23)
BUN SERPL-MCNC: 17 MG/DL — SIGNIFICANT CHANGE UP (ref 7–23)
CALCIUM SERPL-MCNC: 8.2 MG/DL — LOW (ref 8.4–10.5)
CALCIUM SERPL-MCNC: 8.5 MG/DL — SIGNIFICANT CHANGE UP (ref 8.4–10.5)
CHLORIDE SERPL-SCNC: 113 MMOL/L — HIGH (ref 98–107)
CHLORIDE SERPL-SCNC: 114 MMOL/L — HIGH (ref 98–107)
CK MB BLD-MCNC: 1.5 % — SIGNIFICANT CHANGE UP (ref 0–2.5)
CK MB CFR SERPL CALC: 1.9 NG/ML — SIGNIFICANT CHANGE UP
CK SERPL-CCNC: 129 U/L — SIGNIFICANT CHANGE UP (ref 25–170)
CO2 SERPL-SCNC: 25 MMOL/L — SIGNIFICANT CHANGE UP (ref 22–31)
CO2 SERPL-SCNC: 26 MMOL/L — SIGNIFICANT CHANGE UP (ref 22–31)
CREAT SERPL-MCNC: 0.82 MG/DL — SIGNIFICANT CHANGE UP (ref 0.5–1.3)
CREAT SERPL-MCNC: 0.97 MG/DL — SIGNIFICANT CHANGE UP (ref 0.5–1.3)
EGFR: 67 ML/MIN/1.73M2 — SIGNIFICANT CHANGE UP
EGFR: 67 ML/MIN/1.73M2 — SIGNIFICANT CHANGE UP
EGFR: 82 ML/MIN/1.73M2 — SIGNIFICANT CHANGE UP
EGFR: 82 ML/MIN/1.73M2 — SIGNIFICANT CHANGE UP
ESTIMATED AVERAGE GLUCOSE: 117 — SIGNIFICANT CHANGE UP
GLUCOSE BLDC GLUCOMTR-MCNC: 103 MG/DL — HIGH (ref 70–99)
GLUCOSE BLDC GLUCOMTR-MCNC: 112 MG/DL — HIGH (ref 70–99)
GLUCOSE BLDC GLUCOMTR-MCNC: 90 MG/DL — SIGNIFICANT CHANGE UP (ref 70–99)
GLUCOSE BLDC GLUCOMTR-MCNC: 98 MG/DL — SIGNIFICANT CHANGE UP (ref 70–99)
GLUCOSE SERPL-MCNC: 101 MG/DL — HIGH (ref 70–99)
GLUCOSE SERPL-MCNC: 104 MG/DL — HIGH (ref 70–99)
HCT VFR BLD CALC: 33 % — LOW (ref 34.5–45)
HGB BLD-MCNC: 10.2 G/DL — LOW (ref 11.5–15.5)
INR BLD: 1 RATIO — SIGNIFICANT CHANGE UP (ref 0.85–1.16)
MAGNESIUM SERPL-MCNC: 2.4 MG/DL — SIGNIFICANT CHANGE UP (ref 1.6–2.6)
MAGNESIUM SERPL-MCNC: 2.4 MG/DL — SIGNIFICANT CHANGE UP (ref 1.6–2.6)
MCHC RBC-ENTMCNC: 30.4 PG — SIGNIFICANT CHANGE UP (ref 27–34)
MCHC RBC-ENTMCNC: 30.9 G/DL — LOW (ref 32–36)
MCV RBC AUTO: 98.2 FL — SIGNIFICANT CHANGE UP (ref 80–100)
NRBC # BLD AUTO: 0.02 K/UL — HIGH (ref 0–0)
NRBC # FLD: 0.02 K/UL — HIGH (ref 0–0)
NRBC BLD AUTO-RTO: 0 /100 WBCS — SIGNIFICANT CHANGE UP (ref 0–0)
PHOSPHATE SERPL-MCNC: 1.2 MG/DL — LOW (ref 2.5–4.5)
PHOSPHATE SERPL-MCNC: 3.8 MG/DL — SIGNIFICANT CHANGE UP (ref 2.5–4.5)
PLATELET # BLD AUTO: 160 K/UL — SIGNIFICANT CHANGE UP (ref 150–400)
POTASSIUM SERPL-MCNC: 4.4 MMOL/L — SIGNIFICANT CHANGE UP (ref 3.5–5.3)
POTASSIUM SERPL-MCNC: 4.6 MMOL/L — SIGNIFICANT CHANGE UP (ref 3.5–5.3)
POTASSIUM SERPL-SCNC: 4.4 MMOL/L — SIGNIFICANT CHANGE UP (ref 3.5–5.3)
POTASSIUM SERPL-SCNC: 4.6 MMOL/L — SIGNIFICANT CHANGE UP (ref 3.5–5.3)
PROT SERPL-MCNC: 5.5 G/DL — LOW (ref 6–8.3)
PROT SERPL-MCNC: 5.8 G/DL — LOW (ref 6–8.3)
PROTHROM AB SERPL-ACNC: 11.6 SEC — SIGNIFICANT CHANGE UP (ref 9.9–13.4)
RBC # BLD: 3.36 M/UL — LOW (ref 3.8–5.2)
RBC # FLD: 15.9 % — HIGH (ref 10.3–14.5)
RH IG SCN BLD-IMP: POSITIVE — SIGNIFICANT CHANGE UP
SODIUM SERPL-SCNC: 147 MMOL/L — HIGH (ref 135–145)
SODIUM SERPL-SCNC: 149 MMOL/L — HIGH (ref 135–145)
TROPONIN T, HIGH SENSITIVITY RESULT: 49 NG/L — SIGNIFICANT CHANGE UP
WBC # BLD: 23.81 K/UL — HIGH (ref 3.8–10.5)
WBC # FLD AUTO: 23.81 K/UL — HIGH (ref 3.8–10.5)

## 2025-04-01 RX ORDER — LEVOTHYROXINE SODIUM 300 MCG
55 TABLET ORAL AT BEDTIME
Refills: 0 | Status: DISCONTINUED | OUTPATIENT
Start: 2025-04-01 | End: 2025-04-04

## 2025-04-01 RX ORDER — LEVOTHYROXINE SODIUM 300 MCG
55 TABLET ORAL AT BEDTIME
Refills: 0 | Status: DISCONTINUED | OUTPATIENT
Start: 2025-04-01 | End: 2025-04-01

## 2025-04-01 RX ORDER — METOCLOPRAMIDE HCL 10 MG
10 TABLET ORAL EVERY 6 HOURS
Refills: 0 | Status: DISCONTINUED | OUTPATIENT
Start: 2025-04-01 | End: 2025-04-02

## 2025-04-01 RX ORDER — SODIUM CHLORIDE 9 G/1000ML
500 INJECTION, SOLUTION INTRAVENOUS ONCE
Refills: 0 | Status: COMPLETED | OUTPATIENT
Start: 2025-04-01 | End: 2025-04-01

## 2025-04-01 RX ORDER — METHYLNALTREXONE BROMIDE 12 MG/.6ML
12 INJECTION, SOLUTION SUBCUTANEOUS ONCE
Refills: 0 | Status: COMPLETED | OUTPATIENT
Start: 2025-04-01 | End: 2025-04-01

## 2025-04-01 RX ORDER — METOCLOPRAMIDE HCL 10 MG
10 TABLET ORAL EVERY 6 HOURS
Refills: 0 | Status: DISCONTINUED | OUTPATIENT
Start: 2025-04-01 | End: 2025-04-01

## 2025-04-01 RX ORDER — POTASSIUM PHOSPHATE, MONOBASIC POTASSIUM PHOSPHATE, DIBASIC INJECTION, 236; 224 MG/ML; MG/ML
30 SOLUTION, CONCENTRATE INTRAVENOUS ONCE
Refills: 0 | Status: COMPLETED | OUTPATIENT
Start: 2025-04-01 | End: 2025-04-01

## 2025-04-01 RX ADMIN — Medication 1 APPLICATION(S): at 05:51

## 2025-04-01 RX ADMIN — Medication 2.33 MG/KG/HR: at 18:23

## 2025-04-01 RX ADMIN — Medication 55 MICROGRAM(S): at 21:48

## 2025-04-01 RX ADMIN — Medication 2.33 MG/KG/HR: at 19:23

## 2025-04-01 RX ADMIN — Medication 0.93 MICROGRAM(S)/KG/HR: at 07:59

## 2025-04-01 RX ADMIN — Medication 8 MG/HR: at 19:40

## 2025-04-01 RX ADMIN — PROPOFOL 28 MICROGRAM(S)/KG/MIN: 10 INJECTION, EMULSION INTRAVENOUS at 11:11

## 2025-04-01 RX ADMIN — INSULIN LISPRO 0: 100 INJECTION, SOLUTION INTRAVENOUS; SUBCUTANEOUS at 11:24

## 2025-04-01 RX ADMIN — Medication 2.33 MG/KG/HR: at 11:10

## 2025-04-01 RX ADMIN — INSULIN LISPRO 0: 100 INJECTION, SOLUTION INTRAVENOUS; SUBCUTANEOUS at 17:19

## 2025-04-01 RX ADMIN — HEPARIN SODIUM 5000 UNIT(S): 1000 INJECTION INTRAVENOUS; SUBCUTANEOUS at 21:49

## 2025-04-01 RX ADMIN — Medication 500 MICROGRAM(S): at 10:00

## 2025-04-01 RX ADMIN — HEPARIN SODIUM 5000 UNIT(S): 1000 INJECTION INTRAVENOUS; SUBCUTANEOUS at 13:33

## 2025-04-01 RX ADMIN — POTASSIUM PHOSPHATE, MONOBASIC POTASSIUM PHOSPHATE, DIBASIC INJECTION, 83.33 MILLIMOLE(S): 236; 224 SOLUTION, CONCENTRATE INTRAVENOUS at 02:45

## 2025-04-01 RX ADMIN — METHYLNALTREXONE BROMIDE 12 MILLIGRAM(S): 12 INJECTION, SOLUTION SUBCUTANEOUS at 13:54

## 2025-04-01 RX ADMIN — Medication 500 MICROGRAM(S): at 14:25

## 2025-04-01 RX ADMIN — Medication 10 MILLILITER(S): at 11:11

## 2025-04-01 RX ADMIN — POLYETHYLENE GLYCOL 3350 17 GRAM(S): 17 POWDER, FOR SOLUTION ORAL at 05:48

## 2025-04-01 RX ADMIN — Medication 2.33 MG/KG/HR: at 07:59

## 2025-04-01 RX ADMIN — BUDESONIDE 0.5 MILLIGRAM(S): 90 AEROSOL, POWDER RESPIRATORY (INHALATION) at 10:01

## 2025-04-01 RX ADMIN — BUDESONIDE 0.5 MILLIGRAM(S): 90 AEROSOL, POWDER RESPIRATORY (INHALATION) at 21:18

## 2025-04-01 RX ADMIN — Medication 10 MILLIGRAM(S): at 17:15

## 2025-04-01 RX ADMIN — Medication 500 MICROGRAM(S): at 03:40

## 2025-04-01 RX ADMIN — Medication 8 MG/HR: at 03:40

## 2025-04-01 RX ADMIN — PROPOFOL 28 MICROGRAM(S)/KG/MIN: 10 INJECTION, EMULSION INTRAVENOUS at 16:27

## 2025-04-01 RX ADMIN — PROPOFOL 28 MICROGRAM(S)/KG/MIN: 10 INJECTION, EMULSION INTRAVENOUS at 02:05

## 2025-04-01 RX ADMIN — NOREPINEPHRINE BITARTRATE 4.37 MICROGRAM(S)/KG/MIN: 8 SOLUTION at 07:59

## 2025-04-01 RX ADMIN — Medication 1.86 MG/KG/HR: at 18:23

## 2025-04-01 RX ADMIN — Medication 2.33 MG/KG/HR: at 02:05

## 2025-04-01 RX ADMIN — Medication 1 APPLICATION(S): at 11:12

## 2025-04-01 RX ADMIN — Medication 75 MICROGRAM(S): at 05:48

## 2025-04-01 RX ADMIN — PROPOFOL 28 MICROGRAM(S)/KG/MIN: 10 INJECTION, EMULSION INTRAVENOUS at 19:23

## 2025-04-01 RX ADMIN — SODIUM CHLORIDE 500 MILLILITER(S): 9 INJECTION, SOLUTION INTRAVENOUS at 13:31

## 2025-04-01 RX ADMIN — PROPOFOL 28 MICROGRAM(S)/KG/MIN: 10 INJECTION, EMULSION INTRAVENOUS at 07:59

## 2025-04-01 RX ADMIN — Medication 2.33 MG/KG/HR: at 14:44

## 2025-04-01 RX ADMIN — PROPOFOL 28 MICROGRAM(S)/KG/MIN: 10 INJECTION, EMULSION INTRAVENOUS at 18:23

## 2025-04-01 RX ADMIN — Medication 8 MG/HR: at 10:00

## 2025-04-01 RX ADMIN — Medication 15 MILLILITER(S): at 17:15

## 2025-04-01 RX ADMIN — METHYLPREDNISOLONE ACETATE 60 MILLIGRAM(S): 80 INJECTION, SUSPENSION INTRA-ARTICULAR; INTRALESIONAL; INTRAMUSCULAR; SOFT TISSUE at 05:48

## 2025-04-01 RX ADMIN — POLYETHYLENE GLYCOL 3350 17 GRAM(S): 17 POWDER, FOR SOLUTION ORAL at 17:19

## 2025-04-01 RX ADMIN — Medication 1.86 MG/KG/HR: at 16:28

## 2025-04-01 RX ADMIN — Medication 15 MILLILITER(S): at 05:48

## 2025-04-01 RX ADMIN — HEPARIN SODIUM 5000 UNIT(S): 1000 INJECTION INTRAVENOUS; SUBCUTANEOUS at 05:49

## 2025-04-01 RX ADMIN — Medication 500 MICROGRAM(S): at 21:18

## 2025-04-01 RX ADMIN — Medication 8 MG/HR: at 14:25

## 2025-04-01 RX ADMIN — Medication 1.86 MG/KG/HR: at 07:59

## 2025-04-01 RX ADMIN — METHYLPREDNISOLONE ACETATE 60 MILLIGRAM(S): 80 INJECTION, SUSPENSION INTRA-ARTICULAR; INTRALESIONAL; INTRAMUSCULAR; SOFT TISSUE at 21:48

## 2025-04-01 RX ADMIN — Medication 4 MILLIGRAM(S): at 23:35

## 2025-04-01 RX ADMIN — Medication 1.86 MG/KG/HR: at 19:23

## 2025-04-01 RX ADMIN — Medication 2.33 MG/KG/HR: at 16:27

## 2025-04-01 RX ADMIN — Medication 1.86 MG/KG/HR: at 04:30

## 2025-04-01 RX ADMIN — Medication 15 MILLILITER(S): at 11:11

## 2025-04-01 RX ADMIN — Medication 10 MILLIGRAM(S): at 12:06

## 2025-04-01 RX ADMIN — PROPOFOL 28 MICROGRAM(S)/KG/MIN: 10 INJECTION, EMULSION INTRAVENOUS at 14:43

## 2025-04-01 RX ADMIN — Medication 40 MILLIGRAM(S): at 11:11

## 2025-04-01 RX ADMIN — CEFTRIAXONE 100 MILLIGRAM(S): 500 INJECTION, POWDER, FOR SOLUTION INTRAMUSCULAR; INTRAVENOUS at 18:49

## 2025-04-01 RX ADMIN — PROPOFOL 28 MICROGRAM(S)/KG/MIN: 10 INJECTION, EMULSION INTRAVENOUS at 04:30

## 2025-04-01 RX ADMIN — METHYLPREDNISOLONE ACETATE 60 MILLIGRAM(S): 80 INJECTION, SUSPENSION INTRA-ARTICULAR; INTRALESIONAL; INTRAMUSCULAR; SOFT TISSUE at 13:33

## 2025-04-01 NOTE — PROGRESS NOTE ADULT - ATTENDING COMMENTS
Patient is a 58 yo F w/ HTN, T2DM, hypothyroid, and asthma admitted to MICU for status asthmaticus    #Status Asthmaticus  #Acute hypercapnic and hypoxemic respiratory failure  #Hypernatremia  #Elevated Tropinemia  #Subcutaneous Emphysema  #Constipation  #Emesis  - c/w sedation for now. WIll continue to wean Fent. Off Nimbex since 3/31  - c/w mechanical ventilatory support, increased PEEP to 8, decreased RR to 20 with AUto PEEP improved to 4  - c/w continuous Albuterol nebs, currently maxed  - c/w Iptratroprium nebs q6h  - c/w Solumedrol 60mg q8h for now. Monitor FSG  - c/w bowel regimen. WIll order Relistor. Order Reglan x24 hours if QTc acceptable. AXR nonobstructive  - Add PPI for now given critical ill, mechanically ventilated, high dose steroids, and currently not tolerating feeds  - Complete course of empiric Ceft    Zachary Castillo MD  Pulmonary & Critical Care

## 2025-04-01 NOTE — PROGRESS NOTE ADULT - SUBJECTIVE AND OBJECTIVE BOX
MICU PROGRESS NOTE     HISTORY OF PRESENT ILLNESS     NAEON. Afebrile, HR 40s-50s. SBPs 90s-120s. MV.     ROS: All negative except as listed above.    VITAL SIGNS:  ICU Vital Signs Last 24 Hrs  T(C): 35.8 (01 Apr 2025 04:00), Max: 37.7 (31 Mar 2025 20:00)  T(F): 96.4 (01 Apr 2025 04:00), Max: 99.9 (31 Mar 2025 20:00)  HR: 89 (01 Apr 2025 06:00) (87 - 126)  BP: --  BP(mean): --  ABP: 125/70 (01 Apr 2025 06:00) (93/43 - 151/104)  ABP(mean): 93 (01 Apr 2025 06:00) (62 - 121)  RR: 22 (01 Apr 2025 06:00) (13 - 27)  SpO2: 100% (01 Apr 2025 06:00) (92% - 100%)    O2 Parameters below as of 01 Apr 2025 06:00  Patient On (Oxygen Delivery Method): ventilator    O2 Concentration (%): 30      Mode: AC/ CMV (Assist Control/ Continuous Mandatory Ventilation), RR (machine): 22, TV (machine): 400, FiO2: 30, PEEP: 6, ITime: 0.8, MAP: 14, PIP: 40  Plateau pressure:   P/F ratio:     03-31 @ 07:01  -  04-01 @ 07:00  --------------------------------------------------------  IN: 2726.9 mL / OUT: 1970 mL / NET: 756.9 mL      CAPILLARY BLOOD GLUCOSE      POCT Blood Glucose.: 112 mg/dL (01 Apr 2025 05:47)      ECG: reviewed.    PHYSICAL EXAM:    General: NAD, overweight habitus   Neuro: sedated, AAOx0, no grimace to noxious stimuli, no spontaneity   HEENT: PERRLA b/l, mildly moist mucous membranes  Chest: nonTTP   Heart: S1/S2, RRR    Lungs: diffuse expiratory wheezing, nonlabored breathing, MV   Abd: soft, nonTTP, nondistended   Ext: mild trace pitting edema pretibial  Pulses: radial 2+ b/l   Lines/tubes/drains: L IJV triple lumen, L axillary A line, jonas   Psych: unable to assess       MEDICATIONS  (STANDING):  albuterol Continuous Nebulization (Vibrating Mesh Nebulizer) 20 mG/Hr (8 mL/Hr) Continuous Inhalation. <Continuous>  buDESOnide    Inhalation Suspension 0.5 milliGRAM(s) Inhalation two times a day  cefTRIAXone   IVPB 1000 milliGRAM(s) IV Intermittent every 24 hours  chlorhexidine 0.12% Liquid 15 milliLiter(s) Oral Mucosa every 12 hours  chlorhexidine 2% Cloths 1 Application(s) Topical <User Schedule>  dextrose 5%. 1000 milliLiter(s) (100 mL/Hr) IV Continuous <Continuous>  dextrose 5%. 1000 milliLiter(s) (50 mL/Hr) IV Continuous <Continuous>  dextrose 50% Injectable 25 Gram(s) IV Push once  dextrose 50% Injectable 12.5 Gram(s) IV Push once  dextrose 50% Injectable 25 Gram(s) IV Push once  fentaNYL   Infusion..... 0.5 MICROgram(s)/kG/Hr (0.93 mL/Hr) IV Continuous <Continuous>  glucagon  Injectable 1 milliGRAM(s) IntraMuscular once  heparin   Injectable 5000 Unit(s) SubCutaneous every 8 hours  influenza   Vaccine 0.5 milliLiter(s) IntraMuscular once  insulin lispro (ADMELOG) corrective regimen sliding scale   SubCutaneous every 6 hours  insulin NPH human recombinant 7 Unit(s) SubCutaneous every 6 hours  ipratropium    for Nebulization 500 MICROGram(s) Nebulizer every 6 hours  ketamine Infusion. 0.25 mG/kG/Hr (2.33 mL/Hr) IV Continuous <Continuous>  levothyroxine 75 MICROGram(s) Oral daily  methylPREDNISolone sodium succinate Injectable 60 milliGRAM(s) IV Push every 8 hours  midazolam Infusion 0.02 mG/kG/Hr (1.86 mL/Hr) IV Continuous <Continuous>  multivitamin/minerals/iron Oral Solution (CENTRUM) 15 milliLiter(s) Oral daily  naloxegol 25 milliGRAM(s) Oral daily  norepinephrine Infusion 0.05 MICROgram(s)/kG/Min (4.37 mL/Hr) IV Continuous <Continuous>  pantoprazole  Injectable 40 milliGRAM(s) IV Push daily  petrolatum Ophthalmic Ointment 1 Application(s) Both EYES daily  polyethylene glycol 3350 17 Gram(s) Oral two times a day  propofol Infusion 50.072 MICROgram(s)/kG/Min (28 mL/Hr) IV Continuous <Continuous>  senna Syrup 10 milliLiter(s) Oral daily    MEDICATIONS  (PRN):  dextrose Oral Gel 15 Gram(s) Oral once PRN Blood Glucose LESS THAN 70 milliGRAM(s)/deciliter      ALLERGIES:  Allergies    No Known Allergies    Intolerances        LABS:                        10.2   23.81 )-----------( 160      ( 01 Apr 2025 00:35 )             33.0     04-01    147[H]  |  113[H]  |  17  ----------------------------<  104[H]  4.6   |  25  |  0.97    Ca    8.5      01 Apr 2025 00:35  Phos  1.2     04-01  Mg     2.40     04-01    TPro  5.8[L]  /  Alb  3.2[L]  /  TBili  <0.2  /  DBili  x   /  AST  26  /  ALT  36[H]  /  AlkPhos  67  04-01    PT/INR - ( 01 Apr 2025 00:35 )   PT: 11.6 sec;   INR: 1.00 ratio         PTT - ( 01 Apr 2025 00:35 )  PTT:23.3 sec  Urinalysis Basic - ( 01 Apr 2025 00:35 )    Color: x / Appearance: x / SG: x / pH: x  Gluc: 104 mg/dL / Ketone: x  / Bili: x / Urobili: x   Blood: x / Protein: x / Nitrite: x   Leuk Esterase: x / RBC: x / WBC x   Sq Epi: x / Non Sq Epi: x / Bacteria: x      ABG:  pH, Arterial: 7.43 (04-01-25 @ 00:35)  pCO2, Arterial: 45 mmHg (04-01-25 @ 00:35)  pO2, Arterial: 82 mmHg (04-01-25 @ 00:35)  pH, Arterial: 7.38 (03-31-25 @ 17:15)  pCO2, Arterial: 51 mmHg (03-31-25 @ 17:15)  pO2, Arterial: 110 mmHg (03-31-25 @ 17:15)  pH, Arterial: 7.36 (03-31-25 @ 12:05)  pCO2, Arterial: 46 mmHg (03-31-25 @ 12:05)  pO2, Arterial: 96 mmHg (03-31-25 @ 12:05)      vBG:    Micro:    Culture - Blood (collected 03-28-25 @ 16:39)  Source: Blood Blood-Peripheral  Preliminary Report (03-31-25 @ 20:01):    No growth at 72 Hours    Culture - Blood (collected 03-28-25 @ 16:34)  Source: Blood Blood-Peripheral  Preliminary Report (03-31-25 @ 20:01):    No growth at 72 Hours       Urinalysis with Rflx Culture (collected 03-28-25 @ 16:34)         RADIOLOGY & ADDITIONAL TESTS: Reviewed.

## 2025-04-01 NOTE — PROGRESS NOTE ADULT - SUBJECTIVE AND OBJECTIVE BOX
MICU PROGRESS NOTE     HISTORY OF PRESENT ILLNESS     Overnight, ETT got dislodged with desaturation and was able to adjust without need for reintubation. Has large gastric residuals 900 cc and TF held. Afebrile HR 80s-120s. SBPs 90s-130s. MV.     ROS: All negative except as listed above.    VITAL SIGNS:  ICU Vital Signs Last 24 Hrs  T(C): 35.8 (01 Apr 2025 04:00), Max: 37.7 (31 Mar 2025 20:00)  T(F): 96.4 (01 Apr 2025 04:00), Max: 99.9 (31 Mar 2025 20:00)  HR: 88 (01 Apr 2025 07:00) (87 - 126)  BP: --  BP(mean): --  ABP: 117/65 (01 Apr 2025 07:00) (93/43 - 151/104)  ABP(mean): 86 (01 Apr 2025 07:00) (62 - 121)  RR: 22 (01 Apr 2025 07:00) (13 - 27)  SpO2: 98% (01 Apr 2025 07:00) (92% - 100%)    O2 Parameters below as of 01 Apr 2025 07:00  Patient On (Oxygen Delivery Method): ventilator    O2 Concentration (%): 30      Mode: AC/ CMV (Assist Control/ Continuous Mandatory Ventilation), RR (machine): 22, TV (machine): 400, FiO2: 30, PEEP: 6, ITime: 0.8, MAP: 14, PIP: 40  Plateau pressure:   P/F ratio:     03-31 @ 07:01  -  04-01 @ 07:00  --------------------------------------------------------  IN: 2726.9 mL / OUT: 1970 mL / NET: 756.9 mL      CAPILLARY BLOOD GLUCOSE      POCT Blood Glucose.: 112 mg/dL (01 Apr 2025 05:47)      ECG: reviewed.    PHYSICAL EXAM:    General: NAD, overweight habitus   Neuro: sedated, AAOx0, no grimace to noxious stimuli, no spontaneity   HEENT: PERRLA b/l, mildly moist mucous membranes  Chest: nonTTP   Heart: S1/S2, RRR    Lungs: diffuse expiratory wheezing, nonlabored breathing, MV   Abd: soft, nonTTP, nondistended   Ext: mild trace pitting edema pretibial  Pulses: radial 2+ b/l   Lines/tubes/drains: L IJV triple lumen, L axillary A line, jonas   Psych: unable to assess       MEDICATIONS:  MEDICATIONS  (STANDING):  albuterol Continuous Nebulization (Vibrating Mesh Nebulizer) 20 mG/Hr (8 mL/Hr) Continuous Inhalation. <Continuous>  buDESOnide    Inhalation Suspension 0.5 milliGRAM(s) Inhalation two times a day  cefTRIAXone   IVPB 1000 milliGRAM(s) IV Intermittent every 24 hours  chlorhexidine 0.12% Liquid 15 milliLiter(s) Oral Mucosa every 12 hours  chlorhexidine 2% Cloths 1 Application(s) Topical <User Schedule>  dextrose 5%. 1000 milliLiter(s) (100 mL/Hr) IV Continuous <Continuous>  dextrose 5%. 1000 milliLiter(s) (50 mL/Hr) IV Continuous <Continuous>  dextrose 50% Injectable 25 Gram(s) IV Push once  dextrose 50% Injectable 12.5 Gram(s) IV Push once  dextrose 50% Injectable 25 Gram(s) IV Push once  fentaNYL   Infusion..... 0.5 MICROgram(s)/kG/Hr (0.93 mL/Hr) IV Continuous <Continuous>  glucagon  Injectable 1 milliGRAM(s) IntraMuscular once  heparin   Injectable 5000 Unit(s) SubCutaneous every 8 hours  influenza   Vaccine 0.5 milliLiter(s) IntraMuscular once  insulin lispro (ADMELOG) corrective regimen sliding scale   SubCutaneous every 6 hours  insulin NPH human recombinant 7 Unit(s) SubCutaneous every 6 hours  ipratropium    for Nebulization 500 MICROGram(s) Nebulizer every 6 hours  ketamine Infusion. 0.25 mG/kG/Hr (2.33 mL/Hr) IV Continuous <Continuous>  levothyroxine 75 MICROGram(s) Oral daily  methylPREDNISolone sodium succinate Injectable 60 milliGRAM(s) IV Push every 8 hours  midazolam Infusion 0.02 mG/kG/Hr (1.86 mL/Hr) IV Continuous <Continuous>  multivitamin/minerals/iron Oral Solution (CENTRUM) 15 milliLiter(s) Oral daily  naloxegol 25 milliGRAM(s) Oral daily  norepinephrine Infusion 0.05 MICROgram(s)/kG/Min (4.37 mL/Hr) IV Continuous <Continuous>  pantoprazole  Injectable 40 milliGRAM(s) IV Push daily  petrolatum Ophthalmic Ointment 1 Application(s) Both EYES daily  polyethylene glycol 3350 17 Gram(s) Oral two times a day  propofol Infusion 50.072 MICROgram(s)/kG/Min (28 mL/Hr) IV Continuous <Continuous>  senna Syrup 10 milliLiter(s) Oral daily    MEDICATIONS  (PRN):  dextrose Oral Gel 15 Gram(s) Oral once PRN Blood Glucose LESS THAN 70 milliGRAM(s)/deciliter      ALLERGIES:  Allergies    No Known Allergies    Intolerances        LABS:                        10.2   23.81 )-----------( 160      ( 01 Apr 2025 00:35 )             33.0     04-01    147[H]  |  113[H]  |  17  ----------------------------<  104[H]  4.6   |  25  |  0.97    Ca    8.5      01 Apr 2025 00:35  Phos  1.2     04-01  Mg     2.40     04-01    TPro  5.8[L]  /  Alb  3.2[L]  /  TBili  <0.2  /  DBili  x   /  AST  26  /  ALT  36[H]  /  AlkPhos  67  04-01    PT/INR - ( 01 Apr 2025 00:35 )   PT: 11.6 sec;   INR: 1.00 ratio         PTT - ( 01 Apr 2025 00:35 )  PTT:23.3 sec  Urinalysis Basic - ( 01 Apr 2025 00:35 )    Color: x / Appearance: x / SG: x / pH: x  Gluc: 104 mg/dL / Ketone: x  / Bili: x / Urobili: x   Blood: x / Protein: x / Nitrite: x   Leuk Esterase: x / RBC: x / WBC x   Sq Epi: x / Non Sq Epi: x / Bacteria: x      ABG:  pH, Arterial: 7.43 (04-01-25 @ 00:35)  pCO2, Arterial: 45 mmHg (04-01-25 @ 00:35)  pO2, Arterial: 82 mmHg (04-01-25 @ 00:35)  pH, Arterial: 7.38 (03-31-25 @ 17:15)  pCO2, Arterial: 51 mmHg (03-31-25 @ 17:15)  pO2, Arterial: 110 mmHg (03-31-25 @ 17:15)  pH, Arterial: 7.36 (03-31-25 @ 12:05)  pCO2, Arterial: 46 mmHg (03-31-25 @ 12:05)  pO2, Arterial: 96 mmHg (03-31-25 @ 12:05)      vBG:    Micro:    Culture - Blood (collected 03-28-25 @ 16:39)  Source: Blood Blood-Peripheral  Preliminary Report (03-31-25 @ 20:01):    No growth at 72 Hours    Culture - Blood (collected 03-28-25 @ 16:34)  Source: Blood Blood-Peripheral  Preliminary Report (03-31-25 @ 20:01):    No growth at 72 Hours       Urinalysis with Rflx Culture (collected 03-28-25 @ 16:34)         RADIOLOGY & ADDITIONAL TESTS: Reviewed. MICU PROGRESS NOTE     HISTORY OF PRESENT ILLNESS     Overnight, ETT got dislodged with desaturation and was able to adjust without need for reintubation. Has large gastric residuals 900 cc and TF held. Afebrile HR 80s-120s. SBPs 90s-130s. MV V A/C Tv 400, PEEP 6, RR 22, FiO2 30.       Seen and examined at bedside, patient remains unable to engage meaningfully.   ROS: All negative except as listed above.    VITAL SIGNS:  ICU Vital Signs Last 24 Hrs  T(C): 35.8 (01 Apr 2025 04:00), Max: 37.7 (31 Mar 2025 20:00)  T(F): 96.4 (01 Apr 2025 04:00), Max: 99.9 (31 Mar 2025 20:00)  HR: 88 (01 Apr 2025 07:00) (87 - 126)  BP: --  BP(mean): --  ABP: 117/65 (01 Apr 2025 07:00) (93/43 - 151/104)  ABP(mean): 86 (01 Apr 2025 07:00) (62 - 121)  RR: 22 (01 Apr 2025 07:00) (13 - 27)  SpO2: 98% (01 Apr 2025 07:00) (92% - 100%)    O2 Parameters below as of 01 Apr 2025 07:00  Patient On (Oxygen Delivery Method): ventilator    O2 Concentration (%): 30      Mode: AC/ CMV (Assist Control/ Continuous Mandatory Ventilation), RR (machine): 22, TV (machine): 400, FiO2: 30, PEEP: 6, ITime: 0.8, MAP: 14, PIP: 40  Plateau pressure:   P/F ratio:     03-31 @ 07:01  -  04-01 @ 07:00  --------------------------------------------------------  IN: 2726.9 mL / OUT: 1970 mL / NET: 756.9 mL      CAPILLARY BLOOD GLUCOSE      POCT Blood Glucose.: 112 mg/dL (01 Apr 2025 05:47)      ECG: reviewed.    PHYSICAL EXAM:    General: NAD, overweight habitus   Neuro: sedated, AAOx0, no grimace to noxious stimuli, no spontaneity   HEENT: PERRLA b/l, mildly moist mucous membranes  Chest: nonTTP, sq emphysema palpable   Heart: S1/S2, RRR    Lungs: diffuse rhonchi with prolonged expiration, MV   Abd: soft, nonTTP, nondistended, gastric content suctioned out of OG   Ext: no pitting edema pretibial, palpable sq emphysema in L shoulder and R UE down to forearm   Pulses: radial 2+ b/l   Lines/tubes/drains: L IJV triple lumen, L axillary A line, jonas   Psych: unable to assess       MEDICATIONS:  MEDICATIONS  (STANDING):  albuterol Continuous Nebulization (Vibrating Mesh Nebulizer) 20 mG/Hr (8 mL/Hr) Continuous Inhalation. <Continuous>  buDESOnide    Inhalation Suspension 0.5 milliGRAM(s) Inhalation two times a day  cefTRIAXone   IVPB 1000 milliGRAM(s) IV Intermittent every 24 hours  chlorhexidine 0.12% Liquid 15 milliLiter(s) Oral Mucosa every 12 hours  chlorhexidine 2% Cloths 1 Application(s) Topical <User Schedule>  dextrose 5%. 1000 milliLiter(s) (100 mL/Hr) IV Continuous <Continuous>  dextrose 5%. 1000 milliLiter(s) (50 mL/Hr) IV Continuous <Continuous>  dextrose 50% Injectable 25 Gram(s) IV Push once  dextrose 50% Injectable 12.5 Gram(s) IV Push once  dextrose 50% Injectable 25 Gram(s) IV Push once  fentaNYL   Infusion..... 0.5 MICROgram(s)/kG/Hr (0.93 mL/Hr) IV Continuous <Continuous>  glucagon  Injectable 1 milliGRAM(s) IntraMuscular once  heparin   Injectable 5000 Unit(s) SubCutaneous every 8 hours  influenza   Vaccine 0.5 milliLiter(s) IntraMuscular once  insulin lispro (ADMELOG) corrective regimen sliding scale   SubCutaneous every 6 hours  insulin NPH human recombinant 7 Unit(s) SubCutaneous every 6 hours  ipratropium    for Nebulization 500 MICROGram(s) Nebulizer every 6 hours  ketamine Infusion. 0.25 mG/kG/Hr (2.33 mL/Hr) IV Continuous <Continuous>  levothyroxine 75 MICROGram(s) Oral daily  methylPREDNISolone sodium succinate Injectable 60 milliGRAM(s) IV Push every 8 hours  midazolam Infusion 0.02 mG/kG/Hr (1.86 mL/Hr) IV Continuous <Continuous>  multivitamin/minerals/iron Oral Solution (CENTRUM) 15 milliLiter(s) Oral daily  naloxegol 25 milliGRAM(s) Oral daily  norepinephrine Infusion 0.05 MICROgram(s)/kG/Min (4.37 mL/Hr) IV Continuous <Continuous>  pantoprazole  Injectable 40 milliGRAM(s) IV Push daily  petrolatum Ophthalmic Ointment 1 Application(s) Both EYES daily  polyethylene glycol 3350 17 Gram(s) Oral two times a day  propofol Infusion 50.072 MICROgram(s)/kG/Min (28 mL/Hr) IV Continuous <Continuous>  senna Syrup 10 milliLiter(s) Oral daily    MEDICATIONS  (PRN):  dextrose Oral Gel 15 Gram(s) Oral once PRN Blood Glucose LESS THAN 70 milliGRAM(s)/deciliter      ALLERGIES:  Allergies    No Known Allergies    Intolerances        LABS:                        10.2   23.81 )-----------( 160      ( 01 Apr 2025 00:35 )             33.0     04-01    147[H]  |  113[H]  |  17  ----------------------------<  104[H]  4.6   |  25  |  0.97    Ca    8.5      01 Apr 2025 00:35  Phos  1.2     04-01  Mg     2.40     04-01    TPro  5.8[L]  /  Alb  3.2[L]  /  TBili  <0.2  /  DBili  x   /  AST  26  /  ALT  36[H]  /  AlkPhos  67  04-01    PT/INR - ( 01 Apr 2025 00:35 )   PT: 11.6 sec;   INR: 1.00 ratio         PTT - ( 01 Apr 2025 00:35 )  PTT:23.3 sec  Urinalysis Basic - ( 01 Apr 2025 00:35 )    Color: x / Appearance: x / SG: x / pH: x  Gluc: 104 mg/dL / Ketone: x  / Bili: x / Urobili: x   Blood: x / Protein: x / Nitrite: x   Leuk Esterase: x / RBC: x / WBC x   Sq Epi: x / Non Sq Epi: x / Bacteria: x      ABG:  pH, Arterial: 7.43 (04-01-25 @ 00:35)  pCO2, Arterial: 45 mmHg (04-01-25 @ 00:35)  pO2, Arterial: 82 mmHg (04-01-25 @ 00:35)  pH, Arterial: 7.38 (03-31-25 @ 17:15)  pCO2, Arterial: 51 mmHg (03-31-25 @ 17:15)  pO2, Arterial: 110 mmHg (03-31-25 @ 17:15)  pH, Arterial: 7.36 (03-31-25 @ 12:05)  pCO2, Arterial: 46 mmHg (03-31-25 @ 12:05)  pO2, Arterial: 96 mmHg (03-31-25 @ 12:05)      vBG:    Micro:    Culture - Blood (collected 03-28-25 @ 16:39)  Source: Blood Blood-Peripheral  Preliminary Report (03-31-25 @ 20:01):    No growth at 72 Hours    Culture - Blood (collected 03-28-25 @ 16:34)  Source: Blood Blood-Peripheral  Preliminary Report (03-31-25 @ 20:01):    No growth at 72 Hours       Urinalysis with Rflx Culture (collected 03-28-25 @ 16:34)         RADIOLOGY & ADDITIONAL TESTS: Reviewed.

## 2025-04-01 NOTE — PROGRESS NOTE ADULT - ASSESSMENT
59F with HTN, DM2, hypothyroid, and asthma here for status asthmaticus iso recent dental procedures.     NEUROLOGIC   Not at baseline mental status. On sedation and medical paralysis.   -Baseline AAOx3  -C/w propofol; wean as tolerated   -C/w fentanyl; wean as tolerated   -C/w ketamine; wean as tolerated   -C/w midazolam; wean as tolerated   -C/w cistracronium; wean as tolerated       CARDIOVASCULAR     # Vasoplegic Shock  Hemodynamically stable. Requiring pressors.   -C/w levophed; wean as tolerated     #Elevated troponin- 2/2 demand ischemia from vasopressors- RESOLVED   Plated tropnoins 50s to 252 to 180s. No events on telemetry. Peaked and downtrending.   -Monitor   -If increasing again or high clinical suspicion, can consider PE r/o       #HTN   Normotensive.   -Hold home lisinopril 10 mg po qd       RESPIRATORY   #Status asthmaticus   Intubated.   -C/w mechanical ventilator  -Daily spontaneous breathing trials   -C/w methylprednisolone 60 mg IV q12h(start 3/28)   -Uptitrate albuterol nebulizer continous to 20 mg/hr per RT    -C/w ipratropium 500 mg nebulizer 16   -C/w duonebs q6h  -Empiric abx for pna   - Holding off on CT angio for now.   -Pending BAL  -Bronchoscopy 3/30 showing secretions in L main and L lober with mucus aspirated out     # Subcutaneous Emphysema   - Present s/p reintubation 03/30 night  - CXR 3/30 showing subcutaneous emphysema without pneumomediastinum   - Will consider CT chest if worsens    GASTROINTESTINAL   -JS   -OGT 3/30 placed   -C/w TFs  Nutrition consulted    GENITOURINARY/RENAL   #HAGMA 2/2 hypercarbia and lactic acidosis- RESOLVED   - On admission, VBG pH 6.98 and PCO2 125. Currently ABG ABG 7.14 and PCO2 50s.   - Treat with mechanical ventilation for exhalation   - Serial ABGs   - S/p Thiamine x 1 for lactic acidosis         ENDOCRINE   #DM   -NPO   -Low ISS, NPH 7u q6h, q6 fingersticks   -Hold home metformin 500 mg po qd       #Hypothyroid   -Restart home levothyroxine 75 cg po qd       INFECTIOUS DISEASE   Not meeting criteria for SIRS. Unclear trigger for asthma.   -WBC on admission 11.21 and afebrile   -Blood cx  NGTD 3/28   -Sputum cx no PMNs or ogasnisms 3/30   -Urine showing 300 proteinuria and 20 hematuria; no evidence of infection   -Negative viral panel   - urine legionella negative   -F/u urine streptococcus   -Empiric ceftriaxone 1000 mg IV qd x 5 days (started 3/28-4/1)   -Empiric azithromycin 500 mg IV qd x 3 days (started 3/28-3/30)     HEMATOLOGIC/ONCOLOGIC   #Leukocytosis- 2/2 steroid induced   Low suspicion for infection source.   -Monitor     PROPHYLACTIC   -DVT ppx: heparin 5000U sq q8     ETHICS   -HCP daughter   -Full code      59F with HTN, DM2, hypothyroid, and asthma here for status asthmaticus, unclear trigger.     NEUROLOGIC   Not at baseline mental status. On sedation. Weaning down on fentanyl.   -Baseline AAOx3  -C/w propofol; wean as tolerated   -C/w fentanyl; wean as tolerated   -C/w ketamine; wean as tolerated   -C/w midazolam; wean as tolerated   -D/c cistracronium 3/31       CARDIOVASCULAR     # Vasoplegic Shock  Hemodynamically stable. Requiring pressors.   -C/w levophed; wean as tolerated     #Elevated troponin- 2/2 demand ischemia from vasopressors- RESOLVED   Plated tropnoins 50s to 252 to 180s. No events on telemetry. Peaked and downtrending.   -Monitor   -If increasing again or high clinical suspicion, can consider PE r/o       #HTN   Normotensive.   -Hold home lisinopril 10 mg po qd       RESPIRATORY   #Status asthmaticus   Intubated.   -C/w mechanical ventilator  -Daily spontaneous breathing trials   -C/w methylprednisolone 60 mg IV q12h(start 3/28)   -C/w albuterol nebulizer continous to 20 mg/hr per RT    -C/w ipratropium 500 mg nebulizer 16   -C/w duonebs q6h  -Empiric abx for pna   - Holding off on CT angio for now.   -Pending BAL  -Bronchoscopy 3/30 showing secretions in L main and L lober with mucus aspirated out     # Subcutaneous Emphysema   Spreading sq emphysema predominantly on LUE.   - Present s/p reintubation 03/30 night  - CXR 3/30 showing subcutaneous emphysema without pneumomediastinum   - Will consider CT chest if worsens    GASTROINTESTINAL   -JS   -OGT 3/30 placed   -Hold TF   -C/w pantoprazole 40 mg IV qd for gastric mucosa ppx     #Gastric retention- 2/2 opioid induced constipation   -OGT to suction   -Hold TF   -Treat constipation with naloxigel IM x 1 today   -Start metoclopramide 10 mg IV q6 for GI motility; qtc on EKG 4/1 457 ms     GENITOURINARY/RENAL   #HAGMA 2/2 hypercarbia and lactic acidosis- RESOLVED   - On admission, VBG pH 6.98 and PCO2 125. Currently ABG 7.43.   - Treat with mechanical ventilation for exhalation   - Serial ABGs   - S/p Thiamine x 1 for lactic acidosis         ENDOCRINE   #DM   -NPO   -Low ISS, NPH 7u q6h, q6 fingersticks   -Hold home metformin 500 mg po qd   -F/u Hba1c     #Hypothyroid   -C/w home levothyroxine 75 cg po qd       INFECTIOUS DISEASE   Not meeting criteria for SIRS. Unclear trigger for asthma.   -WBC on admission 11.21 and afebrile   -Blood cx  NGTD 3/28   -Sputum cx no PMNs or ogasnisms 3/30   -Urine showing 300 proteinuria and 20 hematuria; no evidence of infection   -Negative viral panel   - urine legionella negative   -F/u urine streptococcus   -Empiric ceftriaxone 1000 mg IV qd x 5 days (started 3/28-4/1)   -Empiric azithromycin 500 mg IV qd x 3 days (started 3/28-3/30)     HEMATOLOGIC/ONCOLOGIC   #Leukocytosis- 2/2 steroid induced   Low suspicion for infection source.   -Monitor     #Thrombocytopenia- 2/2 bone marrow suppression from critical illness   No active bleeding. HIT score 4 with low probabillity of heparin induced.   -Monitor     #Anemia- 2/2 bone marrow suppression from critical illness  No active bleeding. Not meeting transfusion requirements.   -Monitor     PROPHYLACTIC   -DVT ppx: heparin 5000U sq q8     ETHICS   -HCP daughter   -Full code      59F with HTN, DM2, hypothyroid, and asthma here for status asthmaticus, unclear trigger.     NEUROLOGIC   Not at baseline mental status. On sedation. Weaning down on fentanyl.   -Baseline AAOx3  -C/w propofol; wean as tolerated   -C/w fentanyl; wean as tolerated   -C/w ketamine; wean as tolerated   -C/w midazolam; wean as tolerated   -D/c cistracronium 3/31       CARDIOVASCULAR     # Vasoplegic Shock  Hemodynamically stable. Requiring pressors.   -C/w levophed; wean as tolerated     #Elevated troponin- 2/2 demand ischemia from vasopressors- RESOLVED   Plated tropnoins 50s to 252 to 180s. No events on telemetry. Peaked and downtrending.   -Monitor   -If increasing again or high clinical suspicion, can consider PE r/o       #HTN   Normotensive.   -Hold home lisinopril 10 mg po qd       RESPIRATORY   #Status asthmaticus   Intubated.   -C/w mechanical ventilator  -Daily spontaneous breathing trials   -C/w methylprednisolone 60 mg IV q12h(start 3/28)   -C/w albuterol nebulizer continous to 20 mg/hr per RT    -C/w ipratropium 500 mg nebulizer 16   -C/w duonebs q6h  -Empiric abx for pna   - Holding off on CT angio for now.   -Pending BAL  -Bronchoscopy 3/30 showing secretions in L main and L lober with mucus aspirated out     # Subcutaneous Emphysema   Spreading sq emphysema predominantly on LUE.   - Present s/p reintubation 03/30 night  - CXR 3/30 showing subcutaneous emphysema without pneumomediastinum   - Will consider CT chest if worsens    GASTROINTESTINAL   -JS   -OGT 3/30 placed   -Hold TF   -C/w pantoprazole 40 mg IV qd for gastric mucosa ppx     #Gastric retention- 2/2 opioid induced constipation   -OGT to suction   -Hold TF   -Treat constipation with methylnaltrexone 12 mg sq x 1 today   -Start metoclopramide 10 mg IV q6 for GI motility; qtc on EKG 4/1 457 ms     GENITOURINARY/RENAL   #HAGMA 2/2 hypercarbia and lactic acidosis- RESOLVED   - On admission, VBG pH 6.98 and PCO2 125. Currently ABG 7.43.   - Treat with mechanical ventilation for exhalation   - Serial ABGs   - S/p Thiamine x 1 for lactic acidosis         ENDOCRINE   #DM   -NPO   -Low ISS, NPH 7u q6h, q6 fingersticks   -Hold home metformin 500 mg po qd   -F/u Hba1c     #Hypothyroid   -C/w home levothyroxine 75 cg po qd       INFECTIOUS DISEASE   Not meeting criteria for SIRS. Unclear trigger for asthma.   -WBC on admission 11.21 and afebrile   -Blood cx  NGTD 3/28   -Sputum cx no PMNs or ogasnisms 3/30   -Urine showing 300 proteinuria and 20 hematuria; no evidence of infection   -Negative viral panel   - urine legionella negative   -F/u urine streptococcus   -Empiric ceftriaxone 1000 mg IV qd x 5 days (started 3/28-4/1)   -Empiric azithromycin 500 mg IV qd x 3 days (started 3/28-3/30)     HEMATOLOGIC/ONCOLOGIC   #Leukocytosis- 2/2 steroid induced   Low suspicion for infection source.   -Monitor     #Thrombocytopenia- 2/2 bone marrow suppression from critical illness   No active bleeding. HIT score 4 with low probabillity of heparin induced.   -Monitor     #Anemia- 2/2 bone marrow suppression from critical illness  No active bleeding. Not meeting transfusion requirements.   -Monitor     PROPHYLACTIC   -DVT ppx: heparin 5000U sq q8     ETHICS   -HCP daughter   -Full code

## 2025-04-01 NOTE — PROGRESS NOTE ADULT - ASSESSMENT
59F with HTN, DM2, hypothyroid, and asthma here for status asthmaticus iso recent dental procedures.     NEUROLOGIC   Not at baseline mental status. On sedation and medical paralysis.   -Baseline AAOx3  -C/w propofol; wean as tolerated   -C/w fentanyl; wean as tolerated   -C/w ketamine; wean as tolerated   -C/w midazolam; wean as tolerated   -C/w cistracronium; wean as tolerated       CARDIOVASCULAR     # Vasoplegic Shock  Hemodynamically stable. Requiring pressors.   -C/w levophed; wean as tolerated     #Elevated troponin- 2/2 demand ischemia from vasopressors- RESOLVED   Plated tropnoins 50s to 252 to 180s. No events on telemetry. Peaked and downtrending.   -Monitor   -If increasing again or high clinical suspicion, can consider PE r/o       #HTN   Normotensive.   -Hold home lisinopril 10 mg po qd       RESPIRATORY   #Status asthmaticus   Intubated.   -C/w mechanical ventilator  -Daily spontaneous breathing trials   -C/w methylprednisolone 60 mg IV q12h(start 3/28)   -Uptitrate albuterol nebulizer continous to 20 mg/hr per RT    -C/w ipratropium 500 mg nebulizer 16   -C/w duonebs q6h  -Empiric abx for pna   - Holding off on CT angio for now.   -Pending BAL  -Bronchoscopy 3/30 showing secretions in L main and L lober with mucus aspirated out     # Subcutaneous Emphysema   - Present s/p reintubation 03/30 night  - CXR 3/30 showing subcutaneous emphysema without pneumomediastinum   - Will consider CT chest if worsens    GASTROINTESTINAL   -JS   -OGT 3/30 placed   -C/w TFs  Nutrition consulted    GENITOURINARY/RENAL   #HAGMA 2/2 hypercarbia and lactic acidosis- RESOLVED   - On admission, VBG pH 6.98 and PCO2 125. Currently ABG ABG 7.14 and PCO2 50s.   - Treat with mechanical ventilation for exhalation   - Serial ABGs   - S/p Thiamine x 1 for lactic acidosis         ENDOCRINE   #DM   -NPO   -Low ISS, NPH 7u q6h, q6 fingersticks   -Hold home metformin 500 mg po qd       #Hypothyroid   -Restart home levothyroxine 75 cg po qd       INFECTIOUS DISEASE   Not meeting criteria for SIRS. Unclear trigger for asthma.   -WBC on admission 11.21 and afebrile   -Blood cx  NGTD 3/28   -Sputum cx no PMNs or ogasnisms 3/30   -Urine showing 300 proteinuria and 20 hematuria; no evidence of infection   -Negative viral panel   - urine legionella negative   -F/u urine streptococcus   -Empiric ceftriaxone 1000 mg IV qd x 5 days (started 3/28-4/1)   -Empiric azithromycin 500 mg IV qd x 3 days (started 3/28-3/30)     HEMATOLOGIC/ONCOLOGIC   #Leukocytosis- 2/2 steroid induced   Low suspicion for infection source.   -Monitor     PROPHYLACTIC   -DVT ppx: heparin 5000U sq q8     ETHICS   -HCP daughter   -Full code

## 2025-04-02 LAB
ALBUMIN SERPL ELPH-MCNC: 3 G/DL — LOW (ref 3.3–5)
ALP SERPL-CCNC: 58 U/L — SIGNIFICANT CHANGE UP (ref 40–120)
ALT FLD-CCNC: 34 U/L — HIGH (ref 4–33)
ANION GAP SERPL CALC-SCNC: 11 MMOL/L — SIGNIFICANT CHANGE UP (ref 7–14)
APTT BLD: 25 SEC — SIGNIFICANT CHANGE UP (ref 24.5–35.6)
AST SERPL-CCNC: 29 U/L — SIGNIFICANT CHANGE UP (ref 4–32)
BILIRUB SERPL-MCNC: <0.2 MG/DL — SIGNIFICANT CHANGE UP (ref 0.2–1.2)
BLOOD GAS ARTERIAL COMPREHENSIVE RESULT: SIGNIFICANT CHANGE UP
BLOOD GAS ARTERIAL COMPREHENSIVE RESULT: SIGNIFICANT CHANGE UP
BUN SERPL-MCNC: 20 MG/DL — SIGNIFICANT CHANGE UP (ref 7–23)
CALCIUM SERPL-MCNC: 8.3 MG/DL — LOW (ref 8.4–10.5)
CHLORIDE SERPL-SCNC: 114 MMOL/L — HIGH (ref 98–107)
CO2 SERPL-SCNC: 22 MMOL/L — SIGNIFICANT CHANGE UP (ref 22–31)
CREAT SERPL-MCNC: 0.86 MG/DL — SIGNIFICANT CHANGE UP (ref 0.5–1.3)
CULTURE RESULTS: SIGNIFICANT CHANGE UP
CULTURE RESULTS: SIGNIFICANT CHANGE UP
EGFR: 77 ML/MIN/1.73M2 — SIGNIFICANT CHANGE UP
EGFR: 77 ML/MIN/1.73M2 — SIGNIFICANT CHANGE UP
GLUCOSE BLDC GLUCOMTR-MCNC: 88 MG/DL — SIGNIFICANT CHANGE UP (ref 70–99)
GLUCOSE BLDC GLUCOMTR-MCNC: 91 MG/DL — SIGNIFICANT CHANGE UP (ref 70–99)
GLUCOSE BLDC GLUCOMTR-MCNC: 95 MG/DL — SIGNIFICANT CHANGE UP (ref 70–99)
GLUCOSE BLDC GLUCOMTR-MCNC: 99 MG/DL — SIGNIFICANT CHANGE UP (ref 70–99)
GLUCOSE SERPL-MCNC: 100 MG/DL — HIGH (ref 70–99)
HCT VFR BLD CALC: 33.2 % — LOW (ref 34.5–45)
HGB BLD-MCNC: 10.4 G/DL — LOW (ref 11.5–15.5)
INR BLD: 1 RATIO — SIGNIFICANT CHANGE UP (ref 0.85–1.16)
MAGNESIUM SERPL-MCNC: 2.3 MG/DL — SIGNIFICANT CHANGE UP (ref 1.6–2.6)
MCHC RBC-ENTMCNC: 30.5 PG — SIGNIFICANT CHANGE UP (ref 27–34)
MCHC RBC-ENTMCNC: 31.3 G/DL — LOW (ref 32–36)
MCV RBC AUTO: 97.4 FL — SIGNIFICANT CHANGE UP (ref 80–100)
NRBC # BLD AUTO: 0.05 K/UL — HIGH (ref 0–0)
NRBC # FLD: 0.05 K/UL — HIGH (ref 0–0)
NRBC BLD AUTO-RTO: 0 /100 WBCS — SIGNIFICANT CHANGE UP (ref 0–0)
PHOSPHATE SERPL-MCNC: 3 MG/DL — SIGNIFICANT CHANGE UP (ref 2.5–4.5)
PLATELET # BLD AUTO: 157 K/UL — SIGNIFICANT CHANGE UP (ref 150–400)
POTASSIUM SERPL-MCNC: 4.8 MMOL/L — SIGNIFICANT CHANGE UP (ref 3.5–5.3)
POTASSIUM SERPL-SCNC: 4.8 MMOL/L — SIGNIFICANT CHANGE UP (ref 3.5–5.3)
PROT SERPL-MCNC: 5.7 G/DL — LOW (ref 6–8.3)
PROTHROM AB SERPL-ACNC: 11.9 SEC — SIGNIFICANT CHANGE UP (ref 9.9–13.4)
RBC # BLD: 3.41 M/UL — LOW (ref 3.8–5.2)
RBC # FLD: 16.3 % — HIGH (ref 10.3–14.5)
SODIUM SERPL-SCNC: 147 MMOL/L — HIGH (ref 135–145)
SPECIMEN SOURCE: SIGNIFICANT CHANGE UP
SPECIMEN SOURCE: SIGNIFICANT CHANGE UP
TRIGL SERPL-MCNC: 226 MG/DL — HIGH
WBC # BLD: 19.95 K/UL — HIGH (ref 3.8–10.5)
WBC # FLD AUTO: 19.95 K/UL — HIGH (ref 3.8–10.5)

## 2025-04-02 RX ORDER — ERYTHROMYCIN ETHYLSUCCINATE 200 MG/5ML
250 SUSPENSION, RECONSTITUTED, ORAL (ML) ORAL ONCE
Refills: 0 | Status: COMPLETED | OUTPATIENT
Start: 2025-04-02 | End: 2025-04-02

## 2025-04-02 RX ORDER — MIDAZOLAM IN 0.9 % SOD.CHLORID 1 MG/ML
4 PLASTIC BAG, INJECTION (ML) INTRAVENOUS ONCE
Refills: 0 | Status: DISCONTINUED | OUTPATIENT
Start: 2025-04-02 | End: 2025-04-02

## 2025-04-02 RX ORDER — MIDAZOLAM IN 0.9 % SOD.CHLORID 1 MG/ML
4 PLASTIC BAG, INJECTION (ML) INTRAVENOUS ONCE
Refills: 0 | Status: DISCONTINUED | OUTPATIENT
Start: 2025-04-02 | End: 2025-04-01

## 2025-04-02 RX ORDER — MINERAL OIL
133 OIL (ML) MISCELLANEOUS ONCE
Refills: 0 | Status: COMPLETED | OUTPATIENT
Start: 2025-04-02 | End: 2025-04-02

## 2025-04-02 RX ORDER — SODIUM CHLORIDE 9 G/1000ML
1000 INJECTION, SOLUTION INTRAVENOUS
Refills: 0 | Status: DISCONTINUED | OUTPATIENT
Start: 2025-04-02 | End: 2025-04-03

## 2025-04-02 RX ORDER — KETAMINE HCL IN 0.9 % NACL 50 MG/5 ML
0.25 SYRINGE (ML) INTRAVENOUS
Qty: 1000 | Refills: 0 | Status: DISCONTINUED | OUTPATIENT
Start: 2025-04-02 | End: 2025-04-04

## 2025-04-02 RX ORDER — ERYTHROMYCIN ETHYLSUCCINATE 200 MG/5ML
250 SUSPENSION, RECONSTITUTED, ORAL (ML) ORAL EVERY 8 HOURS
Refills: 0 | Status: COMPLETED | OUTPATIENT
Start: 2025-04-02 | End: 2025-04-03

## 2025-04-02 RX ORDER — METHYLNALTREXONE BROMIDE 12 MG/.6ML
12 INJECTION, SOLUTION SUBCUTANEOUS ONCE
Refills: 0 | Status: COMPLETED | OUTPATIENT
Start: 2025-04-02 | End: 2025-04-02

## 2025-04-02 RX ORDER — FENTANYL CITRATE-0.9 % NACL/PF 100MCG/2ML
100 SYRINGE (ML) INTRAVENOUS ONCE
Refills: 0 | Status: DISCONTINUED | OUTPATIENT
Start: 2025-04-02 | End: 2025-04-02

## 2025-04-02 RX ORDER — ERYTHROMYCIN ETHYLSUCCINATE 200 MG/5ML
SUSPENSION, RECONSTITUTED, ORAL (ML) ORAL
Refills: 0 | Status: COMPLETED | OUTPATIENT
Start: 2025-04-02 | End: 2025-04-03

## 2025-04-02 RX ADMIN — Medication 15 MILLILITER(S): at 17:18

## 2025-04-02 RX ADMIN — Medication 100 MICROGRAM(S): at 08:11

## 2025-04-02 RX ADMIN — Medication 40 MILLIGRAM(S): at 11:16

## 2025-04-02 RX ADMIN — HEPARIN SODIUM 5000 UNIT(S): 1000 INJECTION INTRAVENOUS; SUBCUTANEOUS at 05:33

## 2025-04-02 RX ADMIN — Medication 8 MG/HR: at 19:49

## 2025-04-02 RX ADMIN — Medication 1.86 MG/KG/HR: at 09:24

## 2025-04-02 RX ADMIN — Medication 500 MICROGRAM(S): at 03:22

## 2025-04-02 RX ADMIN — METHYLPREDNISOLONE ACETATE 60 MILLIGRAM(S): 80 INJECTION, SUSPENSION INTRA-ARTICULAR; INTRALESIONAL; INTRAMUSCULAR; SOFT TISSUE at 21:57

## 2025-04-02 RX ADMIN — Medication 15 MILLILITER(S): at 05:33

## 2025-04-02 RX ADMIN — Medication 8 MG/HR: at 03:21

## 2025-04-02 RX ADMIN — BUDESONIDE 0.5 MILLIGRAM(S): 90 AEROSOL, POWDER RESPIRATORY (INHALATION) at 08:03

## 2025-04-02 RX ADMIN — METHYLPREDNISOLONE ACETATE 60 MILLIGRAM(S): 80 INJECTION, SUSPENSION INTRA-ARTICULAR; INTRALESIONAL; INTRAMUSCULAR; SOFT TISSUE at 05:33

## 2025-04-02 RX ADMIN — CEFTRIAXONE 100 MILLIGRAM(S): 500 INJECTION, POWDER, FOR SOLUTION INTRAMUSCULAR; INTRAVENOUS at 18:02

## 2025-04-02 RX ADMIN — Medication 10 MILLIGRAM(S): at 05:33

## 2025-04-02 RX ADMIN — Medication 133 MILLILITER(S): at 16:35

## 2025-04-02 RX ADMIN — Medication 500 MICROGRAM(S): at 15:43

## 2025-04-02 RX ADMIN — POLYETHYLENE GLYCOL 3350 17 GRAM(S): 17 POWDER, FOR SOLUTION ORAL at 05:33

## 2025-04-02 RX ADMIN — Medication 15 MILLILITER(S): at 11:16

## 2025-04-02 RX ADMIN — Medication 8 MG/HR: at 15:43

## 2025-04-02 RX ADMIN — Medication 250 MILLIGRAM(S): at 21:58

## 2025-04-02 RX ADMIN — POLYETHYLENE GLYCOL 3350 17 GRAM(S): 17 POWDER, FOR SOLUTION ORAL at 17:18

## 2025-04-02 RX ADMIN — SODIUM CHLORIDE 100 MILLILITER(S): 9 INJECTION, SOLUTION INTRAVENOUS at 19:28

## 2025-04-02 RX ADMIN — Medication 55 MICROGRAM(S): at 21:57

## 2025-04-02 RX ADMIN — Medication 1 APPLICATION(S): at 11:16

## 2025-04-02 RX ADMIN — Medication 1 APPLICATION(S): at 05:34

## 2025-04-02 RX ADMIN — Medication 2.33 MG/KG/HR: at 00:51

## 2025-04-02 RX ADMIN — Medication 1.86 MG/KG/HR: at 19:28

## 2025-04-02 RX ADMIN — SODIUM CHLORIDE 100 MILLILITER(S): 9 INJECTION, SOLUTION INTRAVENOUS at 10:17

## 2025-04-02 RX ADMIN — Medication 8 MG/HR: at 11:03

## 2025-04-02 RX ADMIN — PROPOFOL 28 MICROGRAM(S)/KG/MIN: 10 INJECTION, EMULSION INTRAVENOUS at 20:29

## 2025-04-02 RX ADMIN — METHYLPREDNISOLONE ACETATE 60 MILLIGRAM(S): 80 INJECTION, SUSPENSION INTRA-ARTICULAR; INTRALESIONAL; INTRAMUSCULAR; SOFT TISSUE at 13:57

## 2025-04-02 RX ADMIN — Medication 10 MILLILITER(S): at 11:16

## 2025-04-02 RX ADMIN — Medication 500 MICROGRAM(S): at 08:51

## 2025-04-02 RX ADMIN — Medication 8 MG/HR: at 00:33

## 2025-04-02 RX ADMIN — HEPARIN SODIUM 5000 UNIT(S): 1000 INJECTION INTRAVENOUS; SUBCUTANEOUS at 13:57

## 2025-04-02 RX ADMIN — Medication 2.33 MG/KG/HR: at 05:33

## 2025-04-02 RX ADMIN — PROPOFOL 28 MICROGRAM(S)/KG/MIN: 10 INJECTION, EMULSION INTRAVENOUS at 00:51

## 2025-04-02 RX ADMIN — Medication 1.86 MG/KG/HR: at 22:18

## 2025-04-02 RX ADMIN — Medication 10 MILLIGRAM(S): at 01:02

## 2025-04-02 RX ADMIN — Medication 100 MICROGRAM(S): at 08:30

## 2025-04-02 RX ADMIN — Medication 2.33 MG/KG/HR: at 09:24

## 2025-04-02 RX ADMIN — PROPOFOL 28 MICROGRAM(S)/KG/MIN: 10 INJECTION, EMULSION INTRAVENOUS at 05:32

## 2025-04-02 RX ADMIN — Medication 250 MILLIGRAM(S): at 10:57

## 2025-04-02 RX ADMIN — Medication 2.33 MG/KG/HR: at 20:30

## 2025-04-02 RX ADMIN — Medication 1.86 MG/KG/HR: at 07:12

## 2025-04-02 RX ADMIN — Medication 4 MILLIGRAM(S): at 08:08

## 2025-04-02 RX ADMIN — Medication 2.33 MG/KG/HR: at 07:11

## 2025-04-02 RX ADMIN — Medication 4 MILLIGRAM(S): at 03:51

## 2025-04-02 RX ADMIN — PROPOFOL 28 MICROGRAM(S)/KG/MIN: 10 INJECTION, EMULSION INTRAVENOUS at 19:27

## 2025-04-02 RX ADMIN — PROPOFOL 28 MICROGRAM(S)/KG/MIN: 10 INJECTION, EMULSION INTRAVENOUS at 07:11

## 2025-04-02 RX ADMIN — Medication 4 MILLIGRAM(S): at 16:33

## 2025-04-02 RX ADMIN — HEPARIN SODIUM 5000 UNIT(S): 1000 INJECTION INTRAVENOUS; SUBCUTANEOUS at 21:57

## 2025-04-02 RX ADMIN — BUDESONIDE 0.5 MILLIGRAM(S): 90 AEROSOL, POWDER RESPIRATORY (INHALATION) at 19:49

## 2025-04-02 RX ADMIN — Medication 2.33 MG/KG/HR: at 19:28

## 2025-04-02 RX ADMIN — METHYLNALTREXONE BROMIDE 12 MILLIGRAM(S): 12 INJECTION, SOLUTION SUBCUTANEOUS at 11:17

## 2025-04-02 RX ADMIN — Medication 500 MICROGRAM(S): at 19:50

## 2025-04-02 NOTE — PROGRESS NOTE ADULT - ATTENDING COMMENTS
Patient is a 58 yo F w/ HTN, T2DM, hypothyroid, and asthma admitted to MICU for status asthmaticus    #Status Asthmaticus  #Acute hypercapnic and hypoxemic respiratory failure  #Hypernatremia  #Elevated Tropinemia  #Subcutaneous Emphysema  #Constipation  #Emesis  - c/w sedation for now. Off Nimbex since 3/31. Now off Fent gtt. c/w Versed gtt, hold further weaning as patient requiring PRN pushes for intermittent vent dyssynchrony. Will atempt to wean Ketamine  - c/w mechanical ventilatory support, Peaks improved to 20, AUto PEEP improved to 2  - c/w continuous Albuterol nebs  - c/w Iptratroprium nebs q6h  - c/w Solumedrol 60mg q8h for now. Monitor FSG  - c/w bowel regimen. WIll order additonal Relistor. Order Erythromicin x24 hours. AXR nonobstructive  - c/w PPI for now given critical ill, mechanically ventilated, high dose steroids, and currently not tolerating feeds. Will start maintenance IVF  - Complete course of empiric Ceft    Zachary Castillo MD  Pulmonary & Critical Care

## 2025-04-02 NOTE — PROGRESS NOTE ADULT - ASSESSMENT
59F with HTN, DM2, hypothyroid, and asthma here for status asthmaticus, unclear trigger.     NEUROLOGIC   Not at baseline mental status. On sedation. Weaning down on fentanyl.   -Baseline AAOx3  -C/w propofol; wean as tolerated   -C/w fentanyl; wean as tolerated   -C/w ketamine; wean as tolerated   -C/w midazolam; wean as tolerated   -D/c cistracronium 3/31       CARDIOVASCULAR     # Vasoplegic Shock  Hemodynamically stable. Requiring pressors.   -C/w levophed; wean as tolerated     #Elevated troponin- 2/2 demand ischemia from vasopressors- RESOLVED   Plated tropnoins 50s to 252 to 180s. No events on telemetry. Peaked and downtrending.   -Monitor   -If increasing again or high clinical suspicion, can consider PE r/o       #HTN   Normotensive.   -Hold home lisinopril 10 mg po qd       RESPIRATORY   #Status asthmaticus   Intubated.   -C/w mechanical ventilator  -Daily spontaneous breathing trials   -C/w methylprednisolone 60 mg IV q12h(start 3/28)   -C/w albuterol nebulizer continous to 20 mg/hr per RT    -C/w ipratropium 500 mg nebulizer 16   -C/w duonebs q6h  -Empiric abx for pna   - Holding off on CT angio for now.   -Pending BAL  -Bronchoscopy 3/30 showing secretions in L main and L lober with mucus aspirated out     # Subcutaneous Emphysema   Spreading sq emphysema predominantly on LUE.   - Present s/p reintubation 03/30 night  - CXR 3/30 showing subcutaneous emphysema without pneumomediastinum   - Will consider CT chest if worsens    GASTROINTESTINAL   -JS   -OGT 3/30 placed   -Hold TF   -C/w pantoprazole 40 mg IV qd for gastric mucosa ppx     #Gastric retention- 2/2 opioid induced constipation   -OGT to suction   -Hold TF   -Treat constipation with methylnaltrexone 12 mg sq x 1 today   -Start metoclopramide 10 mg IV q6 for GI motility; qtc on EKG 4/1 457 ms     GENITOURINARY/RENAL   #HAGMA 2/2 hypercarbia and lactic acidosis- RESOLVED   - On admission, VBG pH 6.98 and PCO2 125. Currently ABG 7.43.   - Treat with mechanical ventilation for exhalation   - Serial ABGs   - S/p Thiamine x 1 for lactic acidosis         ENDOCRINE   #DM   -NPO   -Low ISS, NPH 7u q6h, q6 fingersticks   -Hold home metformin 500 mg po qd   -F/u Hba1c     #Hypothyroid   -C/w home levothyroxine 75 cg po qd       INFECTIOUS DISEASE   Not meeting criteria for SIRS. Unclear trigger for asthma.   -WBC on admission 11.21 and afebrile   -Blood cx  NGTD 3/28   -Sputum cx no PMNs or ogasnisms 3/30   -Urine showing 300 proteinuria and 20 hematuria; no evidence of infection   -Negative viral panel   - urine legionella negative   -F/u urine streptococcus   -Empiric ceftriaxone 1000 mg IV qd x 5 days (started 3/28-4/1)   -Empiric azithromycin 500 mg IV qd x 3 days (started 3/28-3/30)     HEMATOLOGIC/ONCOLOGIC   #Leukocytosis- 2/2 steroid induced   Low suspicion for infection source.   -Monitor     #Thrombocytopenia- 2/2 bone marrow suppression from critical illness   No active bleeding. HIT score 4 with low probabillity of heparin induced.   -Monitor     #Anemia- 2/2 bone marrow suppression from critical illness  No active bleeding. Not meeting transfusion requirements.   -Monitor     PROPHYLACTIC   -DVT ppx: heparin 5000U sq q8     ETHICS   -HCP daughter   -Full code      59F with HTN, DM2, hypothyroid, and asthma here for status asthmaticus, unclear trigger; currently with gradual clinical improvement.     NEUROLOGIC   Not at baseline mental status. On sedation. Still requires sedation.   -Baseline AAOx3  -C/w propofol; wean as tolerated   -D/c fentanyl 4/1   -C/w ketamine; wean as tolerated   -C/w midazolam; wean as tolerated   -D/c cistracronium 3/31       CARDIOVASCULAR     # Vasoplegic Shock- RESOLVED   Hemodynamically stable. No pressor requirements.     #Elevated troponin- 2/2 demand ischemia from vasopressors- RESOLVED   Plated tropnoins 50s to 252 to 180s. No events on telemetry. Peaked and downtrending.   -Monitor   -If increasing again or high clinical suspicion, can consider PE r/o       #HTN   Normotensive.   -Hold home lisinopril 10 mg po qd       RESPIRATORY   #Status asthmaticus   Intubated.   -C/w mechanical ventilator  -Daily spontaneous breathing trials   -C/w methylprednisolone 60 mg IV q12h(start 3/28)   -C/w albuterol nebulizer continous to 20 mg/hr per RT    -C/w ipratropium 500 mg nebulizer 16   -C/w duonebs q6h  -Empiric abx for pna   - Holding off on CT angio for now.   -Pending BAL  -Bronchoscopy 3/30 showing secretions in L main and L lober with mucus aspirated out     # Subcutaneous Emphysema   Spreading sq emphysema predominantly on LUE.   - Present s/p reintubation 03/30 night  - CXR 3/30 showing subcutaneous emphysema without pneumomediastinum   - Will consider CT chest if worsens  - CXR qd to assess progression     GASTROINTESTINAL   -JS   -OGT 3/30 placed   -Hold TF   -C/w pantoprazole 40 mg IV qd for gastric mucosa ppx     #Gastric retention- 2/2 opioid induced constipation   -OGT to suction   -Hold TF   -Treat constipation with methylnaltrexone 12 mg sq x 1; repeat today 4/2 and mineral oil enema   -D/c metoclopramide 10 mg IV q6 for GI motility; qtc on EKG 4/1 457 ms   - Start erythromycin 250 mg IV qd for GI motility     GENITOURINARY/RENAL   #HAGMA 2/2 hypercarbia and lactic acidosis- RESOLVED   #Respiratory acidosis- ventilator changes   - On admission, VBG pH 6.98 and PCO2 125. Currently ABG 7.43.   - Treat with mechanical ventilation for exhalation   - Serial ABGs   - S/p Thiamine x 1 for lactic acidosis         ENDOCRINE   #DM   -NPO   -Low ISS, NPH 7u q6h, q6 fingersticks   -Hold home metformin 500 mg po qd   - Hba1c 5.7     #Hypothyroid   -C/w  levothyroxine 55 mcg IV qd       INFECTIOUS DISEASE   Not meeting criteria for SIRS. Unclear trigger for asthma.   -WBC on admission 11.21 and afebrile   -Blood cx  NGTD 3/28   -Sputum cx no PMNs or ogasnisms 3/30   -Urine showing 300 proteinuria and 20 hematuria; no evidence of infection   -Negative viral panel   - urine legionella negative   - urine streptococcus negative   -Empiric ceftriaxone 1000 mg IV qd x 5 days (started 3/29-4/3)   -Empiric azithromycin 500 mg IV qd x 3 days (started 3/28-3/30)     HEMATOLOGIC/ONCOLOGIC   #Leukocytosis- 2/2 steroid induced   Low suspicion for infection source. Unchanged.   -Monitor     #Thrombocytopenia- 2/2 bone marrow suppression from critical illness   No active bleeding. HIT score 4 with low probabillity of heparin induced. Stable.   -Monitor     #Anemia- 2/2 bone marrow suppression from critical illness  No active bleeding. Not meeting transfusion requirements. Stable.   -Monitor     PROPHYLACTIC   -DVT ppx: heparin 5000U sq q8     ETHICS   -HCP daughter   -Full code

## 2025-04-02 NOTE — PROGRESS NOTE ADULT - SUBJECTIVE AND OBJECTIVE BOX
MICU PROGRESS NOTE     HISTORY OF PRESENT ILLNESS         ROS: All negative except as listed above.    VITAL SIGNS:  ICU Vital Signs Last 24 Hrs  T(C): 36.4 (02 Apr 2025 08:00), Max: 37.8 (01 Apr 2025 16:00)  T(F): 97.5 (02 Apr 2025 08:00), Max: 100 (01 Apr 2025 16:00)  HR: 95 (02 Apr 2025 08:11) (87 - 116)  BP: --  BP(mean): --  ABP: 124/66 (02 Apr 2025 08:11) (102/52 - 162/77)  ABP(mean): 90 (02 Apr 2025 08:11) (70 - 113)  RR: 31 (02 Apr 2025 08:11) (15 - 33)  SpO2: 99% (02 Apr 2025 08:11) (91% - 100%)    O2 Parameters below as of 02 Apr 2025 08:00  Patient On (Oxygen Delivery Method): ventilator    O2 Concentration (%): 30      Mode: Nasal CPAP (Neonates and Pediatrics), RR (machine): 20, TV (machine): 400, FiO2: 30, PEEP: 8, ITime: 0.8, MAP: 14, PIP: 36  Plateau pressure:   P/F ratio:     04-01 @ 07:01  -  04-02 @ 07:00  --------------------------------------------------------  IN: 2313.6 mL / OUT: 2030 mL / NET: 283.6 mL      CAPILLARY BLOOD GLUCOSE      POCT Blood Glucose.: 88 mg/dL (02 Apr 2025 05:35)      ECG: reviewed.    PHYSICAL EXAM:    General: NAD, overweight habitus   Neuro: sedated, AAOx0, no grimace to noxious stimuli, no spontaneity   HEENT: PERRLA b/l, mildly moist mucous membranes  Chest: nonTTP, sq emphysema palpable   Heart: S1/S2, RRR    Lungs: diffuse rhonchi with prolonged expiration, MV   Abd: soft, nonTTP, nondistended, gastric content suctioned out of OG   Ext: no pitting edema pretibial, palpable sq emphysema in L shoulder and R UE down to forearm   Pulses: radial 2+ b/l   Lines/tubes/drains: L IJV triple lumen, L axillary A line, jonas   Psych: unable to assess     MEDICATIONS  (STANDING):  albuterol Continuous Nebulization (Vibrating Mesh Nebulizer) 20 mG/Hr (8 mL/Hr) Continuous Inhalation. <Continuous>  buDESOnide    Inhalation Suspension 0.5 milliGRAM(s) Inhalation two times a day  cefTRIAXone   IVPB 1000 milliGRAM(s) IV Intermittent every 24 hours  chlorhexidine 0.12% Liquid 15 milliLiter(s) Oral Mucosa every 12 hours  chlorhexidine 2% Cloths 1 Application(s) Topical <User Schedule>  dextrose 5%. 1000 milliLiter(s) (50 mL/Hr) IV Continuous <Continuous>  dextrose 5%. 1000 milliLiter(s) (100 mL/Hr) IV Continuous <Continuous>  dextrose 50% Injectable 25 Gram(s) IV Push once  dextrose 50% Injectable 12.5 Gram(s) IV Push once  dextrose 50% Injectable 25 Gram(s) IV Push once  glucagon  Injectable 1 milliGRAM(s) IntraMuscular once  heparin   Injectable 5000 Unit(s) SubCutaneous every 8 hours  influenza   Vaccine 0.5 milliLiter(s) IntraMuscular once  insulin lispro (ADMELOG) corrective regimen sliding scale   SubCutaneous every 6 hours  insulin NPH human recombinant 7 Unit(s) SubCutaneous every 6 hours  ipratropium    for Nebulization 500 MICROGram(s) Nebulizer every 6 hours  ketamine Infusion. 0.25 mG/kG/Hr (2.33 mL/Hr) IV Continuous <Continuous>  levothyroxine Injectable 55 MICROGram(s) IV Push at bedtime  methylPREDNISolone sodium succinate Injectable 60 milliGRAM(s) IV Push every 8 hours  metoclopramide Injectable 10 milliGRAM(s) IV Push every 6 hours  midazolam Infusion 0.02 mG/kG/Hr (1.86 mL/Hr) IV Continuous <Continuous>  multivitamin/minerals/iron Oral Solution (CENTRUM) 15 milliLiter(s) Oral daily  pantoprazole  Injectable 40 milliGRAM(s) IV Push daily  petrolatum Ophthalmic Ointment 1 Application(s) Both EYES daily  polyethylene glycol 3350 17 Gram(s) Oral two times a day  propofol Infusion 50.072 MICROgram(s)/kG/Min (28 mL/Hr) IV Continuous <Continuous>  senna Syrup 10 milliLiter(s) Oral daily    MEDICATIONS  (PRN):  dextrose Oral Gel 15 Gram(s) Oral once PRN Blood Glucose LESS THAN 70 milliGRAM(s)/deciliter      ALLERGIES:  Allergies    No Known Allergies    Intolerances        LABS:                        10.4   19.95 )-----------( 157      ( 02 Apr 2025 00:40 )             33.2     04-02    147[H]  |  114[H]  |  20  ----------------------------<  100[H]  4.8   |  22  |  0.86    Ca    8.3[L]      02 Apr 2025 00:40  Phos  3.0     04-02  Mg     2.30     04-02    TPro  5.7[L]  /  Alb  3.0[L]  /  TBili  <0.2  /  DBili  x   /  AST  29  /  ALT  34[H]  /  AlkPhos  58  04-02    PT/INR - ( 02 Apr 2025 00:40 )   PT: 11.9 sec;   INR: 1.00 ratio         PTT - ( 02 Apr 2025 00:40 )  PTT:25.0 sec  Urinalysis Basic - ( 02 Apr 2025 00:40 )    Color: x / Appearance: x / SG: x / pH: x  Gluc: 100 mg/dL / Ketone: x  / Bili: x / Urobili: x   Blood: x / Protein: x / Nitrite: x   Leuk Esterase: x / RBC: x / WBC x   Sq Epi: x / Non Sq Epi: x / Bacteria: x      ABG:  pH, Arterial: 7.34 (04-02-25 @ 00:40)  pCO2, Arterial: 55 mmHg (04-02-25 @ 00:40)  pO2, Arterial: 92 mmHg (04-02-25 @ 00:40)  pH, Arterial: 7.35 (04-01-25 @ 11:30)  pCO2, Arterial: 54 mmHg (04-01-25 @ 11:30)  pO2, Arterial: 80 mmHg (04-01-25 @ 11:30)      vBG:    Micro:    Culture - Blood (collected 03-28-25 @ 16:39)  Source: Blood Blood-Peripheral  Preliminary Report (04-01-25 @ 20:01):    No growth at 4 days    Culture - Blood (collected 03-28-25 @ 16:34)  Source: Blood Blood-Peripheral  Preliminary Report (04-01-25 @ 20:01):    No growth at 4 days       Urinalysis with Rflx Culture (collected 03-28-25 @ 16:34)         RADIOLOGY & ADDITIONAL TESTS: Reviewed. MICU PROGRESS NOTE     HISTORY OF PRESENT ILLNESS     OVernight, patient had multiple episodes of agitation with tachypnea and required multiple PRNs of midazolam. In the AM, had similar episode and required additional midazolam and fentanyl PRN. Afebrile, HR 90s-110s, SBPs 100s-120s. MV V A/C  mL PEEP 8 RR 20 FiO2 30.     Seen and examined at bedside, patient remains unable to engage in meaningfully interaction.     ROS: All negative except as listed above.    VITAL SIGNS:  ICU Vital Signs Last 24 Hrs  T(C): 36.4 (02 Apr 2025 08:00), Max: 37.8 (01 Apr 2025 16:00)  T(F): 97.5 (02 Apr 2025 08:00), Max: 100 (01 Apr 2025 16:00)  HR: 95 (02 Apr 2025 08:11) (87 - 116)  BP: --  BP(mean): --  ABP: 124/66 (02 Apr 2025 08:11) (102/52 - 162/77)  ABP(mean): 90 (02 Apr 2025 08:11) (70 - 113)  RR: 31 (02 Apr 2025 08:11) (15 - 33)  SpO2: 99% (02 Apr 2025 08:11) (91% - 100%)    O2 Parameters below as of 02 Apr 2025 08:00  Patient On (Oxygen Delivery Method): ventilator    O2 Concentration (%): 30      Mode: Nasal CPAP (Neonates and Pediatrics), RR (machine): 20, TV (machine): 400, FiO2: 30, PEEP: 8, ITime: 0.8, MAP: 14, PIP: 36  Plateau pressure:   P/F ratio:     04-01 @ 07:01  -  04-02 @ 07:00  --------------------------------------------------------  IN: 2313.6 mL / OUT: 2030 mL / NET: 283.6 mL      CAPILLARY BLOOD GLUCOSE      POCT Blood Glucose.: 88 mg/dL (02 Apr 2025 05:35)      ECG: reviewed.    PHYSICAL EXAM:    General: NAD, overweight habitus   Neuro: sedated, AAOx0, no grimace to noxious stimuli, no spontaneity   HEENT: PERRLA b/l, mildly moist mucous membranes  Chest: nonTTP, sq emphysema palpable   Heart: S1/S2, RRR    Lungs: inspiratory > expiratory upper > lower lobs b/l wheezing, with prolonged expiration, MV   Abd: soft, nonTTP, nondistended, yellow gastric content suctioned out of OG   Ext: no pitting edema pretibial, palpable sq emphysema in L shoulder and R UE down to forearm   Pulses: radial 2+ b/l   Lines/tubes/drains: L IJV triple lumen, L axillary A line, joans   Psych: unable to assess     MEDICATIONS  (STANDING):  albuterol Continuous Nebulization (Vibrating Mesh Nebulizer) 20 mG/Hr (8 mL/Hr) Continuous Inhalation. <Continuous>  buDESOnide    Inhalation Suspension 0.5 milliGRAM(s) Inhalation two times a day  cefTRIAXone   IVPB 1000 milliGRAM(s) IV Intermittent every 24 hours  chlorhexidine 0.12% Liquid 15 milliLiter(s) Oral Mucosa every 12 hours  chlorhexidine 2% Cloths 1 Application(s) Topical <User Schedule>  dextrose 5%. 1000 milliLiter(s) (50 mL/Hr) IV Continuous <Continuous>  dextrose 5%. 1000 milliLiter(s) (100 mL/Hr) IV Continuous <Continuous>  dextrose 50% Injectable 25 Gram(s) IV Push once  dextrose 50% Injectable 12.5 Gram(s) IV Push once  dextrose 50% Injectable 25 Gram(s) IV Push once  glucagon  Injectable 1 milliGRAM(s) IntraMuscular once  heparin   Injectable 5000 Unit(s) SubCutaneous every 8 hours  influenza   Vaccine 0.5 milliLiter(s) IntraMuscular once  insulin lispro (ADMELOG) corrective regimen sliding scale   SubCutaneous every 6 hours  insulin NPH human recombinant 7 Unit(s) SubCutaneous every 6 hours  ipratropium    for Nebulization 500 MICROGram(s) Nebulizer every 6 hours  ketamine Infusion. 0.25 mG/kG/Hr (2.33 mL/Hr) IV Continuous <Continuous>  levothyroxine Injectable 55 MICROGram(s) IV Push at bedtime  methylPREDNISolone sodium succinate Injectable 60 milliGRAM(s) IV Push every 8 hours  metoclopramide Injectable 10 milliGRAM(s) IV Push every 6 hours  midazolam Infusion 0.02 mG/kG/Hr (1.86 mL/Hr) IV Continuous <Continuous>  multivitamin/minerals/iron Oral Solution (CENTRUM) 15 milliLiter(s) Oral daily  pantoprazole  Injectable 40 milliGRAM(s) IV Push daily  petrolatum Ophthalmic Ointment 1 Application(s) Both EYES daily  polyethylene glycol 3350 17 Gram(s) Oral two times a day  propofol Infusion 50.072 MICROgram(s)/kG/Min (28 mL/Hr) IV Continuous <Continuous>  senna Syrup 10 milliLiter(s) Oral daily    MEDICATIONS  (PRN):  dextrose Oral Gel 15 Gram(s) Oral once PRN Blood Glucose LESS THAN 70 milliGRAM(s)/deciliter      ALLERGIES:  Allergies    No Known Allergies    Intolerances        LABS:                        10.4   19.95 )-----------( 157      ( 02 Apr 2025 00:40 )             33.2     04-02    147[H]  |  114[H]  |  20  ----------------------------<  100[H]  4.8   |  22  |  0.86    Ca    8.3[L]      02 Apr 2025 00:40  Phos  3.0     04-02  Mg     2.30     04-02    TPro  5.7[L]  /  Alb  3.0[L]  /  TBili  <0.2  /  DBili  x   /  AST  29  /  ALT  34[H]  /  AlkPhos  58  04-02    PT/INR - ( 02 Apr 2025 00:40 )   PT: 11.9 sec;   INR: 1.00 ratio         PTT - ( 02 Apr 2025 00:40 )  PTT:25.0 sec  Urinalysis Basic - ( 02 Apr 2025 00:40 )    Color: x / Appearance: x / SG: x / pH: x  Gluc: 100 mg/dL / Ketone: x  / Bili: x / Urobili: x   Blood: x / Protein: x / Nitrite: x   Leuk Esterase: x / RBC: x / WBC x   Sq Epi: x / Non Sq Epi: x / Bacteria: x      ABG:  pH, Arterial: 7.34 (04-02-25 @ 00:40)  pCO2, Arterial: 55 mmHg (04-02-25 @ 00:40)  pO2, Arterial: 92 mmHg (04-02-25 @ 00:40)  pH, Arterial: 7.35 (04-01-25 @ 11:30)  pCO2, Arterial: 54 mmHg (04-01-25 @ 11:30)  pO2, Arterial: 80 mmHg (04-01-25 @ 11:30)      vBG:    Micro:    Culture - Blood (collected 03-28-25 @ 16:39)  Source: Blood Blood-Peripheral  Preliminary Report (04-01-25 @ 20:01):    No growth at 4 days    Culture - Blood (collected 03-28-25 @ 16:34)  Source: Blood Blood-Peripheral  Preliminary Report (04-01-25 @ 20:01):    No growth at 4 days       Urinalysis with Rflx Culture (collected 03-28-25 @ 16:34)         RADIOLOGY & ADDITIONAL TESTS: Reviewed.

## 2025-04-02 NOTE — CHART NOTE - NSCHARTNOTEFT_GEN_A_CORE
: Supriya Tejada     INDICATION: AHRF     PROCEDURE:   [ x] LIMITED ECHO  [ x] LIMITED CHEST      FINDINGS:   minimal lung sliding noted on the left side  difficult to check for lung sliding on the right side given the extent of subcut emphysema   technically difficult view for cardiac window   overall good LV systolic function on the apical four chamber view   IVC >50% variablity  and <2cm           Supervised by Dr. Castillo   Should not be considered final until signed by attending. : Supriya Tejada     INDICATION: AHRF     PROCEDURE:   [ x] LIMITED ECHO  [ x] LIMITED CHEST      FINDINGS:   minimal lung sliding noted on the left side  difficult to check for lung sliding on the right side given the extent of subcut emphysema   technically difficult view for cardiac window   overall good LV systolic function on the apical four chamber view   IVC >50% variablity  and <2cm           Supervised by Dr. Castillo   Should not be considered final until signed by attending.    Attending Attestation:  I was present during the key portions of the procedure and immediately available during the entire procedure.  Zachary Castillo MD  Attending  Pulmonary & Critical Care Medicine

## 2025-04-03 LAB
ALBUMIN SERPL ELPH-MCNC: 3.2 G/DL — LOW (ref 3.3–5)
ALP SERPL-CCNC: 66 U/L — SIGNIFICANT CHANGE UP (ref 40–120)
ALT FLD-CCNC: 34 U/L — HIGH (ref 4–33)
ANION GAP SERPL CALC-SCNC: 13 MMOL/L — SIGNIFICANT CHANGE UP (ref 7–14)
APTT BLD: 27.4 SEC — SIGNIFICANT CHANGE UP (ref 24.5–35.6)
AST SERPL-CCNC: 28 U/L — SIGNIFICANT CHANGE UP (ref 4–32)
BILIRUB SERPL-MCNC: 0.2 MG/DL — SIGNIFICANT CHANGE UP (ref 0.2–1.2)
BLOOD GAS ARTERIAL COMPREHENSIVE RESULT: SIGNIFICANT CHANGE UP
BUN SERPL-MCNC: 22 MG/DL — SIGNIFICANT CHANGE UP (ref 7–23)
CALCIUM SERPL-MCNC: 8.4 MG/DL — SIGNIFICANT CHANGE UP (ref 8.4–10.5)
CHLORIDE SERPL-SCNC: 108 MMOL/L — HIGH (ref 98–107)
CO2 SERPL-SCNC: 24 MMOL/L — SIGNIFICANT CHANGE UP (ref 22–31)
CREAT SERPL-MCNC: 0.7 MG/DL — SIGNIFICANT CHANGE UP (ref 0.5–1.3)
EGFR: 99 ML/MIN/1.73M2 — SIGNIFICANT CHANGE UP
EGFR: 99 ML/MIN/1.73M2 — SIGNIFICANT CHANGE UP
GLUCOSE BLDC GLUCOMTR-MCNC: 83 MG/DL — SIGNIFICANT CHANGE UP (ref 70–99)
GLUCOSE BLDC GLUCOMTR-MCNC: 86 MG/DL — SIGNIFICANT CHANGE UP (ref 70–99)
GLUCOSE BLDC GLUCOMTR-MCNC: 89 MG/DL — SIGNIFICANT CHANGE UP (ref 70–99)
GLUCOSE BLDC GLUCOMTR-MCNC: 91 MG/DL — SIGNIFICANT CHANGE UP (ref 70–99)
GLUCOSE SERPL-MCNC: 96 MG/DL — SIGNIFICANT CHANGE UP (ref 70–99)
HCT VFR BLD CALC: 33 % — LOW (ref 34.5–45)
HGB BLD-MCNC: 10.4 G/DL — LOW (ref 11.5–15.5)
INR BLD: 1.02 RATIO — SIGNIFICANT CHANGE UP (ref 0.85–1.16)
MAGNESIUM SERPL-MCNC: 2.3 MG/DL — SIGNIFICANT CHANGE UP (ref 1.6–2.6)
MCHC RBC-ENTMCNC: 30.4 PG — SIGNIFICANT CHANGE UP (ref 27–34)
MCHC RBC-ENTMCNC: 31.5 G/DL — LOW (ref 32–36)
MCV RBC AUTO: 96.5 FL — SIGNIFICANT CHANGE UP (ref 80–100)
NRBC # BLD AUTO: 0.13 K/UL — HIGH (ref 0–0)
NRBC # FLD: 0.13 K/UL — HIGH (ref 0–0)
NRBC BLD AUTO-RTO: 0 /100 WBCS — SIGNIFICANT CHANGE UP (ref 0–0)
PHOSPHATE SERPL-MCNC: 2.6 MG/DL — SIGNIFICANT CHANGE UP (ref 2.5–4.5)
PLATELET # BLD AUTO: 160 K/UL — SIGNIFICANT CHANGE UP (ref 150–400)
POTASSIUM SERPL-MCNC: 4.8 MMOL/L — SIGNIFICANT CHANGE UP (ref 3.5–5.3)
POTASSIUM SERPL-SCNC: 4.8 MMOL/L — SIGNIFICANT CHANGE UP (ref 3.5–5.3)
PROT SERPL-MCNC: 6.1 G/DL — SIGNIFICANT CHANGE UP (ref 6–8.3)
PROTHROM AB SERPL-ACNC: 12.1 SEC — SIGNIFICANT CHANGE UP (ref 9.9–13.4)
RBC # BLD: 3.42 M/UL — LOW (ref 3.8–5.2)
RBC # FLD: 16.6 % — HIGH (ref 10.3–14.5)
SODIUM SERPL-SCNC: 145 MMOL/L — SIGNIFICANT CHANGE UP (ref 135–145)
WBC # BLD: 19.5 K/UL — HIGH (ref 3.8–10.5)
WBC # FLD AUTO: 19.5 K/UL — HIGH (ref 3.8–10.5)

## 2025-04-03 RX ORDER — DEXMEDETOMIDINE HYDROCHLORIDE IN SODIUM CHLORIDE 4 UG/ML
0.2 INJECTION INTRAVENOUS
Qty: 400 | Refills: 0 | Status: DISCONTINUED | OUTPATIENT
Start: 2025-04-03 | End: 2025-04-07

## 2025-04-03 RX ORDER — ALBUTEROL SULFATE 2.5 MG/3ML
5 VIAL, NEBULIZER (ML) INHALATION
Qty: 100 | Refills: 0 | Status: DISCONTINUED | OUTPATIENT
Start: 2025-04-03 | End: 2025-04-04

## 2025-04-03 RX ORDER — NICARDIPINE HCL 30 MG
5 CAPSULE ORAL
Qty: 40 | Refills: 0 | Status: DISCONTINUED | OUTPATIENT
Start: 2025-04-03 | End: 2025-04-03

## 2025-04-03 RX ORDER — LABETALOL HYDROCHLORIDE 200 MG/1
10 TABLET, FILM COATED ORAL ONCE
Refills: 0 | Status: COMPLETED | OUTPATIENT
Start: 2025-04-03 | End: 2025-04-03

## 2025-04-03 RX ORDER — ENOXAPARIN SODIUM 100 MG/ML
40 INJECTION SUBCUTANEOUS EVERY 24 HOURS
Refills: 0 | Status: DISCONTINUED | OUTPATIENT
Start: 2025-04-03 | End: 2025-04-18

## 2025-04-03 RX ORDER — METHYLNALTREXONE BROMIDE 12 MG/.6ML
12 INJECTION, SOLUTION SUBCUTANEOUS ONCE
Refills: 0 | Status: COMPLETED | OUTPATIENT
Start: 2025-04-03 | End: 2025-04-03

## 2025-04-03 RX ORDER — DEXMEDETOMIDINE HYDROCHLORIDE IN SODIUM CHLORIDE 4 UG/ML
0.2 INJECTION INTRAVENOUS
Qty: 200 | Refills: 0 | Status: DISCONTINUED | OUTPATIENT
Start: 2025-04-03 | End: 2025-04-03

## 2025-04-03 RX ORDER — LACTULOSE 10 G/15ML
20 SOLUTION ORAL THREE TIMES A DAY
Refills: 0 | Status: DISCONTINUED | OUTPATIENT
Start: 2025-04-03 | End: 2025-04-04

## 2025-04-03 RX ORDER — NICARDIPINE HCL 30 MG
5 CAPSULE ORAL
Qty: 40 | Refills: 0 | Status: DISCONTINUED | OUTPATIENT
Start: 2025-04-03 | End: 2025-04-04

## 2025-04-03 RX ORDER — MIDAZOLAM IN 0.9 % SOD.CHLORID 1 MG/ML
4 PLASTIC BAG, INJECTION (ML) INTRAVENOUS ONCE
Refills: 0 | Status: DISCONTINUED | OUTPATIENT
Start: 2025-04-03 | End: 2025-04-03

## 2025-04-03 RX ORDER — MIDAZOLAM IN 0.9 % SOD.CHLORID 1 MG/ML
4 PLASTIC BAG, INJECTION (ML) INTRAVENOUS ONCE
Refills: 0 | Status: DISCONTINUED | OUTPATIENT
Start: 2025-04-03 | End: 2025-04-04

## 2025-04-03 RX ORDER — METHYLPREDNISOLONE ACETATE 80 MG/ML
40 INJECTION, SUSPENSION INTRA-ARTICULAR; INTRALESIONAL; INTRAMUSCULAR; SOFT TISSUE EVERY 8 HOURS
Refills: 0 | Status: DISCONTINUED | OUTPATIENT
Start: 2025-04-03 | End: 2025-04-04

## 2025-04-03 RX ORDER — LISINOPRIL 5 MG/1
10 TABLET ORAL DAILY
Refills: 0 | Status: DISCONTINUED | OUTPATIENT
Start: 2025-04-03 | End: 2025-04-05

## 2025-04-03 RX ADMIN — Medication 1.86 MG/KG/HR: at 22:35

## 2025-04-03 RX ADMIN — Medication 3 MG/HR: at 17:24

## 2025-04-03 RX ADMIN — Medication 85 MILLIMOLE(S): at 11:32

## 2025-04-03 RX ADMIN — DEXMEDETOMIDINE HYDROCHLORIDE IN SODIUM CHLORIDE 4.66 MICROGRAM(S)/KG/HR: 4 INJECTION INTRAVENOUS at 19:31

## 2025-04-03 RX ADMIN — Medication 500 MICROGRAM(S): at 15:11

## 2025-04-03 RX ADMIN — Medication 2.33 MG/KG/HR: at 07:57

## 2025-04-03 RX ADMIN — METHYLNALTREXONE BROMIDE 12 MILLIGRAM(S): 12 INJECTION, SOLUTION SUBCUTANEOUS at 11:33

## 2025-04-03 RX ADMIN — Medication 4 MILLIGRAM(S): at 11:52

## 2025-04-03 RX ADMIN — HEPARIN SODIUM 5000 UNIT(S): 1000 INJECTION INTRAVENOUS; SUBCUTANEOUS at 05:25

## 2025-04-03 RX ADMIN — Medication 2.33 MG/KG/HR: at 05:27

## 2025-04-03 RX ADMIN — Medication 15 MILLILITER(S): at 11:33

## 2025-04-03 RX ADMIN — Medication 4 MG/HR: at 05:40

## 2025-04-03 RX ADMIN — Medication 4 MILLIGRAM(S): at 07:35

## 2025-04-03 RX ADMIN — Medication 40 MILLIGRAM(S): at 11:33

## 2025-04-03 RX ADMIN — Medication 15 MILLILITER(S): at 05:25

## 2025-04-03 RX ADMIN — Medication 15 MILLILITER(S): at 18:33

## 2025-04-03 RX ADMIN — BUDESONIDE 0.5 MILLIGRAM(S): 90 AEROSOL, POWDER RESPIRATORY (INHALATION) at 18:34

## 2025-04-03 RX ADMIN — Medication 8 MG/HR: at 00:11

## 2025-04-03 RX ADMIN — LABETALOL HYDROCHLORIDE 10 MILLIGRAM(S): 200 TABLET, FILM COATED ORAL at 06:30

## 2025-04-03 RX ADMIN — Medication 25 MG/HR: at 08:23

## 2025-04-03 RX ADMIN — Medication 500 MICROGRAM(S): at 18:39

## 2025-04-03 RX ADMIN — Medication 1.86 MG/KG/HR: at 07:56

## 2025-04-03 RX ADMIN — Medication 250 MILLIGRAM(S): at 05:45

## 2025-04-03 RX ADMIN — Medication 4 MG/HR: at 08:36

## 2025-04-03 RX ADMIN — METHYLPREDNISOLONE ACETATE 40 MILLIGRAM(S): 80 INJECTION, SUSPENSION INTRA-ARTICULAR; INTRALESIONAL; INTRAMUSCULAR; SOFT TISSUE at 22:31

## 2025-04-03 RX ADMIN — POLYETHYLENE GLYCOL 3350 17 GRAM(S): 17 POWDER, FOR SOLUTION ORAL at 18:33

## 2025-04-03 RX ADMIN — Medication 1.86 MG/KG/HR: at 19:30

## 2025-04-03 RX ADMIN — LACTULOSE 20 GRAM(S): 10 SOLUTION ORAL at 05:26

## 2025-04-03 RX ADMIN — METHYLPREDNISOLONE ACETATE 40 MILLIGRAM(S): 80 INJECTION, SUSPENSION INTRA-ARTICULAR; INTRALESIONAL; INTRAMUSCULAR; SOFT TISSUE at 14:28

## 2025-04-03 RX ADMIN — Medication 4 MILLIGRAM(S): at 03:05

## 2025-04-03 RX ADMIN — LISINOPRIL 10 MILLIGRAM(S): 5 TABLET ORAL at 06:36

## 2025-04-03 RX ADMIN — Medication 500 MICROGRAM(S): at 03:25

## 2025-04-03 RX ADMIN — Medication 1 APPLICATION(S): at 05:28

## 2025-04-03 RX ADMIN — LACTULOSE 20 GRAM(S): 10 SOLUTION ORAL at 22:31

## 2025-04-03 RX ADMIN — Medication 55 MICROGRAM(S): at 23:38

## 2025-04-03 RX ADMIN — Medication 10 MILLILITER(S): at 11:33

## 2025-04-03 RX ADMIN — Medication 1 APPLICATION(S): at 11:33

## 2025-04-03 RX ADMIN — PROPOFOL 28 MICROGRAM(S)/KG/MIN: 10 INJECTION, EMULSION INTRAVENOUS at 19:31

## 2025-04-03 RX ADMIN — Medication 4 MG/HR: at 04:13

## 2025-04-03 RX ADMIN — PROPOFOL 28 MICROGRAM(S)/KG/MIN: 10 INJECTION, EMULSION INTRAVENOUS at 07:56

## 2025-04-03 RX ADMIN — ENOXAPARIN SODIUM 40 MILLIGRAM(S): 100 INJECTION SUBCUTANEOUS at 14:28

## 2025-04-03 RX ADMIN — BUDESONIDE 0.5 MILLIGRAM(S): 90 AEROSOL, POWDER RESPIRATORY (INHALATION) at 08:31

## 2025-04-03 RX ADMIN — Medication 500 MICROGRAM(S): at 08:30

## 2025-04-03 RX ADMIN — Medication 250 MILLIGRAM(S): at 14:40

## 2025-04-03 RX ADMIN — METHYLPREDNISOLONE ACETATE 40 MILLIGRAM(S): 80 INJECTION, SUSPENSION INTRA-ARTICULAR; INTRALESIONAL; INTRAMUSCULAR; SOFT TISSUE at 05:25

## 2025-04-03 RX ADMIN — LACTULOSE 20 GRAM(S): 10 SOLUTION ORAL at 14:28

## 2025-04-03 RX ADMIN — DEXMEDETOMIDINE HYDROCHLORIDE IN SODIUM CHLORIDE 4.66 MICROGRAM(S)/KG/HR: 4 INJECTION INTRAVENOUS at 14:29

## 2025-04-03 RX ADMIN — POLYETHYLENE GLYCOL 3350 17 GRAM(S): 17 POWDER, FOR SOLUTION ORAL at 05:27

## 2025-04-03 NOTE — PROGRESS NOTE ADULT - SUBJECTIVE AND OBJECTIVE BOX
MICU PROGRESS NOTE     HISTORY OF PRESENT ILLNESS         ROS: All negative except as listed above.    VITAL SIGNS:  ICU Vital Signs Last 24 Hrs  T(C): 37.1 (03 Apr 2025 04:00), Max: 37.5 (03 Apr 2025 00:00)  T(F): 98.8 (03 Apr 2025 04:00), Max: 99.5 (03 Apr 2025 00:00)  HR: 81 (03 Apr 2025 07:42) (80 - 110)  BP: --  BP(mean): --  ABP: 158/75 (03 Apr 2025 07:42) (122/59 - 190/92)  ABP(mean): 109 (03 Apr 2025 07:42) (83 - 133)  RR: 20 (03 Apr 2025 07:42) (16 - 40)  SpO2: 98% (03 Apr 2025 07:42) (88% - 100%)    O2 Parameters below as of 03 Apr 2025 07:37  Patient On (Oxygen Delivery Method): ventilator          Mode: AC/ CMV (Assist Control/ Continuous Mandatory Ventilation), RR (machine): 20, TV (machine): 400, FiO2: 30, PEEP: 8, ITime: 0.8, MAP: 13, PIP: 32  Plateau pressure:   P/F ratio:     04-02 @ 07:01  -  04-03 @ 07:00  --------------------------------------------------------  IN: 3553.6 mL / OUT: 1975 mL / NET: 1578.6 mL      CAPILLARY BLOOD GLUCOSE      POCT Blood Glucose.: 83 mg/dL (03 Apr 2025 05:23)      ECG: reviewed.    PHYSICAL EXAM:    General: NAD, overweight habitus   Neuro: sedated, AAOx0, no grimace to noxious stimuli, no spontaneity   HEENT: PERRLA b/l, mildly moist mucous membranes  Chest: nonTTP, sq emphysema palpable   Heart: S1/S2, RRR    Lungs: inspiratory > expiratory upper > lower lobs b/l wheezing, with prolonged expiration, MV   Abd: soft, nonTTP, nondistended, yellow gastric content suctioned out of OG   Ext: no pitting edema pretibial, palpable sq emphysema in L shoulder and R UE down to forearm   Pulses: radial 2+ b/l   Lines/tubes/drains: L IJV triple lumen, L axillary A line, jonas   Psych: unable to assess       MEDICATIONS  (STANDING):  albuterol Continuous Nebulization (Vibrating Mesh Nebulizer) 10 mG/Hr (4 mL/Hr) Continuous Inhalation. <Continuous>  buDESOnide    Inhalation Suspension 0.5 milliGRAM(s) Inhalation two times a day  chlorhexidine 0.12% Liquid 15 milliLiter(s) Oral Mucosa every 12 hours  chlorhexidine 2% Cloths 1 Application(s) Topical <User Schedule>  dextrose 5%. 1000 milliLiter(s) (50 mL/Hr) IV Continuous <Continuous>  dextrose 5%. 1000 milliLiter(s) (100 mL/Hr) IV Continuous <Continuous>  dextrose 50% Injectable 25 Gram(s) IV Push once  dextrose 50% Injectable 12.5 Gram(s) IV Push once  dextrose 50% Injectable 25 Gram(s) IV Push once  erythromycin   IVPB      erythromycin   IVPB 250 milliGRAM(s) IV Intermittent every 8 hours  glucagon  Injectable 1 milliGRAM(s) IntraMuscular once  heparin   Injectable 5000 Unit(s) SubCutaneous every 8 hours  influenza   Vaccine 0.5 milliLiter(s) IntraMuscular once  insulin lispro (ADMELOG) corrective regimen sliding scale   SubCutaneous every 6 hours  insulin NPH human recombinant 7 Unit(s) SubCutaneous every 6 hours  ipratropium    for Nebulization 500 MICROGram(s) Nebulizer every 6 hours  ketamine Infusion. 0.25 mG/kG/Hr (2.33 mL/Hr) IV Continuous <Continuous>  lactated ringers. 1000 milliLiter(s) (100 mL/Hr) IV Continuous <Continuous>  lactulose Syrup 20 Gram(s) Oral three times a day  levothyroxine Injectable 55 MICROGram(s) IV Push at bedtime  lisinopril 10 milliGRAM(s) Oral daily  methylPREDNISolone sodium succinate Injectable 40 milliGRAM(s) IV Push every 8 hours  midazolam Infusion 0.02 mG/kG/Hr (1.86 mL/Hr) IV Continuous <Continuous>  multivitamin/minerals/iron Oral Solution (CENTRUM) 15 milliLiter(s) Oral daily  niCARdipine Infusion 5 mG/Hr (25 mL/Hr) IV Continuous <Continuous>  pantoprazole  Injectable 40 milliGRAM(s) IV Push daily  petrolatum Ophthalmic Ointment 1 Application(s) Both EYES daily  polyethylene glycol 3350 17 Gram(s) Oral two times a day  propofol Infusion 50.072 MICROgram(s)/kG/Min (28 mL/Hr) IV Continuous <Continuous>  senna Syrup 10 milliLiter(s) Oral daily    MEDICATIONS  (PRN):  dextrose Oral Gel 15 Gram(s) Oral once PRN Blood Glucose LESS THAN 70 milliGRAM(s)/deciliter      ALLERGIES:  Allergies    No Known Allergies    Intolerances        LABS:                        10.4   19.50 )-----------( 160      ( 03 Apr 2025 00:20 )             33.0     04-03    145  |  108[H]  |  22  ----------------------------<  96  4.8   |  24  |  0.70    Ca    8.4      03 Apr 2025 00:20  Phos  2.6     04-03  Mg     2.30     04-03    TPro  6.1  /  Alb  3.2[L]  /  TBili  0.2  /  DBili  x   /  AST  28  /  ALT  34[H]  /  AlkPhos  66  04-03    PT/INR - ( 03 Apr 2025 00:20 )   PT: 12.1 sec;   INR: 1.02 ratio         PTT - ( 03 Apr 2025 00:20 )  PTT:27.4 sec  Urinalysis Basic - ( 03 Apr 2025 00:20 )    Color: x / Appearance: x / SG: x / pH: x  Gluc: 96 mg/dL / Ketone: x  / Bili: x / Urobili: x   Blood: x / Protein: x / Nitrite: x   Leuk Esterase: x / RBC: x / WBC x   Sq Epi: x / Non Sq Epi: x / Bacteria: x      ABG:  pH, Arterial: 7.35 (04-03-25 @ 00:20)  pCO2, Arterial: 55 mmHg (04-03-25 @ 00:20)  pO2, Arterial: 82 mmHg (04-03-25 @ 00:20)  pH, Arterial: 7.34 (04-02-25 @ 11:07)  pCO2, Arterial: 56 mmHg (04-02-25 @ 11:07)  pO2, Arterial: 83 mmHg (04-02-25 @ 11:07)      vBG:    Micro:    Culture - Blood (collected 03-28-25 @ 16:39)  Source: Blood Blood-Peripheral  Final Report (04-02-25 @ 20:00):    No growth at 5 days    Culture - Blood (collected 03-28-25 @ 16:34)  Source: Blood Blood-Peripheral  Final Report (04-02-25 @ 20:00):    No growth at 5 days       Urinalysis with Rflx Culture (collected 03-28-25 @ 16:34)         RADIOLOGY & ADDITIONAL TESTS: Reviewed. MICU PROGRESS NOTE     HISTORY OF PRESENT ILLNESS     Overnight, methylprednisolone as downtitrated from 60 to 40. Lactulose was started  because no BM after enema and methylnaltrexone. Due to persistent HTN, started on home lisinopril and given labetolol 10 mg IV x1. Afebrile, HR 90s-110s. SBPs 120s-175. MV V A/C  PEEP 8 RR 20 FiO2 30.     SEen and examined at bedside, patient remains unable to engage meaningfully.     ROS: All negative except as listed above.    VITAL SIGNS:  ICU Vital Signs Last 24 Hrs  T(C): 37.1 (03 Apr 2025 04:00), Max: 37.5 (03 Apr 2025 00:00)  T(F): 98.8 (03 Apr 2025 04:00), Max: 99.5 (03 Apr 2025 00:00)  HR: 81 (03 Apr 2025 07:42) (80 - 110)  BP: --  BP(mean): --  ABP: 158/75 (03 Apr 2025 07:42) (122/59 - 190/92)  ABP(mean): 109 (03 Apr 2025 07:42) (83 - 133)  RR: 20 (03 Apr 2025 07:42) (16 - 40)  SpO2: 98% (03 Apr 2025 07:42) (88% - 100%)    O2 Parameters below as of 03 Apr 2025 07:37  Patient On (Oxygen Delivery Method): ventilator          Mode: AC/ CMV (Assist Control/ Continuous Mandatory Ventilation), RR (machine): 20, TV (machine): 400, FiO2: 30, PEEP: 8, ITime: 0.8, MAP: 13, PIP: 32  Plateau pressure:   P/F ratio:     04-02 @ 07:01  -  04-03 @ 07:00  --------------------------------------------------------  IN: 3553.6 mL / OUT: 1975 mL / NET: 1578.6 mL      CAPILLARY BLOOD GLUCOSE      POCT Blood Glucose.: 83 mg/dL (03 Apr 2025 05:23)      ECG: reviewed.    PHYSICAL EXAM:    General: NAD, overweight habitus   Neuro: sedated, AAOx0, no grimace to noxious stimuli, no spontaneity   HEENT: PERRLA b/l, mildly moist mucous membranes  Chest: nonTTP, sq emphysema palpable   Heart: S1/S2, RRR    Lungs: inspiratory > expiratory upper diffuse b/l wheezing and rhonchi, with prolonged expiration, MV   Abd: soft, nonTTP, nondistended   Ext: no pitting edema pretibial, palpable sq emphysema in L shoulder and R UE down to forearm   Pulses: radial 2+ b/l   Lines/tubes/drains: L IJV triple lumen, L axillary A line, jonas   Psych: unable to assess       MEDICATIONS  (STANDING):  albuterol Continuous Nebulization (Vibrating Mesh Nebulizer) 10 mG/Hr (4 mL/Hr) Continuous Inhalation. <Continuous>  buDESOnide    Inhalation Suspension 0.5 milliGRAM(s) Inhalation two times a day  chlorhexidine 0.12% Liquid 15 milliLiter(s) Oral Mucosa every 12 hours  chlorhexidine 2% Cloths 1 Application(s) Topical <User Schedule>  dextrose 5%. 1000 milliLiter(s) (50 mL/Hr) IV Continuous <Continuous>  dextrose 5%. 1000 milliLiter(s) (100 mL/Hr) IV Continuous <Continuous>  dextrose 50% Injectable 25 Gram(s) IV Push once  dextrose 50% Injectable 12.5 Gram(s) IV Push once  dextrose 50% Injectable 25 Gram(s) IV Push once  erythromycin   IVPB      erythromycin   IVPB 250 milliGRAM(s) IV Intermittent every 8 hours  glucagon  Injectable 1 milliGRAM(s) IntraMuscular once  heparin   Injectable 5000 Unit(s) SubCutaneous every 8 hours  influenza   Vaccine 0.5 milliLiter(s) IntraMuscular once  insulin lispro (ADMELOG) corrective regimen sliding scale   SubCutaneous every 6 hours  insulin NPH human recombinant 7 Unit(s) SubCutaneous every 6 hours  ipratropium    for Nebulization 500 MICROGram(s) Nebulizer every 6 hours  ketamine Infusion. 0.25 mG/kG/Hr (2.33 mL/Hr) IV Continuous <Continuous>  lactated ringers. 1000 milliLiter(s) (100 mL/Hr) IV Continuous <Continuous>  lactulose Syrup 20 Gram(s) Oral three times a day  levothyroxine Injectable 55 MICROGram(s) IV Push at bedtime  lisinopril 10 milliGRAM(s) Oral daily  methylPREDNISolone sodium succinate Injectable 40 milliGRAM(s) IV Push every 8 hours  midazolam Infusion 0.02 mG/kG/Hr (1.86 mL/Hr) IV Continuous <Continuous>  multivitamin/minerals/iron Oral Solution (CENTRUM) 15 milliLiter(s) Oral daily  niCARdipine Infusion 5 mG/Hr (25 mL/Hr) IV Continuous <Continuous>  pantoprazole  Injectable 40 milliGRAM(s) IV Push daily  petrolatum Ophthalmic Ointment 1 Application(s) Both EYES daily  polyethylene glycol 3350 17 Gram(s) Oral two times a day  propofol Infusion 50.072 MICROgram(s)/kG/Min (28 mL/Hr) IV Continuous <Continuous>  senna Syrup 10 milliLiter(s) Oral daily    MEDICATIONS  (PRN):  dextrose Oral Gel 15 Gram(s) Oral once PRN Blood Glucose LESS THAN 70 milliGRAM(s)/deciliter      ALLERGIES:  Allergies    No Known Allergies    Intolerances        LABS:                        10.4   19.50 )-----------( 160      ( 03 Apr 2025 00:20 )             33.0     04-03    145  |  108[H]  |  22  ----------------------------<  96  4.8   |  24  |  0.70    Ca    8.4      03 Apr 2025 00:20  Phos  2.6     04-03  Mg     2.30     04-03    TPro  6.1  /  Alb  3.2[L]  /  TBili  0.2  /  DBili  x   /  AST  28  /  ALT  34[H]  /  AlkPhos  66  04-03    PT/INR - ( 03 Apr 2025 00:20 )   PT: 12.1 sec;   INR: 1.02 ratio         PTT - ( 03 Apr 2025 00:20 )  PTT:27.4 sec  Urinalysis Basic - ( 03 Apr 2025 00:20 )    Color: x / Appearance: x / SG: x / pH: x  Gluc: 96 mg/dL / Ketone: x  / Bili: x / Urobili: x   Blood: x / Protein: x / Nitrite: x   Leuk Esterase: x / RBC: x / WBC x   Sq Epi: x / Non Sq Epi: x / Bacteria: x      ABG:  pH, Arterial: 7.35 (04-03-25 @ 00:20)  pCO2, Arterial: 55 mmHg (04-03-25 @ 00:20)  pO2, Arterial: 82 mmHg (04-03-25 @ 00:20)  pH, Arterial: 7.34 (04-02-25 @ 11:07)  pCO2, Arterial: 56 mmHg (04-02-25 @ 11:07)  pO2, Arterial: 83 mmHg (04-02-25 @ 11:07)      vBG:    Micro:    Culture - Blood (collected 03-28-25 @ 16:39)  Source: Blood Blood-Peripheral  Final Report (04-02-25 @ 20:00):    No growth at 5 days    Culture - Blood (collected 03-28-25 @ 16:34)  Source: Blood Blood-Peripheral  Final Report (04-02-25 @ 20:00):    No growth at 5 days       Urinalysis with Rflx Culture (collected 03-28-25 @ 16:34)         RADIOLOGY & ADDITIONAL TESTS: Reviewed.

## 2025-04-03 NOTE — PROGRESS NOTE ADULT - ATTENDING COMMENTS
Patient is a 58 yo F w/ HTN, T2DM, hypothyroid, and asthma admitted to MICU for status asthmaticus    #Status Asthmaticus  #Acute hypercapnic and hypoxemic respiratory failure  #Hypernatremia  #Elevated Tropinemia  #Subcutaneous Emphysema  #Constipation  #Emesis  - c/w sedation for now. Off Nimbex since 3/31. Now off Fent gtt. c/w Versed gtt, hold further weaning as patient requiring PRN pushes for intermittent vent dyssynchrony. Will atempt to wean Ketamine off. Add Precedex gtt if needed  - c/w mechanical ventilatory support, Peaks and auto PEEP improved  - Monitor CXR  - c/w continuous Albuterol nebs, wean as tolerated  - c/w Iptratroprium nebs q6h  - c/w Solumedrol 40mg q8h for now. Monitor FSG  - c/w bowel regimen. WIll order additonal Relistor. Started on Lactulose too. Will order CT AP to rule out obstruction  - c/w PPI for now given critical ill, mechanically ventilated, high dose steroids, and currently not tolerating feeds. c/w maintenance IVF    Zachary Castillo MD  Pulmonary & Critical Care

## 2025-04-03 NOTE — PROGRESS NOTE ADULT - ASSESSMENT
59F with HTN, DM2, hypothyroid, and asthma here for status asthmaticus, unclear trigger; currently with gradual clinical improvement.     NEUROLOGIC   Not at baseline mental status. On sedation. Still requires sedation.   -Baseline AAOx3  -C/w propofol; wean as tolerated   -D/c fentanyl 4/1   -C/w ketamine; wean as tolerated   -C/w midazolam; wean as tolerated   -D/c cistracronium 3/31       CARDIOVASCULAR     # Vasoplegic Shock- RESOLVED   Hemodynamically stable. No pressor requirements.     #Elevated troponin- 2/2 demand ischemia from vasopressors- RESOLVED   Plated tropnoins 50s to 252 to 180s. No events on telemetry. Peaked and downtrending.   -Monitor   -If increasing again or high clinical suspicion, can consider PE r/o       #HTN   Normotensive.   -Hold home lisinopril 10 mg po qd       RESPIRATORY   #Status asthmaticus   Intubated.   -C/w mechanical ventilator  -Daily spontaneous breathing trials   -C/w methylprednisolone 60 mg IV q12h(start 3/28)   -C/w albuterol nebulizer continous to 20 mg/hr per RT    -C/w ipratropium 500 mg nebulizer 16   -C/w duonebs q6h  -Empiric abx for pna   - Holding off on CT angio for now.   -Pending BAL  -Bronchoscopy 3/30 showing secretions in L main and L lober with mucus aspirated out     # Subcutaneous Emphysema   Spreading sq emphysema predominantly on LUE.   - Present s/p reintubation 03/30 night  - CXR 3/30 showing subcutaneous emphysema without pneumomediastinum   - Will consider CT chest if worsens  - CXR qd to assess progression     GASTROINTESTINAL   -JS   -OGT 3/30 placed   -Hold TF   -C/w pantoprazole 40 mg IV qd for gastric mucosa ppx     #Gastric retention- 2/2 opioid induced constipation   -OGT to suction   -Hold TF   -Treat constipation with methylnaltrexone 12 mg sq x 1; repeat today 4/2 and mineral oil enema   -D/c metoclopramide 10 mg IV q6 for GI motility; qtc on EKG 4/1 457 ms   - Start erythromycin 250 mg IV qd for GI motility     GENITOURINARY/RENAL   #HAGMA 2/2 hypercarbia and lactic acidosis- RESOLVED   #Respiratory acidosis- ventilator changes   - On admission, VBG pH 6.98 and PCO2 125. Currently ABG 7.43.   - Treat with mechanical ventilation for exhalation   - Serial ABGs   - S/p Thiamine x 1 for lactic acidosis         ENDOCRINE   #DM   -NPO   -Low ISS, NPH 7u q6h, q6 fingersticks   -Hold home metformin 500 mg po qd   - Hba1c 5.7     #Hypothyroid   -C/w  levothyroxine 55 mcg IV qd       INFECTIOUS DISEASE   Not meeting criteria for SIRS. Unclear trigger for asthma.   -WBC on admission 11.21 and afebrile   -Blood cx  NGTD 3/28   -Sputum cx no PMNs or ogasnisms 3/30   -Urine showing 300 proteinuria and 20 hematuria; no evidence of infection   -Negative viral panel   - urine legionella negative   - urine streptococcus negative   -Empiric ceftriaxone 1000 mg IV qd x 5 days (started 3/29-4/3)   -Empiric azithromycin 500 mg IV qd x 3 days (started 3/28-3/30)     HEMATOLOGIC/ONCOLOGIC   #Leukocytosis- 2/2 steroid induced   Low suspicion for infection source. Unchanged.   -Monitor     #Thrombocytopenia- 2/2 bone marrow suppression from critical illness   No active bleeding. HIT score 4 with low probabillity of heparin induced. Stable.   -Monitor     #Anemia- 2/2 bone marrow suppression from critical illness  No active bleeding. Not meeting transfusion requirements. Stable.   -Monitor     PROPHYLACTIC   -DVT ppx: heparin 5000U sq q8     ETHICS   -HCP daughter   -Full code        59F with HTN, DM2, hypothyroid, and asthma here for status asthmaticus, unclear trigger; currently with gradual clinical improvement.     NEUROLOGIC   Not at baseline mental status. On sedation. Still requires sedation.   -Baseline AAOx3  -C/w propofol; wean as tolerated   -D/c fentanyl 4/1   -C/w ketamine; wean as tolerated   -C/w midazolam; wean as tolerated   -D/c cistracronium 3/31       CARDIOVASCULAR     # Vasoplegic Shock- RESOLVED    No pressor requirements.     #Elevated troponin- 2/2 demand ischemia from vasopressors- RESOLVED   Plateaued tropnoins 50s to 252 to 180s. No events on telemetry. Peaked and downtrending.   -Monitor   -If increasing again or high clinical suspicion, can consider PE r/o       #HTN- uncontrolled vs tactile stimulation induced vs tussive bouts   Persistently hypertensive.   -C/w home lisinopril 10 mg po qd   -Started nicardipine drip 5 mg/hr       RESPIRATORY   #Status asthmaticus   Intubated.   -C/w mechanical ventilator  -Daily spontaneous breathing trials   -C/w methylprednisolone 40 mg IV q12h(start 3/28)   -C/w albuterol nebulizer continous to 20 mg/hr per RT    -C/w ipratropium 500 mg nebulizer 16   -C/w duonebs q6h  -C/w empiric pna   - Holding off on CT angio for now.   -Pending BAL  -Bronchoscopy 3/30 showing secretions in L main and L lober with mucus aspirated out     # Subcutaneous Emphysema   Spreading sq emphysema predominantly on RUE and chest.   - Present s/p reintubation 03/30 night  - CXR 3/30 showing subcutaneous emphysema without pneumomediastinum   - Will consider CT chest if worsens  - CXR qd to assess progression     GASTROINTESTINAL   -JS   -OGT 3/30 placed   -Hold TF   -C/w pantoprazole 40 mg IV qd for gastric mucosa ppx     #Gastric retention- 2/2 opioid induced constipation   No BM.   -OGT to suction   -Hold TF   -Treat constipation with methylnaltrexone 12 mg sq x 1; repeat today 4/2 and mineral oil enema   -D/c metoclopramide 10 mg IV q6 for GI motility; qtc on EKG 4/1 457 ms   - C/w erythromycin 250 mg IV qd for GI motility   - S/p mineral castor oil enema   - C/w lactulose 20 mg syrup TID     GENITOURINARY/RENAL   #HAGMA 2/2 hypercarbia and lactic acidosis- RESOLVED   #Respiratory acidosis- ventilator changes   ABG pH 7.35.   - On admission, VBG pH 6.98 and PCO2 125. Currently ABG 7.43.   - Treat with mechanical ventilation for exhalation   - Serial ABGs   - S/p Thiamine x 1 for lactic acidosis         ENDOCRINE   #DM   Glycemic range within 80s-90s.   -NPO   -Low ISS, NPH 7u q6h, q6 fingersticks; hold insuline for TFs   -Hold home metformin 500 mg po qd   - Hba1c 5.7     #Hypothyroid   -C/w  levothyroxine 55 mcg IV qd       INFECTIOUS DISEASE   Not meeting criteria for SIRS. Unclear trigger for asthma.   -WBC on admission 11.21 and afebrile   -Blood cx  NGTD 3/28   -Sputum cx no PMNs or ogasnisms 3/30   -Urine showing 300 proteinuria and 20 hematuria; no evidence of infection   -Negative viral panel   - urine legionella negative   - urine streptococcus negative   -Empiric ceftriaxone 1000 mg IV qd x 5 days (started 3/29-4/3)   -Empiric azithromycin 500 mg IV qd x 3 days (started 3/28-3/30)     HEMATOLOGIC/ONCOLOGIC   #Leukocytosis- 2/2 steroid induced   Low suspicion for infection source. Downtrending.   -Monitor     PROPHYLACTIC   -DVT ppx: heparin 5000U sq q8     ETHICS   -HCP daughter (Jojo)   -Full code        59F with HTN, DM2, hypothyroid, and asthma here for status asthmaticus, unclear trigger; currently with gradual clinical improvement.     NEUROLOGIC   Not at baseline mental status. On sedation. Still requires sedation.   -Baseline AAOx3  -C/w propofol   -D/c fentanyl 4/1   -C/w ketamine; wean as tolerated   -C/w midazolam  -D/c cistracronium 3/31       CARDIOVASCULAR     # Vasoplegic Shock- RESOLVED    No pressor requirements.     #Elevated troponin- 2/2 demand ischemia from vasopressors- RESOLVED   Plateaued tropnoins 50s to 252 to 180s. No events on telemetry. Peaked and downtrending.   -Monitor   -If increasing again or high clinical suspicion, can consider PE r/o       #HTN- uncontrolled vs tactile stimulation induced vs tussive bouts   Persistently hypertensive.   -C/w home lisinopril 10 mg po qd   -Started nicardipine drip 5 mg/hr       RESPIRATORY   #Status asthmaticus   Intubated.   -C/w mechanical ventilator  -Daily spontaneous breathing trials   -C/w methylprednisolone 40 mg IV q12h(start 3/28)   -Downtitrate albuterol nebulizer continous to 10 mg/hr to 7.5 mg/hr per RT    -C/w ipratropium 500 mg nebulizer 16   -C/w duonebs q6h  -Completed empiric pna   - Holding off on CT angio for now.   -3/30 BAL without growth   -Bronchoscopy 3/30 showing secretions in L main and L lober with mucus aspirated out     # Subcutaneous Emphysema   Spreading sq emphysema predominantly on RUE and chest.   - Present s/p reintubation 03/30 night  - CXR 3/30 showing subcutaneous emphysema without pneumomediastinum   - Will consider CT chest if worsens  - CXR qd to assess progression     GASTROINTESTINAL   -JS   -OGT 3/30 placed   -Hold TF   -C/w pantoprazole 40 mg IV qd for gastric mucosa ppx     #Gastric retention- 2/2 opioid induced constipation   No BM.   -OGT off suction; check gastric residuals intermittently   -Hold TF   -Treat constipation with methylnaltrexone 12 mg sq x 1; repeat today 4/2 and mineral oil enema   -D/c metoclopramide 10 mg IV q6 for GI motility; qtc on EKG 4/1 457 ms   - C/w erythromycin 250 mg IV qd for GI motility   - S/p mineral castor oil enema   - C/w lactulose 20 mg syrup TID   -F/u abd XR 4/3     GENITOURINARY/RENAL   #HAGMA 2/2 hypercarbia and lactic acidosis- RESOLVED   #Respiratory acidosis- ventilator changes   ABG pH 7.35.   - On admission, VBG pH 6.98 and PCO2 125. Currently ABG 7.43.   - Treat with mechanical ventilation for exhalation   - Serial ABGs   - S/p Thiamine x 1 for lactic acidosis         ENDOCRINE   #DM   Glycemic range within 80s-90s.   -NPO   -Low ISS, q6 fingersticks  - D/c insulin NPH 7u q6h, q6 fingersticks  -Hold home metformin 500 mg po qd   - Hba1c 5.7     #Hypothyroid   -C/w  levothyroxine 55 mcg IV qd       INFECTIOUS DISEASE   Not meeting criteria for SIRS. Unclear trigger for asthma.   -WBC on admission 11.21 and afebrile   -Blood cx  NGTD 3/28   -Sputum cx no PMNs or ogasnisms 3/30   -Urine showing 300 proteinuria and 20 hematuria; no evidence of infection   -Negative viral panel   - urine legionella negative   - urine streptococcus negative   -Completed ceftriaxone 1000 mg IV qd x 5 days (started 3/29-4/3)   -Completed azithromycin 500 mg IV qd x 3 days (started 3/28-3/30)     HEMATOLOGIC/ONCOLOGIC   #Leukocytosis- 2/2 steroid induced   Low suspicion for infection source. Downtrending.   -Monitor     PROPHYLACTIC   -DVT ppx: heparin 5000U sq q8     ETHICS   -HCP daughter (Jojo)   -Full code

## 2025-04-04 LAB
ADD ON TEST-SPECIMEN IN LAB: SIGNIFICANT CHANGE UP
ALBUMIN SERPL ELPH-MCNC: 3 G/DL — LOW (ref 3.3–5)
ALP SERPL-CCNC: 72 U/L — SIGNIFICANT CHANGE UP (ref 40–120)
ALT FLD-CCNC: 49 U/L — HIGH (ref 4–33)
ANION GAP SERPL CALC-SCNC: 16 MMOL/L — HIGH (ref 7–14)
APTT BLD: 29.6 SEC — SIGNIFICANT CHANGE UP (ref 24.5–35.6)
AST SERPL-CCNC: 43 U/L — HIGH (ref 4–32)
B PERT DNA SPEC QL NAA+PROBE: SIGNIFICANT CHANGE UP
B PERT+PARAPERT DNA PNL SPEC NAA+PROBE: SIGNIFICANT CHANGE UP
BILIRUB SERPL-MCNC: 0.3 MG/DL — SIGNIFICANT CHANGE UP (ref 0.2–1.2)
BLOOD GAS ARTERIAL COMPREHENSIVE RESULT: SIGNIFICANT CHANGE UP
BUN SERPL-MCNC: 18 MG/DL — SIGNIFICANT CHANGE UP (ref 7–23)
C PNEUM DNA SPEC QL NAA+PROBE: SIGNIFICANT CHANGE UP
CALCIUM SERPL-MCNC: 8.1 MG/DL — LOW (ref 8.4–10.5)
CHLORIDE SERPL-SCNC: 99 MMOL/L — SIGNIFICANT CHANGE UP (ref 98–107)
CK MB BLD-MCNC: 0.9 % — SIGNIFICANT CHANGE UP (ref 0–2.5)
CK MB CFR SERPL CALC: 1.6 NG/ML — SIGNIFICANT CHANGE UP
CK SERPL-CCNC: 174 U/L — HIGH (ref 25–170)
CO2 SERPL-SCNC: 24 MMOL/L — SIGNIFICANT CHANGE UP (ref 22–31)
CREAT SERPL-MCNC: 0.59 MG/DL — SIGNIFICANT CHANGE UP (ref 0.5–1.3)
EGFR: 103 ML/MIN/1.73M2 — SIGNIFICANT CHANGE UP
EGFR: 103 ML/MIN/1.73M2 — SIGNIFICANT CHANGE UP
FLUAV AG NPH QL: SIGNIFICANT CHANGE UP
FLUAV SUBTYP SPEC NAA+PROBE: SIGNIFICANT CHANGE UP
FLUBV AG NPH QL: SIGNIFICANT CHANGE UP
FLUBV RNA SPEC QL NAA+PROBE: SIGNIFICANT CHANGE UP
GLUCOSE BLDC GLUCOMTR-MCNC: 74 MG/DL — SIGNIFICANT CHANGE UP (ref 70–99)
GLUCOSE BLDC GLUCOMTR-MCNC: 75 MG/DL — SIGNIFICANT CHANGE UP (ref 70–99)
GLUCOSE BLDC GLUCOMTR-MCNC: 84 MG/DL — SIGNIFICANT CHANGE UP (ref 70–99)
GLUCOSE BLDC GLUCOMTR-MCNC: 91 MG/DL — SIGNIFICANT CHANGE UP (ref 70–99)
GLUCOSE SERPL-MCNC: 79 MG/DL — SIGNIFICANT CHANGE UP (ref 70–99)
HADV DNA SPEC QL NAA+PROBE: SIGNIFICANT CHANGE UP
HCOV 229E RNA SPEC QL NAA+PROBE: SIGNIFICANT CHANGE UP
HCOV HKU1 RNA SPEC QL NAA+PROBE: SIGNIFICANT CHANGE UP
HCOV NL63 RNA SPEC QL NAA+PROBE: SIGNIFICANT CHANGE UP
HCOV OC43 RNA SPEC QL NAA+PROBE: SIGNIFICANT CHANGE UP
HCT VFR BLD CALC: 31.9 % — LOW (ref 34.5–45)
HGB BLD-MCNC: 10.4 G/DL — LOW (ref 11.5–15.5)
HMPV RNA SPEC QL NAA+PROBE: SIGNIFICANT CHANGE UP
HPIV1 RNA SPEC QL NAA+PROBE: SIGNIFICANT CHANGE UP
HPIV2 RNA SPEC QL NAA+PROBE: SIGNIFICANT CHANGE UP
HPIV3 RNA SPEC QL NAA+PROBE: SIGNIFICANT CHANGE UP
HPIV4 RNA SPEC QL NAA+PROBE: SIGNIFICANT CHANGE UP
INR BLD: 1.07 RATIO — SIGNIFICANT CHANGE UP (ref 0.85–1.16)
M PNEUMO DNA SPEC QL NAA+PROBE: SIGNIFICANT CHANGE UP
MAGNESIUM SERPL-MCNC: 2.1 MG/DL — SIGNIFICANT CHANGE UP (ref 1.6–2.6)
MCHC RBC-ENTMCNC: 30.6 PG — SIGNIFICANT CHANGE UP (ref 27–34)
MCHC RBC-ENTMCNC: 32.6 G/DL — SIGNIFICANT CHANGE UP (ref 32–36)
MCV RBC AUTO: 93.8 FL — SIGNIFICANT CHANGE UP (ref 80–100)
NRBC # BLD AUTO: 0.17 K/UL — HIGH (ref 0–0)
NRBC # FLD: 0.17 K/UL — HIGH (ref 0–0)
NRBC BLD AUTO-RTO: 1 /100 WBCS — HIGH (ref 0–0)
PHOSPHATE SERPL-MCNC: 2.5 MG/DL — SIGNIFICANT CHANGE UP (ref 2.5–4.5)
PLATELET # BLD AUTO: 148 K/UL — LOW (ref 150–400)
POTASSIUM SERPL-MCNC: 4.3 MMOL/L — SIGNIFICANT CHANGE UP (ref 3.5–5.3)
POTASSIUM SERPL-SCNC: 4.3 MMOL/L — SIGNIFICANT CHANGE UP (ref 3.5–5.3)
PROT SERPL-MCNC: 5.7 G/DL — LOW (ref 6–8.3)
PROTHROM AB SERPL-ACNC: 12.4 SEC — SIGNIFICANT CHANGE UP (ref 9.9–13.4)
RAPID RVP RESULT: SIGNIFICANT CHANGE UP
RBC # BLD: 3.4 M/UL — LOW (ref 3.8–5.2)
RBC # FLD: 15.9 % — HIGH (ref 10.3–14.5)
RSV RNA NPH QL NAA+NON-PROBE: SIGNIFICANT CHANGE UP
RSV RNA SPEC QL NAA+PROBE: SIGNIFICANT CHANGE UP
RV+EV RNA SPEC QL NAA+PROBE: SIGNIFICANT CHANGE UP
SARS-COV-2 RNA SPEC QL NAA+PROBE: SIGNIFICANT CHANGE UP
SARS-COV-2 RNA SPEC QL NAA+PROBE: SIGNIFICANT CHANGE UP
SODIUM SERPL-SCNC: 139 MMOL/L — SIGNIFICANT CHANGE UP (ref 135–145)
SOURCE RESPIRATORY: SIGNIFICANT CHANGE UP
WBC # BLD: 17.09 K/UL — HIGH (ref 3.8–10.5)
WBC # FLD AUTO: 17.09 K/UL — HIGH (ref 3.8–10.5)

## 2025-04-04 RX ORDER — ACETAMINOPHEN 500 MG/5ML
1000 LIQUID (ML) ORAL ONCE
Refills: 0 | Status: COMPLETED | OUTPATIENT
Start: 2025-04-04 | End: 2025-04-04

## 2025-04-04 RX ORDER — METHYLPREDNISOLONE ACETATE 80 MG/ML
40 INJECTION, SUSPENSION INTRA-ARTICULAR; INTRALESIONAL; INTRAMUSCULAR; SOFT TISSUE EVERY 8 HOURS
Refills: 0 | Status: DISCONTINUED | OUTPATIENT
Start: 2025-04-04 | End: 2025-04-04

## 2025-04-04 RX ORDER — ALBUTEROL SULFATE 2.5 MG/3ML
2.5 VIAL, NEBULIZER (ML) INHALATION
Refills: 0 | Status: DISCONTINUED | OUTPATIENT
Start: 2025-04-04 | End: 2025-04-04

## 2025-04-04 RX ORDER — LEVOTHYROXINE SODIUM 300 MCG
75 TABLET ORAL DAILY
Refills: 0 | Status: DISCONTINUED | OUTPATIENT
Start: 2025-04-04 | End: 2025-04-11

## 2025-04-04 RX ORDER — ALBUTEROL SULFATE 2.5 MG/3ML
1.25 VIAL, NEBULIZER (ML) INHALATION
Refills: 0 | Status: DISCONTINUED | OUTPATIENT
Start: 2025-04-04 | End: 2025-04-04

## 2025-04-04 RX ORDER — METHYLPREDNISOLONE ACETATE 80 MG/ML
40 INJECTION, SUSPENSION INTRA-ARTICULAR; INTRALESIONAL; INTRAMUSCULAR; SOFT TISSUE EVERY 12 HOURS
Refills: 0 | Status: DISCONTINUED | OUTPATIENT
Start: 2025-04-04 | End: 2025-04-09

## 2025-04-04 RX ORDER — ALBUTEROL SULFATE 2.5 MG/3ML
2.5 VIAL, NEBULIZER (ML) INHALATION
Refills: 0 | Status: DISCONTINUED | OUTPATIENT
Start: 2025-04-04 | End: 2025-04-05

## 2025-04-04 RX ADMIN — DEXMEDETOMIDINE HYDROCHLORIDE IN SODIUM CHLORIDE 4.66 MICROGRAM(S)/KG/HR: 4 INJECTION INTRAVENOUS at 19:12

## 2025-04-04 RX ADMIN — Medication 15 MILLILITER(S): at 11:19

## 2025-04-04 RX ADMIN — PROPOFOL 28 MICROGRAM(S)/KG/MIN: 10 INJECTION, EMULSION INTRAVENOUS at 14:24

## 2025-04-04 RX ADMIN — Medication 500 MICROGRAM(S): at 03:44

## 2025-04-04 RX ADMIN — PROPOFOL 28 MICROGRAM(S)/KG/MIN: 10 INJECTION, EMULSION INTRAVENOUS at 08:14

## 2025-04-04 RX ADMIN — Medication 75 MICROGRAM(S): at 17:27

## 2025-04-04 RX ADMIN — Medication 1000 MILLIGRAM(S): at 05:00

## 2025-04-04 RX ADMIN — Medication 400 MILLIGRAM(S): at 04:17

## 2025-04-04 RX ADMIN — Medication 1 APPLICATION(S): at 11:16

## 2025-04-04 RX ADMIN — Medication 15 MILLILITER(S): at 17:26

## 2025-04-04 RX ADMIN — Medication 2.5 MILLIGRAM(S): at 16:09

## 2025-04-04 RX ADMIN — METHYLPREDNISOLONE ACETATE 40 MILLIGRAM(S): 80 INJECTION, SUSPENSION INTRA-ARTICULAR; INTRALESIONAL; INTRAMUSCULAR; SOFT TISSUE at 05:13

## 2025-04-04 RX ADMIN — Medication 1.86 MG/KG/HR: at 19:12

## 2025-04-04 RX ADMIN — METHYLPREDNISOLONE ACETATE 40 MILLIGRAM(S): 80 INJECTION, SUSPENSION INTRA-ARTICULAR; INTRALESIONAL; INTRAMUSCULAR; SOFT TISSUE at 17:26

## 2025-04-04 RX ADMIN — POLYETHYLENE GLYCOL 3350 17 GRAM(S): 17 POWDER, FOR SOLUTION ORAL at 18:44

## 2025-04-04 RX ADMIN — Medication 2 MG/HR: at 04:40

## 2025-04-04 RX ADMIN — Medication 2.5 MILLIGRAM(S): at 21:57

## 2025-04-04 RX ADMIN — Medication 500 MICROGRAM(S): at 09:43

## 2025-04-04 RX ADMIN — DEXMEDETOMIDINE HYDROCHLORIDE IN SODIUM CHLORIDE 4.66 MICROGRAM(S)/KG/HR: 4 INJECTION INTRAVENOUS at 08:14

## 2025-04-04 RX ADMIN — BUDESONIDE 0.5 MILLIGRAM(S): 90 AEROSOL, POWDER RESPIRATORY (INHALATION) at 19:58

## 2025-04-04 RX ADMIN — DEXMEDETOMIDINE HYDROCHLORIDE IN SODIUM CHLORIDE 4.66 MICROGRAM(S)/KG/HR: 4 INJECTION INTRAVENOUS at 11:14

## 2025-04-04 RX ADMIN — Medication 2.5 MILLIGRAM(S): at 18:04

## 2025-04-04 RX ADMIN — Medication 2.5 MILLIGRAM(S): at 14:25

## 2025-04-04 RX ADMIN — Medication 500 MICROGRAM(S): at 20:00

## 2025-04-04 RX ADMIN — Medication 15 MILLILITER(S): at 05:13

## 2025-04-04 RX ADMIN — POLYETHYLENE GLYCOL 3350 17 GRAM(S): 17 POWDER, FOR SOLUTION ORAL at 05:13

## 2025-04-04 RX ADMIN — Medication 40 MILLIGRAM(S): at 11:46

## 2025-04-04 RX ADMIN — Medication 1 APPLICATION(S): at 05:13

## 2025-04-04 RX ADMIN — Medication 500 MICROGRAM(S): at 15:35

## 2025-04-04 RX ADMIN — Medication 2.5 MILLIGRAM(S): at 20:00

## 2025-04-04 RX ADMIN — LACTULOSE 20 GRAM(S): 10 SOLUTION ORAL at 05:13

## 2025-04-04 RX ADMIN — LISINOPRIL 10 MILLIGRAM(S): 5 TABLET ORAL at 05:13

## 2025-04-04 RX ADMIN — PROPOFOL 28 MICROGRAM(S)/KG/MIN: 10 INJECTION, EMULSION INTRAVENOUS at 19:12

## 2025-04-04 RX ADMIN — ENOXAPARIN SODIUM 40 MILLIGRAM(S): 100 INJECTION SUBCUTANEOUS at 14:23

## 2025-04-04 RX ADMIN — BUDESONIDE 0.5 MILLIGRAM(S): 90 AEROSOL, POWDER RESPIRATORY (INHALATION) at 09:43

## 2025-04-04 NOTE — PROGRESS NOTE ADULT - ATTENDING COMMENTS
Patient is a 60 yo F w/ HTN, T2DM, hypothyroid, and asthma admitted to MICU for status asthmaticus    #Status Asthmaticus  #Acute hypercapnic and hypoxemic respiratory failure  #Hypernatremia  #Elevated Tropinemia  #Subcutaneous Emphysema  #Constipation  #Emesis  - c/w sedation for now. Off Nimbex since 3/31. Now off Fent gtt and Ketamine gtt. c/w Precedex gtt and wean Versed gtt  - c/w mechanical ventilatory support, Peaks and auto PEEP improved  - Monitor CXR  - c/w continuous Albuterol nebs, wean as tolerated. Wean to q1 to 2h  - c/w Iptratroprium nebs q6h  - c/w Solumedrol 40mg q8h for now. Monitor FSG  - c/w bowel regimen. Large BM overnight  - Start trickle feeds  - TOV  - Infectious w/u for fever overnight  - c/w PPI for now given critical ill, mechanically ventilated, high dose steroids, and currently not tolerating feeds. c/w maintenance IVF    Zachary Castillo MD  Pulmonary & Critical Care

## 2025-04-04 NOTE — PROGRESS NOTE ADULT - ASSESSMENT
59F with HTN, DM2, hypothyroid, and asthma here for status asthmaticus, unclear trigger; currently with gradual clinical improvement.     NEUROLOGIC   Not at baseline mental status. On sedation. Still requires sedation.   -Baseline AAOx3  -C/w propofol   -D/c fentanyl 4/1   -D/c  ketamine; wean as tolerated   -C/w midazolam  -D/c cistracronium 3/31       CARDIOVASCULAR     # Vasoplegic Shock- RESOLVED    No pressor requirements.     #Elevated troponin- 2/2 demand ischemia from vasopressors- RESOLVED   Plateaued tropnoins 50s to 252 to 180s. No events on telemetry. Peaked and downtrending.   -Monitor   -If increasing again or high clinical suspicion, can consider PE r/o       #HTN- uncontrolled vs tactile stimulation induced vs tussive bouts   Persistently hypertensive.   -C/w home lisinopril 10 mg po qd   -Started nicardipine drip 5 mg/hr       RESPIRATORY   #Status asthmaticus   Intubated.   -C/w mechanical ventilator  -Daily spontaneous breathing trials   -C/w methylprednisolone 40 mg IV q12h(start 3/28)   -Downtitrate albuterol nebulizer continous to 10 mg/hr to 7.5 mg/hr per RT    -C/w ipratropium 500 mg nebulizer 16   -C/w duonebs q6h  -Completed empiric pna   - Holding off on CT angio for now.   -3/30 BAL without growth   -Bronchoscopy 3/30 showing secretions in L main and L lober with mucus aspirated out     # Subcutaneous Emphysema   Spreading sq emphysema predominantly on RUE and chest.   - Present s/p reintubation 03/30 night  - CXR 3/30 showing subcutaneous emphysema without pneumomediastinum   - Will consider CT chest if worsens  - CXR qd to assess progression     GASTROINTESTINAL   -JS   -OGT 3/30 placed   -Hold TF   -C/w pantoprazole 40 mg IV qd for gastric mucosa ppx     #Gastric retention- 2/2 opioid induced constipation   No BM.   -OGT off suction; check gastric residuals intermittently   -Hold TF   -Treat constipation with methylnaltrexone 12 mg sq x 1; repeat today 4/2 and mineral oil enema   -D/c metoclopramide 10 mg IV q6 for GI motility; qtc on EKG 4/1 457 ms   - C/w erythromycin 250 mg IV qd for GI motility   - S/p mineral castor oil enema   - C/w lactulose 20 mg syrup TID   -F/u abd XR 4/3     GENITOURINARY/RENAL   #HAGMA 2/2 hypercarbia and lactic acidosis- RESOLVED   #Respiratory acidosis- ventilator changes   ABG pH 7.35.   - On admission, VBG pH 6.98 and PCO2 125. Currently ABG 7.43.   - Treat with mechanical ventilation for exhalation   - Serial ABGs   - S/p Thiamine x 1 for lactic acidosis         ENDOCRINE   #DM   Glycemic range within 80s-90s.   -NPO   -Low ISS, q6 fingersticks  - D/c insulin NPH 7u q6h, q6 fingersticks  -Hold home metformin 500 mg po qd   - Hba1c 5.7     #Hypothyroid   -C/w  levothyroxine 55 mcg IV qd       INFECTIOUS DISEASE   Not meeting criteria for SIRS. Unclear trigger for asthma.   -WBC on admission 11.21 and afebrile   -Blood cx  NGTD 3/28   -Sputum cx no PMNs or ogasnisms 3/30   -Urine showing 300 proteinuria and 20 hematuria; no evidence of infection   -Negative viral panel   - urine legionella negative   - urine streptococcus negative   -Completed ceftriaxone 1000 mg IV qd x 5 days (started 3/29-4/3)   -Completed azithromycin 500 mg IV qd x 3 days (started 3/28-3/30)     HEMATOLOGIC/ONCOLOGIC   #Leukocytosis- 2/2 steroid induced   Low suspicion for infection source. Downtrending.   -Monitor     PROPHYLACTIC   -DVT ppx: heparin 5000U sq q8     ETHICS   -HCP daughter (Jojo)   -Full code  59F with HTN, DM2, hypothyroid, and asthma here for status asthmaticus, unclear trigger; currently with gradual clinical improvement.     NEUROLOGIC   Not at baseline mental status. On sedation. Still requires sedation.   -Baseline AAOx3  -C/w propofol   -D/c fentanyl 4/1   -D/c  ketamine; wean as tolerated   -C/w midazolam; weaning   -D/c cistracronium 3/31       CARDIOVASCULAR     # Vasoplegic Shock- RESOLVED    No pressor requirements.     #Elevated troponin- 2/2 demand ischemia from vasopressors- RESOLVED   Plateaued tropnoins 50s to 252 to 180s. No events on telemetry. Peaked and downtrending.   -Monitor   -If increasing again or high clinical suspicion, can consider PE r/o       #HTN- uncontrolled vs tactile stimulation induced vs tussive bouts   Persistently hypertensive.   -C/w home lisinopril 10 mg po qd   -Started nicardipine drip 5 mg/hr       RESPIRATORY   #Status asthmaticus   Intubated.   -C/w mechanical ventilator  -Daily spontaneous breathing trials   -Downtitrate to methylprednisolone 40 mg IV q8 to q12h (start 3/28)   -Downtitrate albuterol nebulizer continous 5 mg/hr to nebulizer q1   -C/w ipratropium 500 mg nebulizer 16   -Completed empiric pna   - Holding off on CT angio for now.   -3/30 BAL without growth   -Bronchoscopy 3/30 showing secretions in L main and L lober with mucus aspirated out     # Subcutaneous Emphysema   Decreasing sq emphysema predominantly on RUE and chest.   - Present s/p reintubation 03/30 night  - CXR 3/30 showing subcutaneous emphysema without pneumomediastinum   - Will consider CT chest if worsens  - CXR qd to assess progression     GASTROINTESTINAL   -JS   -OGT 3/30 placed   -Hold TF   -C/w pantoprazole 40 mg IV qd for gastric mucosa ppx     #Gastric retention- 2/2 opioid induced constipation   Had a BM.   -OGT off suction; check gastric residuals intermittently   -Hold TF   -Treat constipation with methylnaltrexone 12 mg sq x 1  -D/c metoclopramide 10 mg IV q6 for GI motility; qtc on EKG 4/1 457 ms   -D/c erythromycin 250 mg IV qd for GI motility   - S/p mineral castor oil enema   - D/c  lactulose 20 mg syrup TID   -abd XR 4/3 with some stool burden    GENITOURINARY/RENAL   #HAGMA 2/2 hypercarbia and lactic acidosis- RESOLVED   #Respiratory acidosis- ventilator changes   ABG pH 7.45  - On admission, VBG pH 6.98 and PCO2 125. Currently ABG 7.43.   - Treat with mechanical ventilation for exhalation   - Serial ABGs   - S/p Thiamine x 1 for lactic acidosis         ENDOCRINE   #DM   Glycemic range within 70s-90s.   -NPO   -Low ISS, q6 fingersticks  - D/c insulin NPH 7u q6h, q6 fingersticks  -Hold home metformin 500 mg po qd   - Hba1c 5.7     #Hypothyroid   -C/w  levothyroxine 55 mcg IV qd       INFECTIOUS DISEASE   Not meeting criteria for SIRS. Unclear trigger for asthma. Febrile 1 episode in the AM.   -WBC on admission 11.21 and afebrile   -Blood cx  NGTD 3/28   -Sputum cx no PMNs or ogasnisms 3/30   -Urine showing 300 proteinuria and 20 hematuria; no evidence of infection   -Negative viral panel  - urine legionella negative   - urine streptococcus negative   -Completed ceftriaxone 1000 mg IV qd x 5 days (started 3/29-4/3)   -Completed azithromycin 500 mg IV qd x 3 days (started 3/28-3/30)   -F/u blood cx 4/4 and full respiratory panel viral     HEMATOLOGIC/ONCOLOGIC   #Leukocytosis- 2/2 steroid induced   Low suspicion for infection source. Downtrending.   -Monitor     PROPHYLACTIC   -DVT ppx: enoxaparin 40 mg sq qd     ETHICS   -HCP daughter (Jojo)   -Full code  59F with HTN, DM2, hypothyroid, and asthma here for status asthmaticus, unclear trigger; currently with gradual clinical improvement.     NEUROLOGIC   Not at baseline mental status. On sedation. Still requires sedation.   -Baseline AAOx3  -C/w propofol   -D/c fentanyl 4/1   -D/c  ketamine; wean as tolerated   -C/w midazolam; weaning   -D/c cistracronium 3/31       CARDIOVASCULAR     # Vasoplegic Shock- RESOLVED    No pressor requirements.     #Elevated troponin- 2/2 demand ischemia from vasopressors- RESOLVED   Plateaued tropnoins 50s to 252 to 180s. No events on telemetry. Peaked and downtrending.   -Monitor   -If increasing again or high clinical suspicion, can consider PE r/o       #HTN- uncontrolled vs tactile stimulation induced vs tussive bouts   Persistently hypertensive.   -C/w home lisinopril 10 mg po qd   -Started nicardipine drip 5 mg/hr       RESPIRATORY   #Status asthmaticus   Intubated.   -C/w mechanical ventilator  -Daily spontaneous breathing trials   -Downtitrate to methylprednisolone 40 mg IV q8 to q12h (start 3/28)   -Downtitrate albuterol nebulizer continous 5 mg/hr to nebulizer q1   -C/w ipratropium 500 mg nebulizer 16   -Completed empiric pna   - Holding off on CT angio for now.   -3/30 BAL without growth   -Bronchoscopy 3/30 showing secretions in L main and L lober with mucus aspirated out     # Subcutaneous Emphysema   Decreasing sq emphysema predominantly on RUE and chest.   - Present s/p reintubation 03/30 night  - CXR 3/30 showing subcutaneous emphysema without pneumomediastinum   - Will consider CT chest if worsens  - CXR qd to assess progression     GASTROINTESTINAL   -JS   -OGT 3/30 placed   -Hold TF   -C/w pantoprazole 40 mg IV qd for gastric mucosa ppx     #Gastric retention- 2/2 opioid induced constipation   Had a BM.   -OGT off suction; check gastric residuals intermittently   -Hold TF   -Treat constipation with methylnaltrexone 12 mg sq x 1  -D/c metoclopramide 10 mg IV q6 for GI motility; qtc on EKG 4/1 457 ms   -D/c erythromycin 250 mg IV qd for GI motility   - S/p mineral castor oil enema   - D/c  lactulose 20 mg syrup TID   -abd XR 4/3 with some stool burden    GENITOURINARY/RENAL   #HAGMA 2/2 hypercarbia and lactic acidosis- RESOLVED   #Respiratory acidosis- ventilator changes   ABG pH 7.45  - On admission, VBG pH 6.98 and PCO2 125. Currently ABG 7.43.   - Treat with mechanical ventilation for exhalation   - Serial ABGs   - S/p Thiamine x 1 for lactic acidosis     #Tia   -TOV 4/4        ENDOCRINE   #DM   Glycemic range within 70s-90s.   -NPO   -Low ISS, q6 fingersticks  - D/c insulin NPH 7u q6h, q6 fingersticks  -Hold home metformin 500 mg po qd   - Hba1c 5.7     #Hypothyroid   -C/w  levothyroxine 55 mcg IV qd       INFECTIOUS DISEASE   Not meeting criteria for SIRS. Unclear trigger for asthma. Febrile 1 episode in the AM.   -WBC on admission 11.21 and afebrile   -Blood cx  NGTD 3/28   -Sputum cx no PMNs or ogasnisms 3/30   -Urine showing 300 proteinuria and 20 hematuria; no evidence of infection   -Negative viral panel  - urine legionella negative   - urine streptococcus negative   -Completed ceftriaxone 1000 mg IV qd x 5 days (started 3/29-4/3)   -Completed azithromycin 500 mg IV qd x 3 days (started 3/28-3/30)   -F/u blood cx 4/4 and full respiratory panel viral     HEMATOLOGIC/ONCOLOGIC   #Leukocytosis- 2/2 steroid induced   Low suspicion for infection source. Downtrending.   -Monitor     PROPHYLACTIC   -DVT ppx: enoxaparin 40 mg sq qd     ETHICS   -HCP daughter (Jojo)   -Full code

## 2025-04-04 NOTE — PROGRESS NOTE ADULT - SUBJECTIVE AND OBJECTIVE BOX
MICU PROGRESS NOTE     HISTORY OF PRESENT ILLNESS     JACKY. Had 1 BM yesterday. Off ketamine. Albuterol downtitrated from 7.5 to 5 mg. 1 episode 100.8 F at 4 AM. SBPs 110s-150s. HR 80s-100s. MV V A/C , PEEP 6, RR 20, FiO2 30. Not triggering.       ROS: All negative except as listed above.    VITAL SIGNS:  ICU Vital Signs Last 24 Hrs  T(C): 37.7 (04 Apr 2025 06:00), Max: 38.2 (04 Apr 2025 04:00)  T(F): 99.9 (04 Apr 2025 06:00), Max: 100.8 (04 Apr 2025 04:00)  HR: 81 (04 Apr 2025 07:28) (75 - 102)  BP: 170/89 (03 Apr 2025 08:21) (170/89 - 170/89)  BP(mean): 116 (03 Apr 2025 08:21) (116 - 116)  ABP: 132/64 (04 Apr 2025 07:00) (104/51 - 204/108)  ABP(mean): 91 (04 Apr 2025 07:00) (72 - 147)  RR: 20 (04 Apr 2025 07:00) (16 - 23)  SpO2: 97% (04 Apr 2025 07:28) (78% - 99%)    O2 Parameters below as of 04 Apr 2025 07:00  Patient On (Oxygen Delivery Method): ventilator    O2 Concentration (%): 30      Mode: AC/ CMV (Assist Control/ Continuous Mandatory Ventilation), RR (machine): 20, TV (machine): 400, FiO2: 30, PEEP: 6, ITime: 0.8, MAP: 12, PIP: 28  Plateau pressure:   P/F ratio:     04-03 @ 07:01  -  04-04 @ 07:00  --------------------------------------------------------  IN: 2365.3 mL / OUT: 2510 mL / NET: -144.7 mL      CAPILLARY BLOOD GLUCOSE      POCT Blood Glucose.: 75 mg/dL (04 Apr 2025 05:16)      ECG: reviewed.    PHYSICAL EXAM:      General: NAD, overweight habitus   Neuro: sedated, AAOx0, no grimace to noxious stimuli, no spontaneity   HEENT: PERRLA b/l, mildly moist mucous membranes  Chest: nonTTP, sq emphysema palpable   Heart: S1/S2, RRR    Lungs: mild expiratory wheeze,  MV   Abd: soft, nonTTP, nondistended   Ext: trace pitting edema pretibial, palpable sq emphysema in L shoulder and R UE down to forearm   Pulses: radial 2+ b/l   Lines/tubes/drains: L IJV triple lumen, L axillary A line, jonas   Psych: unable to assess     MEDICATIONS:  MEDICATIONS  (STANDING):  albuterol Continuous Nebulization (Vibrating Mesh Nebulizer) 5 mG/Hr (2 mL/Hr) Continuous Inhalation. <Continuous>  buDESOnide    Inhalation Suspension 0.5 milliGRAM(s) Inhalation two times a day  chlorhexidine 0.12% Liquid 15 milliLiter(s) Oral Mucosa every 12 hours  chlorhexidine 2% Cloths 1 Application(s) Topical <User Schedule>  dexMEDEtomidine Infusion 0.2 MICROgram(s)/kG/Hr (4.66 mL/Hr) IV Continuous <Continuous>  dextrose 5%. 1000 milliLiter(s) (50 mL/Hr) IV Continuous <Continuous>  dextrose 5%. 1000 milliLiter(s) (100 mL/Hr) IV Continuous <Continuous>  dextrose 50% Injectable 25 Gram(s) IV Push once  dextrose 50% Injectable 12.5 Gram(s) IV Push once  dextrose 50% Injectable 25 Gram(s) IV Push once  enoxaparin Injectable 40 milliGRAM(s) SubCutaneous every 24 hours  glucagon  Injectable 1 milliGRAM(s) IntraMuscular once  influenza   Vaccine 0.5 milliLiter(s) IntraMuscular once  insulin lispro (ADMELOG) corrective regimen sliding scale   SubCutaneous every 6 hours  ipratropium    for Nebulization 500 MICROGram(s) Nebulizer every 6 hours  ketamine Infusion. 0.25 mG/kG/Hr (2.33 mL/Hr) IV Continuous <Continuous>  lactulose Syrup 20 Gram(s) Oral three times a day  levothyroxine Injectable 55 MICROGram(s) IV Push at bedtime  lisinopril 10 milliGRAM(s) Oral daily  methylPREDNISolone sodium succinate Injectable 40 milliGRAM(s) IV Push every 8 hours  midazolam Infusion 0.02 mG/kG/Hr (1.86 mL/Hr) IV Continuous <Continuous>  multivitamin/minerals/iron Oral Solution (CENTRUM) 15 milliLiter(s) Oral daily  pantoprazole  Injectable 40 milliGRAM(s) IV Push daily  petrolatum Ophthalmic Ointment 1 Application(s) Both EYES daily  polyethylene glycol 3350 17 Gram(s) Oral two times a day  propofol Infusion 50.072 MICROgram(s)/kG/Min (28 mL/Hr) IV Continuous <Continuous>  senna Syrup 10 milliLiter(s) Oral daily    MEDICATIONS  (PRN):  dextrose Oral Gel 15 Gram(s) Oral once PRN Blood Glucose LESS THAN 70 milliGRAM(s)/deciliter      ALLERGIES:  Allergies    No Known Allergies    Intolerances        LABS:                        10.4   17.09 )-----------( 148      ( 04 Apr 2025 00:35 )             31.9     04-04    139  |  99  |  18  ----------------------------<  79  4.3   |  24  |  0.59    Ca    8.1[L]      04 Apr 2025 00:35  Phos  2.5     04-04  Mg     2.10     04-04    TPro  5.7[L]  /  Alb  3.0[L]  /  TBili  0.3  /  DBili  x   /  AST  43[H]  /  ALT  49[H]  /  AlkPhos  72  04-04    PT/INR - ( 04 Apr 2025 00:35 )   PT: 12.4 sec;   INR: 1.07 ratio         PTT - ( 04 Apr 2025 00:35 )  PTT:29.6 sec  Urinalysis Basic - ( 04 Apr 2025 00:35 )    Color: x / Appearance: x / SG: x / pH: x  Gluc: 79 mg/dL / Ketone: x  / Bili: x / Urobili: x   Blood: x / Protein: x / Nitrite: x   Leuk Esterase: x / RBC: x / WBC x   Sq Epi: x / Non Sq Epi: x / Bacteria: x      ABG:  pH, Arterial: 7.45 (04-04-25 @ 00:35)  pCO2, Arterial: 46 mmHg (04-04-25 @ 00:35)  pO2, Arterial: 82 mmHg (04-04-25 @ 00:35)      vBG:    Micro:    Culture - Blood (collected 03-28-25 @ 16:39)  Source: Blood Blood-Peripheral  Final Report (04-02-25 @ 20:00):    No growth at 5 days    Culture - Blood (collected 03-28-25 @ 16:34)  Source: Blood Blood-Peripheral  Final Report (04-02-25 @ 20:00):    No growth at 5 days       Urinalysis with Rflx Culture (collected 03-28-25 @ 16:34)         RADIOLOGY & ADDITIONAL TESTS: Reviewed. MICU PROGRESS NOTE     HISTORY OF PRESENT ILLNESS     JACKY. Had 1 BM yesterday. Off ketamine. Albuterol downtitrated from 7.5 to 5 mg. 1 episode 100.8 F at 4 AM. SBPs 110s-150s. HR 80s-100s. MV V A/C , PEEP 6, RR 20, FiO2 30. Not triggering.     SEen and examined at bedside, patient remains unable to engage meaningfully.     ROS: All negative except as listed above.    VITAL SIGNS:  ICU Vital Signs Last 24 Hrs  T(C): 37.7 (04 Apr 2025 06:00), Max: 38.2 (04 Apr 2025 04:00)  T(F): 99.9 (04 Apr 2025 06:00), Max: 100.8 (04 Apr 2025 04:00)  HR: 81 (04 Apr 2025 07:28) (75 - 102)  BP: 170/89 (03 Apr 2025 08:21) (170/89 - 170/89)  BP(mean): 116 (03 Apr 2025 08:21) (116 - 116)  ABP: 132/64 (04 Apr 2025 07:00) (104/51 - 204/108)  ABP(mean): 91 (04 Apr 2025 07:00) (72 - 147)  RR: 20 (04 Apr 2025 07:00) (16 - 23)  SpO2: 97% (04 Apr 2025 07:28) (78% - 99%)    O2 Parameters below as of 04 Apr 2025 07:00  Patient On (Oxygen Delivery Method): ventilator    O2 Concentration (%): 30      Mode: AC/ CMV (Assist Control/ Continuous Mandatory Ventilation), RR (machine): 20, TV (machine): 400, FiO2: 30, PEEP: 6, ITime: 0.8, MAP: 12, PIP: 28  Plateau pressure:   P/F ratio:     04-03 @ 07:01  -  04-04 @ 07:00  --------------------------------------------------------  IN: 2365.3 mL / OUT: 2510 mL / NET: -144.7 mL      CAPILLARY BLOOD GLUCOSE      POCT Blood Glucose.: 75 mg/dL (04 Apr 2025 05:16)      ECG: reviewed.    PHYSICAL EXAM:      General: NAD, overweight habitus   Neuro: sedated, AAOx0, no grimace to noxious stimuli, no spontaneity   HEENT: PERRLA b/l, mildly moist mucous membranes  Chest: nonTTP, sq emphysema palpable   Heart: S1/S2, RRR    Lungs: mild expiratory wheeze,  MV   Abd: soft, nonTTP, nondistended   Ext: trace pitting edema pretibial, palpable sq emphysema in L shoulder and R UE down to forearm   Pulses: radial 2+ b/l   Lines/tubes/drains: L IJV triple lumen, L axillary A line, jonas   Psych: unable to assess     MEDICATIONS:  MEDICATIONS  (STANDING):  albuterol Continuous Nebulization (Vibrating Mesh Nebulizer) 5 mG/Hr (2 mL/Hr) Continuous Inhalation. <Continuous>  buDESOnide    Inhalation Suspension 0.5 milliGRAM(s) Inhalation two times a day  chlorhexidine 0.12% Liquid 15 milliLiter(s) Oral Mucosa every 12 hours  chlorhexidine 2% Cloths 1 Application(s) Topical <User Schedule>  dexMEDEtomidine Infusion 0.2 MICROgram(s)/kG/Hr (4.66 mL/Hr) IV Continuous <Continuous>  dextrose 5%. 1000 milliLiter(s) (50 mL/Hr) IV Continuous <Continuous>  dextrose 5%. 1000 milliLiter(s) (100 mL/Hr) IV Continuous <Continuous>  dextrose 50% Injectable 25 Gram(s) IV Push once  dextrose 50% Injectable 12.5 Gram(s) IV Push once  dextrose 50% Injectable 25 Gram(s) IV Push once  enoxaparin Injectable 40 milliGRAM(s) SubCutaneous every 24 hours  glucagon  Injectable 1 milliGRAM(s) IntraMuscular once  influenza   Vaccine 0.5 milliLiter(s) IntraMuscular once  insulin lispro (ADMELOG) corrective regimen sliding scale   SubCutaneous every 6 hours  ipratropium    for Nebulization 500 MICROGram(s) Nebulizer every 6 hours  ketamine Infusion. 0.25 mG/kG/Hr (2.33 mL/Hr) IV Continuous <Continuous>  lactulose Syrup 20 Gram(s) Oral three times a day  levothyroxine Injectable 55 MICROGram(s) IV Push at bedtime  lisinopril 10 milliGRAM(s) Oral daily  methylPREDNISolone sodium succinate Injectable 40 milliGRAM(s) IV Push every 8 hours  midazolam Infusion 0.02 mG/kG/Hr (1.86 mL/Hr) IV Continuous <Continuous>  multivitamin/minerals/iron Oral Solution (CENTRUM) 15 milliLiter(s) Oral daily  pantoprazole  Injectable 40 milliGRAM(s) IV Push daily  petrolatum Ophthalmic Ointment 1 Application(s) Both EYES daily  polyethylene glycol 3350 17 Gram(s) Oral two times a day  propofol Infusion 50.072 MICROgram(s)/kG/Min (28 mL/Hr) IV Continuous <Continuous>  senna Syrup 10 milliLiter(s) Oral daily    MEDICATIONS  (PRN):  dextrose Oral Gel 15 Gram(s) Oral once PRN Blood Glucose LESS THAN 70 milliGRAM(s)/deciliter      ALLERGIES:  Allergies    No Known Allergies    Intolerances        LABS:                        10.4   17.09 )-----------( 148      ( 04 Apr 2025 00:35 )             31.9     04-04    139  |  99  |  18  ----------------------------<  79  4.3   |  24  |  0.59    Ca    8.1[L]      04 Apr 2025 00:35  Phos  2.5     04-04  Mg     2.10     04-04    TPro  5.7[L]  /  Alb  3.0[L]  /  TBili  0.3  /  DBili  x   /  AST  43[H]  /  ALT  49[H]  /  AlkPhos  72  04-04    PT/INR - ( 04 Apr 2025 00:35 )   PT: 12.4 sec;   INR: 1.07 ratio         PTT - ( 04 Apr 2025 00:35 )  PTT:29.6 sec  Urinalysis Basic - ( 04 Apr 2025 00:35 )    Color: x / Appearance: x / SG: x / pH: x  Gluc: 79 mg/dL / Ketone: x  / Bili: x / Urobili: x   Blood: x / Protein: x / Nitrite: x   Leuk Esterase: x / RBC: x / WBC x   Sq Epi: x / Non Sq Epi: x / Bacteria: x      ABG:  pH, Arterial: 7.45 (04-04-25 @ 00:35)  pCO2, Arterial: 46 mmHg (04-04-25 @ 00:35)  pO2, Arterial: 82 mmHg (04-04-25 @ 00:35)      vBG:    Micro:    Culture - Blood (collected 03-28-25 @ 16:39)  Source: Blood Blood-Peripheral  Final Report (04-02-25 @ 20:00):    No growth at 5 days    Culture - Blood (collected 03-28-25 @ 16:34)  Source: Blood Blood-Peripheral  Final Report (04-02-25 @ 20:00):    No growth at 5 days       Urinalysis with Rflx Culture (collected 03-28-25 @ 16:34)         RADIOLOGY & ADDITIONAL TESTS: Reviewed.

## 2025-04-05 LAB
ALBUMIN SERPL ELPH-MCNC: 2.9 G/DL — LOW (ref 3.3–5)
ALP SERPL-CCNC: 100 U/L — SIGNIFICANT CHANGE UP (ref 40–120)
ALT FLD-CCNC: 61 U/L — HIGH (ref 4–33)
ANION GAP SERPL CALC-SCNC: 15 MMOL/L — HIGH (ref 7–14)
APTT BLD: 27.6 SEC — SIGNIFICANT CHANGE UP (ref 24.5–35.6)
AST SERPL-CCNC: 51 U/L — HIGH (ref 4–32)
BILIRUB SERPL-MCNC: 0.3 MG/DL — SIGNIFICANT CHANGE UP (ref 0.2–1.2)
BLD GP AB SCN SERPL QL: NEGATIVE — SIGNIFICANT CHANGE UP
BLOOD GAS ARTERIAL COMPREHENSIVE RESULT: SIGNIFICANT CHANGE UP
BLOOD GAS ARTERIAL COMPREHENSIVE RESULT: SIGNIFICANT CHANGE UP
BUN SERPL-MCNC: 16 MG/DL — SIGNIFICANT CHANGE UP (ref 7–23)
CALCIUM SERPL-MCNC: 8.4 MG/DL — SIGNIFICANT CHANGE UP (ref 8.4–10.5)
CHLORIDE SERPL-SCNC: 101 MMOL/L — SIGNIFICANT CHANGE UP (ref 98–107)
CO2 SERPL-SCNC: 24 MMOL/L — SIGNIFICANT CHANGE UP (ref 22–31)
CREAT SERPL-MCNC: 0.54 MG/DL — SIGNIFICANT CHANGE UP (ref 0.5–1.3)
EGFR: 105 ML/MIN/1.73M2 — SIGNIFICANT CHANGE UP
EGFR: 105 ML/MIN/1.73M2 — SIGNIFICANT CHANGE UP
GLUCOSE BLDC GLUCOMTR-MCNC: 102 MG/DL — HIGH (ref 70–99)
GLUCOSE BLDC GLUCOMTR-MCNC: 95 MG/DL — SIGNIFICANT CHANGE UP (ref 70–99)
GLUCOSE BLDC GLUCOMTR-MCNC: 96 MG/DL — SIGNIFICANT CHANGE UP (ref 70–99)
GLUCOSE SERPL-MCNC: 112 MG/DL — HIGH (ref 70–99)
HCT VFR BLD CALC: 34.4 % — LOW (ref 34.5–45)
HGB BLD-MCNC: 11.2 G/DL — LOW (ref 11.5–15.5)
INR BLD: 1.02 RATIO — SIGNIFICANT CHANGE UP (ref 0.85–1.16)
MAGNESIUM SERPL-MCNC: 2.3 MG/DL — SIGNIFICANT CHANGE UP (ref 1.6–2.6)
MCHC RBC-ENTMCNC: 30.3 PG — SIGNIFICANT CHANGE UP (ref 27–34)
MCHC RBC-ENTMCNC: 32.6 G/DL — SIGNIFICANT CHANGE UP (ref 32–36)
MCV RBC AUTO: 93 FL — SIGNIFICANT CHANGE UP (ref 80–100)
NRBC # BLD AUTO: 0.05 K/UL — HIGH (ref 0–0)
NRBC # FLD: 0.05 K/UL — HIGH (ref 0–0)
NRBC BLD AUTO-RTO: 0 /100 WBCS — SIGNIFICANT CHANGE UP (ref 0–0)
PHOSPHATE SERPL-MCNC: 4.4 MG/DL — SIGNIFICANT CHANGE UP (ref 2.5–4.5)
PLATELET # BLD AUTO: 166 K/UL — SIGNIFICANT CHANGE UP (ref 150–400)
POTASSIUM SERPL-MCNC: 4.6 MMOL/L — SIGNIFICANT CHANGE UP (ref 3.5–5.3)
POTASSIUM SERPL-SCNC: 4.6 MMOL/L — SIGNIFICANT CHANGE UP (ref 3.5–5.3)
PROT SERPL-MCNC: 6.2 G/DL — SIGNIFICANT CHANGE UP (ref 6–8.3)
PROTHROM AB SERPL-ACNC: 12.1 SEC — SIGNIFICANT CHANGE UP (ref 9.9–13.4)
RBC # BLD: 3.7 M/UL — LOW (ref 3.8–5.2)
RBC # FLD: 15.6 % — HIGH (ref 10.3–14.5)
RH IG SCN BLD-IMP: POSITIVE — SIGNIFICANT CHANGE UP
SODIUM SERPL-SCNC: 140 MMOL/L — SIGNIFICANT CHANGE UP (ref 135–145)
WBC # BLD: 13.54 K/UL — HIGH (ref 3.8–10.5)
WBC # FLD AUTO: 13.54 K/UL — HIGH (ref 3.8–10.5)

## 2025-04-05 RX ORDER — LISINOPRIL 5 MG/1
20 TABLET ORAL DAILY
Refills: 0 | Status: DISCONTINUED | OUTPATIENT
Start: 2025-04-06 | End: 2025-04-06

## 2025-04-05 RX ORDER — ALBUTEROL SULFATE 2.5 MG/3ML
2.5 VIAL, NEBULIZER (ML) INHALATION EVERY 4 HOURS
Refills: 0 | Status: DISCONTINUED | OUTPATIENT
Start: 2025-04-05 | End: 2025-04-05

## 2025-04-05 RX ORDER — IPRATROPIUM BROMIDE AND ALBUTEROL SULFATE .5; 2.5 MG/3ML; MG/3ML
3 SOLUTION RESPIRATORY (INHALATION) EVERY 4 HOURS
Refills: 0 | Status: DISCONTINUED | OUTPATIENT
Start: 2025-04-05 | End: 2025-04-07

## 2025-04-05 RX ORDER — LISINOPRIL 5 MG/1
10 TABLET ORAL ONCE
Refills: 0 | Status: COMPLETED | OUTPATIENT
Start: 2025-04-05 | End: 2025-04-05

## 2025-04-05 RX ORDER — NICARDIPINE HCL 30 MG
2.5 CAPSULE ORAL
Qty: 40 | Refills: 0 | Status: DISCONTINUED | OUTPATIENT
Start: 2025-04-05 | End: 2025-04-07

## 2025-04-05 RX ADMIN — DEXMEDETOMIDINE HYDROCHLORIDE IN SODIUM CHLORIDE 4.66 MICROGRAM(S)/KG/HR: 4 INJECTION INTRAVENOUS at 19:14

## 2025-04-05 RX ADMIN — Medication 10 MILLILITER(S): at 11:42

## 2025-04-05 RX ADMIN — IPRATROPIUM BROMIDE AND ALBUTEROL SULFATE 3 MILLILITER(S): .5; 2.5 SOLUTION RESPIRATORY (INHALATION) at 07:22

## 2025-04-05 RX ADMIN — Medication 75 MICROGRAM(S): at 05:15

## 2025-04-05 RX ADMIN — Medication 15 MILLILITER(S): at 11:42

## 2025-04-05 RX ADMIN — IPRATROPIUM BROMIDE AND ALBUTEROL SULFATE 3 MILLILITER(S): .5; 2.5 SOLUTION RESPIRATORY (INHALATION) at 23:40

## 2025-04-05 RX ADMIN — LISINOPRIL 10 MILLIGRAM(S): 5 TABLET ORAL at 11:42

## 2025-04-05 RX ADMIN — Medication 500 MICROGRAM(S): at 03:27

## 2025-04-05 RX ADMIN — Medication 15 MILLILITER(S): at 17:29

## 2025-04-05 RX ADMIN — Medication 2.5 MILLIGRAM(S): at 00:10

## 2025-04-05 RX ADMIN — ENOXAPARIN SODIUM 40 MILLIGRAM(S): 100 INJECTION SUBCUTANEOUS at 14:24

## 2025-04-05 RX ADMIN — Medication 25 MILLIGRAM(S): at 21:38

## 2025-04-05 RX ADMIN — Medication 1 APPLICATION(S): at 05:16

## 2025-04-05 RX ADMIN — DEXMEDETOMIDINE HYDROCHLORIDE IN SODIUM CHLORIDE 4.66 MICROGRAM(S)/KG/HR: 4 INJECTION INTRAVENOUS at 07:30

## 2025-04-05 RX ADMIN — IPRATROPIUM BROMIDE AND ALBUTEROL SULFATE 3 MILLILITER(S): .5; 2.5 SOLUTION RESPIRATORY (INHALATION) at 11:00

## 2025-04-05 RX ADMIN — POLYETHYLENE GLYCOL 3350 17 GRAM(S): 17 POWDER, FOR SOLUTION ORAL at 05:16

## 2025-04-05 RX ADMIN — PROPOFOL 28 MICROGRAM(S)/KG/MIN: 10 INJECTION, EMULSION INTRAVENOUS at 22:00

## 2025-04-05 RX ADMIN — LISINOPRIL 10 MILLIGRAM(S): 5 TABLET ORAL at 05:15

## 2025-04-05 RX ADMIN — Medication 1 APPLICATION(S): at 11:42

## 2025-04-05 RX ADMIN — Medication 12.5 MG/HR: at 03:37

## 2025-04-05 RX ADMIN — PROPOFOL 28 MICROGRAM(S)/KG/MIN: 10 INJECTION, EMULSION INTRAVENOUS at 19:14

## 2025-04-05 RX ADMIN — IPRATROPIUM BROMIDE AND ALBUTEROL SULFATE 3 MILLILITER(S): .5; 2.5 SOLUTION RESPIRATORY (INHALATION) at 19:53

## 2025-04-05 RX ADMIN — Medication 2.5 MILLIGRAM(S): at 02:10

## 2025-04-05 RX ADMIN — BUDESONIDE 0.5 MILLIGRAM(S): 90 AEROSOL, POWDER RESPIRATORY (INHALATION) at 19:53

## 2025-04-05 RX ADMIN — Medication 25 MILLIGRAM(S): at 14:24

## 2025-04-05 RX ADMIN — METHYLPREDNISOLONE ACETATE 40 MILLIGRAM(S): 80 INJECTION, SUSPENSION INTRA-ARTICULAR; INTRALESIONAL; INTRAMUSCULAR; SOFT TISSUE at 17:29

## 2025-04-05 RX ADMIN — BUDESONIDE 0.5 MILLIGRAM(S): 90 AEROSOL, POWDER RESPIRATORY (INHALATION) at 08:47

## 2025-04-05 RX ADMIN — PROPOFOL 28 MICROGRAM(S)/KG/MIN: 10 INJECTION, EMULSION INTRAVENOUS at 07:30

## 2025-04-05 RX ADMIN — Medication 12.5 MG/HR: at 07:30

## 2025-04-05 RX ADMIN — Medication 40 MILLIGRAM(S): at 11:42

## 2025-04-05 RX ADMIN — Medication 2.5 MILLIGRAM(S): at 04:11

## 2025-04-05 RX ADMIN — POLYETHYLENE GLYCOL 3350 17 GRAM(S): 17 POWDER, FOR SOLUTION ORAL at 17:29

## 2025-04-05 RX ADMIN — METHYLPREDNISOLONE ACETATE 40 MILLIGRAM(S): 80 INJECTION, SUSPENSION INTRA-ARTICULAR; INTRALESIONAL; INTRAMUSCULAR; SOFT TISSUE at 05:16

## 2025-04-05 RX ADMIN — IPRATROPIUM BROMIDE AND ALBUTEROL SULFATE 3 MILLILITER(S): .5; 2.5 SOLUTION RESPIRATORY (INHALATION) at 15:01

## 2025-04-05 RX ADMIN — Medication 15 MILLILITER(S): at 05:16

## 2025-04-05 NOTE — PROGRESS NOTE ADULT - SUBJECTIVE AND OBJECTIVE BOX
MICU PROGRESS NOTE     HISTORY OF PRESENT ILLNESS         ROS: All negative except as listed above.    VITAL SIGNS:  ICU Vital Signs Last 24 Hrs  T(C): 37.1 (05 Apr 2025 04:00), Max: 37.8 (04 Apr 2025 16:00)  T(F): 98.8 (05 Apr 2025 04:00), Max: 100 (04 Apr 2025 16:00)  HR: 65 (05 Apr 2025 07:47) (55 - 82)  BP: --  BP(mean): --  ABP: 148/57 (05 Apr 2025 07:00) (111/56 - 179/87)  ABP(mean): 88 (05 Apr 2025 07:00) (77 - 122)  RR: 20 (05 Apr 2025 07:00) (19 - 24)  SpO2: 98% (05 Apr 2025 07:47) (94% - 100%)    O2 Parameters below as of 05 Apr 2025 07:00  Patient On (Oxygen Delivery Method): ventilator    O2 Concentration (%): 30      Mode: AC/ CMV (Assist Control/ Continuous Mandatory Ventilation), RR (machine): 18, TV (machine): 400, FiO2: 30, PEEP: 6, ITime: 1, MAP: 10, PIP: 28  Plateau pressure:   P/F ratio:     04-04 @ 07:01  -  04-05 @ 07:00  --------------------------------------------------------  IN: 1884.8 mL / OUT: 2725 mL / NET: -840.2 mL      CAPILLARY BLOOD GLUCOSE      POCT Blood Glucose.: 102 mg/dL (05 Apr 2025 05:14)      ECG: reviewed.    PHYSICAL EXAM:    General: NAD, overweight habitus   Neuro: sedated, AAOx0, no grimace to noxious stimuli, no spontaneity   HEENT: PERRLA b/l, mildly moist mucous membranes  Chest: nonTTP, sq emphysema palpable   Heart: S1/S2, RRR    Lungs: mild expiratory wheeze,  MV   Abd: soft, nonTTP, nondistended   Ext: trace pitting edema pretibial, palpable sq emphysema in L shoulder and R UE down to forearm   Pulses: radial 2+ b/l   Lines/tubes/drains: L IJV triple lumen, L axillary A line, jonas   Psych: unable to assess     MEDICATIONS  (STANDING):  albuterol/ipratropium for Nebulization 3 milliLiter(s) Nebulizer every 4 hours  buDESOnide    Inhalation Suspension 0.5 milliGRAM(s) Inhalation two times a day  chlorhexidine 0.12% Liquid 15 milliLiter(s) Oral Mucosa every 12 hours  chlorhexidine 2% Cloths 1 Application(s) Topical <User Schedule>  dexMEDEtomidine Infusion 0.2 MICROgram(s)/kG/Hr (4.66 mL/Hr) IV Continuous <Continuous>  dextrose 5%. 1000 milliLiter(s) (50 mL/Hr) IV Continuous <Continuous>  dextrose 5%. 1000 milliLiter(s) (100 mL/Hr) IV Continuous <Continuous>  dextrose 50% Injectable 25 Gram(s) IV Push once  dextrose 50% Injectable 12.5 Gram(s) IV Push once  dextrose 50% Injectable 25 Gram(s) IV Push once  enoxaparin Injectable 40 milliGRAM(s) SubCutaneous every 24 hours  glucagon  Injectable 1 milliGRAM(s) IntraMuscular once  influenza   Vaccine 0.5 milliLiter(s) IntraMuscular once  insulin lispro (ADMELOG) corrective regimen sliding scale   SubCutaneous every 6 hours  levothyroxine 75 MICROGram(s) Oral daily  lisinopril 10 milliGRAM(s) Oral daily  methylPREDNISolone sodium succinate Injectable 40 milliGRAM(s) IV Push every 12 hours  midazolam Infusion 0.02 mG/kG/Hr (1.86 mL/Hr) IV Continuous <Continuous>  multivitamin/minerals/iron Oral Solution (CENTRUM) 15 milliLiter(s) Oral daily  niCARdipine Infusion 2.5 mG/Hr (12.5 mL/Hr) IV Continuous <Continuous>  pantoprazole  Injectable 40 milliGRAM(s) IV Push daily  petrolatum Ophthalmic Ointment 1 Application(s) Both EYES daily  polyethylene glycol 3350 17 Gram(s) Oral two times a day  propofol Infusion 50.072 MICROgram(s)/kG/Min (28 mL/Hr) IV Continuous <Continuous>  senna Syrup 10 milliLiter(s) Oral daily    MEDICATIONS  (PRN):  dextrose Oral Gel 15 Gram(s) Oral once PRN Blood Glucose LESS THAN 70 milliGRAM(s)/deciliter      ALLERGIES:  Allergies    No Known Allergies    Intolerances        LABS:                        11.2   13.54 )-----------( 166      ( 05 Apr 2025 00:40 )             34.4     04-05    140  |  101  |  16  ----------------------------<  112[H]  4.6   |  24  |  0.54    Ca    8.4      05 Apr 2025 00:40  Phos  4.4     04-05  Mg     2.30     04-05    TPro  6.2  /  Alb  2.9[L]  /  TBili  0.3  /  DBili  x   /  AST  51[H]  /  ALT  61[H]  /  AlkPhos  100  04-05    PT/INR - ( 05 Apr 2025 00:40 )   PT: 12.1 sec;   INR: 1.02 ratio         PTT - ( 05 Apr 2025 00:40 )  PTT:27.6 sec  Urinalysis Basic - ( 05 Apr 2025 00:40 )    Color: x / Appearance: x / SG: x / pH: x  Gluc: 112 mg/dL / Ketone: x  / Bili: x / Urobili: x   Blood: x / Protein: x / Nitrite: x   Leuk Esterase: x / RBC: x / WBC x   Sq Epi: x / Non Sq Epi: x / Bacteria: x      ABG:  pH, Arterial: 7.50 (04-05-25 @ 00:40)  pCO2, Arterial: 44 mmHg (04-05-25 @ 00:40)  pO2, Arterial: 125 mmHg (04-05-25 @ 00:40)      vBG:    Micro:    Culture - Blood (collected 03-28-25 @ 16:39)  Source: Blood Blood-Peripheral  Final Report (04-02-25 @ 20:00):    No growth at 5 days    Culture - Blood (collected 03-28-25 @ 16:34)  Source: Blood Blood-Peripheral  Final Report (04-02-25 @ 20:00):    No growth at 5 days       Urinalysis with Rflx Culture (collected 03-28-25 @ 16:34)         RADIOLOGY & ADDITIONAL TESTS: Reviewed. MICU PROGRESS NOTE     HISTORY OF PRESENT ILLNESS     Overnight, weaned off midazolam and propofol. Albuterol neb q1 to q4. Restarted nicardipine drip due to hypotension.     Seen and examined at bedside, patient remains unable to engage meaningfully.     ROS: All negative except as listed above.    VITAL SIGNS:  ICU Vital Signs Last 24 Hrs  T(C): 37.1 (05 Apr 2025 04:00), Max: 37.8 (04 Apr 2025 16:00)  T(F): 98.8 (05 Apr 2025 04:00), Max: 100 (04 Apr 2025 16:00)  HR: 65 (05 Apr 2025 07:47) (55 - 82)  BP: --  BP(mean): --  ABP: 148/57 (05 Apr 2025 07:00) (111/56 - 179/87)  ABP(mean): 88 (05 Apr 2025 07:00) (77 - 122)  RR: 20 (05 Apr 2025 07:00) (19 - 24)  SpO2: 98% (05 Apr 2025 07:47) (94% - 100%)    O2 Parameters below as of 05 Apr 2025 07:00  Patient On (Oxygen Delivery Method): ventilator    O2 Concentration (%): 30      Mode: AC/ CMV (Assist Control/ Continuous Mandatory Ventilation), RR (machine): 18, TV (machine): 400, FiO2: 30, PEEP: 6, ITime: 1, MAP: 10, PIP: 28  Plateau pressure:   P/F ratio:     04-04 @ 07:01  -  04-05 @ 07:00  --------------------------------------------------------  IN: 1884.8 mL / OUT: 2725 mL / NET: -840.2 mL      CAPILLARY BLOOD GLUCOSE      POCT Blood Glucose.: 102 mg/dL (05 Apr 2025 05:14)      ECG: reviewed.    PHYSICAL EXAM:    General: NAD, overweight habitus   Neuro: sedated, AAOx0, no grimace to noxious stimuli, no spontaneity   HEENT: PERRLA b/l, mildly moist mucous membranes  Chest: nonTTP, sq emphysema palpable   Heart: S1/S2, RRR    Lungs: coare breath sounds,  MV   Abd: soft, nonTTP, nondistended   Ext: trace pitting edema pretibial, palpable sq emphysema in R forearm   Pulses: radial 2+ b/l   Lines/tubes/drains: L IJV triple lumen, L axillary A line, jonas   Psych: unable to assess     MEDICATIONS  (STANDING):  albuterol/ipratropium for Nebulization 3 milliLiter(s) Nebulizer every 4 hours  buDESOnide    Inhalation Suspension 0.5 milliGRAM(s) Inhalation two times a day  chlorhexidine 0.12% Liquid 15 milliLiter(s) Oral Mucosa every 12 hours  chlorhexidine 2% Cloths 1 Application(s) Topical <User Schedule>  dexMEDEtomidine Infusion 0.2 MICROgram(s)/kG/Hr (4.66 mL/Hr) IV Continuous <Continuous>  dextrose 5%. 1000 milliLiter(s) (50 mL/Hr) IV Continuous <Continuous>  dextrose 5%. 1000 milliLiter(s) (100 mL/Hr) IV Continuous <Continuous>  dextrose 50% Injectable 25 Gram(s) IV Push once  dextrose 50% Injectable 12.5 Gram(s) IV Push once  dextrose 50% Injectable 25 Gram(s) IV Push once  enoxaparin Injectable 40 milliGRAM(s) SubCutaneous every 24 hours  glucagon  Injectable 1 milliGRAM(s) IntraMuscular once  influenza   Vaccine 0.5 milliLiter(s) IntraMuscular once  insulin lispro (ADMELOG) corrective regimen sliding scale   SubCutaneous every 6 hours  levothyroxine 75 MICROGram(s) Oral daily  lisinopril 10 milliGRAM(s) Oral daily  methylPREDNISolone sodium succinate Injectable 40 milliGRAM(s) IV Push every 12 hours  midazolam Infusion 0.02 mG/kG/Hr (1.86 mL/Hr) IV Continuous <Continuous>  multivitamin/minerals/iron Oral Solution (CENTRUM) 15 milliLiter(s) Oral daily  niCARdipine Infusion 2.5 mG/Hr (12.5 mL/Hr) IV Continuous <Continuous>  pantoprazole  Injectable 40 milliGRAM(s) IV Push daily  petrolatum Ophthalmic Ointment 1 Application(s) Both EYES daily  polyethylene glycol 3350 17 Gram(s) Oral two times a day  propofol Infusion 50.072 MICROgram(s)/kG/Min (28 mL/Hr) IV Continuous <Continuous>  senna Syrup 10 milliLiter(s) Oral daily    MEDICATIONS  (PRN):  dextrose Oral Gel 15 Gram(s) Oral once PRN Blood Glucose LESS THAN 70 milliGRAM(s)/deciliter      ALLERGIES:  Allergies    No Known Allergies    Intolerances        LABS:                        11.2   13.54 )-----------( 166      ( 05 Apr 2025 00:40 )             34.4     04-05    140  |  101  |  16  ----------------------------<  112[H]  4.6   |  24  |  0.54    Ca    8.4      05 Apr 2025 00:40  Phos  4.4     04-05  Mg     2.30     04-05    TPro  6.2  /  Alb  2.9[L]  /  TBili  0.3  /  DBili  x   /  AST  51[H]  /  ALT  61[H]  /  AlkPhos  100  04-05    PT/INR - ( 05 Apr 2025 00:40 )   PT: 12.1 sec;   INR: 1.02 ratio         PTT - ( 05 Apr 2025 00:40 )  PTT:27.6 sec  Urinalysis Basic - ( 05 Apr 2025 00:40 )    Color: x / Appearance: x / SG: x / pH: x  Gluc: 112 mg/dL / Ketone: x  / Bili: x / Urobili: x   Blood: x / Protein: x / Nitrite: x   Leuk Esterase: x / RBC: x / WBC x   Sq Epi: x / Non Sq Epi: x / Bacteria: x      ABG:  pH, Arterial: 7.50 (04-05-25 @ 00:40)  pCO2, Arterial: 44 mmHg (04-05-25 @ 00:40)  pO2, Arterial: 125 mmHg (04-05-25 @ 00:40)      vBG:    Micro:    Culture - Blood (collected 03-28-25 @ 16:39)  Source: Blood Blood-Peripheral  Final Report (04-02-25 @ 20:00):    No growth at 5 days    Culture - Blood (collected 03-28-25 @ 16:34)  Source: Blood Blood-Peripheral  Final Report (04-02-25 @ 20:00):    No growth at 5 days       Urinalysis with Rflx Culture (collected 03-28-25 @ 16:34)         RADIOLOGY & ADDITIONAL TESTS: Reviewed.

## 2025-04-05 NOTE — PROGRESS NOTE ADULT - ATTENDING COMMENTS
Patient is a 60 yo F w/ HTN, T2DM, hypothyroid, and asthma admitted to MICU for status asthmaticus. Now off Nimbex, Fent gtt, versed, and Ketamine gtt. c/w propofol and Precedex gtt. c/w mechanical ventilatory support, subcut emphysema improving. Continue duonebs Q4, solumedrol 40 mg Q12. Started on cardene drip overnight, wean off, add prn hydralazine.

## 2025-04-05 NOTE — PROGRESS NOTE ADULT - ASSESSMENT
59F with HTN, DM2, hypothyroid, and asthma here for status asthmaticus, unclear trigger; currently with gradual clinical improvement.     NEUROLOGIC   Not at baseline mental status. On sedation. Still requires sedation.   -Baseline AAOx3  -C/w propofol   -D/c fentanyl 4/1   -D/c  ketamine; wean as tolerated   -C/w midazolam; weaning   -D/c cistracronium 3/31       CARDIOVASCULAR     # Vasoplegic Shock- RESOLVED    No pressor requirements.     #Elevated troponin- 2/2 demand ischemia from vasopressors- RESOLVED   Plateaued tropnoins 50s to 252 to 180s. No events on telemetry. Peaked and downtrending.   -Monitor   -If increasing again or high clinical suspicion, can consider PE r/o       #HTN- uncontrolled vs tactile stimulation induced vs tussive bouts   Persistently hypertensive.   -C/w home lisinopril 10 mg po qd   -Started nicardipine drip 5 mg/hr       RESPIRATORY   #Status asthmaticus   Intubated.   -C/w mechanical ventilator  -Daily spontaneous breathing trials   -Downtitrate to methylprednisolone 40 mg IV q8 to q12h (start 3/28)   -Downtitrate albuterol nebulizer continous 5 mg/hr to nebulizer q1   -C/w ipratropium 500 mg nebulizer 16   -Completed empiric pna   - Holding off on CT angio for now.   -3/30 BAL without growth   -Bronchoscopy 3/30 showing secretions in L main and L lober with mucus aspirated out     # Subcutaneous Emphysema   Decreasing sq emphysema predominantly on RUE and chest.   - Present s/p reintubation 03/30 night  - CXR 3/30 showing subcutaneous emphysema without pneumomediastinum   - Will consider CT chest if worsens  - CXR qd to assess progression     GASTROINTESTINAL   -JS   -OGT 3/30 placed   -Hold TF   -C/w pantoprazole 40 mg IV qd for gastric mucosa ppx     #Gastric retention- 2/2 opioid induced constipation   Had a BM.   -OGT off suction; check gastric residuals intermittently   -Hold TF   -Treat constipation with methylnaltrexone 12 mg sq x 1  -D/c metoclopramide 10 mg IV q6 for GI motility; qtc on EKG 4/1 457 ms   -D/c erythromycin 250 mg IV qd for GI motility   - S/p mineral castor oil enema   - D/c  lactulose 20 mg syrup TID   -abd XR 4/3 with some stool burden    GENITOURINARY/RENAL   #HAGMA 2/2 hypercarbia and lactic acidosis- RESOLVED   #Respiratory acidosis- ventilator changes   ABG pH 7.45  - On admission, VBG pH 6.98 and PCO2 125. Currently ABG 7.43.   - Treat with mechanical ventilation for exhalation   - Serial ABGs   - S/p Thiamine x 1 for lactic acidosis     #Tai   -TOV 4/4        ENDOCRINE   #DM   Glycemic range within 70s-90s.   -NPO   -Low ISS, q6 fingersticks  - D/c insulin NPH 7u q6h, q6 fingersticks  -Hold home metformin 500 mg po qd   - Hba1c 5.7     #Hypothyroid   -C/w  levothyroxine 55 mcg IV qd       INFECTIOUS DISEASE   Not meeting criteria for SIRS. Unclear trigger for asthma. Febrile 1 episode in the AM.   -WBC on admission 11.21 and afebrile   -Blood cx  NGTD 3/28   -Sputum cx no PMNs or ogasnisms 3/30   -Urine showing 300 proteinuria and 20 hematuria; no evidence of infection   -Negative viral panel  - urine legionella negative   - urine streptococcus negative   -Completed ceftriaxone 1000 mg IV qd x 5 days (started 3/29-4/3)   -Completed azithromycin 500 mg IV qd x 3 days (started 3/28-3/30)   -F/u blood cx 4/4 and full respiratory panel viral     HEMATOLOGIC/ONCOLOGIC   #Leukocytosis- 2/2 steroid induced   Low suspicion for infection source. Downtrending.   -Monitor     PROPHYLACTIC   -DVT ppx: enoxaparin 40 mg sq qd     ETHICS   -HCP daughter (Jojo)   -Full code    59F with HTN, DM2, hypothyroid, and asthma here for status asthmaticus, unclear trigger; currently with gradual clinical improvement.     NEUROLOGIC   Not at baseline mental status. On sedation. Still requires sedation.   -Baseline AAOx3  -C/w propofol;wean as tolerated   -C/w dexomedomitdine  -D/c fentanyl 4/1   -D/c  ketamine  -D/c midazolam  -D/c cistracronium 3/31       CARDIOVASCULAR     # Vasoplegic Shock- RESOLVED    No pressor requirements.     #Elevated troponin- 2/2 demand ischemia from vasopressors- RESOLVED   Plateaued tropnoins 50s to 252 to 180s. No events on telemetry. Peaked and downtrending.   -Monitor   -If increasing again or high clinical suspicion, can consider PE r/o       #HTN- uncontrolled vs tactile stimulation induced vs tussive bouts   Persistently hypertensive.   -Uptitrate home lisinopril 10 mg to 20 mg po qd   -Started hydralazine 25 mg po TID   -Wean nicardipine drip       RESPIRATORY   #Status asthmaticus   Intubated.   -C/w mechanical ventilator  -Daily spontaneous breathing trials   -C/w  methylprednisolone 40 mg IV 12h (start 3/28)   -C/w duonebs 3 mL q4   -Completed empiric pna   - Holding off on CT angio for now.   -3/30 BAL without growth   -Bronchoscopy 3/30 showing secretions in L main and L lober with mucus aspirated out     # Subcutaneous Emphysema   Decreasing sq emphysema on RUE.   - Present s/p reintubation 03/30 night  - CXR 3/30 showing subcutaneous emphysema without pneumomediastinum   - Will consider CT chest if worsens  - CXR qd to assess progression     GASTROINTESTINAL   -JS   -OGT 3/30 placed   -Restart trickle feeds   -C/w pantoprazole 40 mg IV qd for gastric mucosa ppx     #Gastric retention- 2/2 opioid induced constipation   Had a BM.   -OGT off suction; check gastric residuals intermittently   -Hold TF   -Treat constipation with methylnaltrexone 12 mg sq x 1  -D/c metoclopramide 10 mg IV q6 for GI motility; qtc on EKG 4/1 457 ms   -D/c erythromycin 250 mg IV qd for GI motility   - S/p mineral castor oil enema   - D/c  lactulose 20 mg syrup TID   -abd XR 4/3 with some stool burden    GENITOURINARY/RENAL   #HAGMA 2/2 hypercarbia and lactic acidosis- RESOLVED   #Respiratory acidosis- ventilator changes   ABG pH 7.50.   - On admission, VBG pH 6.98 and PCO2 125. Currently ABG 7.43.   - Treat with mechanical ventilation for exhalation   - Serial ABGs   - S/p Thiamine x 1 for lactic acidosis     #Tai   -TOV 4/4        ENDOCRINE   #DM   Glycemic range within 70s-90s.   -NPO   -Low ISS, q6 fingersticks  - D/c insulin NPH 7u q6h, q6 fingersticks  -Hold home metformin 500 mg po qd   - Hba1c 5.7     #Hypothyroid   -C/w  levothyroxine 55 mcg IV qd       INFECTIOUS DISEASE   Not meeting criteria for SIRS. Unclear trigger for asthma. Febrile 1 episode in the AM.   -WBC on admission 11.21 and afebrile   -Blood cx  NGTD 3/28   -Sputum cx no PMNs or ogasnisms 3/30   -Urine showing 300 proteinuria and 20 hematuria; no evidence of infection   -Negative viral panel  - urine legionella negative   - urine streptococcus negative   -Completed ceftriaxone 1000 mg IV qd x 5 days (started 3/29-4/3)   -Completed azithromycin 500 mg IV qd x 3 days (started 3/28-3/30)   -4/4 and full respiratory panel viral negative; blood cx NGTD     HEMATOLOGIC/ONCOLOGIC   #Leukocytosis- 2/2 steroid induced   Low suspicion for infection source. Downtrending.   -Monitor     PROPHYLACTIC   -DVT ppx: enoxaparin 40 mg sq qd     ETHICS   -HCP daughter (Jojo)   -Full code

## 2025-04-06 LAB
ALBUMIN SERPL ELPH-MCNC: 2.9 G/DL — LOW (ref 3.3–5)
ALP SERPL-CCNC: 110 U/L — SIGNIFICANT CHANGE UP (ref 40–120)
ALT FLD-CCNC: 64 U/L — HIGH (ref 4–33)
ANION GAP SERPL CALC-SCNC: 16 MMOL/L — HIGH (ref 7–14)
APTT BLD: 29.6 SEC — SIGNIFICANT CHANGE UP (ref 24.5–35.6)
AST SERPL-CCNC: 52 U/L — HIGH (ref 4–32)
BILIRUB SERPL-MCNC: 0.3 MG/DL — SIGNIFICANT CHANGE UP (ref 0.2–1.2)
BLOOD GAS ARTERIAL COMPREHENSIVE RESULT: SIGNIFICANT CHANGE UP
BUN SERPL-MCNC: 19 MG/DL — SIGNIFICANT CHANGE UP (ref 7–23)
CALCIUM SERPL-MCNC: 8.8 MG/DL — SIGNIFICANT CHANGE UP (ref 8.4–10.5)
CHLORIDE SERPL-SCNC: 102 MMOL/L — SIGNIFICANT CHANGE UP (ref 98–107)
CK MB BLD-MCNC: 1.2 % — SIGNIFICANT CHANGE UP (ref 0–2.5)
CK MB CFR SERPL CALC: 1.9 NG/ML — SIGNIFICANT CHANGE UP
CK SERPL-CCNC: 165 U/L — SIGNIFICANT CHANGE UP (ref 25–170)
CO2 SERPL-SCNC: 21 MMOL/L — LOW (ref 22–31)
CREAT SERPL-MCNC: 0.53 MG/DL — SIGNIFICANT CHANGE UP (ref 0.5–1.3)
EGFR: 106 ML/MIN/1.73M2 — SIGNIFICANT CHANGE UP
EGFR: 106 ML/MIN/1.73M2 — SIGNIFICANT CHANGE UP
GLUCOSE BLDC GLUCOMTR-MCNC: 102 MG/DL — HIGH (ref 70–99)
GLUCOSE BLDC GLUCOMTR-MCNC: 107 MG/DL — HIGH (ref 70–99)
GLUCOSE BLDC GLUCOMTR-MCNC: 117 MG/DL — HIGH (ref 70–99)
GLUCOSE BLDC GLUCOMTR-MCNC: 130 MG/DL — HIGH (ref 70–99)
GLUCOSE SERPL-MCNC: 120 MG/DL — HIGH (ref 70–99)
HCT VFR BLD CALC: 36.3 % — SIGNIFICANT CHANGE UP (ref 34.5–45)
HGB BLD-MCNC: 11.9 G/DL — SIGNIFICANT CHANGE UP (ref 11.5–15.5)
INR BLD: 1.03 RATIO — SIGNIFICANT CHANGE UP (ref 0.85–1.16)
MAGNESIUM SERPL-MCNC: 2.2 MG/DL — SIGNIFICANT CHANGE UP (ref 1.6–2.6)
MCHC RBC-ENTMCNC: 30.7 PG — SIGNIFICANT CHANGE UP (ref 27–34)
MCHC RBC-ENTMCNC: 32.8 G/DL — SIGNIFICANT CHANGE UP (ref 32–36)
MCV RBC AUTO: 93.8 FL — SIGNIFICANT CHANGE UP (ref 80–100)
NRBC # BLD AUTO: 0 K/UL — SIGNIFICANT CHANGE UP (ref 0–0)
NRBC # FLD: 0 K/UL — SIGNIFICANT CHANGE UP (ref 0–0)
NRBC BLD AUTO-RTO: 0 /100 WBCS — SIGNIFICANT CHANGE UP (ref 0–0)
PHOSPHATE SERPL-MCNC: 3.7 MG/DL — SIGNIFICANT CHANGE UP (ref 2.5–4.5)
PLATELET # BLD AUTO: 161 K/UL — SIGNIFICANT CHANGE UP (ref 150–400)
POTASSIUM SERPL-MCNC: 4.5 MMOL/L — SIGNIFICANT CHANGE UP (ref 3.5–5.3)
POTASSIUM SERPL-SCNC: 4.5 MMOL/L — SIGNIFICANT CHANGE UP (ref 3.5–5.3)
PROT SERPL-MCNC: 6.3 G/DL — SIGNIFICANT CHANGE UP (ref 6–8.3)
PROTHROM AB SERPL-ACNC: 11.9 SEC — SIGNIFICANT CHANGE UP (ref 9.9–13.4)
RBC # BLD: 3.87 M/UL — SIGNIFICANT CHANGE UP (ref 3.8–5.2)
RBC # FLD: 15.5 % — HIGH (ref 10.3–14.5)
SODIUM SERPL-SCNC: 139 MMOL/L — SIGNIFICANT CHANGE UP (ref 135–145)
WBC # BLD: 12.74 K/UL — HIGH (ref 3.8–10.5)
WBC # FLD AUTO: 12.74 K/UL — HIGH (ref 3.8–10.5)

## 2025-04-06 RX ORDER — MIDAZOLAM IN 0.9 % SOD.CHLORID 1 MG/ML
2 PLASTIC BAG, INJECTION (ML) INTRAVENOUS ONCE
Refills: 0 | Status: DISCONTINUED | OUTPATIENT
Start: 2025-04-06 | End: 2025-04-07

## 2025-04-06 RX ORDER — SODIUM CHLORIDE 9 G/1000ML
1000 INJECTION, SOLUTION INTRAVENOUS ONCE
Refills: 0 | Status: COMPLETED | OUTPATIENT
Start: 2025-04-06 | End: 2025-04-06

## 2025-04-06 RX ORDER — NOREPINEPHRINE BITARTRATE 8 MG
0.05 SOLUTION INTRAVENOUS
Qty: 8 | Refills: 0 | Status: DISCONTINUED | OUTPATIENT
Start: 2025-04-06 | End: 2025-04-06

## 2025-04-06 RX ORDER — NOREPINEPHRINE BITARTRATE 8 MG
0.05 SOLUTION INTRAVENOUS
Qty: 16 | Refills: 0 | Status: DISCONTINUED | OUTPATIENT
Start: 2025-04-06 | End: 2025-04-08

## 2025-04-06 RX ADMIN — Medication 1 APPLICATION(S): at 05:05

## 2025-04-06 RX ADMIN — ENOXAPARIN SODIUM 40 MILLIGRAM(S): 100 INJECTION SUBCUTANEOUS at 13:20

## 2025-04-06 RX ADMIN — PROPOFOL 28 MICROGRAM(S)/KG/MIN: 10 INJECTION, EMULSION INTRAVENOUS at 20:17

## 2025-04-06 RX ADMIN — Medication 15 MILLILITER(S): at 17:28

## 2025-04-06 RX ADMIN — IPRATROPIUM BROMIDE AND ALBUTEROL SULFATE 3 MILLILITER(S): .5; 2.5 SOLUTION RESPIRATORY (INHALATION) at 07:25

## 2025-04-06 RX ADMIN — METHYLPREDNISOLONE ACETATE 40 MILLIGRAM(S): 80 INJECTION, SUSPENSION INTRA-ARTICULAR; INTRALESIONAL; INTRAMUSCULAR; SOFT TISSUE at 05:05

## 2025-04-06 RX ADMIN — Medication 15 MILLILITER(S): at 05:05

## 2025-04-06 RX ADMIN — IPRATROPIUM BROMIDE AND ALBUTEROL SULFATE 3 MILLILITER(S): .5; 2.5 SOLUTION RESPIRATORY (INHALATION) at 19:57

## 2025-04-06 RX ADMIN — PROPOFOL 28 MICROGRAM(S)/KG/MIN: 10 INJECTION, EMULSION INTRAVENOUS at 02:09

## 2025-04-06 RX ADMIN — DEXMEDETOMIDINE HYDROCHLORIDE IN SODIUM CHLORIDE 4.66 MICROGRAM(S)/KG/HR: 4 INJECTION INTRAVENOUS at 20:17

## 2025-04-06 RX ADMIN — METHYLPREDNISOLONE ACETATE 40 MILLIGRAM(S): 80 INJECTION, SUSPENSION INTRA-ARTICULAR; INTRALESIONAL; INTRAMUSCULAR; SOFT TISSUE at 17:28

## 2025-04-06 RX ADMIN — Medication 25 MILLIGRAM(S): at 05:04

## 2025-04-06 RX ADMIN — Medication 40 MILLIGRAM(S): at 11:20

## 2025-04-06 RX ADMIN — BUDESONIDE 0.5 MILLIGRAM(S): 90 AEROSOL, POWDER RESPIRATORY (INHALATION) at 07:57

## 2025-04-06 RX ADMIN — LISINOPRIL 20 MILLIGRAM(S): 5 TABLET ORAL at 05:04

## 2025-04-06 RX ADMIN — POLYETHYLENE GLYCOL 3350 17 GRAM(S): 17 POWDER, FOR SOLUTION ORAL at 17:28

## 2025-04-06 RX ADMIN — BUDESONIDE 0.5 MILLIGRAM(S): 90 AEROSOL, POWDER RESPIRATORY (INHALATION) at 19:56

## 2025-04-06 RX ADMIN — IPRATROPIUM BROMIDE AND ALBUTEROL SULFATE 3 MILLILITER(S): .5; 2.5 SOLUTION RESPIRATORY (INHALATION) at 02:48

## 2025-04-06 RX ADMIN — IPRATROPIUM BROMIDE AND ALBUTEROL SULFATE 3 MILLILITER(S): .5; 2.5 SOLUTION RESPIRATORY (INHALATION) at 23:42

## 2025-04-06 RX ADMIN — SODIUM CHLORIDE 1000 MILLILITER(S): 9 INJECTION, SOLUTION INTRAVENOUS at 17:28

## 2025-04-06 RX ADMIN — IPRATROPIUM BROMIDE AND ALBUTEROL SULFATE 3 MILLILITER(S): .5; 2.5 SOLUTION RESPIRATORY (INHALATION) at 11:13

## 2025-04-06 RX ADMIN — IPRATROPIUM BROMIDE AND ALBUTEROL SULFATE 3 MILLILITER(S): .5; 2.5 SOLUTION RESPIRATORY (INHALATION) at 15:15

## 2025-04-06 RX ADMIN — Medication 10 MILLILITER(S): at 11:20

## 2025-04-06 RX ADMIN — POLYETHYLENE GLYCOL 3350 17 GRAM(S): 17 POWDER, FOR SOLUTION ORAL at 05:06

## 2025-04-06 RX ADMIN — Medication 1 APPLICATION(S): at 11:20

## 2025-04-06 RX ADMIN — Medication 75 MICROGRAM(S): at 05:04

## 2025-04-06 RX ADMIN — Medication 15 MILLILITER(S): at 11:20

## 2025-04-06 NOTE — PROGRESS NOTE ADULT - ATTENDING COMMENTS
Patient is a 60 yo F w/ HTN, T2DM, hypothyroid, and asthma admitted to MICU for status asthmaticus. Now off Nimbex, Fent gtt, versed, and Ketamine gtt. c/w propofol and Precedex gtt. c/w mechanical ventilatory support, subcut emphysema improving. Continue duonebs Q4, solumedrol 40 mg Q12. Weaned off cardene drip, wean propofol.

## 2025-04-06 NOTE — PROGRESS NOTE ADULT - ASSESSMENT
59F with HTN, DM2, hypothyroid, and asthma here for status asthmaticus, unclear trigger; currently with gradual clinical improvement.     NEUROLOGIC   Not at baseline mental status. On sedation. Still requires sedation.   -Baseline AAOx3  -C/w propofol;wean as tolerated   -C/w dexomedomitdine  -D/c fentanyl 4/1   -D/c  ketamine  -D/c midazolam  -D/c cistracronium 3/31       CARDIOVASCULAR     # Vasoplegic Shock- RESOLVED    No pressor requirements.     #Elevated troponin- 2/2 demand ischemia from vasopressors- RESOLVED   Plateaued tropnoins 50s to 252 to 180s. No events on telemetry. Peaked and downtrending.   -Monitor   -If increasing again or high clinical suspicion, can consider PE r/o       #HTN- uncontrolled vs tactile stimulation induced vs tussive bouts   Persistently hypertensive.   -Uptitrate home lisinopril 10 mg to 20 mg po qd   -Started hydralazine 25 mg po TID   -Wean nicardipine drip       RESPIRATORY   #Status asthmaticus   Intubated.   -C/w mechanical ventilator  -Daily spontaneous breathing trials   -C/w  methylprednisolone 40 mg IV 12h (start 3/28)   -C/w duonebs 3 mL q4   -Completed empiric pna   - Holding off on CT angio for now.   -3/30 BAL without growth   -Bronchoscopy 3/30 showing secretions in L main and L lober with mucus aspirated out     # Subcutaneous Emphysema   Decreasing sq emphysema on RUE.   - Present s/p reintubation 03/30 night  - CXR 3/30 showing subcutaneous emphysema without pneumomediastinum   - Will consider CT chest if worsens  - CXR qd to assess progression     GASTROINTESTINAL   -JS   -OGT 3/30 placed   -Restart trickle feeds   -C/w pantoprazole 40 mg IV qd for gastric mucosa ppx     #Gastric retention- 2/2 opioid induced constipation   Had a BM.   -OGT off suction; check gastric residuals intermittently   -Hold TF   -Treat constipation with methylnaltrexone 12 mg sq x 1  -D/c metoclopramide 10 mg IV q6 for GI motility; qtc on EKG 4/1 457 ms   -D/c erythromycin 250 mg IV qd for GI motility   - S/p mineral castor oil enema   - D/c  lactulose 20 mg syrup TID   -abd XR 4/3 with some stool burden    GENITOURINARY/RENAL   #HAGMA 2/2 hypercarbia and lactic acidosis- RESOLVED   #Respiratory acidosis- ventilator changes   ABG pH 7.50.   - On admission, VBG pH 6.98 and PCO2 125. Currently ABG 7.43.   - Treat with mechanical ventilation for exhalation   - Serial ABGs   - S/p Thiamine x 1 for lactic acidosis     #Tai   -TOV 4/4        ENDOCRINE   #DM   Glycemic range within 70s-90s.   -NPO   -Low ISS, q6 fingersticks  - D/c insulin NPH 7u q6h, q6 fingersticks  -Hold home metformin 500 mg po qd   - Hba1c 5.7     #Hypothyroid   -C/w  levothyroxine 55 mcg IV qd       INFECTIOUS DISEASE   Not meeting criteria for SIRS. Unclear trigger for asthma. Febrile 1 episode in the AM.   -WBC on admission 11.21 and afebrile   -Blood cx  NGTD 3/28   -Sputum cx no PMNs or ogasnisms 3/30   -Urine showing 300 proteinuria and 20 hematuria; no evidence of infection   -Negative viral panel  - urine legionella negative   - urine streptococcus negative   -Completed ceftriaxone 1000 mg IV qd x 5 days (started 3/29-4/3)   -Completed azithromycin 500 mg IV qd x 3 days (started 3/28-3/30)   -4/4 and full respiratory panel viral negative; blood cx NGTD     HEMATOLOGIC/ONCOLOGIC   #Leukocytosis- 2/2 steroid induced   Low suspicion for infection source. Downtrending.   -Monitor     PROPHYLACTIC   -DVT ppx: enoxaparin 40 mg sq qd     ETHICS   -HCP daughter (Jojo)   -Full code    59F with HTN, DM2, hypothyroid, and asthma here for status asthmaticus, unclear trigger; currently with gradual clinical improvement.     NEUROLOGIC   Not at baseline mental status. On sedation. Still requires sedation.   -Baseline AAOx3  -C/w propofol; wean as tolerated   -C/w dexomedomitdine  -2mg Versed mg IVP PRN   -D/c fentanyl 4/1   -D/c  ketamine  -D/c midazolam  -D/c cistracronium 3/31       CARDIOVASCULAR     # Vasoplegic Shock- RESOLVED    No pressor requirements.     #Elevated troponin- 2/2 demand ischemia from vasopressors- RESOLVED   Plateaued tropnoins 50s to 252 to 180s. No events on telemetry. Peaked and downtrending.   -Monitor   -If increasing again or high clinical suspicion, can consider PE r/o       #HTN- uncontrolled vs tactile stimulation induced vs tussive bouts   Persistently hypertensive.   -Uptitrate home lisinopril 10 mg to 20 mg po qd   -Started hydralazine 25 mg po TID   -Wean nicardipine drip       RESPIRATORY   #Status asthmaticus   Intubated.   -C/w mechanical ventilator  -Daily spontaneous breathing trials   -C/w  methylprednisolone 40 mg IV 12h (start 3/28)   -C/w duonebs 3 mL q4   -Completed empiric pna   - Holding off on CT angio for now.   -3/30 BAL without growth   -Bronchoscopy 3/30 showing secretions in L main and L lober with mucus aspirated out     # Subcutaneous Emphysema   Decreasing sq emphysema on RUE.   - Present s/p reintubation 03/30 night  - CXR 3/30 showing subcutaneous emphysema without pneumomediastinum   - Will consider CT chest if worsens  - CXR qd to assess progression     GASTROINTESTINAL   -JS   -OGT 3/30 placed   -Restart trickle feeds   -C/w pantoprazole 40 mg IV qd for gastric mucosa ppx     #Gastric retention- 2/2 opioid induced constipation   Had a BM.   -OGT off suction; check gastric residuals intermittently   -Hold TF   -Treat constipation with methylnaltrexone 12 mg sq x 1  -D/c metoclopramide 10 mg IV q6 for GI motility; qtc on EKG 4/1 457 ms   -D/c erythromycin 250 mg IV qd for GI motility   - S/p mineral castor oil enema   - D/c  lactulose 20 mg syrup TID   -abd XR 4/3 with some stool burden    GENITOURINARY/RENAL   #HAGMA 2/2 hypercarbia and lactic acidosis- RESOLVED   #Respiratory acidosis- ventilator changes   ABG pH 7.50.   - On admission, VBG pH 6.98 and PCO2 125. Currently ABG 7.43.   - Treat with mechanical ventilation for exhalation   - Serial ABGs   - S/p Thiamine x 1 for lactic acidosis     #Tai   -TOV 4/4        ENDOCRINE   #DM   Glycemic range within 70s-90s.   -NPO   -Low ISS, q6 fingersticks  - D/c insulin NPH 7u q6h, q6 fingersticks  -Hold home metformin 500 mg po qd   - Hba1c 5.7     #Hypothyroid   -C/w  levothyroxine 55 mcg IV qd       INFECTIOUS DISEASE   Not meeting criteria for SIRS. Unclear trigger for asthma. Febrile 1 episode in the AM.   -WBC on admission 11.21 and afebrile   -Blood cx  NGTD 3/28   -Sputum cx no PMNs or ogasnisms 3/30   -Urine showing 300 proteinuria and 20 hematuria; no evidence of infection   -Negative viral panel  - urine legionella negative   - urine streptococcus negative   -Completed ceftriaxone 1000 mg IV qd x 5 days (started 3/29-4/3)   -Completed azithromycin 500 mg IV qd x 3 days (started 3/28-3/30)   -4/4 and full respiratory panel viral negative; blood cx NGTD     HEMATOLOGIC/ONCOLOGIC   #Leukocytosis- 2/2 steroid induced   Low suspicion for infection source. Downtrending.   -Monitor     PROPHYLACTIC   -DVT ppx: enoxaparin 40 mg sq qd     ETHICS   -HCP daughter (Jojo)   -Full code

## 2025-04-06 NOTE — PROGRESS NOTE ADULT - SUBJECTIVE AND OBJECTIVE BOX
MICU PROGRESS NOTE     HISTORY OF PRESENT ILLNESS     Overnight    Seen and examined at bedside, patient remains unable to engage meaningfully.     ROS: All negative except as listed above.    VITAL SIGNS:  ICU Vital Signs Last 24 Hrs  T(C): 37.1 (05 Apr 2025 04:00), Max: 37.8 (04 Apr 2025 16:00)  T(F): 98.8 (05 Apr 2025 04:00), Max: 100 (04 Apr 2025 16:00)  HR: 65 (05 Apr 2025 07:47) (55 - 82)  BP: --  BP(mean): --  ABP: 148/57 (05 Apr 2025 07:00) (111/56 - 179/87)  ABP(mean): 88 (05 Apr 2025 07:00) (77 - 122)  RR: 20 (05 Apr 2025 07:00) (19 - 24)  SpO2: 98% (05 Apr 2025 07:47) (94% - 100%)    O2 Parameters below as of 05 Apr 2025 07:00  Patient On (Oxygen Delivery Method): ventilator    O2 Concentration (%): 30      Mode: AC/ CMV (Assist Control/ Continuous Mandatory Ventilation), RR (machine): 18, TV (machine): 400, FiO2: 30, PEEP: 6, ITime: 1, MAP: 10, PIP: 28  Plateau pressure:   P/F ratio:     04-04 @ 07:01  -  04-05 @ 07:00  --------------------------------------------------------  IN: 1884.8 mL / OUT: 2725 mL / NET: -840.2 mL      CAPILLARY BLOOD GLUCOSE      POCT Blood Glucose.: 102 mg/dL (05 Apr 2025 05:14)      ECG: reviewed.    PHYSICAL EXAM:    General: NAD, overweight habitus   Neuro: sedated, AAOx0, no grimace to noxious stimuli, no spontaneity   HEENT: PERRLA b/l, mildly moist mucous membranes  Chest: nonTTP, sq emphysema palpable   Heart: S1/S2, RRR    Lungs: coare breath sounds,  MV   Abd: soft, nonTTP, nondistended   Ext: trace pitting edema pretibial, palpable sq emphysema in R forearm   Pulses: radial 2+ b/l   Lines/tubes/drains: L IJV triple lumen, L axillary A line, jonas   Psych: unable to assess     MEDICATIONS  (STANDING):  albuterol/ipratropium for Nebulization 3 milliLiter(s) Nebulizer every 4 hours  buDESOnide    Inhalation Suspension 0.5 milliGRAM(s) Inhalation two times a day  chlorhexidine 0.12% Liquid 15 milliLiter(s) Oral Mucosa every 12 hours  chlorhexidine 2% Cloths 1 Application(s) Topical <User Schedule>  dexMEDEtomidine Infusion 0.2 MICROgram(s)/kG/Hr (4.66 mL/Hr) IV Continuous <Continuous>  dextrose 5%. 1000 milliLiter(s) (50 mL/Hr) IV Continuous <Continuous>  dextrose 5%. 1000 milliLiter(s) (100 mL/Hr) IV Continuous <Continuous>  dextrose 50% Injectable 25 Gram(s) IV Push once  dextrose 50% Injectable 12.5 Gram(s) IV Push once  dextrose 50% Injectable 25 Gram(s) IV Push once  enoxaparin Injectable 40 milliGRAM(s) SubCutaneous every 24 hours  glucagon  Injectable 1 milliGRAM(s) IntraMuscular once  influenza   Vaccine 0.5 milliLiter(s) IntraMuscular once  insulin lispro (ADMELOG) corrective regimen sliding scale   SubCutaneous every 6 hours  levothyroxine 75 MICROGram(s) Oral daily  lisinopril 10 milliGRAM(s) Oral daily  methylPREDNISolone sodium succinate Injectable 40 milliGRAM(s) IV Push every 12 hours  midazolam Infusion 0.02 mG/kG/Hr (1.86 mL/Hr) IV Continuous <Continuous>  multivitamin/minerals/iron Oral Solution (CENTRUM) 15 milliLiter(s) Oral daily  niCARdipine Infusion 2.5 mG/Hr (12.5 mL/Hr) IV Continuous <Continuous>  pantoprazole  Injectable 40 milliGRAM(s) IV Push daily  petrolatum Ophthalmic Ointment 1 Application(s) Both EYES daily  polyethylene glycol 3350 17 Gram(s) Oral two times a day  propofol Infusion 50.072 MICROgram(s)/kG/Min (28 mL/Hr) IV Continuous <Continuous>  senna Syrup 10 milliLiter(s) Oral daily    MEDICATIONS  (PRN):  dextrose Oral Gel 15 Gram(s) Oral once PRN Blood Glucose LESS THAN 70 milliGRAM(s)/deciliter      ALLERGIES:  Allergies    No Known Allergies    Intolerances        LABS:                        11.2   13.54 )-----------( 166      ( 05 Apr 2025 00:40 )             34.4     04-05    140  |  101  |  16  ----------------------------<  112[H]  4.6   |  24  |  0.54    Ca    8.4      05 Apr 2025 00:40  Phos  4.4     04-05  Mg     2.30     04-05    TPro  6.2  /  Alb  2.9[L]  /  TBili  0.3  /  DBili  x   /  AST  51[H]  /  ALT  61[H]  /  AlkPhos  100  04-05    PT/INR - ( 05 Apr 2025 00:40 )   PT: 12.1 sec;   INR: 1.02 ratio         PTT - ( 05 Apr 2025 00:40 )  PTT:27.6 sec  Urinalysis Basic - ( 05 Apr 2025 00:40 )    Color: x / Appearance: x / SG: x / pH: x  Gluc: 112 mg/dL / Ketone: x  / Bili: x / Urobili: x   Blood: x / Protein: x / Nitrite: x   Leuk Esterase: x / RBC: x / WBC x   Sq Epi: x / Non Sq Epi: x / Bacteria: x      ABG:  pH, Arterial: 7.50 (04-05-25 @ 00:40)  pCO2, Arterial: 44 mmHg (04-05-25 @ 00:40)  pO2, Arterial: 125 mmHg (04-05-25 @ 00:40)      vBG:    Micro:    Culture - Blood (collected 03-28-25 @ 16:39)  Source: Blood Blood-Peripheral  Final Report (04-02-25 @ 20:00):    No growth at 5 days    Culture - Blood (collected 03-28-25 @ 16:34)  Source: Blood Blood-Peripheral  Final Report (04-02-25 @ 20:00):    No growth at 5 days       Urinalysis with Rflx Culture (collected 03-28-25 @ 16:34)         RADIOLOGY & ADDITIONAL TESTS: Reviewed. MICU PROGRESS NOTE     HISTORY OF PRESENT ILLNESS   Overnight - Weaned off Versed ggt, nicardipine ggt    Intubated and sedated.     ROS: All negative except as listed above.    VITAL SIGNS:  ICU Vital Signs Last 24 Hrs  T(C): 37.1 (05 Apr 2025 04:00), Max: 37.8 (04 Apr 2025 16:00)  T(F): 98.8 (05 Apr 2025 04:00), Max: 100 (04 Apr 2025 16:00)  HR: 65 (05 Apr 2025 07:47) (55 - 82)  BP: --  BP(mean): --  ABP: 148/57 (05 Apr 2025 07:00) (111/56 - 179/87)  ABP(mean): 88 (05 Apr 2025 07:00) (77 - 122)  RR: 20 (05 Apr 2025 07:00) (19 - 24)  SpO2: 98% (05 Apr 2025 07:47) (94% - 100%)    O2 Parameters below as of 05 Apr 2025 07:00  Patient On (Oxygen Delivery Method): ventilator    O2 Concentration (%): 30      Mode: AC/ CMV (Assist Control/ Continuous Mandatory Ventilation), RR (machine): 18, TV (machine): 400, FiO2: 30, PEEP: 6, ITime: 1, MAP: 10, PIP: 28  Plateau pressure:   P/F ratio:     04-04 @ 07:01  -  04-05 @ 07:00  --------------------------------------------------------  IN: 1884.8 mL / OUT: 2725 mL / NET: -840.2 mL      CAPILLARY BLOOD GLUCOSE      POCT Blood Glucose.: 102 mg/dL (05 Apr 2025 05:14)      ECG: reviewed.    PHYSICAL EXAM:    General: NAD, overweight habitus   Neuro: sedated, AAOx0, no grimace to noxious stimuli, no spontaneity   HEENT: PERRLA b/l, mildly moist mucous membranes  Chest: nonTTP, sq emphysema palpable   Heart: S1/S2, RRR    Lungs: coare breath sounds,  MV   Abd: soft, nonTTP, nondistended   Ext: trace pitting edema pretibial, palpable sq emphysema in R forearm   Pulses: radial 2+ b/l   Lines/tubes/drains: L IJV triple lumen, L axillary A line, jonas   Psych: unable to assess     MEDICATIONS  (STANDING):  albuterol/ipratropium for Nebulization 3 milliLiter(s) Nebulizer every 4 hours  buDESOnide    Inhalation Suspension 0.5 milliGRAM(s) Inhalation two times a day  chlorhexidine 0.12% Liquid 15 milliLiter(s) Oral Mucosa every 12 hours  chlorhexidine 2% Cloths 1 Application(s) Topical <User Schedule>  dexMEDEtomidine Infusion 0.2 MICROgram(s)/kG/Hr (4.66 mL/Hr) IV Continuous <Continuous>  dextrose 5%. 1000 milliLiter(s) (50 mL/Hr) IV Continuous <Continuous>  dextrose 5%. 1000 milliLiter(s) (100 mL/Hr) IV Continuous <Continuous>  dextrose 50% Injectable 25 Gram(s) IV Push once  dextrose 50% Injectable 12.5 Gram(s) IV Push once  dextrose 50% Injectable 25 Gram(s) IV Push once  enoxaparin Injectable 40 milliGRAM(s) SubCutaneous every 24 hours  glucagon  Injectable 1 milliGRAM(s) IntraMuscular once  influenza   Vaccine 0.5 milliLiter(s) IntraMuscular once  insulin lispro (ADMELOG) corrective regimen sliding scale   SubCutaneous every 6 hours  levothyroxine 75 MICROGram(s) Oral daily  lisinopril 10 milliGRAM(s) Oral daily  methylPREDNISolone sodium succinate Injectable 40 milliGRAM(s) IV Push every 12 hours  midazolam Infusion 0.02 mG/kG/Hr (1.86 mL/Hr) IV Continuous <Continuous>  multivitamin/minerals/iron Oral Solution (CENTRUM) 15 milliLiter(s) Oral daily  niCARdipine Infusion 2.5 mG/Hr (12.5 mL/Hr) IV Continuous <Continuous>  pantoprazole  Injectable 40 milliGRAM(s) IV Push daily  petrolatum Ophthalmic Ointment 1 Application(s) Both EYES daily  polyethylene glycol 3350 17 Gram(s) Oral two times a day  propofol Infusion 50.072 MICROgram(s)/kG/Min (28 mL/Hr) IV Continuous <Continuous>  senna Syrup 10 milliLiter(s) Oral daily    MEDICATIONS  (PRN):  dextrose Oral Gel 15 Gram(s) Oral once PRN Blood Glucose LESS THAN 70 milliGRAM(s)/deciliter      ALLERGIES:  Allergies    No Known Allergies    Intolerances        LABS:                        11.2   13.54 )-----------( 166      ( 05 Apr 2025 00:40 )             34.4     04-05    140  |  101  |  16  ----------------------------<  112[H]  4.6   |  24  |  0.54    Ca    8.4      05 Apr 2025 00:40  Phos  4.4     04-05  Mg     2.30     04-05    TPro  6.2  /  Alb  2.9[L]  /  TBili  0.3  /  DBili  x   /  AST  51[H]  /  ALT  61[H]  /  AlkPhos  100  04-05    PT/INR - ( 05 Apr 2025 00:40 )   PT: 12.1 sec;   INR: 1.02 ratio         PTT - ( 05 Apr 2025 00:40 )  PTT:27.6 sec  Urinalysis Basic - ( 05 Apr 2025 00:40 )    Color: x / Appearance: x / SG: x / pH: x  Gluc: 112 mg/dL / Ketone: x  / Bili: x / Urobili: x   Blood: x / Protein: x / Nitrite: x   Leuk Esterase: x / RBC: x / WBC x   Sq Epi: x / Non Sq Epi: x / Bacteria: x      ABG:  pH, Arterial: 7.50 (04-05-25 @ 00:40)  pCO2, Arterial: 44 mmHg (04-05-25 @ 00:40)  pO2, Arterial: 125 mmHg (04-05-25 @ 00:40)      vBG:    Micro:    Culture - Blood (collected 03-28-25 @ 16:39)  Source: Blood Blood-Peripheral  Final Report (04-02-25 @ 20:00):    No growth at 5 days    Culture - Blood (collected 03-28-25 @ 16:34)  Source: Blood Blood-Peripheral  Final Report (04-02-25 @ 20:00):    No growth at 5 days       Urinalysis with Rflx Culture (collected 03-28-25 @ 16:34)         RADIOLOGY & ADDITIONAL TESTS: Reviewed.

## 2025-04-07 LAB
ALBUMIN SERPL ELPH-MCNC: 2.9 G/DL — LOW (ref 3.3–5)
ALP SERPL-CCNC: 130 U/L — HIGH (ref 40–120)
ALT FLD-CCNC: 68 U/L — HIGH (ref 4–33)
ANION GAP SERPL CALC-SCNC: 16 MMOL/L — HIGH (ref 7–14)
APTT BLD: 28.5 SEC — SIGNIFICANT CHANGE UP (ref 24.5–35.6)
AST SERPL-CCNC: 47 U/L — HIGH (ref 4–32)
BILIRUB SERPL-MCNC: 0.3 MG/DL — SIGNIFICANT CHANGE UP (ref 0.2–1.2)
BLOOD GAS ARTERIAL COMPREHENSIVE RESULT: SIGNIFICANT CHANGE UP
BUN SERPL-MCNC: 20 MG/DL — SIGNIFICANT CHANGE UP (ref 7–23)
CALCIUM SERPL-MCNC: 8.8 MG/DL — SIGNIFICANT CHANGE UP (ref 8.4–10.5)
CHLORIDE SERPL-SCNC: 103 MMOL/L — SIGNIFICANT CHANGE UP (ref 98–107)
CK MB BLD-MCNC: 1.1 % — SIGNIFICANT CHANGE UP (ref 0–2.5)
CK MB CFR SERPL CALC: 1.7 NG/ML — SIGNIFICANT CHANGE UP
CK SERPL-CCNC: 160 U/L — SIGNIFICANT CHANGE UP (ref 25–170)
CO2 SERPL-SCNC: 21 MMOL/L — LOW (ref 22–31)
CREAT SERPL-MCNC: 0.53 MG/DL — SIGNIFICANT CHANGE UP (ref 0.5–1.3)
EGFR: 106 ML/MIN/1.73M2 — SIGNIFICANT CHANGE UP
EGFR: 106 ML/MIN/1.73M2 — SIGNIFICANT CHANGE UP
FLUAV AG NPH QL: SIGNIFICANT CHANGE UP
FLUBV AG NPH QL: SIGNIFICANT CHANGE UP
GLUCOSE BLDC GLUCOMTR-MCNC: 103 MG/DL — HIGH (ref 70–99)
GLUCOSE BLDC GLUCOMTR-MCNC: 115 MG/DL — HIGH (ref 70–99)
GLUCOSE BLDC GLUCOMTR-MCNC: 120 MG/DL — HIGH (ref 70–99)
GLUCOSE BLDC GLUCOMTR-MCNC: 126 MG/DL — HIGH (ref 70–99)
GLUCOSE SERPL-MCNC: 131 MG/DL — HIGH (ref 70–99)
GRAM STN FLD: SIGNIFICANT CHANGE UP
HCT VFR BLD CALC: 35.7 % — SIGNIFICANT CHANGE UP (ref 34.5–45)
HGB BLD-MCNC: 11.5 G/DL — SIGNIFICANT CHANGE UP (ref 11.5–15.5)
INR BLD: 1.02 RATIO — SIGNIFICANT CHANGE UP (ref 0.85–1.16)
MAGNESIUM SERPL-MCNC: 2.1 MG/DL — SIGNIFICANT CHANGE UP (ref 1.6–2.6)
MCHC RBC-ENTMCNC: 30.7 PG — SIGNIFICANT CHANGE UP (ref 27–34)
MCHC RBC-ENTMCNC: 32.2 G/DL — SIGNIFICANT CHANGE UP (ref 32–36)
MCV RBC AUTO: 95.5 FL — SIGNIFICANT CHANGE UP (ref 80–100)
NRBC # BLD AUTO: 0 K/UL — SIGNIFICANT CHANGE UP (ref 0–0)
NRBC # FLD: 0 K/UL — SIGNIFICANT CHANGE UP (ref 0–0)
NRBC BLD AUTO-RTO: 0 /100 WBCS — SIGNIFICANT CHANGE UP (ref 0–0)
PHOSPHATE SERPL-MCNC: 3.2 MG/DL — SIGNIFICANT CHANGE UP (ref 2.5–4.5)
PLATELET # BLD AUTO: 176 K/UL — SIGNIFICANT CHANGE UP (ref 150–400)
POTASSIUM SERPL-MCNC: 4.6 MMOL/L — SIGNIFICANT CHANGE UP (ref 3.5–5.3)
POTASSIUM SERPL-SCNC: 4.6 MMOL/L — SIGNIFICANT CHANGE UP (ref 3.5–5.3)
PROT SERPL-MCNC: 6.4 G/DL — SIGNIFICANT CHANGE UP (ref 6–8.3)
PROTHROM AB SERPL-ACNC: 12.1 SEC — SIGNIFICANT CHANGE UP (ref 9.9–13.4)
RBC # BLD: 3.74 M/UL — LOW (ref 3.8–5.2)
RBC # FLD: 15.4 % — HIGH (ref 10.3–14.5)
RSV RNA NPH QL NAA+NON-PROBE: SIGNIFICANT CHANGE UP
SARS-COV-2 RNA SPEC QL NAA+PROBE: SIGNIFICANT CHANGE UP
SODIUM SERPL-SCNC: 140 MMOL/L — SIGNIFICANT CHANGE UP (ref 135–145)
SOURCE RESPIRATORY: SIGNIFICANT CHANGE UP
SPECIMEN SOURCE: SIGNIFICANT CHANGE UP
WBC # BLD: 10.21 K/UL — SIGNIFICANT CHANGE UP (ref 3.8–10.5)
WBC # FLD AUTO: 10.21 K/UL — SIGNIFICANT CHANGE UP (ref 3.8–10.5)

## 2025-04-07 RX ORDER — HYDROCORTISONE 20 MG
40 TABLET ORAL ONCE
Refills: 0 | Status: DISCONTINUED | OUTPATIENT
Start: 2025-04-07 | End: 2025-04-07

## 2025-04-07 RX ORDER — PROPOFOL 10 MG/ML
30 INJECTION, EMULSION INTRAVENOUS
Qty: 1000 | Refills: 0 | Status: DISCONTINUED | OUTPATIENT
Start: 2025-04-07 | End: 2025-04-10

## 2025-04-07 RX ORDER — NICARDIPINE HCL 30 MG
5 CAPSULE ORAL
Qty: 40 | Refills: 0 | Status: DISCONTINUED | OUTPATIENT
Start: 2025-04-07 | End: 2025-04-07

## 2025-04-07 RX ORDER — FENTANYL CITRATE-0.9 % NACL/PF 100MCG/2ML
100 SYRINGE (ML) INTRAVENOUS ONCE
Refills: 0 | Status: DISCONTINUED | OUTPATIENT
Start: 2025-04-07 | End: 2025-04-07

## 2025-04-07 RX ORDER — PIPERACILLIN-TAZO-DEXTROSE,ISO 3.375G/5
3.38 IV SOLUTION, PIGGYBACK PREMIX FROZEN(ML) INTRAVENOUS ONCE
Refills: 0 | Status: COMPLETED | OUTPATIENT
Start: 2025-04-07 | End: 2025-04-07

## 2025-04-07 RX ORDER — FENTANYL CITRATE-0.9 % NACL/PF 100MCG/2ML
50 SYRINGE (ML) INTRAVENOUS ONCE
Refills: 0 | Status: DISCONTINUED | OUTPATIENT
Start: 2025-04-07 | End: 2025-04-07

## 2025-04-07 RX ORDER — PIPERACILLIN-TAZO-DEXTROSE,ISO 3.375G/5
3.38 IV SOLUTION, PIGGYBACK PREMIX FROZEN(ML) INTRAVENOUS EVERY 8 HOURS
Refills: 0 | Status: COMPLETED | OUTPATIENT
Start: 2025-04-08 | End: 2025-04-14

## 2025-04-07 RX ORDER — ACETAMINOPHEN 500 MG/5ML
1000 LIQUID (ML) ORAL ONCE
Refills: 0 | Status: COMPLETED | OUTPATIENT
Start: 2025-04-07 | End: 2025-04-07

## 2025-04-07 RX ORDER — PIPERACILLIN-TAZO-DEXTROSE,ISO 3.375G/5
3.38 IV SOLUTION, PIGGYBACK PREMIX FROZEN(ML) INTRAVENOUS ONCE
Refills: 0 | Status: COMPLETED | OUTPATIENT
Start: 2025-04-07 | End: 2025-04-08

## 2025-04-07 RX ORDER — VANCOMYCIN HCL IN 5 % DEXTROSE 1.5G/250ML
1750 PLASTIC BAG, INJECTION (ML) INTRAVENOUS ONCE
Refills: 0 | Status: COMPLETED | OUTPATIENT
Start: 2025-04-07 | End: 2025-04-08

## 2025-04-07 RX ORDER — METHYLPREDNISOLONE ACETATE 80 MG/ML
40 INJECTION, SUSPENSION INTRA-ARTICULAR; INTRALESIONAL; INTRAMUSCULAR; SOFT TISSUE ONCE
Refills: 0 | Status: COMPLETED | OUTPATIENT
Start: 2025-04-07 | End: 2025-04-07

## 2025-04-07 RX ORDER — AMLODIPINE BESYLATE 10 MG/1
5 TABLET ORAL DAILY
Refills: 0 | Status: DISCONTINUED | OUTPATIENT
Start: 2025-04-07 | End: 2025-04-07

## 2025-04-07 RX ORDER — ETOMIDATE 2 MG/ML
20 AMPUL (ML) INTRAVENOUS ONCE
Refills: 0 | Status: COMPLETED | OUTPATIENT
Start: 2025-04-07 | End: 2025-04-07

## 2025-04-07 RX ORDER — LISINOPRIL 5 MG/1
10 TABLET ORAL DAILY
Refills: 0 | Status: DISCONTINUED | OUTPATIENT
Start: 2025-04-07 | End: 2025-04-07

## 2025-04-07 RX ORDER — PROPOFOL 10 MG/ML
40 INJECTION, EMULSION INTRAVENOUS ONCE
Refills: 0 | Status: COMPLETED | OUTPATIENT
Start: 2025-04-07 | End: 2025-04-07

## 2025-04-07 RX ORDER — MIDAZOLAM IN 0.9 % SOD.CHLORID 1 MG/ML
4 PLASTIC BAG, INJECTION (ML) INTRAVENOUS ONCE
Refills: 0 | Status: DISCONTINUED | OUTPATIENT
Start: 2025-04-07 | End: 2025-04-07

## 2025-04-07 RX ORDER — DEXMEDETOMIDINE HYDROCHLORIDE IN SODIUM CHLORIDE 4 UG/ML
0.25 INJECTION INTRAVENOUS
Qty: 400 | Refills: 0 | Status: DISCONTINUED | OUTPATIENT
Start: 2025-04-07 | End: 2025-04-08

## 2025-04-07 RX ORDER — MAGNESIUM SULFATE 500 MG/ML
2 SYRINGE (ML) INJECTION ONCE
Refills: 0 | Status: COMPLETED | OUTPATIENT
Start: 2025-04-07 | End: 2025-04-07

## 2025-04-07 RX ORDER — KETAMINE HCL IN 0.9 % NACL 50 MG/5 ML
0.25 SYRINGE (ML) INTRAVENOUS
Qty: 1000 | Refills: 0 | Status: DISCONTINUED | OUTPATIENT
Start: 2025-04-07 | End: 2025-04-08

## 2025-04-07 RX ORDER — ALBUTEROL SULFATE 2.5 MG/3ML
10 VIAL, NEBULIZER (ML) INHALATION
Qty: 100 | Refills: 0 | Status: DISCONTINUED | OUTPATIENT
Start: 2025-04-07 | End: 2025-04-08

## 2025-04-07 RX ADMIN — Medication 2 MILLIGRAM(S): at 06:29

## 2025-04-07 RX ADMIN — Medication 200 GRAM(S): at 20:58

## 2025-04-07 RX ADMIN — Medication 150 GRAM(S): at 20:59

## 2025-04-07 RX ADMIN — Medication 100 MICROGRAM(S): at 04:14

## 2025-04-07 RX ADMIN — Medication 40 MILLIGRAM(S): at 11:13

## 2025-04-07 RX ADMIN — Medication 8 MG/HR: at 20:28

## 2025-04-07 RX ADMIN — PROPOFOL 28 MICROGRAM(S)/KG/MIN: 10 INJECTION, EMULSION INTRAVENOUS at 05:23

## 2025-04-07 RX ADMIN — Medication 20 MILLIGRAM(S): at 20:55

## 2025-04-07 RX ADMIN — Medication 100 MICROGRAM(S): at 04:10

## 2025-04-07 RX ADMIN — Medication 100 MICROGRAM(S): at 01:45

## 2025-04-07 RX ADMIN — PROPOFOL 40 MILLIGRAM(S): 10 INJECTION, EMULSION INTRAVENOUS at 20:40

## 2025-04-07 RX ADMIN — IPRATROPIUM BROMIDE AND ALBUTEROL SULFATE 3 MILLILITER(S): .5; 2.5 SOLUTION RESPIRATORY (INHALATION) at 19:28

## 2025-04-07 RX ADMIN — Medication 4 MILLILITER(S): at 19:28

## 2025-04-07 RX ADMIN — Medication 1000 MILLIGRAM(S): at 07:37

## 2025-04-07 RX ADMIN — Medication 1 APPLICATION(S): at 05:06

## 2025-04-07 RX ADMIN — Medication 50 MICROGRAM(S): at 21:23

## 2025-04-07 RX ADMIN — ENOXAPARIN SODIUM 40 MILLIGRAM(S): 100 INJECTION SUBCUTANEOUS at 14:16

## 2025-04-07 RX ADMIN — IPRATROPIUM BROMIDE AND ALBUTEROL SULFATE 3 MILLILITER(S): .5; 2.5 SOLUTION RESPIRATORY (INHALATION) at 11:01

## 2025-04-07 RX ADMIN — Medication 1 MILLIGRAM(S): at 20:54

## 2025-04-07 RX ADMIN — IPRATROPIUM BROMIDE AND ALBUTEROL SULFATE 3 MILLILITER(S): .5; 2.5 SOLUTION RESPIRATORY (INHALATION) at 07:13

## 2025-04-07 RX ADMIN — POLYETHYLENE GLYCOL 3350 17 GRAM(S): 17 POWDER, FOR SOLUTION ORAL at 17:16

## 2025-04-07 RX ADMIN — Medication 1000 MILLIGRAM(S): at 21:25

## 2025-04-07 RX ADMIN — Medication 400 MILLIGRAM(S): at 21:20

## 2025-04-07 RX ADMIN — IPRATROPIUM BROMIDE AND ALBUTEROL SULFATE 3 MILLILITER(S): .5; 2.5 SOLUTION RESPIRATORY (INHALATION) at 15:13

## 2025-04-07 RX ADMIN — Medication 15 MILLILITER(S): at 05:05

## 2025-04-07 RX ADMIN — Medication 4 MILLILITER(S): at 15:16

## 2025-04-07 RX ADMIN — METHYLPREDNISOLONE ACETATE 40 MILLIGRAM(S): 80 INJECTION, SUSPENSION INTRA-ARTICULAR; INTRALESIONAL; INTRAMUSCULAR; SOFT TISSUE at 17:12

## 2025-04-07 RX ADMIN — Medication 50 MICROGRAM(S): at 06:29

## 2025-04-07 RX ADMIN — Medication 10 MILLILITER(S): at 11:14

## 2025-04-07 RX ADMIN — PROPOFOL 28 MICROGRAM(S)/KG/MIN: 10 INJECTION, EMULSION INTRAVENOUS at 01:03

## 2025-04-07 RX ADMIN — Medication 1 APPLICATION(S): at 11:14

## 2025-04-07 RX ADMIN — Medication 100 MICROGRAM(S): at 17:12

## 2025-04-07 RX ADMIN — PROPOFOL 16.8 MICROGRAM(S)/KG/MIN: 10 INJECTION, EMULSION INTRAVENOUS at 21:20

## 2025-04-07 RX ADMIN — NOREPINEPHRINE BITARTRATE 4.37 MICROGRAM(S)/KG/MIN: 8 SOLUTION at 21:24

## 2025-04-07 RX ADMIN — IPRATROPIUM BROMIDE AND ALBUTEROL SULFATE 3 MILLILITER(S): .5; 2.5 SOLUTION RESPIRATORY (INHALATION) at 02:46

## 2025-04-07 RX ADMIN — BUDESONIDE 0.5 MILLIGRAM(S): 90 AEROSOL, POWDER RESPIRATORY (INHALATION) at 07:14

## 2025-04-07 RX ADMIN — Medication 4 MG/HR: at 23:52

## 2025-04-07 RX ADMIN — METHYLPREDNISOLONE ACETATE 40 MILLIGRAM(S): 80 INJECTION, SUSPENSION INTRA-ARTICULAR; INTRALESIONAL; INTRAMUSCULAR; SOFT TISSUE at 05:04

## 2025-04-07 RX ADMIN — Medication 15 MILLILITER(S): at 17:12

## 2025-04-07 RX ADMIN — Medication 75 MICROGRAM(S): at 05:04

## 2025-04-07 RX ADMIN — POLYETHYLENE GLYCOL 3350 17 GRAM(S): 17 POWDER, FOR SOLUTION ORAL at 05:04

## 2025-04-07 RX ADMIN — BUDESONIDE 0.5 MILLIGRAM(S): 90 AEROSOL, POWDER RESPIRATORY (INHALATION) at 19:28

## 2025-04-07 RX ADMIN — Medication 2.33 MG/KG/HR: at 21:22

## 2025-04-07 RX ADMIN — Medication 400 MILLIGRAM(S): at 07:26

## 2025-04-07 RX ADMIN — Medication 100 MICROGRAM(S): at 03:06

## 2025-04-07 RX ADMIN — Medication 4 MILLIGRAM(S): at 20:39

## 2025-04-07 RX ADMIN — Medication 50 MICROGRAM(S): at 21:53

## 2025-04-07 RX ADMIN — METHYLPREDNISOLONE ACETATE 40 MILLIGRAM(S): 80 INJECTION, SUSPENSION INTRA-ARTICULAR; INTRALESIONAL; INTRAMUSCULAR; SOFT TISSUE at 20:47

## 2025-04-07 RX ADMIN — Medication 15 MILLILITER(S): at 11:14

## 2025-04-07 RX ADMIN — NOREPINEPHRINE BITARTRATE 4.37 MICROGRAM(S)/KG/MIN: 8 SOLUTION at 17:16

## 2025-04-07 NOTE — PROCEDURE NOTE - NSPOSTPRCRAD_GEN_A_CORE
chest radiograph/feeding tube in correct gastric position/post-procedure radiography performed
18cm depth/central line located in the superior vena cava/depth of insertion/no pneumothorax/post-procedure radiography performed

## 2025-04-07 NOTE — PROGRESS NOTE ADULT - ATTENDING COMMENTS
1. Acute hypoxemic respiratory failure due to status asthmaticus.  Continue to decrease sedation and try to wean as tolerated..     Continue solumedrol 40mg Iv q12. Will decrease in am. Continue bronchodilators.    2. HTN. Restart lisinopril 10mg daily. Start amlodipine 5 mg daily    3.Pneumomediastinum with sq air. Clinically stable. No need for routine daily CXR.    4.FEN: Restart tube feeds.    5. D/C a line.    6. DVT prophylaxis: Lovenox  7; GOC :Full code.

## 2025-04-07 NOTE — PROGRESS NOTE ADULT - ASSESSMENT
59F with HTN, DM2, hypothyroid, and asthma here for status asthmaticus, unclear trigger; currently with gradual clinical improvement.     NEUROLOGIC   Not at baseline mental status. On sedation. Still requires sedation.   -Baseline AAOx3  -C/w propofol; continue weaning as tolerated   -C/w dexomedomitdine  -2mg Versed mg IVP PRN   -D/c fentanyl 4/1   -D/c  ketamine  -D/c midazolam  -D/c cistracronium 3/31       CARDIOVASCULAR     # Vasoplegic Shock- RESOLVED    No pressor requirements.     #Elevated troponin- 2/2 demand ischemia from vasopressors- RESOLVED   Plateaued tropnoins 50s to 252 to 180s. No events on telemetry. Peaked and downtrending.   -Monitor   -If increasing again or high clinical suspicion, can consider PE r/o       #HTN- uncontrolled vs tactile stimulation induced vs tussive bouts   Persistently hypertensive.   -Uptitrate home lisinopril 10 mg to 20 mg po qd   -Started hydralazine 25 mg po TID   -Weaned off nicardipine drip   -Continue monitoring BP     RESPIRATORY   #Status asthmaticus   Intubated.   -C/w mechanical ventilator  -Daily spontaneous breathing trials   -C/w  methylprednisolone 40 mg IV 12h (start 3/28)   -C/w duonebs 3 mL q4   -Completed empiric pna   - Holding off on CT angio for now.   -3/30 BAL without growth   -Bronchoscopy 3/30 showing secretions in L main and L lober with mucus aspirated out     # Subcutaneous Emphysema   Decreasing sq emphysema on RUE.   - Present s/p reintubation 03/30 night  - CXR 3/30 showing subcutaneous emphysema without pneumomediastinum   - Will consider CT chest if worsens  - CXR qd to assess progression   -    GASTROINTESTINAL   -JS   -OGT 3/30 placed, trickle feeds paused   -C/w pantoprazole 40 mg IV qd for gastric mucosa ppx     #Gastric retention- 2/2 opioid induced constipation   Had a BM.   -OGT off suction; check gastric residuals intermittently   -Hold TF   -Treat constipation with methylnaltrexone 12 mg sq x 1  -D/c metoclopramide 10 mg IV q6 for GI motility; qtc on EKG 4/1 457 ms   -D/c erythromycin 250 mg IV qd for GI motility   - S/p mineral castor oil enema   - D/c  lactulose 20 mg syrup TID   -abd XR 4/3 with some stool burden    GENITOURINARY/RENAL   #HAGMA 2/2 hypercarbia and lactic acidosis- RESOLVED   #Respiratory acidosis- ventilator changes   ABG pH 7.50.   - On admission, VBG pH 6.98 and PCO2 125. Currently ABG 7.43.   - Treat with mechanical ventilation for exhalation   - Serial ABGs   - S/p Thiamine x 1 for lactic acidosis     #Tai   -TOV 4/4        ENDOCRINE   #DM   Glycemic range within 100s-130s.   -NPO   -Low ISS, q6 fingersticks  - D/c insulin NPH 7u q6h, q6 fingersticks  -Hold home metformin 500 mg po qd   - Hba1c 5.7     #Hypothyroid   -C/w  levothyroxine 55 mcg IV qd       INFECTIOUS DISEASE   Not meeting criteria for SIRS. Unclear trigger for asthma. Febrile 1 episode in the AM.   -WBC on admission 11.21 and afebrile   -Blood cx  NGTD 3/28   -Sputum cx no PMNs or ogasnisms 3/30   -Urine showing 300 proteinuria and 20 hematuria; no evidence of infection   -Negative viral panel  - urine legionella negative   - urine streptococcus negative   -Completed ceftriaxone 1000 mg IV qd x 5 days (started 3/29-4/3)   -Completed azithromycin 500 mg IV qd x 3 days (started 3/28-3/30)   -4/4 and full respiratory panel viral negative; blood cx NGTD     HEMATOLOGIC/ONCOLOGIC   #Leukocytosis- 2/2 steroid induced   Low suspicion for infection source. Downtrending.   -Monitor     PROPHYLACTIC   -DVT ppx: enoxaparin 40 mg sq qd     ETHICS   -HCP daughter (Jojo)   -Full code

## 2025-04-07 NOTE — PROGRESS NOTE ADULT - SUBJECTIVE AND OBJECTIVE BOX
MICU PROGRESS NOTE     HISTORY OF PRESENT ILLNESS   Overnight - Tussive episodes with secretions accompanied with episodes of high BP (systolic 170-180s) and tachypnea. S/p 1 push of fentanyl 50 and Versed 2mg. Rectal temp of 100.6 this morning, s/p IV Tylenol.    Intubated and sedated.     ROS: All negative except as listed above.    VITAL SIGNS:  ICU Vital Signs Last 24 Hrs  T(C): 37.1 (05 Apr 2025 04:00), Max: 37.8 (04 Apr 2025 16:00)  T(F): 98.8 (05 Apr 2025 04:00), Max: 100 (04 Apr 2025 16:00)  HR: 65 (05 Apr 2025 07:47) (55 - 82)  BP: --  BP(mean): --  ABP: 148/57 (05 Apr 2025 07:00) (111/56 - 179/87)  ABP(mean): 88 (05 Apr 2025 07:00) (77 - 122)  RR: 20 (05 Apr 2025 07:00) (19 - 24)  SpO2: 98% (05 Apr 2025 07:47) (94% - 100%)    O2 Parameters below as of 05 Apr 2025 07:00  Patient On (Oxygen Delivery Method): ventilator    O2 Concentration (%): 30      Mode: AC/ CMV (Assist Control/ Continuous Mandatory Ventilation), RR (machine): 18, TV (machine): 400, FiO2: 30, PEEP: 6, ITime: 1, MAP: 10, PIP: 28  Plateau pressure:   P/F ratio:     04-04 @ 07:01  -  04-05 @ 07:00  --------------------------------------------------------  IN: 1884.8 mL / OUT: 2725 mL / NET: -840.2 mL      CAPILLARY BLOOD GLUCOSE      POCT Blood Glucose.: 102 mg/dL (05 Apr 2025 05:14)      ECG: reviewed.    PHYSICAL EXAM:    General: NAD, overweight habitus   Neuro: sedated, AAOx0, no grimace to noxious stimuli, no spontaneity   HEENT: PERRLA b/l, mildly moist mucous membranes  Chest: nonTTP, sq emphysema palpable but improving  Heart: S1/S2, RRR    Lungs: coarse inspiratory breath sounds b/l,  MV   Abd: soft, nonTTP, nondistended   Ext: trace pitting edema pretibial, palpable sq emphysema in R forearm   Pulses: radial 2+ b/l   Lines/tubes/drains: L IJV triple lumen, L axillary A line, jonas   Psych: unable to assess     MEDICATIONS  (STANDING):  albuterol/ipratropium for Nebulization 3 milliLiter(s) Nebulizer every 4 hours  buDESOnide    Inhalation Suspension 0.5 milliGRAM(s) Inhalation two times a day  chlorhexidine 0.12% Liquid 15 milliLiter(s) Oral Mucosa every 12 hours  chlorhexidine 2% Cloths 1 Application(s) Topical <User Schedule>  dexMEDEtomidine Infusion 0.2 MICROgram(s)/kG/Hr (4.66 mL/Hr) IV Continuous <Continuous>  dextrose 5%. 1000 milliLiter(s) (50 mL/Hr) IV Continuous <Continuous>  dextrose 5%. 1000 milliLiter(s) (100 mL/Hr) IV Continuous <Continuous>  dextrose 50% Injectable 25 Gram(s) IV Push once  dextrose 50% Injectable 12.5 Gram(s) IV Push once  dextrose 50% Injectable 25 Gram(s) IV Push once  enoxaparin Injectable 40 milliGRAM(s) SubCutaneous every 24 hours  glucagon  Injectable 1 milliGRAM(s) IntraMuscular once  influenza   Vaccine 0.5 milliLiter(s) IntraMuscular once  insulin lispro (ADMELOG) corrective regimen sliding scale   SubCutaneous every 6 hours  levothyroxine 75 MICROGram(s) Oral daily  lisinopril 10 milliGRAM(s) Oral daily  methylPREDNISolone sodium succinate Injectable 40 milliGRAM(s) IV Push every 12 hours  midazolam Infusion 0.02 mG/kG/Hr (1.86 mL/Hr) IV Continuous <Continuous>  multivitamin/minerals/iron Oral Solution (CENTRUM) 15 milliLiter(s) Oral daily  niCARdipine Infusion 2.5 mG/Hr (12.5 mL/Hr) IV Continuous <Continuous>  pantoprazole  Injectable 40 milliGRAM(s) IV Push daily  petrolatum Ophthalmic Ointment 1 Application(s) Both EYES daily  polyethylene glycol 3350 17 Gram(s) Oral two times a day  propofol Infusion 50.072 MICROgram(s)/kG/Min (28 mL/Hr) IV Continuous <Continuous>  senna Syrup 10 milliLiter(s) Oral daily    MEDICATIONS  (PRN):  dextrose Oral Gel 15 Gram(s) Oral once PRN Blood Glucose LESS THAN 70 milliGRAM(s)/deciliter      ALLERGIES:  Allergies    No Known Allergies    Intolerances        LABS:                        11.2   13.54 )-----------( 166      ( 05 Apr 2025 00:40 )             34.4     04-05    140  |  101  |  16  ----------------------------<  112[H]  4.6   |  24  |  0.54    Ca    8.4      05 Apr 2025 00:40  Phos  4.4     04-05  Mg     2.30     04-05    TPro  6.2  /  Alb  2.9[L]  /  TBili  0.3  /  DBili  x   /  AST  51[H]  /  ALT  61[H]  /  AlkPhos  100  04-05    PT/INR - ( 05 Apr 2025 00:40 )   PT: 12.1 sec;   INR: 1.02 ratio         PTT - ( 05 Apr 2025 00:40 )  PTT:27.6 sec  Urinalysis Basic - ( 05 Apr 2025 00:40 )    Color: x / Appearance: x / SG: x / pH: x  Gluc: 112 mg/dL / Ketone: x  / Bili: x / Urobili: x   Blood: x / Protein: x / Nitrite: x   Leuk Esterase: x / RBC: x / WBC x   Sq Epi: x / Non Sq Epi: x / Bacteria: x      ABG:  pH, Arterial: 7.50 (04-05-25 @ 00:40)  pCO2, Arterial: 44 mmHg (04-05-25 @ 00:40)  pO2, Arterial: 125 mmHg (04-05-25 @ 00:40)      vBG:    Micro:    Culture - Blood (collected 03-28-25 @ 16:39)  Source: Blood Blood-Peripheral  Final Report (04-02-25 @ 20:00):    No growth at 5 days    Culture - Blood (collected 03-28-25 @ 16:34)  Source: Blood Blood-Peripheral  Final Report (04-02-25 @ 20:00):    No growth at 5 days       Urinalysis with Rflx Culture (collected 03-28-25 @ 16:34)         RADIOLOGY & ADDITIONAL TESTS: Reviewed.

## 2025-04-07 NOTE — PROCEDURE NOTE - NSTRACHINTUBMED_RESP_A_CORE
nimbex
fentaNYL injectable/etomidate injectable/ketamine injectable/midazolam injectable/propofol infusion

## 2025-04-08 LAB
ALBUMIN SERPL ELPH-MCNC: 2.9 G/DL — LOW (ref 3.3–5)
ALP SERPL-CCNC: 135 U/L — HIGH (ref 40–120)
ALT FLD-CCNC: 71 U/L — HIGH (ref 4–33)
ANION GAP SERPL CALC-SCNC: 14 MMOL/L — SIGNIFICANT CHANGE UP (ref 7–14)
APTT BLD: 25.4 SEC — SIGNIFICANT CHANGE UP (ref 24.5–35.6)
AST SERPL-CCNC: 42 U/L — HIGH (ref 4–32)
BILIRUB SERPL-MCNC: 0.5 MG/DL — SIGNIFICANT CHANGE UP (ref 0.2–1.2)
BLOOD GAS ARTERIAL COMPREHENSIVE RESULT: SIGNIFICANT CHANGE UP
BUN SERPL-MCNC: 14 MG/DL — SIGNIFICANT CHANGE UP (ref 7–23)
CALCIUM SERPL-MCNC: 8.3 MG/DL — LOW (ref 8.4–10.5)
CHLORIDE SERPL-SCNC: 100 MMOL/L — SIGNIFICANT CHANGE UP (ref 98–107)
CO2 SERPL-SCNC: 24 MMOL/L — SIGNIFICANT CHANGE UP (ref 22–31)
CREAT SERPL-MCNC: 0.54 MG/DL — SIGNIFICANT CHANGE UP (ref 0.5–1.3)
EGFR: 105 ML/MIN/1.73M2 — SIGNIFICANT CHANGE UP
EGFR: 105 ML/MIN/1.73M2 — SIGNIFICANT CHANGE UP
GLUCOSE BLDC GLUCOMTR-MCNC: 109 MG/DL — HIGH (ref 70–99)
GLUCOSE BLDC GLUCOMTR-MCNC: 111 MG/DL — HIGH (ref 70–99)
GLUCOSE BLDC GLUCOMTR-MCNC: 129 MG/DL — HIGH (ref 70–99)
GLUCOSE BLDC GLUCOMTR-MCNC: 160 MG/DL — HIGH (ref 70–99)
GLUCOSE SERPL-MCNC: 192 MG/DL — HIGH (ref 70–99)
GRAM STN FLD: SIGNIFICANT CHANGE UP
HCT VFR BLD CALC: 35.9 % — SIGNIFICANT CHANGE UP (ref 34.5–45)
HGB BLD-MCNC: 11.7 G/DL — SIGNIFICANT CHANGE UP (ref 11.5–15.5)
INR BLD: 1.09 RATIO — SIGNIFICANT CHANGE UP (ref 0.85–1.16)
MAGNESIUM SERPL-MCNC: 2.3 MG/DL — SIGNIFICANT CHANGE UP (ref 1.6–2.6)
MCHC RBC-ENTMCNC: 30.8 PG — SIGNIFICANT CHANGE UP (ref 27–34)
MCHC RBC-ENTMCNC: 32.6 G/DL — SIGNIFICANT CHANGE UP (ref 32–36)
MCV RBC AUTO: 94.5 FL — SIGNIFICANT CHANGE UP (ref 80–100)
MRSA PCR RESULT.: SIGNIFICANT CHANGE UP
NRBC # BLD AUTO: 0 K/UL — SIGNIFICANT CHANGE UP (ref 0–0)
NRBC # FLD: 0 K/UL — SIGNIFICANT CHANGE UP (ref 0–0)
NRBC BLD AUTO-RTO: 0 /100 WBCS — SIGNIFICANT CHANGE UP (ref 0–0)
PHOSPHATE SERPL-MCNC: 2.9 MG/DL — SIGNIFICANT CHANGE UP (ref 2.5–4.5)
PLATELET # BLD AUTO: 239 K/UL — SIGNIFICANT CHANGE UP (ref 150–400)
POTASSIUM SERPL-MCNC: 3.6 MMOL/L — SIGNIFICANT CHANGE UP (ref 3.5–5.3)
POTASSIUM SERPL-SCNC: 3.6 MMOL/L — SIGNIFICANT CHANGE UP (ref 3.5–5.3)
PROT SERPL-MCNC: 6.3 G/DL — SIGNIFICANT CHANGE UP (ref 6–8.3)
PROTHROM AB SERPL-ACNC: 13 SEC — SIGNIFICANT CHANGE UP (ref 9.9–13.4)
RBC # BLD: 3.8 M/UL — SIGNIFICANT CHANGE UP (ref 3.8–5.2)
RBC # FLD: 14.8 % — HIGH (ref 10.3–14.5)
S AUREUS DNA NOSE QL NAA+PROBE: SIGNIFICANT CHANGE UP
SODIUM SERPL-SCNC: 138 MMOL/L — SIGNIFICANT CHANGE UP (ref 135–145)
SPECIMEN SOURCE: SIGNIFICANT CHANGE UP
WBC # BLD: 24.85 K/UL — HIGH (ref 3.8–10.5)
WBC # FLD AUTO: 24.85 K/UL — HIGH (ref 3.8–10.5)

## 2025-04-08 RX ORDER — SOD PHOS DI, MONO/K PHOS MONO 250 MG
2 TABLET ORAL ONCE
Refills: 0 | Status: COMPLETED | OUTPATIENT
Start: 2025-04-08 | End: 2025-04-08

## 2025-04-08 RX ORDER — MIDAZOLAM IN 0.9 % SOD.CHLORID 1 MG/ML
2 PLASTIC BAG, INJECTION (ML) INTRAVENOUS ONCE
Refills: 0 | Status: DISCONTINUED | OUTPATIENT
Start: 2025-04-08 | End: 2025-04-08

## 2025-04-08 RX ORDER — AMLODIPINE BESYLATE 10 MG/1
5 TABLET ORAL DAILY
Refills: 0 | Status: DISCONTINUED | OUTPATIENT
Start: 2025-04-08 | End: 2025-04-08

## 2025-04-08 RX ORDER — DEXMEDETOMIDINE HYDROCHLORIDE IN SODIUM CHLORIDE 4 UG/ML
0.25 INJECTION INTRAVENOUS
Qty: 400 | Refills: 0 | Status: DISCONTINUED | OUTPATIENT
Start: 2025-04-08 | End: 2025-04-09

## 2025-04-08 RX ORDER — NOREPINEPHRINE BITARTRATE 8 MG
0.05 SOLUTION INTRAVENOUS
Qty: 8 | Refills: 0 | Status: DISCONTINUED | OUTPATIENT
Start: 2025-04-08 | End: 2025-04-09

## 2025-04-08 RX ORDER — KETAMINE HCL IN 0.9 % NACL 50 MG/5 ML
0.25 SYRINGE (ML) INTRAVENOUS
Qty: 1000 | Refills: 0 | Status: DISCONTINUED | OUTPATIENT
Start: 2025-04-08 | End: 2025-04-09

## 2025-04-08 RX ORDER — SODIUM CHLORIDE 9 G/1000ML
1000 INJECTION, SOLUTION INTRAVENOUS ONCE
Refills: 0 | Status: COMPLETED | OUTPATIENT
Start: 2025-04-08 | End: 2025-04-08

## 2025-04-08 RX ORDER — ALBUTEROL SULFATE 2.5 MG/3ML
2.5 VIAL, NEBULIZER (ML) INHALATION EVERY 4 HOURS
Refills: 0 | Status: DISCONTINUED | OUTPATIENT
Start: 2025-04-08 | End: 2025-04-14

## 2025-04-08 RX ADMIN — Medication 1 APPLICATION(S): at 11:23

## 2025-04-08 RX ADMIN — Medication 25 GRAM(S): at 14:44

## 2025-04-08 RX ADMIN — Medication 40 MILLIEQUIVALENT(S): at 11:24

## 2025-04-08 RX ADMIN — Medication 40 MILLIGRAM(S): at 11:23

## 2025-04-08 RX ADMIN — BUDESONIDE 0.5 MILLIGRAM(S): 90 AEROSOL, POWDER RESPIRATORY (INHALATION) at 08:03

## 2025-04-08 RX ADMIN — INSULIN LISPRO 1: 100 INJECTION, SOLUTION INTRAVENOUS; SUBCUTANEOUS at 00:09

## 2025-04-08 RX ADMIN — Medication 2 MILLIGRAM(S): at 09:00

## 2025-04-08 RX ADMIN — Medication 25 GRAM(S): at 21:50

## 2025-04-08 RX ADMIN — Medication 4 MILLILITER(S): at 03:52

## 2025-04-08 RX ADMIN — SODIUM CHLORIDE 1000 MILLILITER(S): 9 INJECTION, SOLUTION INTRAVENOUS at 16:26

## 2025-04-08 RX ADMIN — Medication 250 MILLIGRAM(S): at 00:11

## 2025-04-08 RX ADMIN — Medication 4 MILLILITER(S): at 14:55

## 2025-04-08 RX ADMIN — Medication 4 MG/HR: at 01:00

## 2025-04-08 RX ADMIN — Medication 75 MICROGRAM(S): at 06:06

## 2025-04-08 RX ADMIN — BUDESONIDE 0.5 MILLIGRAM(S): 90 AEROSOL, POWDER RESPIRATORY (INHALATION) at 18:50

## 2025-04-08 RX ADMIN — AMLODIPINE BESYLATE 5 MILLIGRAM(S): 10 TABLET ORAL at 11:23

## 2025-04-08 RX ADMIN — Medication 15 MILLILITER(S): at 17:39

## 2025-04-08 RX ADMIN — Medication 4 MILLILITER(S): at 08:03

## 2025-04-08 RX ADMIN — Medication 4 MILLILITER(S): at 22:01

## 2025-04-08 RX ADMIN — DEXMEDETOMIDINE HYDROCHLORIDE IN SODIUM CHLORIDE 5.83 MICROGRAM(S)/KG/HR: 4 INJECTION INTRAVENOUS at 11:25

## 2025-04-08 RX ADMIN — Medication 15 MILLILITER(S): at 06:08

## 2025-04-08 RX ADMIN — Medication 25 GRAM(S): at 06:04

## 2025-04-08 RX ADMIN — Medication 25 GRAM(S): at 02:42

## 2025-04-08 RX ADMIN — METHYLPREDNISOLONE ACETATE 40 MILLIGRAM(S): 80 INJECTION, SUSPENSION INTRA-ARTICULAR; INTRALESIONAL; INTRAMUSCULAR; SOFT TISSUE at 17:40

## 2025-04-08 RX ADMIN — NOREPINEPHRINE BITARTRATE 8.74 MICROGRAM(S)/KG/MIN: 8 SOLUTION at 17:39

## 2025-04-08 RX ADMIN — Medication 1 APPLICATION(S): at 06:05

## 2025-04-08 RX ADMIN — METHYLPREDNISOLONE ACETATE 40 MILLIGRAM(S): 80 INJECTION, SUSPENSION INTRA-ARTICULAR; INTRALESIONAL; INTRAMUSCULAR; SOFT TISSUE at 06:05

## 2025-04-08 RX ADMIN — Medication 2.33 MG/KG/HR: at 08:37

## 2025-04-08 RX ADMIN — ENOXAPARIN SODIUM 40 MILLIGRAM(S): 100 INJECTION SUBCUTANEOUS at 14:44

## 2025-04-08 RX ADMIN — Medication 2 PACKET(S): at 11:24

## 2025-04-08 RX ADMIN — Medication 15 MILLILITER(S): at 11:23

## 2025-04-08 RX ADMIN — Medication 2.5 MILLIGRAM(S): at 18:49

## 2025-04-08 RX ADMIN — Medication 4 MG/HR: at 08:03

## 2025-04-08 RX ADMIN — Medication 2.5 MILLIGRAM(S): at 15:55

## 2025-04-08 RX ADMIN — DEXMEDETOMIDINE HYDROCHLORIDE IN SODIUM CHLORIDE 5.83 MICROGRAM(S)/KG/HR: 4 INJECTION INTRAVENOUS at 21:51

## 2025-04-08 RX ADMIN — PROPOFOL 16.8 MICROGRAM(S)/KG/MIN: 10 INJECTION, EMULSION INTRAVENOUS at 08:37

## 2025-04-08 RX ADMIN — Medication 4 MILLILITER(S): at 18:50

## 2025-04-08 RX ADMIN — Medication 2.5 MILLIGRAM(S): at 22:01

## 2025-04-08 RX ADMIN — PROPOFOL 16.8 MICROGRAM(S)/KG/MIN: 10 INJECTION, EMULSION INTRAVENOUS at 21:50

## 2025-04-08 RX ADMIN — Medication 4 MG/HR: at 03:50

## 2025-04-08 RX ADMIN — POLYETHYLENE GLYCOL 3350 17 GRAM(S): 17 POWDER, FOR SOLUTION ORAL at 06:05

## 2025-04-08 NOTE — PROCEDURE NOTE - NSPROCDETAILS_GEN_ALL_CORE
patient pre-oxygenated, tube inserted, placement confirmed
patient pre-oxygenated, tube inserted, placement confirmed
guidewire recovered/lumen(s) aspirated and flushed/sterile dressing applied/sterile technique, catheter placed/ultrasound guidance with use of sterile gel and probe cove
location identified, draped/prepped, sterile technique used/blood seen on insertion/dressing applied/flushes easily/secured in place/sterile technique, catheter placed
patient pre-oxygenated, tube inserted, placement confirmed
location identified, draped/prepped, sterile technique used, needle inserted/introduced/positive blood return obtained via catheter/connected to a pressurized flush line/sutured in place/hemostasis with direct pressure, dressing applied/Seldinger technique/all materials/supplies accounted for at end of procedure
location identified, draped/prepped, sterile technique used/blood seen on insertion/dressing applied/flushes easily/secured in place/sterile technique, catheter placed

## 2025-04-08 NOTE — PROCEDURE NOTE - NSINDICATIONS_GEN_A_CORE
other
critical illness
critical patient
airway protection
antibiotic therapy/emergency venous access
arterial puncture to obtain ABG's/critical patient
antibiotic therapy/emergency venous access/other
respiratory distress

## 2025-04-08 NOTE — CHART NOTE - NSCHARTNOTEFT_GEN_A_CORE
Left brachial A-line removed on 04/08/25 @ 13:20 with pressure added for 15 minutes. No signs of bleeding noted after removal.    PGY-1  Satnam Jones

## 2025-04-08 NOTE — CHART NOTE - NSCHARTNOTEFT_GEN_A_CORE
: Jon Perrin    PROCEDURE:  [ ] LIMITED ECHO  [x] LIMITED CHEST  [ ] LIMITED RETROPERITONEAL  [ ] LIMITED ABDOMINAL  [ ] LIMITED DVT  [ ] NEEDLE GUIDANCE VASCULAR  [ ] NEEDLE GUIDANCE THORACENTESIS  [ ] NEEDLE GUIDANCE PARACENTESIS  [ ] NEEDLE GUIDANCE PERICARDIOCENTESIS  [ ] OTHER    FINDINGS/INTERPRETATION:  Lungs   Lung sliding bilaterally  A-line predominant  Lung consolidation noted on left and small effusion bilaterally along midaxillary line : Jon Perrin    PROCEDURE:  [ ] LIMITED ECHO  [x] LIMITED CHEST  [ ] LIMITED RETROPERITONEAL  [ ] LIMITED ABDOMINAL  [ ] LIMITED DVT  [ ] NEEDLE GUIDANCE VASCULAR  [ ] NEEDLE GUIDANCE THORACENTESIS  [ ] NEEDLE GUIDANCE PARACENTESIS  [ ] NEEDLE GUIDANCE PERICARDIOCENTESIS  [ ] OTHER    FINDINGS/INTERPRETATION:  Lungs   Lung sliding bilaterally  A-line predominant  Lung consolidation noted on left and small effusion bilaterally along mid-axillary line : Jon Perrin    PROCEDURE:  [ ] LIMITED ECHO  [x] LIMITED CHEST  [ ] LIMITED RETROPERITONEAL  [ ] LIMITED ABDOMINAL  [ ] LIMITED DVT  [ ] NEEDLE GUIDANCE VASCULAR  [ ] NEEDLE GUIDANCE THORACENTESIS  [ ] NEEDLE GUIDANCE PARACENTESIS  [ ] NEEDLE GUIDANCE PERICARDIOCENTESIS  [ ] OTHER    FINDINGS/INTERPRETATION:  Lungs   Bilateral lung sliding  A-line predominant  Lung consolidation noted on left and small effusion bilaterally along mid-axillary line : Jon Perrin    INDICATION: Subcutaneous Emphysema    PROCEDURE:  [ ] LIMITED ECHO  [x] LIMITED CHEST  [ ] LIMITED RETROPERITONEAL  [ ] LIMITED ABDOMINAL  [ ] LIMITED DVT  [ ] NEEDLE GUIDANCE VASCULAR  [ ] NEEDLE GUIDANCE THORACENTESIS  [ ] NEEDLE GUIDANCE PARACENTESIS  [ ] NEEDLE GUIDANCE PERICARDIOCENTESIS  [ ] OTHER    FINDINGS/INTERPRETATION:  Lungs   Bilateral lung sliding  A-line predominant  Lung consolidation noted on left and small effusion bilaterally when scanning along mid-axillary line : Jon Perrin    INDICATION: Subcutaneous Emphysema    PROCEDURE:  [ ] LIMITED ECHO  [x] LIMITED CHEST  [ ] LIMITED RETROPERITONEAL  [ ] LIMITED ABDOMINAL  [ ] LIMITED DVT  [ ] NEEDLE GUIDANCE VASCULAR  [ ] NEEDLE GUIDANCE THORACENTESIS  [ ] NEEDLE GUIDANCE PARACENTESIS  [ ] NEEDLE GUIDANCE PERICARDIOCENTESIS  [ ] OTHER    FINDINGS/INTERPRETATION:  Lungs   Bilateral lung sliding  A-line predominant  Lung consolidation noted on left and small effusion bilaterally when scanning along mid-axillary line    Heart   difficult to obtain quality views : Jon Perrin    INDICATION: Subcutaneous Emphysema    PROCEDURE:  [ ] LIMITED ECHO  [x] LIMITED CHEST  [ ] LIMITED RETROPERITONEAL  [ ] LIMITED ABDOMINAL  [ ] LIMITED DVT  [ ] NEEDLE GUIDANCE VASCULAR  [ ] NEEDLE GUIDANCE THORACENTESIS  [ ] NEEDLE GUIDANCE PARACENTESIS  [ ] NEEDLE GUIDANCE PERICARDIOCENTESIS  [ ] OTHER    FINDINGS/INTERPRETATION:    Lungs   Bilateral lung sliding  A-line predominant  Lung consolidation noted on left and small effusion bilaterally when scanning along mid-axillary line    Heart   poor cardiac window    [employer] : Jon Perrin    INDICATION: Subcutaneous Emphysema    PROCEDURE:  [ ] LIMITED ECHO  [x] LIMITED CHEST  [ ] LIMITED RETROPERITONEAL  [ ] LIMITED ABDOMINAL  [ ] LIMITED DVT  [ ] NEEDLE GUIDANCE VASCULAR  [ ] NEEDLE GUIDANCE THORACENTESIS  [ ] NEEDLE GUIDANCE PARACENTESIS  [ ] NEEDLE GUIDANCE PERICARDIOCENTESIS  [ ] OTHER    FINDINGS/INTERPRETATION:  Lungs   Bilateral lung sliding  A-line predominant  Lung consolidation noted on left and small effusion bilaterally when scanning along mid-axillary line    Heart   poor cardiac window    [employer] : Jon Perrin    INDICATION: Status asthmaticus & Subcutaneous Emphysema    PROCEDURE:  [ ] LIMITED ECHO  [x] LIMITED CHEST  [ ] LIMITED RETROPERITONEAL  [ ] LIMITED ABDOMINAL  [ ] LIMITED DVT  [ ] NEEDLE GUIDANCE VASCULAR  [ ] NEEDLE GUIDANCE THORACENTESIS  [ ] NEEDLE GUIDANCE PARACENTESIS  [ ] NEEDLE GUIDANCE PERICARDIOCENTESIS  [ ] OTHER    FINDINGS/INTERPRETATION:  Lungs   Bilateral lung sliding  A-line predominant  Lung consolidation noted on left and small effusions bilaterally when scanning along mid-axillary line    Heart   poor cardiac window    [employer] : Jon Perrin    INDICATION: AHRF    PROCEDURE:  [ ] LIMITED ECHO  [x] LIMITED CHEST  [ ] LIMITED RETROPERITONEAL  [ ] LIMITED ABDOMINAL  [ ] LIMITED DVT  [ ] NEEDLE GUIDANCE VASCULAR  [ ] NEEDLE GUIDANCE THORACENTESIS  [ ] NEEDLE GUIDANCE PARACENTESIS  [ ] NEEDLE GUIDANCE PERICARDIOCENTESIS  [ ] OTHER    FINDINGS/INTERPRETATION:  Lungs   Bilateral lung sliding  A-line predominant  Lung consolidation noted on left and small effusions bilaterally when scanning along mid-axillary line    Heart   poor cardiac window    [employer] : Jon Perrin    INDICATION: Status asthmaticus, subcutaneous emphysema    PROCEDURE:  [ ] LIMITED ECHO  [x] LIMITED CHEST  [ ] LIMITED RETROPERITONEAL  [ ] LIMITED ABDOMINAL  [ ] LIMITED DVT  [ ] NEEDLE GUIDANCE VASCULAR  [ ] NEEDLE GUIDANCE THORACENTESIS  [ ] NEEDLE GUIDANCE PARACENTESIS  [ ] NEEDLE GUIDANCE PERICARDIOCENTESIS  [ ] OTHER    FINDINGS/INTERPRETATION:  Lungs   Bilateral lung sliding  A-line predominant  Lung consolidation noted on left and small effusions bilaterally when scanning along mid-axillary line    Heart   poor cardiac window    [employer] : Jon Perrin    INDICATION: Status asthmaticus, subcutaneous emphysema    PROCEDURE:  [ ] LIMITED ECHO  [x] LIMITED CHEST  [ ] LIMITED RETROPERITONEAL  [ ] LIMITED ABDOMINAL  [ ] LIMITED DVT  [ ] NEEDLE GUIDANCE VASCULAR  [ ] NEEDLE GUIDANCE THORACENTESIS  [ ] NEEDLE GUIDANCE PARACENTESIS  [ ] NEEDLE GUIDANCE PERICARDIOCENTESIS  [ ] OTHER    FINDINGS/INTERPRETATION:  Lungs   Bilateral lung sliding  A-line predominant  Lung consolidation noted on left and small effusions bilaterally when scanning along mid-axillary line      [employer]    I have assisted and supervised entire procedure.     Sridhar Fields MD.

## 2025-04-08 NOTE — PROCEDURE NOTE - PROCEDURE DATE TIME, MLM
08-Apr-2025 00:30
30-Mar-2025 20:29
29-Mar-2025 05:30
07-Apr-2025 21:30
08-Apr-2025 02:00
28-Mar-2025 19:00
30-Mar-2025 04:31
28-Mar-2025 18:30
07-Apr-2025 20:50

## 2025-04-08 NOTE — PROCEDURE NOTE - NSPOSTCAREGUIDE_GEN_A_CORE
Care for catheter as per unit/ICU protocols
Verbal/written post procedure instructions were given to patient/caregiver
Care for catheter as per unit/ICU protocols

## 2025-04-08 NOTE — PROGRESS NOTE ADULT - ASSESSMENT
59F with HTN, DM2, hypothyroid, and asthma here for status asthmaticus, unclear trigger; currently with gradual clinical improvement.     NEUROLOGIC   Not at baseline mental status. On sedation. Still requires sedation.   -Baseline AAOx3  -C/w propofol 40  -C/w dexomedomitdine   -D/c fentanyl 4/1   - C/w ketamine 0.5mcg  -D/c midazolam - -2mg Versed mg IVP PRN  -D/c cistracronium 3/31       CARDIOVASCULAR     # Vasoplegic Shock- RESOLVED    No pressor requirements.     #Elevated troponin- 2/2 demand ischemia from vasopressors- RESOLVED   Plateaued tropnoins 50s to 252 to 180s. No events on telemetry. Peaked and downtrending.   -Monitor   -If increasing again or high clinical suspicion, can consider PE r/o       #HTN- labile with hypotensive and hypertensive episodes with tactile stimulation induced vs tussive bouts   -C/w home lisinopril 10mg and start 5mg Amlodipine if BPs remain consistently elevated  -Weaned off nicardipine drip, s/p 3 doses of 25mg Hydralazine   -Continue monitoring BP     RESPIRATORY   #Status asthmaticus   Intubated.   -C/w mechanical ventilator  -Daily spontaneous breathing trials   -C/w  methylprednisolone 40 mg IV 12h (start 3/28)   -C/w duonebs 3 mL q4   -Completed empiric pna, s/p vanc and zosyn 4/7   - Holding off on CT angio for now.   -3/30 BAL without growth   -Bronchoscopy 3/30 showing secretions in L main and L lober with mucus aspirated out     # Subcutaneous Emphysema   Decreasing sq emphysema on RUE.   - Present s/p reintubation 03/30 night  - CXR 3/30 showing subcutaneous emphysema without pneumomediastinum   - Will consider CT chest if worsens  - CXR qd to assess progression     GASTROINTESTINAL   -JS   -OGT 3/30 placed, trickle feeds paused, feeding tube placed again 4/7  -C/w pantoprazole 40 mg IV qd for gastric mucosa ppx     #Gastric retention- 2/2 opioid induced constipation   Had a BM.   -OGT off suction; check gastric residuals intermittently   -Hold TF   -Treat constipation with methylnaltrexone 12 mg sq x 1  -D/c metoclopramide 10 mg IV q6 for GI motility; qtc on EKG 4/1 457 ms   -D/c erythromycin 250 mg IV qd for GI motility   - S/p mineral castor oil enema   - D/c  lactulose 20 mg syrup TID   -abd XR 4/3 with some stool burden    GENITOURINARY/RENAL   #HAGMA 2/2 hypercarbia and lactic acidosis- RESOLVED   #Respiratory acidosis- ventilator changes   ABG pH 7.40.   - On admission, VBG pH 6.98 and PCO2 125. Currently ABG 7.43.   - Treat with mechanical ventilation for exhalation   - Serial ABGs   - S/p Thiamine x 1 for lactic acidosis     #Tai   -TOV 4/4        ENDOCRINE   #DM   Glycemic range within 100s-130s.   -NPO   -Low ISS, q6 fingersticks  - D/c insulin NPH 7u q6h, q6 fingersticks  -Hold home metformin 500 mg po qd   - Hba1c 5.7     #Hypothyroid   -C/w  levothyroxine 55 mcg IV qd       INFECTIOUS DISEASE   Not meeting criteria for SIRS. Unclear trigger for asthma. 2 Febrile episode in since 4/7 AM.   -WBC on admission 11.21 and afebrile   -New leukocytosis 24, 4/8  -Blood cx  NGTD 3/28, pending new blood cx and sputum cultures  -Sputum cx no PMNs or organisms 3/30, pending new sputum cultures   -Urine showing 300 proteinuria and 20 hematuria; no evidence of infection   -Negative viral panel  - urine legionella negative   - urine streptococcus negative   -Completed ceftriaxone 1000 mg IV qd x 5 days (started 3/29-4/3)   -Completed azithromycin 500 mg IV qd x 3 days (started 3/28-3/30)   -4/4 and full respiratory panel viral negative; blood cx NGTD   -S/p zosyn and vanc 4/7    HEMATOLOGIC/ONCOLOGIC   #Leukocytosis- likely 2/2 infection   Rising suspicion for infection source given left basilar consolidation on CXR.   -Monitor    PROPHYLACTIC   -DVT ppx: enoxaparin 40 mg sq qd     ETHICS   -HCP daughter (Jojo)   -Full code    59F with HTN, DM2, hypothyroid, and asthma here for status asthmaticus, unclear trigger; currently with gradual clinical improvement.     NEUROLOGIC   Not at baseline mental status. On sedation. Still requires sedation.   -Baseline AAOx3  -C/w propofol 40  -C/w dexomedomitdine 0.5  - C/w ketamine 0.5mcg  -D/c fentanyl 4/1   -D/c midazolam -2mg Versed mg IVP PRN  -D/c cistracronium 3/31       CARDIOVASCULAR     # Vasoplegic Shock- RESOLVED    No pressor requirements.     #Elevated troponin- 2/2 demand ischemia from vasopressors- RESOLVED   Plateaued tropnoins 50s to 252 to 180s. No events on telemetry. Peaked and downtrending.   -Monitor   -If increasing again or high clinical suspicion, can consider PE r/o       #HTN- labile with hypotensive and hypertensive episodes with tactile stimulation induced vs tussive bouts   -C/w 5mg Amlodipine for pressure control  -Hold home lisinopril 10mg  -Weaned off nicardipine drip, s/p 3 doses of 25mg Hydralazine   -Continue monitoring BP     RESPIRATORY   #Status asthmaticus   Intubated.   -C/w mechanical ventilator  -Daily spontaneous breathing trials   -C/w  methylprednisolone 40 mg IV 12h (start 3/28)   -C/w duonebs 3 mL q4   -Completed empiric pna, s/p vanc and zosyn 4/7   - Holding off on CT angio for now.   -3/30 BAL without growth   -Bronchoscopy 3/30 showing secretions in L main and L lober with mucus aspirated out     # Subcutaneous Emphysema   -Decreasing sq emphysema on RUE and b/l chest wall - improving daily  -Present s/p reintubation 03/30 night  -CXR 3/30 showing subcutaneous emphysema without pneumomediastinum   - Will consider CT chest if worsens    GASTROINTESTINAL   -JS   -OGT 3/30 placed, trickle feeds paused, feeding tube placed again 4/7  -C/w pantoprazole 40 mg IV qd for gastric mucosa ppx     #Gastric retention- 2/2 opioid induced constipation   Had a BM.   -OGT off suction; check gastric residuals intermittently   -Continue tube feeds 4/8  -Treat constipation with methylnaltrexone 12 mg sq x 1  -D/c metoclopramide 10 mg IV q6 for GI motility; qtc on EKG 4/1 457 ms   -D/c erythromycin 250 mg IV qd for GI motility   - S/p mineral castor oil enema   - D/c  lactulose 20 mg syrup TID   -abd XR 4/3 with some stool burden    GENITOURINARY/RENAL   #HAGMA 2/2 hypercarbia and lactic acidosis- RESOLVED   #Respiratory acidosis- ventilator changes   ABG pH 7.40.   - On admission, VBG pH 6.98 and PCO2 125. Currently ABG 7.43.   - Treat with mechanical ventilation for exhalation   - Serial ABGs   - S/p Thiamine x 1 for lactic acidosis     #Tai   -TOV 4/4        ENDOCRINE   #DM   Glycemic range within 100s-130s.   -NPO   -Low ISS, q6 fingersticks  - D/c insulin NPH 7u q6h, q6 fingersticks  -Hold home metformin 500 mg po qd   - Hba1c 5.7     #Hypothyroid   -C/w  levothyroxine 55 mcg IV qd       INFECTIOUS DISEASE   Not meeting criteria for SIRS. Unclear trigger for asthma. 2 Febrile episode in since 4/7 AM.   -WBC on admission 11.21 and afebrile   -New leukocytosis 24, 4/8  -Triple lumen taken out 4/7, A line taken out 4/8  -Blood cx NGTD 3/28  -Sputum cx no PMNs or organisms 3/30, pending new sputum cultures   -Urine showing 300 proteinuria and 20 hematuria; no evidence of infection   -Negative viral panel  - urine legionella negative   - urine streptococcus negative   -MRSA (-) 3/29  -Completed ceftriaxone 1000 mg IV qd x 5 days (started 3/29-4/3)   -Completed azithromycin 500 mg IV qd x 3 days (started 3/28-3/30)   -4/4 and full respiratory panel viral negative; blood cx NGTD   -S/p zosyn and vanc 4/7  -Continue Zosyn 3.375g q8 course of 7 days    HEMATOLOGIC/ONCOLOGIC   #Leukocytosis- likely 2/2 infection   Rising suspicion for infection source given left basilar consolidation on CXR.   -Monitor    PROPHYLACTIC   -DVT ppx: enoxaparin 40 mg sq qd     ETHICS   -HCP daughter (Jojo)   -Full code

## 2025-04-08 NOTE — PROCEDURE NOTE - NSPROCNAME_GEN_A_CORE
Peripheral Line Insertion
Feeding Tube Placement
Bronchoscopy
Peripheral Line Insertion
Tracheal Intubation
Arterial Puncture/Cannulation
Tracheal Intubation
Tracheal Intubation
Central Line Insertion

## 2025-04-08 NOTE — PROGRESS NOTE ADULT - SUBJECTIVE AND OBJECTIVE BOX
MICU PROGRESS NOTE     HISTORY OF PRESENT ILLNESS - 60 y/o woman with pmhx of asthma, HTN, DM and hypothyroidism in the MICU for status asthmaticus     Overnight - Patient self exctubated and was unable to tolerate. She was re-intubated. Febrile 100.4 with new blood and sputum cultures sent. S/p vanc 1750mg and Zosyn 3.375 g  Intubated and sedated.     ROS: All negative except as listed above.    VITAL SIGNS:  ICU Vital Signs Last 24 Hrs  T(C): 37.1 (05 Apr 2025 04:00), Max: 37.8 (04 Apr 2025 16:00)  T(F): 98.8 (05 Apr 2025 04:00), Max: 100 (04 Apr 2025 16:00)  HR: 65 (05 Apr 2025 07:47) (55 - 82)  BP: --  BP(mean): --  ABP: 148/57 (05 Apr 2025 07:00) (111/56 - 179/87)  ABP(mean): 88 (05 Apr 2025 07:00) (77 - 122)  RR: 20 (05 Apr 2025 07:00) (19 - 24)  SpO2: 98% (05 Apr 2025 07:47) (94% - 100%)    O2 Parameters below as of 05 Apr 2025 07:00  Patient On (Oxygen Delivery Method): ventilator    O2 Concentration (%): 30      Mode: AC/ CMV (Assist Control/ Continuous Mandatory Ventilation), RR (machine): 18, TV (machine): 400, FiO2: 30, PEEP: 6, ITime: 1, MAP: 10, PIP: 28  Plateau pressure:   P/F ratio:     04-04 @ 07:01  -  04-05 @ 07:00  --------------------------------------------------------  IN: 1884.8 mL / OUT: 2725 mL / NET: -840.2 mL      CAPILLARY BLOOD GLUCOSE      POCT Blood Glucose.: 102 mg/dL (05 Apr 2025 05:14)      ECG: reviewed.    PHYSICAL EXAM:    General: NAD, overweight habitus   Neuro: sedated, AAOx0, no grimace to noxious stimuli, no spontaneity   HEENT: PERRLA b/l, mildly moist mucous membranes  Chest: nonTTP, sq emphysema palpable but improving  Heart: S1/S2, RRR    Lungs: coarse inspiratory breath sounds b/l,  MV   Abd: soft, nonTTP, nondistended   Ext: trace pitting edema pretibial, palpable sq emphysema in R forearm   Pulses: radial 2+ b/l   Lines/tubes/drains: L IJV triple lumen, L axillary A line, jonas   Psych: unable to assess     MEDICATIONS  (STANDING):  albuterol/ipratropium for Nebulization 3 milliLiter(s) Nebulizer every 4 hours  buDESOnide    Inhalation Suspension 0.5 milliGRAM(s) Inhalation two times a day  chlorhexidine 0.12% Liquid 15 milliLiter(s) Oral Mucosa every 12 hours  chlorhexidine 2% Cloths 1 Application(s) Topical <User Schedule>  dexMEDEtomidine Infusion 0.2 MICROgram(s)/kG/Hr (4.66 mL/Hr) IV Continuous <Continuous>  dextrose 5%. 1000 milliLiter(s) (50 mL/Hr) IV Continuous <Continuous>  dextrose 5%. 1000 milliLiter(s) (100 mL/Hr) IV Continuous <Continuous>  dextrose 50% Injectable 25 Gram(s) IV Push once  dextrose 50% Injectable 12.5 Gram(s) IV Push once  dextrose 50% Injectable 25 Gram(s) IV Push once  enoxaparin Injectable 40 milliGRAM(s) SubCutaneous every 24 hours  glucagon  Injectable 1 milliGRAM(s) IntraMuscular once  influenza   Vaccine 0.5 milliLiter(s) IntraMuscular once  insulin lispro (ADMELOG) corrective regimen sliding scale   SubCutaneous every 6 hours  levothyroxine 75 MICROGram(s) Oral daily  lisinopril 10 milliGRAM(s) Oral daily  methylPREDNISolone sodium succinate Injectable 40 milliGRAM(s) IV Push every 12 hours  midazolam Infusion 0.02 mG/kG/Hr (1.86 mL/Hr) IV Continuous <Continuous>  multivitamin/minerals/iron Oral Solution (CENTRUM) 15 milliLiter(s) Oral daily  niCARdipine Infusion 2.5 mG/Hr (12.5 mL/Hr) IV Continuous <Continuous>  pantoprazole  Injectable 40 milliGRAM(s) IV Push daily  petrolatum Ophthalmic Ointment 1 Application(s) Both EYES daily  polyethylene glycol 3350 17 Gram(s) Oral two times a day  propofol Infusion 50.072 MICROgram(s)/kG/Min (28 mL/Hr) IV Continuous <Continuous>  senna Syrup 10 milliLiter(s) Oral daily    MEDICATIONS  (PRN):  dextrose Oral Gel 15 Gram(s) Oral once PRN Blood Glucose LESS THAN 70 milliGRAM(s)/deciliter      ALLERGIES:  Allergies    No Known Allergies    Intolerances        LABS:                        11.2   13.54 )-----------( 166      ( 05 Apr 2025 00:40 )             34.4     04-05    140  |  101  |  16  ----------------------------<  112[H]  4.6   |  24  |  0.54    Ca    8.4      05 Apr 2025 00:40  Phos  4.4     04-05  Mg     2.30     04-05    TPro  6.2  /  Alb  2.9[L]  /  TBili  0.3  /  DBili  x   /  AST  51[H]  /  ALT  61[H]  /  AlkPhos  100  04-05    PT/INR - ( 05 Apr 2025 00:40 )   PT: 12.1 sec;   INR: 1.02 ratio         PTT - ( 05 Apr 2025 00:40 )  PTT:27.6 sec  Urinalysis Basic - ( 05 Apr 2025 00:40 )    Color: x / Appearance: x / SG: x / pH: x  Gluc: 112 mg/dL / Ketone: x  / Bili: x / Urobili: x   Blood: x / Protein: x / Nitrite: x   Leuk Esterase: x / RBC: x / WBC x   Sq Epi: x / Non Sq Epi: x / Bacteria: x      ABG:  pH, Arterial: 7.50 (04-05-25 @ 00:40)  pCO2, Arterial: 44 mmHg (04-05-25 @ 00:40)  pO2, Arterial: 125 mmHg (04-05-25 @ 00:40)      vBG:    Micro:    Culture - Blood (collected 03-28-25 @ 16:39)  Source: Blood Blood-Peripheral  Final Report (04-02-25 @ 20:00):    No growth at 5 days    Culture - Blood (collected 03-28-25 @ 16:34)  Source: Blood Blood-Peripheral  Final Report (04-02-25 @ 20:00):    No growth at 5 days       Urinalysis with Rflx Culture (collected 03-28-25 @ 16:34)         RADIOLOGY & ADDITIONAL TESTS: Reviewed.  New left basilar opacity on CXR. MICU PROGRESS NOTE     HISTORY OF PRESENT ILLNESS - 58 y/o woman with pmhx of asthma, HTN, DM and hypothyroidism in the MICU for status asthmaticus     Overnight - Patient self exctubated and was unable to tolerate. She was re-intubated. Febrile 100.4 with new blood and sputum cultures sent. S/p vanc 1750mg and Zosyn 3.375 g  Intubated and sedated.     ROS: All negative except as listed above.      ICU Vital Signs Last 24 Hrs  T(C): 37.6 (08 Apr 2025 04:00), Max: 38 (08 Apr 2025 00:00)  T(F): 99.7 (08 Apr 2025 04:00), Max: 100.4 (08 Apr 2025 00:00)  HR: 103 (08 Apr 2025 07:00) (57 - 145)  BP: --  BP(mean): --  ABP: 147/61 (08 Apr 2025 07:00) (85/57 - 219/96)  ABP(mean): 91 (08 Apr 2025 07:00) (63 - 293)  RR: 21 (08 Apr 2025 07:00) (17 - 33)  SpO2: 100% (08 Apr 2025 07:00) (91% - 100%)    O2 Parameters below as of 08 Apr 2025 07:00  Patient On (Oxygen Delivery Method): ventilator          LABS:                        11.7   24.85 )-----------( 239      ( 08 Apr 2025 00:54 )             35.9     04-08    138  |  100  |  14  ----------------------------<  192[H]  3.6   |  24  |  0.54    Ca    8.3[L]      08 Apr 2025 00:54  Phos  2.9     04-08  Mg     2.30     04-08    TPro  6.3  /  Alb  2.9[L]  /  TBili  0.5  /  DBili  x   /  AST  42[H]  /  ALT  71[H]  /  AlkPhos  135[H]  04-08    PT/INR - ( 08 Apr 2025 00:54 )   PT: 13.0 sec;   INR: 1.09 ratio         PTT - ( 08 Apr 2025 00:54 )  PTT:25.4 sec  Urinalysis Basic - ( 08 Apr 2025 00:54 )    Color: x / Appearance: x / SG: x / pH: x  Gluc: 192 mg/dL / Ketone: x  / Bili: x / Urobili: x   Blood: x / Protein: x / Nitrite: x   Leuk Esterase: x / RBC: x / WBC x   Sq Epi: x / Non Sq Epi: x / Bacteria: x      ABG - ( 08 Apr 2025 00:54 )  pH, Arterial: 7.40  pH, Blood: x     /  pCO2: 45    /  pO2: 130   / HCO3: 28    / Base Excess: 2.6   /  SaO2: 98.8                  Culture - Sputum (collected 07 Apr 2025 12:07)  Source: Trach Asp Tracheal Aspirate  Gram Stain (07 Apr 2025 18:51):    Rare polymorphonuclear leukocytes per low power field    No Squamous epithelial cells per low power field    No organisms seen per oil power field      CARDIAC MARKERS ( 07 Apr 2025 00:30 )  x     / x     / x     / x     / 1.7 ng/mL            Mode: AC/ CMV (Assist Control/ Continuous Mandatory Ventilation), RR (machine): 18, TV (machine): 400, FiO2: 50, PEEP: 6, ITime: 1, MAP: 10, PIP: 28  Plateau pressure:   P/F ratio:         07 Apr 2025 07:01  -  08 Apr 2025 07:00  --------------------------------------------------------  IN:    Dexmedetomidine: 204.6 mL    Dexmedetomidine: 56.7 mL    Glucerna 1.5: 210 mL    Ketamine: 56.4 mL    Norepinephrine: 38.5 mL    Propofol: 92 mL    Propofol: 179.2 mL  Total IN: 837.4 mL    OUT:    Rectal Tube (mL): 800 mL    Voided (mL): 1645 mL  Total OUT: 2445 mL    Total NET: -1607.6 mL          CAPILLARY BLOOD GLUCOSE    POCT Blood Glucose.: 109 mg/dL (08 Apr 2025 06:10)      ECG: reviewed.    PHYSICAL EXAM:    General: NAD, overweight habitus   Neuro: sedated, AAOx0, no grimace to noxious stimuli, no spontaneity   HEENT: PERRLA b/l, mildly moist mucous membranes  Chest: nonTTP, sq emphysema palpable but improving  Heart: S1/S2, RRR    Lungs: coarse inspiratory breath sounds b/l,  MV   Abd: soft, nonTTP, nondistended   Ext: trace pitting edema pretibial, palpable sq emphysema in R forearm   Pulses: radial 2+ b/l   Lines/tubes/drains: L IJV triple lumen, L axillary A line, jonas   Psych: unable to assess     MEDICATIONS  (STANDING):  albuterol Continuous Nebulization (Vibrating Mesh Nebulizer) 10 mG/Hr (4 mL/Hr) Continuous Inhalation. <Continuous>  buDESOnide    Inhalation Suspension 0.5 milliGRAM(s) Inhalation two times a day  chlorhexidine 0.12% Liquid 15 milliLiter(s) Oral Mucosa every 12 hours  chlorhexidine 2% Cloths 1 Application(s) Topical <User Schedule>  dexMEDEtomidine Infusion 0.25 MICROgram(s)/kG/Hr (5.83 mL/Hr) IV Continuous <Continuous>  dextrose 5%. 1000 milliLiter(s) (100 mL/Hr) IV Continuous <Continuous>  dextrose 5%. 1000 milliLiter(s) (50 mL/Hr) IV Continuous <Continuous>  dextrose 50% Injectable 25 Gram(s) IV Push once  dextrose 50% Injectable 12.5 Gram(s) IV Push once  dextrose 50% Injectable 25 Gram(s) IV Push once  enoxaparin Injectable 40 milliGRAM(s) SubCutaneous every 24 hours  glucagon  Injectable 1 milliGRAM(s) IntraMuscular once  influenza   Vaccine 0.5 milliLiter(s) IntraMuscular once  insulin lispro (ADMELOG) corrective regimen sliding scale   SubCutaneous every 6 hours  levothyroxine 75 MICROGram(s) Oral daily  methylPREDNISolone sodium succinate Injectable 40 milliGRAM(s) IV Push every 12 hours  multivitamin/minerals/iron Oral Solution (CENTRUM) 15 milliLiter(s) Oral daily  pantoprazole  Injectable 40 milliGRAM(s) IV Push daily  petrolatum Ophthalmic Ointment 1 Application(s) Both EYES daily  piperacillin/tazobactam IVPB.. 3.375 Gram(s) IV Intermittent every 8 hours  polyethylene glycol 3350 17 Gram(s) Oral two times a day  propofol Infusion 30 MICROgram(s)/kG/Min (16.8 mL/Hr) IV Continuous <Continuous>  senna Syrup 10 milliLiter(s) Oral daily  sodium chloride 3%  Inhalation 4 milliLiter(s) Inhalation every 6 hours    MEDICATIONS  (PRN):  dextrose Oral Gel 15 Gram(s) Oral once PRN Blood Glucose LESS THAN 70 milliGRAM(s)/deciliter      ALLERGIES:  Allergies    No Known Allergies    Intolerances         RADIOLOGY & ADDITIONAL TESTS: Reviewed.  New left basilar opacity on CXR. MICU PROGRESS NOTE     HISTORY OF PRESENT ILLNESS - 60 y/o woman with pmhx of asthma, HTN, DM and hypothyroidism in the MICU for status asthmaticus     Overnight - Patient self exctubated and was unable to tolerate. She was re-intubated. Febrile 100.4 with new blood and sputum cultures sent. S/p vanc 1750mg and Zosyn 3.375 g  Intubated and sedated.     ROS: All negative except as listed above.      ICU Vital Signs Last 24 Hrs  T(C): 37.6 (08 Apr 2025 04:00), Max: 38 (08 Apr 2025 00:00)  T(F): 99.7 (08 Apr 2025 04:00), Max: 100.4 (08 Apr 2025 00:00)  HR: 103 (08 Apr 2025 07:00) (57 - 145)  BP: --  BP(mean): --  ABP: 147/61 (08 Apr 2025 07:00) (85/57 - 219/96)  ABP(mean): 91 (08 Apr 2025 07:00) (63 - 293)  RR: 21 (08 Apr 2025 07:00) (17 - 33)  SpO2: 100% (08 Apr 2025 07:00) (91% - 100%)    O2 Parameters below as of 08 Apr 2025 07:00  Patient On (Oxygen Delivery Method): ventilator          LABS:                        11.7   24.85 )-----------( 239      ( 08 Apr 2025 00:54 )             35.9     04-08    138  |  100  |  14  ----------------------------<  192[H]  3.6   |  24  |  0.54    Ca    8.3[L]      08 Apr 2025 00:54  Phos  2.9     04-08  Mg     2.30     04-08    TPro  6.3  /  Alb  2.9[L]  /  TBili  0.5  /  DBili  x   /  AST  42[H]  /  ALT  71[H]  /  AlkPhos  135[H]  04-08    PT/INR - ( 08 Apr 2025 00:54 )   PT: 13.0 sec;   INR: 1.09 ratio         PTT - ( 08 Apr 2025 00:54 )  PTT:25.4 sec  Urinalysis Basic - ( 08 Apr 2025 00:54 )    Color: x / Appearance: x / SG: x / pH: x  Gluc: 192 mg/dL / Ketone: x  / Bili: x / Urobili: x   Blood: x / Protein: x / Nitrite: x   Leuk Esterase: x / RBC: x / WBC x   Sq Epi: x / Non Sq Epi: x / Bacteria: x      ABG - ( 08 Apr 2025 00:54 )  pH, Arterial: 7.40  pH, Blood: x     /  pCO2: 45    /  pO2: 130   / HCO3: 28    / Base Excess: 2.6   /  SaO2: 98.8                  Culture - Sputum (collected 07 Apr 2025 12:07)  Source: Trach Asp Tracheal Aspirate  Gram Stain (07 Apr 2025 18:51):    Rare polymorphonuclear leukocytes per low power field    No Squamous epithelial cells per low power field    No organisms seen per oil power field      CARDIAC MARKERS ( 07 Apr 2025 00:30 )  x     / x     / x     / x     / 1.7 ng/mL            Mode: AC/ CMV (Assist Control/ Continuous Mandatory Ventilation), RR (machine): 18, TV (machine): 400, FiO2: 50, PEEP: 6, ITime: 1, MAP: 10, PIP: 28  Plateau pressure:   P/F ratio:         07 Apr 2025 07:01  -  08 Apr 2025 07:00  --------------------------------------------------------  IN:    Dexmedetomidine: 204.6 mL    Dexmedetomidine: 56.7 mL    Glucerna 1.5: 210 mL    Ketamine: 56.4 mL    Norepinephrine: 38.5 mL    Propofol: 92 mL    Propofol: 179.2 mL  Total IN: 837.4 mL    OUT:    Rectal Tube (mL): 800 mL    Voided (mL): 1645 mL  Total OUT: 2445 mL    Total NET: -1607.6 mL    CAPILLARY BLOOD GLUCOSE    POCT Blood Glucose.: 109 mg/dL (08 Apr 2025 06:10)      ECG: reviewed.    PHYSICAL EXAM:    General: NAD, overweight habitus   Neuro: sedated, AAOx0, no grimace to noxious stimuli, no spontaneity   HEENT: PERRLA b/l, mildly moist mucous membranes  Chest: nonTTP, sq emphysema palpable but improving  Heart: S1/S2, RRR    Lungs: high pitched inspiratory breath sounds b/l,  MV   Abd: soft, nonTTP, nondistended   Ext: trace pitting edema pretibial, palpable sq emphysema in R forearm   Pulses: radial 2+ b/l   Lines/tubes/drains: 2 peripheral lines R arm, 1 peripheral line L arm, L axillary A line  Psych: unable to assess     MEDICATIONS  (STANDING):  albuterol Continuous Nebulization (Vibrating Mesh Nebulizer) 10 mG/Hr (4 mL/Hr) Continuous Inhalation. <Continuous>  buDESOnide    Inhalation Suspension 0.5 milliGRAM(s) Inhalation two times a day  chlorhexidine 0.12% Liquid 15 milliLiter(s) Oral Mucosa every 12 hours  chlorhexidine 2% Cloths 1 Application(s) Topical <User Schedule>  dexMEDEtomidine Infusion 0.25 MICROgram(s)/kG/Hr (5.83 mL/Hr) IV Continuous <Continuous>  dextrose 5%. 1000 milliLiter(s) (100 mL/Hr) IV Continuous <Continuous>  dextrose 5%. 1000 milliLiter(s) (50 mL/Hr) IV Continuous <Continuous>  dextrose 50% Injectable 25 Gram(s) IV Push once  dextrose 50% Injectable 12.5 Gram(s) IV Push once  dextrose 50% Injectable 25 Gram(s) IV Push once  enoxaparin Injectable 40 milliGRAM(s) SubCutaneous every 24 hours  glucagon  Injectable 1 milliGRAM(s) IntraMuscular once  influenza   Vaccine 0.5 milliLiter(s) IntraMuscular once  insulin lispro (ADMELOG) corrective regimen sliding scale   SubCutaneous every 6 hours  levothyroxine 75 MICROGram(s) Oral daily  methylPREDNISolone sodium succinate Injectable 40 milliGRAM(s) IV Push every 12 hours  multivitamin/minerals/iron Oral Solution (CENTRUM) 15 milliLiter(s) Oral daily  pantoprazole  Injectable 40 milliGRAM(s) IV Push daily  petrolatum Ophthalmic Ointment 1 Application(s) Both EYES daily  piperacillin/tazobactam IVPB.. 3.375 Gram(s) IV Intermittent every 8 hours  polyethylene glycol 3350 17 Gram(s) Oral two times a day  propofol Infusion 30 MICROgram(s)/kG/Min (16.8 mL/Hr) IV Continuous <Continuous>  senna Syrup 10 milliLiter(s) Oral daily  sodium chloride 3%  Inhalation 4 milliLiter(s) Inhalation every 6 hours    MEDICATIONS  (PRN):  dextrose Oral Gel 15 Gram(s) Oral once PRN Blood Glucose LESS THAN 70 milliGRAM(s)/deciliter      ALLERGIES:  Allergies    No Known Allergies    Intolerances         RADIOLOGY & ADDITIONAL TESTS: Reviewed.  New left basilar opacity on CXR. MICU PROGRESS NOTE     HISTORY OF PRESENT ILLNESS - 60 y/o woman with pmhx of asthma, HTN, DM and hypothyroidism in the MICU for status asthmaticus     Overnight - Patient self extubated and was unable to tolerate. She was re-intubated. Febrile 100.4 with new blood and sputum cultures sent. S/p vanc 1750mg and Zosyn 3.375 g  Intubated and sedated.     ROS: All negative except as listed above.      ICU Vital Signs Last 24 Hrs  T(C): 37.6 (08 Apr 2025 04:00), Max: 38 (08 Apr 2025 00:00)  T(F): 99.7 (08 Apr 2025 04:00), Max: 100.4 (08 Apr 2025 00:00)  HR: 103 (08 Apr 2025 07:00) (57 - 145)  BP: --  BP(mean): --  ABP: 147/61 (08 Apr 2025 07:00) (85/57 - 219/96)  ABP(mean): 91 (08 Apr 2025 07:00) (63 - 293)  RR: 21 (08 Apr 2025 07:00) (17 - 33)  SpO2: 100% (08 Apr 2025 07:00) (91% - 100%)    O2 Parameters below as of 08 Apr 2025 07:00  Patient On (Oxygen Delivery Method): ventilator          LABS:                        11.7   24.85 )-----------( 239      ( 08 Apr 2025 00:54 )             35.9     04-08    138  |  100  |  14  ----------------------------<  192[H]  3.6   |  24  |  0.54    Ca    8.3[L]      08 Apr 2025 00:54  Phos  2.9     04-08  Mg     2.30     04-08    TPro  6.3  /  Alb  2.9[L]  /  TBili  0.5  /  DBili  x   /  AST  42[H]  /  ALT  71[H]  /  AlkPhos  135[H]  04-08    PT/INR - ( 08 Apr 2025 00:54 )   PT: 13.0 sec;   INR: 1.09 ratio         PTT - ( 08 Apr 2025 00:54 )  PTT:25.4 sec  Urinalysis Basic - ( 08 Apr 2025 00:54 )    Color: x / Appearance: x / SG: x / pH: x  Gluc: 192 mg/dL / Ketone: x  / Bili: x / Urobili: x   Blood: x / Protein: x / Nitrite: x   Leuk Esterase: x / RBC: x / WBC x   Sq Epi: x / Non Sq Epi: x / Bacteria: x      ABG - ( 08 Apr 2025 00:54 )  pH, Arterial: 7.40  pH, Blood: x     /  pCO2: 45    /  pO2: 130   / HCO3: 28    / Base Excess: 2.6   /  SaO2: 98.8                  Culture - Sputum (collected 07 Apr 2025 12:07)  Source: Trach Asp Tracheal Aspirate  Gram Stain (07 Apr 2025 18:51):    Rare polymorphonuclear leukocytes per low power field    No Squamous epithelial cells per low power field    No organisms seen per oil power field      CARDIAC MARKERS ( 07 Apr 2025 00:30 )  x     / x     / x     / x     / 1.7 ng/mL            Mode: AC/ CMV (Assist Control/ Continuous Mandatory Ventilation), RR (machine): 18, TV (machine): 400, FiO2: 50, PEEP: 6, ITime: 1, MAP: 10, PIP: 28  Plateau pressure:   P/F ratio:         07 Apr 2025 07:01  -  08 Apr 2025 07:00  --------------------------------------------------------  IN:    Dexmedetomidine: 204.6 mL    Dexmedetomidine: 56.7 mL    Glucerna 1.5: 210 mL    Ketamine: 56.4 mL    Norepinephrine: 38.5 mL    Propofol: 92 mL    Propofol: 179.2 mL  Total IN: 837.4 mL    OUT:    Rectal Tube (mL): 800 mL    Voided (mL): 1645 mL  Total OUT: 2445 mL    Total NET: -1607.6 mL    CAPILLARY BLOOD GLUCOSE    POCT Blood Glucose.: 109 mg/dL (08 Apr 2025 06:10)      ECG: reviewed.    PHYSICAL EXAM:    General: NAD, overweight habitus   Neuro: sedated, AAOx0, no grimace to noxious stimuli, no spontaneity   HEENT: PERRLA b/l, mildly moist mucous membranes  Chest: nonTTP, sq emphysema palpable but improving  Heart: S1/S2, RRR    Lungs: high pitched inspiratory breath sounds b/l,  MV   Abd: soft, nonTTP, nondistended   Ext: trace pitting edema pretibial, palpable sq emphysema in R forearm   Pulses: radial 2+ b/l   Lines/tubes/drains: 2 peripheral lines R arm, 1 peripheral line L arm, L axillary A line  Psych: unable to assess     MEDICATIONS  (STANDING):  albuterol Continuous Nebulization (Vibrating Mesh Nebulizer) 10 mG/Hr (4 mL/Hr) Continuous Inhalation. <Continuous>  buDESOnide    Inhalation Suspension 0.5 milliGRAM(s) Inhalation two times a day  chlorhexidine 0.12% Liquid 15 milliLiter(s) Oral Mucosa every 12 hours  chlorhexidine 2% Cloths 1 Application(s) Topical <User Schedule>  dexMEDEtomidine Infusion 0.25 MICROgram(s)/kG/Hr (5.83 mL/Hr) IV Continuous <Continuous>  dextrose 5%. 1000 milliLiter(s) (100 mL/Hr) IV Continuous <Continuous>  dextrose 5%. 1000 milliLiter(s) (50 mL/Hr) IV Continuous <Continuous>  dextrose 50% Injectable 25 Gram(s) IV Push once  dextrose 50% Injectable 12.5 Gram(s) IV Push once  dextrose 50% Injectable 25 Gram(s) IV Push once  enoxaparin Injectable 40 milliGRAM(s) SubCutaneous every 24 hours  glucagon  Injectable 1 milliGRAM(s) IntraMuscular once  influenza   Vaccine 0.5 milliLiter(s) IntraMuscular once  insulin lispro (ADMELOG) corrective regimen sliding scale   SubCutaneous every 6 hours  levothyroxine 75 MICROGram(s) Oral daily  methylPREDNISolone sodium succinate Injectable 40 milliGRAM(s) IV Push every 12 hours  multivitamin/minerals/iron Oral Solution (CENTRUM) 15 milliLiter(s) Oral daily  pantoprazole  Injectable 40 milliGRAM(s) IV Push daily  petrolatum Ophthalmic Ointment 1 Application(s) Both EYES daily  piperacillin/tazobactam IVPB.. 3.375 Gram(s) IV Intermittent every 8 hours  polyethylene glycol 3350 17 Gram(s) Oral two times a day  propofol Infusion 30 MICROgram(s)/kG/Min (16.8 mL/Hr) IV Continuous <Continuous>  senna Syrup 10 milliLiter(s) Oral daily  sodium chloride 3%  Inhalation 4 milliLiter(s) Inhalation every 6 hours    MEDICATIONS  (PRN):  dextrose Oral Gel 15 Gram(s) Oral once PRN Blood Glucose LESS THAN 70 milliGRAM(s)/deciliter      ALLERGIES:  Allergies    No Known Allergies    Intolerances         RADIOLOGY & ADDITIONAL TESTS: Reviewed.  New left basilar opacity on CXR.

## 2025-04-08 NOTE — PROCEDURE NOTE - PRACTITIONER PERFORMING THE TIME OUT
Anthony elliott
Rosa Moody
Rosa Moody
Anthony elliott
Rosa Moody
Rosa Moody

## 2025-04-08 NOTE — PROGRESS NOTE ADULT - ATTENDING COMMENTS
1. Acute hypoxemic respiratory failure due to status asthmaticus.   Pt  reintubated yesterday for cuff leak . Pt given quick trial of extubation  be reintubated quickly. Continue.  on sedation today. Decrease Solumedrol to 40mg IV daily.  2. HTN: Continue   amlodipine 5 mg daily today.    3.Pneumomediastinum with sq air. Clinically stable. No need for routine daily CXR.    4.FEN: Conitnue tube feeds.    5. ID. New LLL pneumonia. Continue Zosyn started last night. Continue 3% saline.    6. DVT prophylaxis: Lovenox  7; GOC :Full code.

## 2025-04-08 NOTE — PROCEDURE NOTE - NSSITEPREP_SKIN_A_CORE
chlorhexidine
chlorhexidine/Adherence to aseptic technique: hand hygiene prior to donning barriers (gown, gloves), don cap and mask, sterile drape over patient
Adherence to aseptic technique: hand hygiene prior to donning barriers (gown, gloves), don cap and mask, sterile drape over patient
chlorhexidine/Adherence to aseptic technique: hand hygiene prior to donning barriers (gown, gloves), don cap and mask, sterile drape over patient

## 2025-04-08 NOTE — PROCEDURE NOTE - ADDITIONAL PROCEDURE DETAILS
Patient with large, audible airleak and not receiving appropriate tidal volumes. Attempted to reposition tube and re-inflate balloon, however tidal volumes not improving and O2 sats dropping with good waveform. Decision made to remove old tube and replace. 7.5 ETT placed at 23 at the lip. O2 sats improved and tidal volumes adequate without audible leak. Patient tolerated procedure well.
US guided power-glide IV catheter
Patient previously intubated in the field with 6.5 ETT. Patient with worsening gases and elevated peak pressures and decision made to exchange tube for 7.5 tube. Patient previously sedated on ketamine, propofol, and fentanyl drip and paralyzed with nimbex drip. Tube exchanged without complication and peak pressures improved. Tube placement confirmed with chest xray.
US guided ACCU CATH IV Catheter

## 2025-04-08 NOTE — CHART NOTE - NSCHARTNOTEFT_GEN_A_CORE
CRITICAL CARE NUTRITION FOLLOW-UP NOTE    Nutrition Follow Up for Critical care length of stay.    SOURCE:   [X] Medical record [X] RN/PCA [X] Other:  MICU TEAM     Medical Course:  59F with HTN, DM2, hypothyroid, and asthma here for status asthmaticus, unclear trigger; currently with gradual clinical improvement.      Diet Order:  Diet, NPO with Tube Feed:   Tube Feeding Modality: Orogastric  Glucerna 1.5 Robert (GLUCERNA1.5RTH)  Total Volume for 24 Hours (mL): 630  Continuous  Starting Tube Feed Rate {mL per Hour}: 10  Increase Tube Feed Rate by (mL): 10     Every 4 hours  Until Goal Tube Feed Rate (mL per Hour): 35  Tube Feed Duration (in Hours): 18  Tube Feed Start Time: 11:00  Tube Feed Stop Time: 05:00  Liquid Protein Supplement     Qty per Day:  2 (04-08-25 @ 10:44) [Active]    Nutrition Course:    Per chart ----> Patient self extubated and was unable to tolerate, Pt re- intubated. Remains sedated, on propofol. Received ~298 non-nutritive lipid calories provided by Propofol over past 24 hrs.   OGT placed 4/7 and initiated on trickle feeds, Glucerna 1.5 @ 10 mL/hr at present. This regimen to provide 945 kcal + 53 gm pro. In addition, Pt is on Liquid Protein Supplement (LPS) 2 packets daily = 200 kcal, 30 gm pro.   Total robert from EN + Propofol = 1445 kcal and 83 gm pro.   Lat BM 4/7. Weight as noted, with 1+ gen edema.      Pertinent Medications:   MEDICATIONS  (STANDING):  albuterol    0.083% 2.5 milliGRAM(s) Nebulizer every 4 hours  amLODIPine   Tablet 5 milliGRAM(s) Oral daily  buDESOnide    Inhalation Suspension 0.5 milliGRAM(s) Inhalation two times a day  chlorhexidine 0.12% Liquid 15 milliLiter(s) Oral Mucosa every 12 hours  chlorhexidine 2% Cloths 1 Application(s) Topical <User Schedule>  dexMEDEtomidine Infusion 0.25 MICROgram(s)/kG/Hr (5.83 mL/Hr) IV Continuous <Continuous>  dextrose 5%. 1000 milliLiter(s) (100 mL/Hr) IV Continuous <Continuous>  dextrose 5%. 1000 milliLiter(s) (50 mL/Hr) IV Continuous <Continuous>  dextrose 50% Injectable 25 Gram(s) IV Push once  dextrose 50% Injectable 12.5 Gram(s) IV Push once  dextrose 50% Injectable 25 Gram(s) IV Push once  enoxaparin Injectable 40 milliGRAM(s) SubCutaneous every 24 hours  glucagon  Injectable 1 milliGRAM(s) IntraMuscular once  influenza   Vaccine 0.5 milliLiter(s) IntraMuscular once  insulin lispro (ADMELOG) corrective regimen sliding scale   SubCutaneous every 6 hours  ketamine Infusion. 0.25 mG/kG/Hr (2.33 mL/Hr) IV Continuous <Continuous>  levothyroxine 75 MICROGram(s) Oral daily  methylPREDNISolone sodium succinate Injectable 40 milliGRAM(s) IV Push every 12 hours  multivitamin/minerals/iron Oral Solution (CENTRUM) 15 milliLiter(s) Oral daily  pantoprazole  Injectable 40 milliGRAM(s) IV Push daily  petrolatum Ophthalmic Ointment 1 Application(s) Both EYES daily  piperacillin/tazobactam IVPB.. 3.375 Gram(s) IV Intermittent every 8 hours  polyethylene glycol 3350 17 Gram(s) Oral two times a day  propofol Infusion 30 MICROgram(s)/kG/Min (16.8 mL/Hr) IV Continuous <Continuous>  senna Syrup 10 milliLiter(s) Oral daily  sodium chloride 3%  Inhalation 4 milliLiter(s) Inhalation every 6 hours    Pertinent Labs:  04-08 Na138 mmol/L Glu 192 mg/dL[H] K+ 3.6 mmol/L Cr  0.54 mg/dL BUN 14 mg/dL 04-08 Phos 2.9   A1C with Estimated Average Glucose Result: 5.7 % (04-01-25 @ 11:35)  CAPILLARY BLOOD GLUCOSE  POCT Blood Glucose.: 129 mg/dL (08 Apr 2025 11:13)  POCT Blood Glucose.: 109 mg/dL (08 Apr 2025 06:10)  POCT Blood Glucose.: 160 mg/dL (08 Apr 2025 00:06)  POCT Blood Glucose.: 103 mg/dL (07 Apr 2025 17:24)    Weight Trend: kg (3/31) 94.2  (4/1) 96.2  (4/2) 97  (4/3) 102.7  (4/4) 101.2 (4/6) 101.4  (4/7) 98.4    Admit wt: 93.2 kg    Height:  167.6 cm / 66 inches   IBW:  130lbs / 59.1kg      Physical Assessment:   Edema: 1+ generalized edema    Pressure Injury: No skin injuries identified as per nursing flow sheet records      Estimated Energy Needs (based on IBW = 59.1kg )  [X] No change since previous assessment   20-25 KCals/kg = 1182- 1478 KCals/d  1.4-1.6g protein/kg =  83-95g protein/d    Education:   [x] Not applicable secondary to medical acuity     Interventions:    1. Suggest continue with current tube feeding as ordered  2. Please adjust rate/volume/duration/free water provision of enteral feeds in accordance with patient tolerance and needs.     Monitor and Evaluate:   1. EN tolerance, nutrition related lab values, weight trends, BMs/GI distress, hydration status, skin integrity.      Olinda Simon, SEVENN, CDN, CDCES   Pager [phone] or MS Teams CRITICAL CARE NUTRITION FOLLOW-UP NOTE    Nutrition Follow Up for Critical care length of stay.    SOURCE:   [X] Medical record [X] RN/PCA [X] Other:  MICU TEAM     Medical Course:  59F with HTN, DM2, hypothyroid, and asthma here for status asthmaticus, unclear trigger; currently with gradual clinical improvement.      Diet Order:  Diet, NPO with Tube Feed:   Tube Feeding Modality: Orogastric  Glucerna 1.5 Robert (GLUCERNA1.5RTH)  Total Volume for 24 Hours (mL): 630  Continuous  Starting Tube Feed Rate {mL per Hour}: 10  Increase Tube Feed Rate by (mL): 10     Every 4 hours  Until Goal Tube Feed Rate (mL per Hour): 35  Tube Feed Duration (in Hours): 18  Tube Feed Start Time: 11:00  Tube Feed Stop Time: 05:00  Liquid Protein Supplement     Qty per Day:  2 (04-08-25 @ 10:44) [Active]    Nutrition Course:    Per chart ----> Patient self extubated and was unable to tolerate, Pt re- intubated. Remains sedated, on propofol. Received ~298 non-nutritive lipid calories provided by Propofol over past 24 hrs.   OGT placed 4/7 and initiated on trickle feeds, Glucerna 1.5 @ 10 mL/hr at present. This regimen to provide 945 kcal + 53 gm pro. In addition, Pt is on Liquid Protein Supplement (LPS) 2 packets daily = 200 kcal, 30 gm pro.   Total robert from EN + Propofol = 1445 kcal and 83 gm pro.   Rectal tube in place, 800 mL output x 24 hrs.  Weight as noted, with 1+ gen edema.      Pertinent Medications:   MEDICATIONS  (STANDING):  albuterol    0.083% 2.5 milliGRAM(s) Nebulizer every 4 hours  amLODIPine   Tablet 5 milliGRAM(s) Oral daily  buDESOnide    Inhalation Suspension 0.5 milliGRAM(s) Inhalation two times a day  chlorhexidine 0.12% Liquid 15 milliLiter(s) Oral Mucosa every 12 hours  chlorhexidine 2% Cloths 1 Application(s) Topical <User Schedule>  dexMEDEtomidine Infusion 0.25 MICROgram(s)/kG/Hr (5.83 mL/Hr) IV Continuous <Continuous>  dextrose 5%. 1000 milliLiter(s) (100 mL/Hr) IV Continuous <Continuous>  dextrose 5%. 1000 milliLiter(s) (50 mL/Hr) IV Continuous <Continuous>  dextrose 50% Injectable 25 Gram(s) IV Push once  dextrose 50% Injectable 12.5 Gram(s) IV Push once  dextrose 50% Injectable 25 Gram(s) IV Push once  enoxaparin Injectable 40 milliGRAM(s) SubCutaneous every 24 hours  glucagon  Injectable 1 milliGRAM(s) IntraMuscular once  influenza   Vaccine 0.5 milliLiter(s) IntraMuscular once  insulin lispro (ADMELOG) corrective regimen sliding scale   SubCutaneous every 6 hours  ketamine Infusion. 0.25 mG/kG/Hr (2.33 mL/Hr) IV Continuous <Continuous>  levothyroxine 75 MICROGram(s) Oral daily  methylPREDNISolone sodium succinate Injectable 40 milliGRAM(s) IV Push every 12 hours  multivitamin/minerals/iron Oral Solution (CENTRUM) 15 milliLiter(s) Oral daily  pantoprazole  Injectable 40 milliGRAM(s) IV Push daily  petrolatum Ophthalmic Ointment 1 Application(s) Both EYES daily  piperacillin/tazobactam IVPB.. 3.375 Gram(s) IV Intermittent every 8 hours  polyethylene glycol 3350 17 Gram(s) Oral two times a day  propofol Infusion 30 MICROgram(s)/kG/Min (16.8 mL/Hr) IV Continuous <Continuous>  senna Syrup 10 milliLiter(s) Oral daily  sodium chloride 3%  Inhalation 4 milliLiter(s) Inhalation every 6 hours    Pertinent Labs:  04-08 Na138 mmol/L Glu 192 mg/dL[H] K+ 3.6 mmol/L Cr  0.54 mg/dL BUN 14 mg/dL 04-08 Phos 2.9   A1C with Estimated Average Glucose Result: 5.7 % (04-01-25 @ 11:35)  CAPILLARY BLOOD GLUCOSE  POCT Blood Glucose.: 129 mg/dL (08 Apr 2025 11:13)  POCT Blood Glucose.: 109 mg/dL (08 Apr 2025 06:10)  POCT Blood Glucose.: 160 mg/dL (08 Apr 2025 00:06)  POCT Blood Glucose.: 103 mg/dL (07 Apr 2025 17:24)    Weight Trend: kg (3/31) 94.2  (4/1) 96.2  (4/2) 97  (4/3) 102.7  (4/4) 101.2 (4/6) 101.4  (4/7) 98.4    Admit wt: 93.2 kg    Height:  167.6 cm / 66 inches   IBW:  130lbs / 59.1kg      Physical Assessment:   Edema: 1+ generalized edema    Pressure Injury: No skin injuries identified as per nursing flow sheet records      Estimated Energy Needs (based on IBW = 59.1kg )  [X] No change since previous assessment   20-25 KCals/kg = 1182- 1478 KCals/d  1.4-1.6g protein/kg =  83-95g protein/d    Education:   [x] Not applicable secondary to medical acuity     Interventions:    1. Suggest continue with current tube feeding as ordered  2. Please adjust rate/volume/duration/free water provision of enteral feeds in accordance with patient tolerance and needs.     Monitor and Evaluate:   1. EN tolerance, nutrition related lab values, weight trends, BMs/GI distress, hydration status, skin integrity.      Olinda Simon, SEVENN, CDN, CDCES   Pager [phone] or MS Teams

## 2025-04-09 LAB
ALBUMIN SERPL ELPH-MCNC: 2.9 G/DL — LOW (ref 3.3–5)
ALP SERPL-CCNC: 142 U/L — HIGH (ref 40–120)
ALT FLD-CCNC: 97 U/L — HIGH (ref 4–33)
ANION GAP SERPL CALC-SCNC: 12 MMOL/L — SIGNIFICANT CHANGE UP (ref 7–14)
APTT BLD: 26.6 SEC — SIGNIFICANT CHANGE UP (ref 24.5–35.6)
AST SERPL-CCNC: 67 U/L — HIGH (ref 4–32)
BILIRUB SERPL-MCNC: 0.6 MG/DL — SIGNIFICANT CHANGE UP (ref 0.2–1.2)
BLOOD GAS VENOUS COMPREHENSIVE RESULT: SIGNIFICANT CHANGE UP
BUN SERPL-MCNC: 13 MG/DL — SIGNIFICANT CHANGE UP (ref 7–23)
CALCIUM SERPL-MCNC: 8.6 MG/DL — SIGNIFICANT CHANGE UP (ref 8.4–10.5)
CHLORIDE SERPL-SCNC: 104 MMOL/L — SIGNIFICANT CHANGE UP (ref 98–107)
CO2 SERPL-SCNC: 24 MMOL/L — SIGNIFICANT CHANGE UP (ref 22–31)
CREAT SERPL-MCNC: 0.55 MG/DL — SIGNIFICANT CHANGE UP (ref 0.5–1.3)
CULTURE RESULTS: SIGNIFICANT CHANGE UP
EGFR: 105 ML/MIN/1.73M2 — SIGNIFICANT CHANGE UP
EGFR: 105 ML/MIN/1.73M2 — SIGNIFICANT CHANGE UP
GLUCOSE BLDC GLUCOMTR-MCNC: 109 MG/DL — HIGH (ref 70–99)
GLUCOSE BLDC GLUCOMTR-MCNC: 122 MG/DL — HIGH (ref 70–99)
GLUCOSE BLDC GLUCOMTR-MCNC: 135 MG/DL — HIGH (ref 70–99)
GLUCOSE BLDC GLUCOMTR-MCNC: 42 MG/DL — CRITICAL LOW (ref 70–99)
GLUCOSE BLDC GLUCOMTR-MCNC: 44 MG/DL — CRITICAL LOW (ref 70–99)
GLUCOSE BLDC GLUCOMTR-MCNC: 83 MG/DL — SIGNIFICANT CHANGE UP (ref 70–99)
GLUCOSE BLDC GLUCOMTR-MCNC: 93 MG/DL — SIGNIFICANT CHANGE UP (ref 70–99)
GLUCOSE SERPL-MCNC: 158 MG/DL — HIGH (ref 70–99)
HCT VFR BLD CALC: 35.8 % — SIGNIFICANT CHANGE UP (ref 34.5–45)
HGB BLD-MCNC: 11.6 G/DL — SIGNIFICANT CHANGE UP (ref 11.5–15.5)
INR BLD: 1.12 RATIO — SIGNIFICANT CHANGE UP (ref 0.85–1.16)
MAGNESIUM SERPL-MCNC: 2.3 MG/DL — SIGNIFICANT CHANGE UP (ref 1.6–2.6)
MCHC RBC-ENTMCNC: 30.5 PG — SIGNIFICANT CHANGE UP (ref 27–34)
MCHC RBC-ENTMCNC: 32.4 G/DL — SIGNIFICANT CHANGE UP (ref 32–36)
MCV RBC AUTO: 94.2 FL — SIGNIFICANT CHANGE UP (ref 80–100)
NRBC # BLD AUTO: 0 K/UL — SIGNIFICANT CHANGE UP (ref 0–0)
NRBC # FLD: 0 K/UL — SIGNIFICANT CHANGE UP (ref 0–0)
NRBC BLD AUTO-RTO: 0 /100 WBCS — SIGNIFICANT CHANGE UP (ref 0–0)
PHOSPHATE SERPL-MCNC: 3.2 MG/DL — SIGNIFICANT CHANGE UP (ref 2.5–4.5)
PLATELET # BLD AUTO: 252 K/UL — SIGNIFICANT CHANGE UP (ref 150–400)
POTASSIUM SERPL-MCNC: 3.9 MMOL/L — SIGNIFICANT CHANGE UP (ref 3.5–5.3)
POTASSIUM SERPL-SCNC: 3.9 MMOL/L — SIGNIFICANT CHANGE UP (ref 3.5–5.3)
PROT SERPL-MCNC: 6.5 G/DL — SIGNIFICANT CHANGE UP (ref 6–8.3)
PROTHROM AB SERPL-ACNC: 13.3 SEC — SIGNIFICANT CHANGE UP (ref 9.9–13.4)
RBC # BLD: 3.8 M/UL — SIGNIFICANT CHANGE UP (ref 3.8–5.2)
RBC # FLD: 15 % — HIGH (ref 10.3–14.5)
SODIUM SERPL-SCNC: 140 MMOL/L — SIGNIFICANT CHANGE UP (ref 135–145)
SPECIMEN SOURCE: SIGNIFICANT CHANGE UP
WBC # BLD: 18.47 K/UL — HIGH (ref 3.8–10.5)
WBC # FLD AUTO: 18.47 K/UL — HIGH (ref 3.8–10.5)

## 2025-04-09 RX ORDER — METHYLPREDNISOLONE ACETATE 80 MG/ML
20 INJECTION, SUSPENSION INTRA-ARTICULAR; INTRALESIONAL; INTRAMUSCULAR; SOFT TISSUE EVERY 12 HOURS
Refills: 0 | Status: DISCONTINUED | OUTPATIENT
Start: 2025-04-09 | End: 2025-04-10

## 2025-04-09 RX ORDER — DEXTROSE 50 % IN WATER 50 %
25 SYRINGE (ML) INTRAVENOUS ONCE
Refills: 0 | Status: COMPLETED | OUTPATIENT
Start: 2025-04-09 | End: 2025-04-09

## 2025-04-09 RX ORDER — QUETIAPINE FUMARATE 25 MG/1
25 TABLET ORAL
Refills: 0 | Status: DISCONTINUED | OUTPATIENT
Start: 2025-04-09 | End: 2025-04-10

## 2025-04-09 RX ADMIN — Medication 25 GRAM(S): at 23:06

## 2025-04-09 RX ADMIN — Medication 2.5 MILLIGRAM(S): at 15:10

## 2025-04-09 RX ADMIN — Medication 2.5 MILLIGRAM(S): at 22:24

## 2025-04-09 RX ADMIN — Medication 15 MILLILITER(S): at 12:13

## 2025-04-09 RX ADMIN — BUDESONIDE 0.5 MILLIGRAM(S): 90 AEROSOL, POWDER RESPIRATORY (INHALATION) at 07:30

## 2025-04-09 RX ADMIN — NOREPINEPHRINE BITARTRATE 8.74 MICROGRAM(S)/KG/MIN: 8 SOLUTION at 08:03

## 2025-04-09 RX ADMIN — Medication 1 APPLICATION(S): at 05:17

## 2025-04-09 RX ADMIN — DEXMEDETOMIDINE HYDROCHLORIDE IN SODIUM CHLORIDE 5.83 MICROGRAM(S)/KG/HR: 4 INJECTION INTRAVENOUS at 08:03

## 2025-04-09 RX ADMIN — BUDESONIDE 0.5 MILLIGRAM(S): 90 AEROSOL, POWDER RESPIRATORY (INHALATION) at 18:28

## 2025-04-09 RX ADMIN — METHYLPREDNISOLONE ACETATE 40 MILLIGRAM(S): 80 INJECTION, SUSPENSION INTRA-ARTICULAR; INTRALESIONAL; INTRAMUSCULAR; SOFT TISSUE at 05:14

## 2025-04-09 RX ADMIN — Medication 2.5 MILLIGRAM(S): at 02:22

## 2025-04-09 RX ADMIN — PROPOFOL 16.8 MICROGRAM(S)/KG/MIN: 10 INJECTION, EMULSION INTRAVENOUS at 23:06

## 2025-04-09 RX ADMIN — Medication 75 MICROGRAM(S): at 05:14

## 2025-04-09 RX ADMIN — Medication 15 MILLILITER(S): at 17:35

## 2025-04-09 RX ADMIN — Medication 2.5 MILLIGRAM(S): at 07:30

## 2025-04-09 RX ADMIN — ENOXAPARIN SODIUM 40 MILLIGRAM(S): 100 INJECTION SUBCUTANEOUS at 13:56

## 2025-04-09 RX ADMIN — PROPOFOL 16.8 MICROGRAM(S)/KG/MIN: 10 INJECTION, EMULSION INTRAVENOUS at 08:03

## 2025-04-09 RX ADMIN — Medication 2.5 MILLIGRAM(S): at 10:10

## 2025-04-09 RX ADMIN — Medication 4 MILLILITER(S): at 18:29

## 2025-04-09 RX ADMIN — Medication 2.5 MILLIGRAM(S): at 18:28

## 2025-04-09 RX ADMIN — Medication 4 MILLILITER(S): at 07:30

## 2025-04-09 RX ADMIN — Medication 4 MILLILITER(S): at 15:15

## 2025-04-09 RX ADMIN — Medication 25 GRAM(S): at 13:57

## 2025-04-09 RX ADMIN — METHYLPREDNISOLONE ACETATE 40 MILLIGRAM(S): 80 INJECTION, SUSPENSION INTRA-ARTICULAR; INTRALESIONAL; INTRAMUSCULAR; SOFT TISSUE at 17:35

## 2025-04-09 RX ADMIN — Medication 40 MILLIEQUIVALENT(S): at 10:50

## 2025-04-09 RX ADMIN — Medication 10 MILLILITER(S): at 12:13

## 2025-04-09 RX ADMIN — Medication 1 APPLICATION(S): at 12:13

## 2025-04-09 RX ADMIN — Medication 15 MILLILITER(S): at 05:17

## 2025-04-09 RX ADMIN — Medication 25 GRAM(S): at 12:14

## 2025-04-09 RX ADMIN — Medication 40 MILLIGRAM(S): at 12:13

## 2025-04-09 RX ADMIN — QUETIAPINE FUMARATE 25 MILLIGRAM(S): 25 TABLET ORAL at 17:35

## 2025-04-09 RX ADMIN — Medication 25 GRAM(S): at 05:14

## 2025-04-09 NOTE — PROGRESS NOTE ADULT - ATTENDING COMMENTS
1. Acute hypoxemic respiratory failure due to status asthmaticus.     . Pt given quick trial of extubation  be reintubated quickly.  With tan yellows secretion Decrease Solumedrol to 40mg IV daily.  2. HTN: Continue   amlodipine 5 mg daily today.    3.Pneumomediastinum with sq air. Clinically stable. No need for routine daily CXR.    4.FEN: Continue tube feeds.    5. ID. New LLL pneumonia. Continue Zosyn started last night. Continue 3% saline.    6. DVT prophylaxis: Lovenox  7; GOC :Full code.

## 2025-04-09 NOTE — PROGRESS NOTE ADULT - ASSESSMENT
59F with HTN, DM2, hypothyroid, and asthma here for status asthmaticus, unclear trigger; currently with gradual clinical improvement.     NEUROLOGIC   Not at baseline mental status. On sedation. Still requires sedation.   -Baseline AAOx3  -C/w propofol 40  -C/w dexomedomitdine 0.5  - C/w ketamine 0.5mcg  -D/c fentanyl 4/1   -D/c midazolam -2mg Versed mg IVP PRN  -D/c cistracronium 3/31       CARDIOVASCULAR     # Vasoplegic Shock- RESOLVED    No pressor requirements.     #Elevated troponin- 2/2 demand ischemia from vasopressors- RESOLVED   Plateaued tropnoins 50s to 252 to 180s. No events on telemetry. Peaked and downtrending.   -Monitor   -If increasing again or high clinical suspicion, can consider PE r/o       #HTN- labile with hypotensive and hypertensive episodes with tactile stimulation induced vs tussive bouts   -C/w 5mg Amlodipine for pressure control  -Hold home lisinopril 10mg  -Weaned off nicardipine drip, s/p 3 doses of 25mg Hydralazine   -Continue monitoring BP     RESPIRATORY   #Status asthmaticus   Intubated.   -C/w mechanical ventilator  -Daily spontaneous breathing trials   -C/w  methylprednisolone 40 mg IV 12h (start 3/28)   -C/w duonebs 3 mL q4   -Completed empiric pna, s/p vanc and zosyn 4/7   - Holding off on CT angio for now.   -3/30 BAL without growth   -Bronchoscopy 3/30 showing secretions in L main and L lober with mucus aspirated out     # Subcutaneous Emphysema   -Decreasing sq emphysema on RUE and b/l chest wall - improving daily  -Present s/p reintubation 03/30 night  -CXR 3/30 showing subcutaneous emphysema without pneumomediastinum   - Will consider CT chest if worsens    GASTROINTESTINAL   -JS   -OGT 3/30 placed, trickle feeds paused, feeding tube placed again 4/7  -C/w pantoprazole 40 mg IV qd for gastric mucosa ppx     #Gastric retention- 2/2 opioid induced constipation   Had a BM.   -OGT off suction; check gastric residuals intermittently   -Continue tube feeds 4/8  -Treat constipation with methylnaltrexone 12 mg sq x 1  -D/c metoclopramide 10 mg IV q6 for GI motility; qtc on EKG 4/1 457 ms   -D/c erythromycin 250 mg IV qd for GI motility   - S/p mineral castor oil enema   - D/c  lactulose 20 mg syrup TID   -abd XR 4/3 with some stool burden    GENITOURINARY/RENAL   #HAGMA 2/2 hypercarbia and lactic acidosis- RESOLVED   #Respiratory acidosis- ventilator changes   ABG pH 7.40.   - On admission, VBG pH 6.98 and PCO2 125. Currently ABG 7.43.   - Treat with mechanical ventilation for exhalation   - Serial ABGs   - S/p Thiamine x 1 for lactic acidosis     #Tai   -TOV 4/4        ENDOCRINE   #DM   Glycemic range within 100s-130s.   -NPO   -Low ISS, q6 fingersticks  - D/c insulin NPH 7u q6h, q6 fingersticks  -Hold home metformin 500 mg po qd   - Hba1c 5.7     #Hypothyroid   -C/w  levothyroxine 55 mcg IV qd       INFECTIOUS DISEASE   Not meeting criteria for SIRS. Unclear trigger for asthma.   -WBC on admission 11.21 and afebrile but 2 Febrile episode in since 4/7 AM  -Currently afebrile  -New leukocytosis 24, 4/8, downtrending  -Triple lumen taken out 4/7, A line taken out 4/8  -Blood cx NGTD 3/28  -Sputum cx no PMNs or organisms 3/30, new sputum cultures no/rare PMNs with no organisms  -Urine showing 300 proteinuria and 20 hematuria; no evidence of infection   -Negative viral panel  - urine legionella negative   - urine streptococcus negative   -MRSA (-)  -Completed ceftriaxone 1000 mg IV qd x 5 days (started 3/29-4/3)   -Completed azithromycin 500 mg IV qd x 3 days (started 3/28-3/30)   -4/4 and full respiratory panel viral negative; blood cx NGTD   -S/p zosyn and vanc 4/7  -Continue Zosyn 3.375g q8 course of 7 days (today's day 3 of 7)    HEMATOLOGIC/ONCOLOGIC   #Leukocytosis- likely 2/2 infection -- downtrending  Rising suspicion for infection source given left basilar consolidation on CXR.   -Monitor    PROPHYLACTIC   -DVT ppx: enoxaparin 40 mg sq qd     ETHICS   -HCP daughter (Jojo)   -Full code    59F with HTN, DM2, hypothyroid, and asthma here for status asthmaticus, unclear trigger; currently with gradual clinical improvement.     NEUROLOGIC   Not at baseline mental status. On sedation. Still requires sedation.   -Baseline AAOx3  -C/w propofol 30, wean as tolerated  -C/w dexomedomitdine 0.25, wean as tolerated  - C/w ketamine d/c 4/7  -D/c fentanyl 4/1   -D/c midazolam -2mg Versed mg IVP PRN  -D/c cistracronium 3/31       CARDIOVASCULAR     # Vasoplegic Shock- RESOLVED    No pressor requirements.     #Elevated troponin- 2/2 demand ischemia from vasopressors- RESOLVED   Plateaued tropnoins 50s to 252 to 180s. No events on telemetry. Peaked and downtrending.   -Monitor   -If increasing again or high clinical suspicion, can consider PE r/o       #HTN- labile with hypotensive and hypertensive episodes with tactile stimulation induced vs tussive bouts   -C/w 5mg Amlodipine for pressure control  -Hold home lisinopril 10mg  -Weaned off nicardipine drip, s/p 3 doses of 25mg Hydralazine   -Continue monitoring BP     #Bradycardia - Monitor with weaning sedatives    RESPIRATORY   #Status asthmaticus   Intubated.   -C/w mechanical ventilator  -Daily spontaneous breathing trials   -C/w  methylprednisolone 40 mg IV 12h (start 3/28) -- wean to 40mg qd  -C/w duonebs 3 mL q4  -Albuterol 2.5mg q4   -Completed empiric pna, s/p vanc and zosyn 4/7   - Holding off on CT angio for now.   -3/30 BAL without growth   -Bronchoscopy 3/30 showing secretions in L main and L lober with mucus aspirated out     # Subcutaneous Emphysema   -Decreasing sq emphysema on RUE and b/l chest wall - improving daily  -Present s/p reintubation 03/30 night  -CXR 3/30 showing subcutaneous emphysema without pneumomediastinum   - Will consider CT chest if worsens    GASTROINTESTINAL   -JS   -OGT 3/30 placed, trickle feeds continued 4/7  -C/w pantoprazole 40 mg IV qd for gastric mucosa ppx     #Gastric retention- 2/2 opioid induced constipation   Had a BM.   -Treated constipation with methylnaltrexone 12 mg sq x 1  -D/c metoclopramide 10 mg IV q6 for GI motility; qtc on EKG 4/1 457 ms   -D/c erythromycin 250 mg IV qd for GI motility   - S/p mineral castor oil enema   - D/c  lactulose 20 mg syrup TID   -abd XR 4/3 with some stool burden    GENITOURINARY/RENAL   #HAGMA 2/2 hypercarbia and lactic acidosis- RESOLVED   #Respiratory acidosis- ventilator changes   ABG pH 7.40.   - On admission, VBG pH 6.98 and PCO2 125. Currently ABG 7.43.   - Treat with mechanical ventilation for exhalation    - S/p Thiamine x 1 for lactic acidosis     #Tai   -TOV 4/4    ENDOCRINE   #DM   Glycemic range within 100s-130s.   -NPO   -Low ISS, q6 fingersticks  - D/c insulin NPH 7u q6h, q6 fingersticks - monitor glucose  -Hold home metformin 500 mg po qd   - Hba1c 5.7     #Hypothyroid   -C/w  levothyroxine 55 mcg IV qd       INFECTIOUS DISEASE   Not meeting criteria for SIRS. Unclear trigger for asthma.   -WBC on admission 11.21 and afebrile but 2 Febrile episode in since 4/7 AM  -Currently afebrile  -New leukocytosis 24, 4/8, downtrending  -Triple lumen taken out 4/7, A line taken out 4/8  -Blood cx NGTD 3/28  -Sputum cx no PMNs or organisms 3/30, new sputum cultures no/rare PMNs with no organisms  -Urine showing 300 proteinuria and 20 hematuria; no evidence of infection   -Negative viral panel  - urine legionella negative   - urine streptococcus negative   -MRSA (-)  -Completed ceftriaxone 1000 mg IV qd x 5 days (started 3/29-4/3)   -Completed azithromycin 500 mg IV qd x 3 days (started 3/28-3/30)   -4/4 and full respiratory panel viral negative; blood cx NGTD   -S/p zosyn and vanc 4/7  -Continue Zosyn 3.375g q8 course of 7 days (today's day 3 of 7)    HEMATOLOGIC/ONCOLOGIC   #Leukocytosis- likely 2/2 infection -- downtrending  Rising suspicion for infection source given left basilar consolidation on CXR.   -Continue monitoring    PROPHYLACTIC   -DVT ppx: enoxaparin 40 mg sq qd     ETHICS   -HCP daughter (Jojo)   -Full code    59F with HTN, DM2, hypothyroid, and asthma here for status asthmaticus, unclear trigger; currently with gradual clinical improvement.     NEUROLOGIC   Not at baseline mental status. On sedation. Still requires sedation.   -Baseline AAOx3  -C/w propofol 30, wean as tolerated  -C/w dexomedomitdine 0.25, wean as tolerated  -ketamine d/c 4/7  -D/c fentanyl 4/1   -D/c midazolam -2mg Versed mg IVP PRN  -D/c cistracronium 3/31       CARDIOVASCULAR     # Vasoplegic Shock- RESOLVED    No pressor requirements.     #Elevated troponin- 2/2 demand ischemia from vasopressors- RESOLVED   Plateaued tropnoins 50s to 252 to 180s. No events on telemetry. Peaked and downtrending.   -Monitor   -If increasing again or high clinical suspicion, can consider PE r/o       #HTN- labile with hypotensive and hypertensive episodes with tactile stimulation induced vs tussive bouts   -C/w 5mg Amlodipine for pressure control  -Hold home lisinopril 10mg  -Weaned off nicardipine drip, s/p 3 doses of 25mg Hydralazine   -Continue monitoring BP     #Bradycardia - Monitor with weaning sedatives    RESPIRATORY   #Status asthmaticus   Intubated.   -C/w mechanical ventilator  -Daily spontaneous breathing trials   -C/w  methylprednisolone 40 mg IV 12h (start 3/28) -- wean to 40mg qd  -C/w duonebs 3 mL q4  -Albuterol 2.5mg q4   -Completed empiric pna, s/p vanc and zosyn 4/7   - Holding off on CT angio for now.   -3/30 BAL without growth   -Bronchoscopy 3/30 showing secretions in L main and L lober with mucus aspirated out     # Subcutaneous Emphysema   -Decreasing sq emphysema on RUE and b/l chest wall - improving daily  -Present s/p reintubation 03/30 night  -CXR 3/30 showing subcutaneous emphysema without pneumomediastinum   - Will consider CT chest if worsens    GASTROINTESTINAL   -JS   -OGT 3/30 placed, trickle feeds continued 4/7  -C/w pantoprazole 40 mg IV qd for gastric mucosa ppx     #Gastric retention- 2/2 opioid induced constipation   Had a BM.   -Treated constipation with methylnaltrexone 12 mg sq x 1  -D/c metoclopramide 10 mg IV q6 for GI motility; qtc on EKG 4/1 457 ms   -D/c erythromycin 250 mg IV qd for GI motility   - S/p mineral castor oil enema   - D/c  lactulose 20 mg syrup TID   -abd XR 4/3 with some stool burden    GENITOURINARY/RENAL   #HAGMA 2/2 hypercarbia and lactic acidosis- RESOLVED   #Respiratory acidosis- ventilator changes   ABG pH 7.40.   - On admission, VBG pH 6.98 and PCO2 125. Currently ABG 7.43.   - Treat with mechanical ventilation for exhalation    - S/p Thiamine x 1 for lactic acidosis     #Tai   -TOV 4/4    ENDOCRINE   #DM   Glycemic range within 100s-130s.   -NPO   -Low ISS, q6 fingersticks  - D/c insulin NPH 7u q6h, q6 fingersticks - monitor glucose  -Hold home metformin 500 mg po qd   - Hba1c 5.7     #Hypothyroid   -C/w  levothyroxine 55 mcg IV qd       INFECTIOUS DISEASE   Not meeting criteria for SIRS. Unclear trigger for asthma.   -WBC on admission 11.21 and afebrile but 2 Febrile episode in since 4/7 AM  -Currently afebrile  -New leukocytosis 24, 4/8, downtrending  -Triple lumen taken out 4/7, A line taken out 4/8  -Blood cx NGTD 3/28  -Sputum cx no PMNs or organisms 3/30, new sputum cultures no/rare PMNs with no organisms  -Urine showing 300 proteinuria and 20 hematuria; no evidence of infection   -Negative viral panel  - urine legionella negative   - urine streptococcus negative   -MRSA (-)  -Completed ceftriaxone 1000 mg IV qd x 5 days (started 3/29-4/3)   -Completed azithromycin 500 mg IV qd x 3 days (started 3/28-3/30)   -4/4 and full respiratory panel viral negative; blood cx NGTD   -S/p zosyn and vanc 4/7  -Continue Zosyn 3.375g q8 course of 7 days (today's day 3 of 7)    HEMATOLOGIC/ONCOLOGIC   #Leukocytosis- likely 2/2 infection -- downtrending  Rising suspicion for infection source given left basilar consolidation on CXR.   -Continue monitoring    PROPHYLACTIC   -DVT ppx: enoxaparin 40 mg sq qd     ETHICS   -HCP daughter (Jojo)   -Full code    59F with HTN, DM2, hypothyroid, and asthma here for status asthmaticus, unclear trigger; currently with gradual clinical improvement.     NEUROLOGIC   Not at baseline mental status. On sedation. Still requires sedation.   -Baseline AAOx3  -C/w propofol 30, wean as tolerated  -C/w dexomedomitdine 0.25, wean as tolerated  -ketamine d/c 4/7  -D/c fentanyl 4/1   -D/c midazolam -2mg Versed mg IVP PRN  -D/c cistracronium 3/31       CARDIOVASCULAR     # Vasoplegic Shock- RESOLVED    No pressor requirements.     #Elevated troponin- 2/2 demand ischemia from vasopressors- RESOLVED   Plateaued tropnoins 50s to 252 to 180s. No events on telemetry. Peaked and downtrending.   -Monitor   -If increasing again or high clinical suspicion, can consider PE r/o       #HTN- labile with hypotensive and hypertensive episodes with tactile stimulation induced vs tussive bouts   -C/w 5mg Amlodipine for pressure control  -Hold home lisinopril 10mg  -Weaned off nicardipine drip, s/p 3 doses of 25mg Hydralazine   -Continue monitoring BP     #Bradycardia - Monitor with weaning sedatives    RESPIRATORY   #Status asthmaticus   Intubated.   -C/w mechanical ventilator  -Daily spontaneous breathing trials   -C/w  methylprednisolone 40 mg IV 12h (start 3/28) -- wean to 40mg qd  -C/w duonebs 3 mL q4  -Albuterol 2.5mg q4   -Completed empiric pna, s/p vanc and zosyn 4/7   - Holding off on CT angio for now.   -3/30 BAL without growth   -Bronchoscopy 3/30 showing secretions in L main and L lober with mucus aspirated out     # Subcutaneous Emphysema   -Decreasing sq emphysema on RUE and b/l chest wall - improving daily  -Present s/p reintubation 03/30 night  -CXR 3/30 showing subcutaneous emphysema without pneumomediastinum   - Will consider CT chest if worsens    GASTROINTESTINAL   -JS   -OGT 3/30 placed, trickle feeds continued 4/7  -C/w pantoprazole 40 mg IV qd for gastric mucosa ppx     #Gastric retention- 2/2 opioid induced constipation   Had a BM.   -Treated constipation with methylnaltrexone 12 mg sq x 1  -D/c metoclopramide 10 mg IV q6 for GI motility; qtc on EKG 4/1 457 ms   -D/c erythromycin 250 mg IV qd for GI motility   - S/p mineral castor oil enema   - D/c  lactulose 20 mg syrup TID   -abd XR 4/3 with some stool burden    GENITOURINARY/RENAL   #HAGMA 2/2 hypercarbia and lactic acidosis- RESOLVED   #Respiratory acidosis- ventilator changes   ABG pH 7.40.   - On admission, VBG pH 6.98 and PCO2 125. Currently ABG 7.43.   - Treat with mechanical ventilation for exhalation    - S/p Thiamine x 1 for lactic acidosis     #Tai   -TOV 4/4    ENDOCRINE   #DM   Glycemic range within 100s-130s.   -NPO   -Low ISS, q6 fingersticks  - D/c insulin NPH 7u q6h, q6 fingersticks - monitor glucose  -Hold home metformin 500 mg po qd   - Hba1c 5.7     #Hypothyroid   -C/w  levothyroxine 55 mcg IV qd       INFECTIOUS DISEASE   Not meeting criteria for SIRS. Unclear trigger for asthma.   -WBC on admission 11.21 and afebrile but 2 Febrile episode in since 4/7 AM  -Currently afebrile  -New leukocytosis 24, 4/8, downtrending  -Triple lumen taken out 4/7, A line taken out 4/8  -Blood cx NGTD 3/28  -Sputum cx no PMNs or organisms 3/30, new sputum cultures no/rare PMNs with no organisms  -Urine showing 300 proteinuria and 20 hematuria; no evidence of infection   -Negative viral panel  - urine legionella negative   - urine streptococcus negative   -MRSA (-)  -Completed ceftriaxone 1000 mg IV qd x 5 days (started 3/29-4/3)   -Completed azithromycin 500 mg IV qd x 3 days (started 3/28-3/30)   -4/4 and full respiratory panel viral negative; blood cx NGTD   -S/p zosyn and vanc 4/7  -Continue Zosyn 3.375g q8 course of 7 days (today's day 2 of 7)    HEMATOLOGIC/ONCOLOGIC   #Leukocytosis- likely 2/2 infection -- downtrending  Rising suspicion for infection source given left basilar consolidation on CXR.   -Continue monitoring    PROPHYLACTIC   -DVT ppx: enoxaparin 40 mg sq qd     ETHICS   -HCP daughter (Jojo)   -Full code    59F with HTN, DM2, hypothyroid, and asthma here for status asthmaticus, unclear trigger; currently with gradual clinical improvement.     NEUROLOGIC   Not at baseline mental status. On sedation. Still requires sedation.   -Baseline AAOx3  -C/w propofol 30, wean as tolerated  -C/w dexomedomitdine 0.25, wean as tolerated  -ketamine d/c 4/7  -D/c fentanyl 4/1   -D/c midazolam -2mg Versed mg IVP PRN  -D/c cistracronium 3/31       CARDIOVASCULAR     # Vasoplegic Shock- RESOLVED    Labile BP, currently on 0.03 levophed     #Elevated troponin- 2/2 demand ischemia from vasopressors- RESOLVED   Plateaued tropnoins 50s to 252 to 180s. No events on telemetry. Peaked and downtrending.   -Monitor   -If increasing again or high clinical suspicion, can consider PE r/o       #HTN- labile with hypotensive and hypertensive episodes with tactile stimulation induced vs tussive bouts   -5mg Amlodipine for pressure control - hold with labile BPs  -Hold home lisinopril 10mg  -Weaned off nicardipine drip, s/p 3 doses of 25mg Hydralazine   -Continue monitoring BP     #Bradycardia - Monitor with weaning sedatives    RESPIRATORY   #Status asthmaticus   Intubated.   -C/w mechanical ventilator  -Daily spontaneous breathing trials   -C/w  methylprednisolone 40 mg IV 12h (start 3/28) -- wean to 40mg qd  -C/w duonebs 3 mL q4  -Albuterol 2.5mg q4   -Completed empiric pna, s/p vanc and zosyn 4/7   - Holding off on CT angio for now.   -3/30 BAL without growth   -Bronchoscopy 3/30 showing secretions in L main and L lober with mucus aspirated out     # Subcutaneous Emphysema   -Decreasing sq emphysema on RUE and b/l chest wall - improving daily  -Present s/p reintubation 03/30 night  -CXR 3/30 showing subcutaneous emphysema without pneumomediastinum   - Will consider CT chest if worsens    GASTROINTESTINAL   -JS   -OGT 3/30 placed, trickle feeds continued 4/7  -C/w pantoprazole 40 mg IV qd for gastric mucosa ppx     #Gastric retention- 2/2 opioid induced constipation   Had a BM.   -Treated constipation with methylnaltrexone 12 mg sq x 1  -D/c metoclopramide 10 mg IV q6 for GI motility; qtc on EKG 4/1 457 ms   -D/c erythromycin 250 mg IV qd for GI motility   - S/p mineral castor oil enema   - D/c  lactulose 20 mg syrup TID   -abd XR 4/3 with some stool burden    GENITOURINARY/RENAL   #HAGMA 2/2 hypercarbia and lactic acidosis- RESOLVED   #Respiratory acidosis- ventilator changes   ABG pH 7.40.   - On admission, VBG pH 6.98 and PCO2 125. Currently ABG 7.43.   - Treat with mechanical ventilation for exhalation    - S/p Thiamine x 1 for lactic acidosis     #Tai   -TOV 4/4    ENDOCRINE   #DM   Glycemic range within 100s-130s.   -NPO   -Low ISS, q6 fingersticks  - D/c insulin NPH 7u q6h, q6 fingersticks - monitor glucose  -Hold home metformin 500 mg po qd   - Hba1c 5.7     #Hypothyroid   -C/w  levothyroxine 55 mcg IV qd       INFECTIOUS DISEASE   Not meeting criteria for SIRS. Unclear trigger for asthma.   -WBC on admission 11.21 and afebrile but 2 Febrile episode in since 4/7 AM  -Currently afebrile  -New leukocytosis 24, 4/8, downtrending  -Triple lumen taken out 4/7, A line taken out 4/8  -Blood cx NGTD 3/28  -Sputum cx no PMNs or organisms 3/30, new sputum cultures no/rare PMNs with no organisms  -Urine showing 300 proteinuria and 20 hematuria; no evidence of infection   -Negative viral panel  - urine legionella negative   - urine streptococcus negative   -MRSA (-)  -Completed ceftriaxone 1000 mg IV qd x 5 days (started 3/29-4/3)   -Completed azithromycin 500 mg IV qd x 3 days (started 3/28-3/30)   -4/4 and full respiratory panel viral negative; blood cx NGTD   -S/p zosyn and vanc 4/7  -Continue Zosyn 3.375g q8 course of 7 days (today's day 2 of 7)    HEMATOLOGIC/ONCOLOGIC   #Leukocytosis- likely 2/2 infection -- downtrending  Rising suspicion for infection source given left basilar consolidation on CXR.   -Continue monitoring    PROPHYLACTIC   -DVT ppx: enoxaparin 40 mg sq qd     ETHICS   -HCP daughter (Jojo)   -Full code    59F with HTN, DM2, hypothyroid, and asthma here for status asthmaticus, unclear trigger; currently with gradual clinical improvement.     NEUROLOGIC   Not at baseline mental status. On sedation. Still requires sedation.   -Baseline AAOx3  -C/w propofol 30, wean as tolerated  -C/w dexomedomitdine 0.20, wean as tolerated  -ketamine d/c 4/7  -D/c fentanyl 4/1   -D/c midazolam -2mg Versed mg IVP PRN  -D/c cistracronium 3/31   -With frequent agitation with weaning of sedatives, start Seroquel 25mg BID after QTc check    CARDIOVASCULAR     # Vasoplegic Shock- RESOLVED    Labile BP, currently on 0.02 levophed -- continue weaning as tolerated    #Elevated troponin- 2/2 demand ischemia from vasopressors- RESOLVED   Plateaued tropnoins 50s to 252 to 180s. No events on telemetry. Peaked and downtrending.   -Monitor   -If increasing again or high clinical suspicion, can consider PE r/o       #HTN- labile with hypotensive and hypertensive episodes with tactile stimulation induced vs tussive bouts   -5mg Amlodipine for pressure control - hold with labile BPs  -Hold home lisinopril 10mg  -Weaned off nicardipine drip, s/p 3 doses of 25mg Hydralazine   -Continue monitoring BP     #Bradycardia - Monitor with weaning sedatives    RESPIRATORY   #Status asthmaticus   Intubated.   -C/w mechanical ventilator  -Daily spontaneous breathing trials   -C/w  methylprednisolone 40 mg IV -- wean from q12h (start 3/28) to 40mg qd  -C/w duonebs 3 mL q4  -Albuterol 2.5mg q4   -Completed empiric pna, s/p vanc and zosyn 4/7   -IPV and c/w 3% inhaled saline for secretions  - Holding off on CT angio for now.   -3/30 BAL without growth   -Bronchoscopy 3/30 showing secretions in L main and L lober with mucus aspirated out     # Subcutaneous Emphysema   -Decreasing sq emphysema on RUE and b/l chest wall - improving daily  -Present s/p reintubation 03/30 night  -CXR 3/30 showing subcutaneous emphysema without pneumomediastinum   - Will consider CT chest if worsens    GASTROINTESTINAL   -JS   -OGT 3/30 placed, trickle feeds continued 4/7  -C/w pantoprazole 40 mg IV qd for gastric mucosa ppx     #Gastric retention- 2/2 opioid induced constipation   Had a BM.   -Treated constipation with methylnaltrexone 12 mg sq x 1  -D/c metoclopramide 10 mg IV q6 for GI motility; qtc on EKG 4/1 457 ms   -D/c erythromycin 250 mg IV qd for GI motility   - S/p mineral castor oil enema   - D/c  lactulose 20 mg syrup TID   -abd XR 4/3 with some stool burden    GENITOURINARY/RENAL   #HAGMA 2/2 hypercarbia and lactic acidosis- RESOLVED   #Respiratory acidosis- ventilator changes   ABG pH 7.40.   - On admission, VBG pH 6.98 and PCO2 125. Currently ABG 7.43.   - Treat with mechanical ventilation for exhalation    - S/p Thiamine x 1 for lactic acidosis     #Tai   -TOV 4/4    ENDOCRINE   #DM   Glycemic range within 100s-130s.   -NPO   -Low ISS, q6 fingersticks  - D/c insulin NPH 7u q6h, q6 fingersticks - monitor glucose  -Hold home metformin 500 mg po qd   - Hba1c 5.7     #Hypothyroid   -C/w  levothyroxine 55 mcg IV qd       INFECTIOUS DISEASE   Not meeting criteria for SIRS. Unclear trigger for asthma.   -WBC on admission 11.21 and afebrile but 2 Febrile episode in since 4/7 AM  -Currently afebrile  -New leukocytosis 24, 4/8, downtrending  -Triple lumen taken out 4/7, A line taken out 4/8  -Blood cx NGTD 3/28  -Sputum cx no PMNs or organisms 3/30, new sputum cultures no/rare PMNs with no organisms  -Urine showing 300 proteinuria and 20 hematuria; no evidence of infection   -Negative viral panel  - urine legionella negative   - urine streptococcus negative   -MRSA (-)  -Completed ceftriaxone 1000 mg IV qd x 5 days (started 3/29-4/3)   -Completed azithromycin 500 mg IV qd x 3 days (started 3/28-3/30)   -4/4 and full respiratory panel viral negative; blood cx NGTD   -S/p zosyn and vanc 4/7  -Continue Zosyn 3.375g q8 course of 7 days (today's day 2 of 7)    HEMATOLOGIC/ONCOLOGIC   #Leukocytosis- likely 2/2 infection -- downtrending  Rising suspicion for infection source given left basilar consolidation on CXR.   -Continue monitoring    PROPHYLACTIC   -DVT ppx: enoxaparin 40 mg sq qd     ETHICS   -HCP daughter (Jojo)   -Full code

## 2025-04-09 NOTE — CHART NOTE - NSCHARTNOTEFT_GEN_A_CORE
: Dr. Webb  INDICATION: Status asthmaticus, AHRF    PROCEDURE:  [ ] LIMITED ECHO  [X] LIMITED CHEST  [ ] LIMITED RETROPERITONEAL  [ ] LIMITED ABDOMINAL  [ ] LIMITED DVT  [ ] NEEDLE GUIDANCE VASCULAR  [ ] NEEDLE GUIDANCE THORACENTESIS  [ ] NEEDLE GUIDANCE PARACENTESIS  [ ] NEEDLE GUIDANCE PERICARDIOCENTESIS  [ ] OTHER    FINDINGS/INTERPRETATION:  A lines bilaterally on anterior chest  Trace effusion on lower left lung field  Normal LV function    Images in [facility] : Dr. Perrin  INDICATION: Status asthmaticus, AHRF    PROCEDURE:  [ ] LIMITED ECHO  [X] LIMITED CHEST  [ ] LIMITED RETROPERITONEAL  [ ] LIMITED ABDOMINAL  [ ] LIMITED DVT  [ ] NEEDLE GUIDANCE VASCULAR  [ ] NEEDLE GUIDANCE THORACENTESIS  [ ] NEEDLE GUIDANCE PARACENTESIS  [ ] NEEDLE GUIDANCE PERICARDIOCENTESIS  [ ] OTHER    FINDINGS/INTERPRETATION:  A lines bilaterally on anterior chest  Trace effusion on lower left lung field  Normal LV function    Images in [facility] : Dr. Perrin  INDICATION: Status asthmaticus, AHRF    PROCEDURE:  [ x] LIMITED ECHO  [X] LIMITED CHEST  [ ] LIMITED RETROPERITONEAL  [ ] LIMITED ABDOMINAL  [ ] LIMITED DVT  [ ] NEEDLE GUIDANCE VASCULAR  [ ] NEEDLE GUIDANCE THORACENTESIS  [ ] NEEDLE GUIDANCE PARACENTESIS  [ ] NEEDLE GUIDANCE PERICARDIOCENTESIS  [ ] OTHER    FINDINGS/INTERPRETATION:  A lines bilaterally on anterior chest  Trace effusion on lower left lung field  Normal LV  systolic function    Images in [facility]   I have assisted and supervised entire procedure.     Sridhar Fields MD

## 2025-04-09 NOTE — PROGRESS NOTE ADULT - SUBJECTIVE AND OBJECTIVE BOX
visited per consult. Pt's daughter at bedside and asked for prayer for pt. Pt's cousin  recently and they were very close. Pt seems confused about a few things and the daughter did most of the talking. They are Muslim and believe God will help them through this time. They asked for prayer for healing and for their family to be blessed during this time of loss. After prayer, there were no other needs at this time. Pt and daughter will contact  if needed.     MICU PROGRESS NOTE     HISTORY OF PRESENT ILLNESS - 58 y/o woman with pmhx of asthma, HTN, DM and hypothyroidism in the MICU for status asthmaticus     Overnight - Patient had episodes of bradycardia but was otherwise stable.     ROS: All negative except as listed above.    ICU Vital Signs Last 24 Hrs  T(C): 37 (09 Apr 2025 04:00), Max: 37.6 (08 Apr 2025 08:00)  T(F): 98.6 (09 Apr 2025 04:00), Max: 99.7 (08 Apr 2025 08:00)  HR: 57 (09 Apr 2025 06:00) (43 - 114)  BP: 99/54 (09 Apr 2025 06:00) (64/39 - 169/64)  BP(mean): 68 (09 Apr 2025 06:00) (47 - 143)  ABP: 110/51 (08 Apr 2025 13:30) (99/51 - 195/95)  ABP(mean): 68 (08 Apr 2025 13:30) (68 - 137)  RR: 19 (09 Apr 2025 06:00) (18 - 37)  SpO2: 100% (09 Apr 2025 06:00) (97% - 100%)    O2 Parameters below as of 09 Apr 2025 06:00  Patient On (Oxygen Delivery Method): ventilator      LABS:  LABS:                        11.6   18.47 )-----------( 252      ( 09 Apr 2025 00:29 )             35.8     04-09    140  |  104  |  13  ----------------------------<  158[H]  3.9   |  24  |  0.55    Ca    8.6      09 Apr 2025 00:29  Phos  3.2     04-09  Mg     2.30     04-09    TPro  6.5  /  Alb  2.9[L]  /  TBili  0.6  /  DBili  x   /  AST  67[H]  /  ALT  97[H]  /  AlkPhos  142[H]  04-09    PT/INR - ( 09 Apr 2025 00:29 )   PT: 13.3 sec;   INR: 1.12 ratio         PTT - ( 09 Apr 2025 00:29 )  PTT:26.6 sec  Urinalysis Basic - ( 09 Apr 2025 00:29 )    Color: x / Appearance: x / SG: x / pH: x  Gluc: 158 mg/dL / Ketone: x  / Bili: x / Urobili: x   Blood: x / Protein: x / Nitrite: x   Leuk Esterase: x / RBC: x / WBC x   Sq Epi: x / Non Sq Epi: x / Bacteria: x      ABG - ( 08 Apr 2025 00:54 )  pH, Arterial: 7.40  pH, Blood: x     /  pCO2: 45    /  pO2: 130   / HCO3: 28    / Base Excess: 2.6   /  SaO2: 98.8        Culture - Sputum (collected 08 Apr 2025 09:00)  Source: ET Tube ET Tube  Gram Stain (08 Apr 2025 22:17):    Rare polymorphonuclear leukocytes per low power field    Rare Squamous epithelial cells per low power field    No organisms seen    Culture - Sputum (collected 07 Apr 2025 12:07)  Source: Trach Asp Tracheal Aspirate  Gram Stain (07 Apr 2025 18:51):    Rare polymorphonuclear leukocytes per low power field    No Squamous epithelial cells per low power field    No organisms seen per oil power field  Final Report (09 Apr 2025 06:39):    Commensal maryann consistent with body site        Mode: AC/ CMV (Assist Control/ Continuous Mandatory Ventilation), RR (machine): 18, TV (machine): 400, FiO2: 50, PEEP: 6, ITime: 1, MAP: 10, PIP: 28          08 Apr 2025 07:01  -  09 Apr 2025 07:00  --------------------------------------------------------  IN:    Dexmedetomidine: 254.8 mL    Enteral Tube Flush: 250 mL    Glucerna 1.5: 440 mL    IV PiggyBack: 100 mL    Ketamine: 4.6 mL    Ketamine: 36.8 mL    Lactated Ringers Bolus: 1000 mL    Norepinephrine: 39.9 mL    Propofol: 508 mL  Total IN: 2634.1 mL    OUT:    Rectal Tube (mL): 25 mL    Voided (mL): 1550 mL  Total OUT: 1575 mL    Total NET: 1059.1 mL    CAPILLARY BLOOD GLUCOSE      POCT Blood Glucose.: 122 mg/dL (09 Apr 2025 05:04)    PHYSICAL EXAM:    General: NAD, overweight habitus   Neuro: sedated, AAOx0, no grimace to noxious stimuli, no spontaneity   HEENT: PERRLA b/l, mildly moist mucous membranes  Chest: nonTTP, sq emphysema palpable but improving  Heart: S1/S2, RRR    Lungs: high pitched inspiratory breath sounds b/l,  MV   Abd: soft, nonTTP, nondistended   Ext: trace pitting edema pretibial, palpable sq emphysema in R forearm   Pulses: radial 2+ b/l   Lines/tubes/drains: 2 peripheral lines R arm, 1 peripheral line L arm, L axillary A line  Psych: unable to assess     MEDICATIONS  (STANDING):  albuterol    0.083% 2.5 milliGRAM(s) Nebulizer every 4 hours  buDESOnide    Inhalation Suspension 0.5 milliGRAM(s) Inhalation two times a day  chlorhexidine 0.12% Liquid 15 milliLiter(s) Oral Mucosa every 12 hours  chlorhexidine 2% Cloths 1 Application(s) Topical <User Schedule>  dexMEDEtomidine Infusion 0.25 MICROgram(s)/kG/Hr (5.83 mL/Hr) IV Continuous <Continuous>  dextrose 5%. 1000 milliLiter(s) (50 mL/Hr) IV Continuous <Continuous>  dextrose 5%. 1000 milliLiter(s) (100 mL/Hr) IV Continuous <Continuous>  dextrose 50% Injectable 25 Gram(s) IV Push once  dextrose 50% Injectable 12.5 Gram(s) IV Push once  dextrose 50% Injectable 25 Gram(s) IV Push once  enoxaparin Injectable 40 milliGRAM(s) SubCutaneous every 24 hours  glucagon  Injectable 1 milliGRAM(s) IntraMuscular once  influenza   Vaccine 0.5 milliLiter(s) IntraMuscular once  insulin lispro (ADMELOG) corrective regimen sliding scale   SubCutaneous every 6 hours  levothyroxine 75 MICROGram(s) Oral daily  methylPREDNISolone sodium succinate Injectable 40 milliGRAM(s) IV Push every 12 hours  multivitamin/minerals/iron Oral Solution (CENTRUM) 15 milliLiter(s) Oral daily  norepinephrine Infusion 0.05 MICROgram(s)/kG/Min (8.74 mL/Hr) IV Continuous <Continuous>  pantoprazole  Injectable 40 milliGRAM(s) IV Push daily  petrolatum Ophthalmic Ointment 1 Application(s) Both EYES daily  piperacillin/tazobactam IVPB.. 3.375 Gram(s) IV Intermittent every 8 hours  polyethylene glycol 3350 17 Gram(s) Oral two times a day  propofol Infusion 30 MICROgram(s)/kG/Min (16.8 mL/Hr) IV Continuous <Continuous>  senna Syrup 10 milliLiter(s) Oral daily  sodium chloride 3%  Inhalation 4 milliLiter(s) Inhalation every 6 hours    MEDICATIONS  (PRN):  dextrose Oral Gel 15 Gram(s) Oral once PRN Blood Glucose LESS THAN 70 milliGRAM(s)/deciliter        ALLERGIES:  Allergies    No Known Allergies    Intolerances         RADIOLOGY & ADDITIONAL TESTS: Reviewed.  New left basilar opacity on CXR. MICU PROGRESS NOTE     HISTORY OF PRESENT ILLNESS - 58 y/o woman with pmhx of asthma, HTN, DM and hypothyroidism in the MICU for status asthmaticus     Overnight - Patient had episodes of bradycardia but was otherwise stable.     ROS: All negative except as listed above.    ICU Vital Signs Last 24 Hrs  T(C): 37 (09 Apr 2025 04:00), Max: 37.6 (08 Apr 2025 08:00)  T(F): 98.6 (09 Apr 2025 04:00), Max: 99.7 (08 Apr 2025 08:00)  HR: 57 (09 Apr 2025 06:00) (43 - 114)  BP: 99/54 (09 Apr 2025 06:00) (64/39 - 169/64)  BP(mean): 68 (09 Apr 2025 06:00) (47 - 143)  ABP: 110/51 (08 Apr 2025 13:30) (99/51 - 195/95)  ABP(mean): 68 (08 Apr 2025 13:30) (68 - 137)  RR: 19 (09 Apr 2025 06:00) (18 - 37)  SpO2: 100% (09 Apr 2025 06:00) (97% - 100%)    O2 Parameters below as of 09 Apr 2025 06:00  Patient On (Oxygen Delivery Method): ventilator      LABS:                        11.6   18.47 )-----------( 252      ( 09 Apr 2025 00:29 )             35.8     04-09    140  |  104  |  13  ----------------------------<  158[H]  3.9   |  24  |  0.55    Ca    8.6      09 Apr 2025 00:29  Phos  3.2     04-09  Mg     2.30     04-09    TPro  6.5  /  Alb  2.9[L]  /  TBili  0.6  /  DBili  x   /  AST  67[H]  /  ALT  97[H]  /  AlkPhos  142[H]  04-09    PT/INR - ( 09 Apr 2025 00:29 )   PT: 13.3 sec;   INR: 1.12 ratio         PTT - ( 09 Apr 2025 00:29 )  PTT:26.6 sec  Urinalysis Basic - ( 09 Apr 2025 00:29 )    Color: x / Appearance: x / SG: x / pH: x  Gluc: 158 mg/dL / Ketone: x  / Bili: x / Urobili: x   Blood: x / Protein: x / Nitrite: x   Leuk Esterase: x / RBC: x / WBC x   Sq Epi: x / Non Sq Epi: x / Bacteria: x      ABG - ( 08 Apr 2025 00:54 )  pH, Arterial: 7.40  pH, Blood: x     /  pCO2: 45    /  pO2: 130   / HCO3: 28    / Base Excess: 2.6   /  SaO2: 98.8        Culture - Sputum (collected 08 Apr 2025 09:00)  Source: ET Tube ET Tube  Gram Stain (08 Apr 2025 22:17):    Rare polymorphonuclear leukocytes per low power field    Rare Squamous epithelial cells per low power field    No organisms seen    Culture - Sputum (collected 07 Apr 2025 12:07)  Source: Trach Asp Tracheal Aspirate  Gram Stain (07 Apr 2025 18:51):    Rare polymorphonuclear leukocytes per low power field    No Squamous epithelial cells per low power field    No organisms seen per oil power field  Final Report (09 Apr 2025 06:39):    Commensal maryann consistent with body site        Mode: AC/ CMV (Assist Control/ Continuous Mandatory Ventilation), RR (machine): 18, TV (machine): 400, FiO2: 30, PEEP: 6, ITime: 1, MAP: 10, PIP: 28          08 Apr 2025 07:01  -  09 Apr 2025 07:00  --------------------------------------------------------  IN:    Dexmedetomidine: 254.8 mL    Enteral Tube Flush: 250 mL    Glucerna 1.5: 440 mL    IV PiggyBack: 100 mL    Ketamine: 4.6 mL    Ketamine: 36.8 mL    Lactated Ringers Bolus: 1000 mL    Norepinephrine: 39.9 mL    Propofol: 508 mL  Total IN: 2634.1 mL    OUT:    Rectal Tube (mL): 25 mL    Voided (mL): 1550 mL  Total OUT: 1575 mL    Total NET: 1059.1 mL    CAPILLARY BLOOD GLUCOSE      POCT Blood Glucose.: 122 mg/dL (09 Apr 2025 05:04)    PHYSICAL EXAM:    General: NAD, overweight habitus   Neuro: sedated, AAOx0, no grimace to noxious stimuli, no spontaneity   HEENT: PERRLA b/l, mildly moist mucous membranes  Chest: nonTTP, sq emphysema palpable but improving  Heart: S1/S2, RRR    Lungs: high pitched inspiratory breath sounds Left upper lobe, inspiratory rhonchi right lower lobe,  MV   Abd: soft, nonTTP, nondistended   Ext: trace pitting edema pretibial, palpable sq emphysema in R forearm   Pulses: radial 2+ b/l   Lines/tubes/drains: 2 peripheral lines R arm, 1 peripheral line L arm  Psych: unable to assess     MEDICATIONS  (STANDING):  albuterol    0.083% 2.5 milliGRAM(s) Nebulizer every 4 hours  buDESOnide    Inhalation Suspension 0.5 milliGRAM(s) Inhalation two times a day  chlorhexidine 0.12% Liquid 15 milliLiter(s) Oral Mucosa every 12 hours  chlorhexidine 2% Cloths 1 Application(s) Topical <User Schedule>  dexMEDEtomidine Infusion 0.25 MICROgram(s)/kG/Hr (5.83 mL/Hr) IV Continuous <Continuous>  dextrose 5%. 1000 milliLiter(s) (50 mL/Hr) IV Continuous <Continuous>  dextrose 5%. 1000 milliLiter(s) (100 mL/Hr) IV Continuous <Continuous>  dextrose 50% Injectable 25 Gram(s) IV Push once  dextrose 50% Injectable 12.5 Gram(s) IV Push once  dextrose 50% Injectable 25 Gram(s) IV Push once  enoxaparin Injectable 40 milliGRAM(s) SubCutaneous every 24 hours  glucagon  Injectable 1 milliGRAM(s) IntraMuscular once  influenza   Vaccine 0.5 milliLiter(s) IntraMuscular once  insulin lispro (ADMELOG) corrective regimen sliding scale   SubCutaneous every 6 hours  levothyroxine 75 MICROGram(s) Oral daily  methylPREDNISolone sodium succinate Injectable 40 milliGRAM(s) IV Push every 12 hours  multivitamin/minerals/iron Oral Solution (CENTRUM) 15 milliLiter(s) Oral daily  norepinephrine Infusion 0.05 MICROgram(s)/kG/Min (8.74 mL/Hr) IV Continuous <Continuous>  pantoprazole  Injectable 40 milliGRAM(s) IV Push daily  petrolatum Ophthalmic Ointment 1 Application(s) Both EYES daily  piperacillin/tazobactam IVPB.. 3.375 Gram(s) IV Intermittent every 8 hours  polyethylene glycol 3350 17 Gram(s) Oral two times a day  propofol Infusion 30 MICROgram(s)/kG/Min (16.8 mL/Hr) IV Continuous <Continuous>  senna Syrup 10 milliLiter(s) Oral daily  sodium chloride 3%  Inhalation 4 milliLiter(s) Inhalation every 6 hours    MEDICATIONS  (PRN):  dextrose Oral Gel 15 Gram(s) Oral once PRN Blood Glucose LESS THAN 70 milliGRAM(s)/deciliter        ALLERGIES:  Allergies    No Known Allergies    Intolerances         RADIOLOGY & ADDITIONAL TESTS: Reviewed.  New left basilar opacity on CXR. Right streaky opacity in RLL.

## 2025-04-10 LAB
ALBUMIN SERPL ELPH-MCNC: 2.9 G/DL — LOW (ref 3.3–5)
ALP SERPL-CCNC: 141 U/L — HIGH (ref 40–120)
ALT FLD-CCNC: 124 U/L — HIGH (ref 4–33)
ANION GAP SERPL CALC-SCNC: 10 MMOL/L — SIGNIFICANT CHANGE UP (ref 7–14)
APTT BLD: 26.5 SEC — SIGNIFICANT CHANGE UP (ref 24.5–35.6)
AST SERPL-CCNC: 57 U/L — HIGH (ref 4–32)
BILIRUB SERPL-MCNC: 0.4 MG/DL — SIGNIFICANT CHANGE UP (ref 0.2–1.2)
BLD GP AB SCN SERPL QL: NEGATIVE — SIGNIFICANT CHANGE UP
BLOOD GAS ARTERIAL COMPREHENSIVE RESULT: SIGNIFICANT CHANGE UP
BLOOD GAS ARTERIAL COMPREHENSIVE RESULT: SIGNIFICANT CHANGE UP
BLOOD GAS VENOUS COMPREHENSIVE RESULT: SIGNIFICANT CHANGE UP
BUN SERPL-MCNC: 15 MG/DL — SIGNIFICANT CHANGE UP (ref 7–23)
CALCIUM SERPL-MCNC: 8.6 MG/DL — SIGNIFICANT CHANGE UP (ref 8.4–10.5)
CHLORIDE SERPL-SCNC: 103 MMOL/L — SIGNIFICANT CHANGE UP (ref 98–107)
CK MB BLD-MCNC: 0.5 % — SIGNIFICANT CHANGE UP (ref 0–2.5)
CK MB CFR SERPL CALC: 1.7 NG/ML — SIGNIFICANT CHANGE UP
CK SERPL-CCNC: 369 U/L — HIGH (ref 25–170)
CO2 SERPL-SCNC: 23 MMOL/L — SIGNIFICANT CHANGE UP (ref 22–31)
CREAT SERPL-MCNC: 0.54 MG/DL — SIGNIFICANT CHANGE UP (ref 0.5–1.3)
CULTURE RESULTS: SIGNIFICANT CHANGE UP
EGFR: 105 ML/MIN/1.73M2 — SIGNIFICANT CHANGE UP
EGFR: 105 ML/MIN/1.73M2 — SIGNIFICANT CHANGE UP
GLUCOSE BLDC GLUCOMTR-MCNC: 108 MG/DL — HIGH (ref 70–99)
GLUCOSE BLDC GLUCOMTR-MCNC: 43 MG/DL — CRITICAL LOW (ref 70–99)
GLUCOSE BLDC GLUCOMTR-MCNC: 82 MG/DL — SIGNIFICANT CHANGE UP (ref 70–99)
GLUCOSE BLDC GLUCOMTR-MCNC: 85 MG/DL — SIGNIFICANT CHANGE UP (ref 70–99)
GLUCOSE BLDC GLUCOMTR-MCNC: 93 MG/DL — SIGNIFICANT CHANGE UP (ref 70–99)
GLUCOSE BLDC GLUCOMTR-MCNC: 94 MG/DL — SIGNIFICANT CHANGE UP (ref 70–99)
GLUCOSE BLDC GLUCOMTR-MCNC: 98 MG/DL — SIGNIFICANT CHANGE UP (ref 70–99)
GLUCOSE SERPL-MCNC: 117 MG/DL — HIGH (ref 70–99)
HCT VFR BLD CALC: 36.1 % — SIGNIFICANT CHANGE UP (ref 34.5–45)
HGB BLD-MCNC: 11.5 G/DL — SIGNIFICANT CHANGE UP (ref 11.5–15.5)
INR BLD: 1.09 RATIO — SIGNIFICANT CHANGE UP (ref 0.85–1.16)
MAGNESIUM SERPL-MCNC: 2.1 MG/DL — SIGNIFICANT CHANGE UP (ref 1.6–2.6)
MCHC RBC-ENTMCNC: 30.7 PG — SIGNIFICANT CHANGE UP (ref 27–34)
MCHC RBC-ENTMCNC: 31.9 G/DL — LOW (ref 32–36)
MCV RBC AUTO: 96.5 FL — SIGNIFICANT CHANGE UP (ref 80–100)
NRBC # BLD AUTO: 0 K/UL — SIGNIFICANT CHANGE UP (ref 0–0)
NRBC # FLD: 0 K/UL — SIGNIFICANT CHANGE UP (ref 0–0)
NRBC BLD AUTO-RTO: 0 /100 WBCS — SIGNIFICANT CHANGE UP (ref 0–0)
PHOSPHATE SERPL-MCNC: 2.5 MG/DL — SIGNIFICANT CHANGE UP (ref 2.5–4.5)
PLATELET # BLD AUTO: 308 K/UL — SIGNIFICANT CHANGE UP (ref 150–400)
POTASSIUM SERPL-MCNC: 3.7 MMOL/L — SIGNIFICANT CHANGE UP (ref 3.5–5.3)
POTASSIUM SERPL-SCNC: 3.7 MMOL/L — SIGNIFICANT CHANGE UP (ref 3.5–5.3)
PROT SERPL-MCNC: 6.5 G/DL — SIGNIFICANT CHANGE UP (ref 6–8.3)
PROTHROM AB SERPL-ACNC: 13 SEC — SIGNIFICANT CHANGE UP (ref 9.9–13.4)
RBC # BLD: 3.74 M/UL — LOW (ref 3.8–5.2)
RBC # FLD: 15 % — HIGH (ref 10.3–14.5)
RH IG SCN BLD-IMP: POSITIVE — SIGNIFICANT CHANGE UP
SODIUM SERPL-SCNC: 136 MMOL/L — SIGNIFICANT CHANGE UP (ref 135–145)
SPECIMEN SOURCE: SIGNIFICANT CHANGE UP
TROPONIN T, HIGH SENSITIVITY RESULT: 19 NG/L — SIGNIFICANT CHANGE UP
WBC # BLD: 17.33 K/UL — HIGH (ref 3.8–10.5)
WBC # FLD AUTO: 17.33 K/UL — HIGH (ref 3.8–10.5)

## 2025-04-10 RX ORDER — METHYLPREDNISOLONE ACETATE 80 MG/ML
20 INJECTION, SUSPENSION INTRA-ARTICULAR; INTRALESIONAL; INTRAMUSCULAR; SOFT TISSUE ONCE
Refills: 0 | Status: COMPLETED | OUTPATIENT
Start: 2025-04-10 | End: 2025-04-10

## 2025-04-10 RX ORDER — KETAMINE HCL IN 0.9 % NACL 50 MG/5 ML
0.25 SYRINGE (ML) INTRAVENOUS
Qty: 1000 | Refills: 0 | Status: DISCONTINUED | OUTPATIENT
Start: 2025-04-10 | End: 2025-04-10

## 2025-04-10 RX ORDER — METHYLPREDNISOLONE ACETATE 80 MG/ML
INJECTION, SUSPENSION INTRA-ARTICULAR; INTRALESIONAL; INTRAMUSCULAR; SOFT TISSUE
Refills: 0 | Status: DISCONTINUED | OUTPATIENT
Start: 2025-04-11 | End: 2025-04-15

## 2025-04-10 RX ORDER — METHYLPREDNISOLONE ACETATE 80 MG/ML
20 INJECTION, SUSPENSION INTRA-ARTICULAR; INTRALESIONAL; INTRAMUSCULAR; SOFT TISSUE DAILY
Refills: 0 | Status: DISCONTINUED | OUTPATIENT
Start: 2025-04-14 | End: 2025-04-15

## 2025-04-10 RX ORDER — METHYLPREDNISOLONE ACETATE 80 MG/ML
30 INJECTION, SUSPENSION INTRA-ARTICULAR; INTRALESIONAL; INTRAMUSCULAR; SOFT TISSUE DAILY
Refills: 0 | Status: COMPLETED | OUTPATIENT
Start: 2025-04-11 | End: 2025-04-13

## 2025-04-10 RX ORDER — METHYLPREDNISOLONE ACETATE 80 MG/ML
40 INJECTION, SUSPENSION INTRA-ARTICULAR; INTRALESIONAL; INTRAMUSCULAR; SOFT TISSUE DAILY
Refills: 0 | Status: DISCONTINUED | OUTPATIENT
Start: 2025-04-10 | End: 2025-04-10

## 2025-04-10 RX ORDER — ENALAPRIL MALEATE 20 MG
1.25 TABLET ORAL EVERY 6 HOURS
Refills: 0 | Status: DISCONTINUED | OUTPATIENT
Start: 2025-04-10 | End: 2025-04-13

## 2025-04-10 RX ORDER — POTASSIUM PHOSPHATE, MONOBASIC POTASSIUM PHOSPHATE, DIBASIC INJECTION, 236; 224 MG/ML; MG/ML
30 SOLUTION, CONCENTRATE INTRAVENOUS ONCE
Refills: 0 | Status: COMPLETED | OUTPATIENT
Start: 2025-04-10 | End: 2025-04-10

## 2025-04-10 RX ORDER — SODIUM CHLORIDE 9 G/1000ML
1000 INJECTION, SOLUTION INTRAVENOUS
Refills: 0 | Status: DISCONTINUED | OUTPATIENT
Start: 2025-04-10 | End: 2025-04-11

## 2025-04-10 RX ORDER — METHYLPREDNISOLONE ACETATE 80 MG/ML
40 INJECTION, SUSPENSION INTRA-ARTICULAR; INTRALESIONAL; INTRAMUSCULAR; SOFT TISSUE ONCE
Refills: 0 | Status: DISCONTINUED | OUTPATIENT
Start: 2025-04-10 | End: 2025-04-10

## 2025-04-10 RX ADMIN — Medication 75 MICROGRAM(S): at 05:06

## 2025-04-10 RX ADMIN — Medication 25 GRAM(S): at 14:35

## 2025-04-10 RX ADMIN — Medication 2.5 MILLIGRAM(S): at 19:24

## 2025-04-10 RX ADMIN — Medication 2.5 MILLIGRAM(S): at 02:21

## 2025-04-10 RX ADMIN — BUDESONIDE 0.5 MILLIGRAM(S): 90 AEROSOL, POWDER RESPIRATORY (INHALATION) at 07:02

## 2025-04-10 RX ADMIN — METHYLPREDNISOLONE ACETATE 20 MILLIGRAM(S): 80 INJECTION, SUSPENSION INTRA-ARTICULAR; INTRALESIONAL; INTRAMUSCULAR; SOFT TISSUE at 05:24

## 2025-04-10 RX ADMIN — Medication 1 APPLICATION(S): at 05:05

## 2025-04-10 RX ADMIN — Medication 4 MILLILITER(S): at 02:21

## 2025-04-10 RX ADMIN — Medication 4 MILLILITER(S): at 19:25

## 2025-04-10 RX ADMIN — Medication 1.25 MILLIGRAM(S): at 18:52

## 2025-04-10 RX ADMIN — Medication 1.25 MILLIGRAM(S): at 23:41

## 2025-04-10 RX ADMIN — Medication 25 GRAM(S): at 21:20

## 2025-04-10 RX ADMIN — ENOXAPARIN SODIUM 40 MILLIGRAM(S): 100 INJECTION SUBCUTANEOUS at 14:35

## 2025-04-10 RX ADMIN — Medication 1.25 MILLIGRAM(S): at 12:48

## 2025-04-10 RX ADMIN — Medication 10 MILLIGRAM(S): at 06:43

## 2025-04-10 RX ADMIN — Medication 2.5 MILLIGRAM(S): at 07:02

## 2025-04-10 RX ADMIN — Medication 2.5 MILLIGRAM(S): at 11:00

## 2025-04-10 RX ADMIN — Medication 2.33 MG/KG/HR: at 04:35

## 2025-04-10 RX ADMIN — Medication 15 MILLILITER(S): at 05:04

## 2025-04-10 RX ADMIN — Medication 2.5 MILLIGRAM(S): at 15:00

## 2025-04-10 RX ADMIN — Medication 2.5 MILLIGRAM(S): at 23:08

## 2025-04-10 RX ADMIN — BUDESONIDE 0.5 MILLIGRAM(S): 90 AEROSOL, POWDER RESPIRATORY (INHALATION) at 19:24

## 2025-04-10 RX ADMIN — Medication 25 GRAM(S): at 05:03

## 2025-04-10 RX ADMIN — METHYLPREDNISOLONE ACETATE 20 MILLIGRAM(S): 80 INJECTION, SUSPENSION INTRA-ARTICULAR; INTRALESIONAL; INTRAMUSCULAR; SOFT TISSUE at 10:36

## 2025-04-10 RX ADMIN — Medication 4 MILLILITER(S): at 23:11

## 2025-04-10 RX ADMIN — Medication 4 MILLILITER(S): at 15:10

## 2025-04-10 RX ADMIN — SODIUM CHLORIDE 50 MILLILITER(S): 9 INJECTION, SOLUTION INTRAVENOUS at 10:36

## 2025-04-10 RX ADMIN — Medication 4 MILLILITER(S): at 07:10

## 2025-04-10 RX ADMIN — POTASSIUM PHOSPHATE, MONOBASIC POTASSIUM PHOSPHATE, DIBASIC INJECTION, 111.11 MILLIMOLE(S): 236; 224 SOLUTION, CONCENTRATE INTRAVENOUS at 11:41

## 2025-04-10 NOTE — PROGRESS NOTE ADULT - ASSESSMENT
59F with HTN, DM2, hypothyroid, and asthma here for status asthmaticus, unclear trigger; currently with gradual clinical improvement.     NEUROLOGIC   Extubated and no longer on sedation.   -Baseline AAOx3, currently awake and alert.  -Off all sedatives (propofol, dexomedomitdine, ketamine d/c 4/7, fentanyl 4/1, D/c midazolam)  -Off paralytics (D/c cistracronium 3/31)   -2mg Versed mg IVP PRN  -Seroquel 25mg BID after QTc check (422)     CARDIOVASCULAR     # Vasoplegic Shock- RESOLVED   Off pressors    #Elevated troponin- 2/2 demand ischemia from vasopressors- RESOLVED   Plateaued tropnoins 50s to 252 to 180s. No events on telemetry. Peaked and downtrending.   -Monitor   -If increasing again or high clinical suspicion, can consider PE r/o       #HTN- labile with hypotensive and hypertensive episodes with tactile stimulation induced vs tussive bouts   -5mg Amlodipine for pressure control - hold with labile BPs  -Hold home lisinopril 10mg  -Weaned off nicardipine drip, s/p 3 doses of 25mg Hydralazine   -Continue monitoring BP     #Bradycardia - Monitor with weaning sedatives    RESPIRATORY   #Status asthmaticus   -Extubated earlier this morning (5:20ish AM), on HFNC  -D/C mechanical ventilator  -Wean off HFNC as tolerated  -C/w  methylprednisolone 20 mg IV BID   -C/w duonebs 3 mL q4  -Albuterol 2.5mg q4   -Completed empiric pna, s/p vanc and zosyn 4/7   -IPV and c/w 3% inhaled saline for secretions  - Holding off on CT angio for now.   -3/30 BAL without growth   -Bronchoscopy 3/30 showing secretions in L main and L lober with mucus aspirated out     # Subcutaneous Emphysema   -Decreasing sq emphysema on RUE and b/l chest wall - resolved!  -Present s/p reintubation 03/30 night  -CXR 3/30 showing subcutaneous emphysema without pneumomediastinum   - Will consider CT chest if worsens    GASTROINTESTINAL   -JS   -OGT 3/30 placed, trickle feeds continued 4/7  -C/w pantoprazole 40 mg IV qd for gastric mucosa ppx     #Gastric retention- 2/2 opioid induced constipation   Had a BM.   -Treated constipation with methylnaltrexone 12 mg sq x 1  -D/c metoclopramide 10 mg IV q6 for GI motility; qtc on EKG 4/1 457 ms   -D/c erythromycin 250 mg IV qd for GI motility   - S/p mineral castor oil enema   - D/c  lactulose 20 mg syrup TID   -abd XR 4/3 with some stool burden    GENITOURINARY/RENAL   #HAGMA 2/2 hypercarbia and lactic acidosis- RESOLVED   #Respiratory acidosis- ventilator changes   ABG pH 7.40.   - On admission, VBG pH 6.98 and PCO2 125. Currently ABG 7.43.   - Treat with mechanical ventilation for exhalation    - S/p Thiamine x 1 for lactic acidosis     #Tai   -TOV 4/4    ENDOCRINE   #DM   Glycemic range within 100s-130s.    -Low ISS, q6 fingersticks  - D/c insulin NPH 7u q6h, q6 fingersticks - monitor glucose  -Hold home metformin 500 mg po qd   - Hba1c 5.7     #Hypothyroid   -C/w  levothyroxine 55 mcg IV qd       INFECTIOUS DISEASE   Not meeting criteria for SIRS. Unclear trigger for asthma.   -WBC on admission 11.21 and afebrile but 2 Febrile episode in since 4/7 AM  -Currently afebrile  -New leukocytosis 24, 4/8, downtrending  -Triple lumen taken out 4/7, A line taken out 4/8  -Blood cx NGTD 3/28  -Sputum cx no PMNs or organisms 3/30, new sputum cultures no/rare PMNs with no organisms  -Urine showing 300 proteinuria and 20 hematuria; no evidence of infection   -Negative viral panel  - urine legionella negative   - urine streptococcus negative   -MRSA (-)  -Completed ceftriaxone 1000 mg IV qd x 5 days (started 3/29-4/3)   -Completed azithromycin 500 mg IV qd x 3 days (started 3/28-3/30)   -4/4 and full respiratory panel viral negative; blood cx NGTD   -S/p zosyn and vanc 4/7  -Continue Zosyn 3.375g q8 course of 7 days (today's day 2 of 7)    HEMATOLOGIC/ONCOLOGIC   #Leukocytosis- likely 2/2 infection -- downtrending  Rising suspicion for infection source given left basilar consolidation on CXR.   -Continue monitoring    PROPHYLACTIC   -DVT ppx: enoxaparin 40 mg sq qd     ETHICS   -HCP daughter (Jojo)   -Full code    59F with HTN, DM2, hypothyroid, and asthma here for status asthmaticus, unclear trigger; currently with gradual clinical improvement.     NEUROLOGIC   Extubated and no longer on sedation.   -Baseline AAOx3, currently awake and alert.  -Off all sedatives (propofol, dexomedomitdine, ketamine d/c 4/7, fentanyl 4/1, D/c midazolam)  -Off paralytics (D/c cistracronium 3/31)  -2mg Versed mg IVP PRN  -D/c Seroquel 25mg BID    CARDIOVASCULAR     # Vasoplegic Shock- RESOLVED   Off pressors    #Elevated troponin- 2/2 demand ischemia from vasopressors- RESOLVED   Plateaued tropnoins 50s to 252 to 180s. No events on telemetry. Peaked and downtrending.   -Monitor   -If increasing again or high clinical suspicion, can consider PE r/o       #HTN- labile with hypotensive and hypertensive episodes with tactile stimulation induced vs tussive bouts   -Hold home lisinopril 10mg  -1 time 0.125 dose of Vasotec, monitor BP after administration  -If BP okay after 1x dose, continue q6  -Weaned off nicardipine drip, s/p 3 doses of 25mg Hydralazine   -Continue monitoring BP     RESPIRATORY   #Status asthmaticus   -Extubated earlier this morning (5:20ish AM), on HFNC  -D/C mechanical ventilator  -Wean off HFNC as tolerated to NC  -C/w  methylprednisolone wean - 40 qd today, start 30 qd tomorrow   -C/w duonebs 3 mL q4  -Albuterol 2.5mg q4   -Completed empiric pna, s/p vanc and zosyn 4/7   -IPV and c/w 3% inhaled saline for secretions  - Holding off on CT angio for now.   -3/30 BAL without growth   -Bronchoscopy 3/30 showing secretions in L main and L lober with mucus aspirated out     # Subcutaneous Emphysema   -CXR 3/30 showing subcutaneous emphysema without pneumomediastinum  -subcutaneous emphysema on RUE and b/l chest wall resolved     GASTROINTESTINAL   -JS   -OGT 3/30 removed 4/10  -NPO today for 24 hrs  -LR 50cc/hr  -D/c pantoprazole 40 mg IV qd for gastric mucosa ppx     #Gastric retention- 2/2 opioid induced constipation   Had a BM.   -Treated constipation with methylnaltrexone 12 mg sq x 1  -D/c metoclopramide 10 mg IV q6 for GI motility; qtc on EKG 4/1 457 ms   -D/c erythromycin 250 mg IV qd for GI motility   - S/p mineral castor oil enema   - D/c  lactulose 20 mg syrup TID   -abd XR 4/3 with some stool burden    GENITOURINARY/RENAL   #HAGMA 2/2 hypercarbia and lactic acidosis- RESOLVED   #Respiratory acidosis- ventilator changes   ABG pH 7.40.   - On admission, VBG pH 6.98 and PCO2 125. Currently ABG 7.43.   - Treat with mechanical ventilation for exhalation    - S/p Thiamine x 1 for lactic acidosis     #Tai   -TOV 4/4    ENDOCRINE   #DM   -Couple episodes of hypoglycemia (40s)  -Monitor glucose levels closely  -Low ISS  - D/c insulin NPH 7u q6h, q6 fingersticks   -Hold home metformin 500 mg po qd   - Hba1c 5.7     #Hypothyroid   -Hold home Levothyroxine today (NPO)       INFECTIOUS DISEASE   Not meeting criteria for SIRS. Unclear trigger for asthma.   -WBC on admission 11.21 and afebrile but 2 Febrile episode in since 4/7 AM  -Currently afebrile  -New leukocytosis 24, 4/8, downtrending  -Triple lumen taken out 4/7, A line taken out 4/8  -Blood cx NGTD 3/28  -Sputum cx no PMNs or organisms 3/30  -New sputum cultures/stains (4/7) = commensal maryann and no/rare PMNs with no organisms  -Urine showing 300 proteinuria and 20 hematuria; no evidence of infection   -Negative viral panel  - urine legionella negative   - urine streptococcus negative   -MRSA (-)  -Completed ceftriaxone 1000 mg IV qd x 5 days (started 3/29-4/3)   -Completed azithromycin 500 mg IV qd x 3 days (started 3/28-3/30)   -4/4 and full respiratory panel viral negative  -S/p zosyn and vanc 4/7  -Continue Zosyn 3.375g q8 course of 7 days (today's day 3 of 7)    HEMATOLOGIC/ONCOLOGIC   #Leukocytosis- likely 2/2 infection -- downtrending  Rising suspicion for infection source given left basilar consolidation on CXR.   -Continue monitoring    PROPHYLACTIC   -DVT ppx: enoxaparin 40 mg sq qd     PT consulted    ETHICS   -HCP daughter (Jojo)   -Full code

## 2025-04-10 NOTE — CHART NOTE - NSCHARTNOTEFT_GEN_A_CORE
: Karlo Beal    INDICATION: AHRF    PROCEDURE:  [x] LIMITED ECHO  [x] LIMITED CHEST  [ ] LIMITED RETROPERITONEAL  [ ] LIMITED ABDOMINAL  [ ] LIMITED DVT  [ ] NEEDLE GUIDANCE VASCULAR  [ ] NEEDLE GUIDANCE THORACENTESIS  [ ] NEEDLE GUIDANCE PARACENTESIS  [ ] NEEDLE GUIDANCE PERICARDIOCENTESIS  [ ] OTHER    FINDINGS/INTERPRETATION:  Lungs  Bilateral lung sliding  Bilateral A-line predominant  No effusion    Heart  LVSF normal    Images stored in [facility] : Karlo Beal    INDICATION: AHRF    PROCEDURE:  [x] LIMITED ECHO  [x] LIMITED CHEST  [ ] LIMITED RETROPERITONEAL  [ ] LIMITED ABDOMINAL  [ ] LIMITED DVT  [ ] NEEDLE GUIDANCE VASCULAR  [ ] NEEDLE GUIDANCE THORACENTESIS  [ ] NEEDLE GUIDANCE PARACENTESIS  [ ] NEEDLE GUIDANCE PERICARDIOCENTESIS  [ ] OTHER    FINDINGS/INTERPRETATION:  Lungs  Bilateral lung sliding  Bilateral A-line predominant  No effusion    Heart  LVSF normal    Images stored in [facility]  I have assisted and supervised entire procedure.     Sridhar Fields MD.

## 2025-04-10 NOTE — PROGRESS NOTE ADULT - ATTENDING COMMENTS
1. Acute hypoxemic respiratory failure due to status asthmaticus.    Extubated early this am.   Tolerating Hi Flow 02. Pt severely weak and deconditioned.   2. HTN: Continue   Start vasotec 0.125 q 6 hrs. Up titrate as needed.    3.Pneumomediastinum with sq air. Clinically stable. No need for routine daily CXR.    4.FEN: Continue tube feeds.    5. ID. New LLL pneumonia. Continue Zosyn started last night. Continue 3% saline.    6. DVT prophylaxis: Lovenox  7; GOC :Full code.  8. PT consult for deconditioning.

## 2025-04-10 NOTE — PROGRESS NOTE ADULT - SUBJECTIVE AND OBJECTIVE BOX
MICU PROGRESS NOTE     HISTORY OF PRESENT ILLNESS - 60 y/o woman with pmhx of asthma, HTN, DM and hypothyroidism in the MICU for status asthmaticus     Overnight - Patient was extubated earlier this morning and is currently on HFNC. She had episodes of HTN throughout the evening.     ROS: All negative except as listed above.    ICU Vital Signs Last 24 Hrs  T(C): 37.9 (10 Apr 2025 04:00), Max: 38 (10 Apr 2025 00:00)  T(F): 100.2 (10 Apr 2025 04:00), Max: 100.4 (10 Apr 2025 00:00)  HR: 96 (10 Apr 2025 07:00) (53 - 104)  BP: 157/83 (10 Apr 2025 07:00) (104/68 - 185/90)  BP(mean): 104 (10 Apr 2025 07:00) (78 - 119)  ABP: --  ABP(mean): --  RR: 21 (10 Apr 2025 07:00) (16 - 29)  SpO2: 100% (10 Apr 2025 07:00) (100% - 100%)    O2 Parameters below as of 10 Apr 2025 07:00  Patient On (Oxygen Delivery Method): nasal cannula, high flow  O2 Flow (L/min): 40  O2 Concentration (%): 45    HFNC: FiO2 50%, Flow rate 40      LABS:                        11.5   17.33 )-----------( 308      ( 10 Apr 2025 01:15 )             36.1     04-10    136  |  103  |  15  ----------------------------<  117[H]  3.7   |  23  |  0.54    Ca    8.6      10 Apr 2025 01:15  Phos  2.5     04-10  Mg     2.10     04-10    TPro  6.5  /  Alb  2.9[L]  /  TBili  0.4  /  DBili  x   /  AST  57[H]  /  ALT  124[H]  /  AlkPhos  141[H]  04-10    PT/INR - ( 10 Apr 2025 01:15 )   PT: 13.0 sec;   INR: 1.09 ratio         PTT - ( 10 Apr 2025 01:15 )  PTT:26.5 sec  Urinalysis Basic - ( 10 Apr 2025 01:15 )    Color: x / Appearance: x / SG: x / pH: x  Gluc: 117 mg/dL / Ketone: x  / Bili: x / Urobili: x   Blood: x / Protein: x / Nitrite: x   Leuk Esterase: x / RBC: x / WBC x   Sq Epi: x / Non Sq Epi: x / Bacteria: x      ABG - ( 10 Apr 2025 01:15 )  pH, Arterial: 7.44  pH, Blood: x     /  pCO2: 41    /  pO2: 131   / HCO3: 28    / Base Excess: 3.3   /  SaO2: 98.8                  Culture - Sputum (collected 08 Apr 2025 09:00)  Source: ET Tube ET Tube  Gram Stain (08 Apr 2025 22:17):    Rare polymorphonuclear leukocytes per low power field    Rare Squamous epithelial cells per low power field    No organisms seen  Preliminary Report (09 Apr 2025 14:48):    Commensal maryann consistent with body site    Culture - Sputum (collected 07 Apr 2025 12:07)  Source: Trach Asp Tracheal Aspirate  Gram Stain (07 Apr 2025 18:51):    Rare polymorphonuclear leukocytes per low power field    No Squamous epithelial cells per low power field    No organisms seen per oil power field  Final Report (09 Apr 2025 06:39):    Commensal maryann consistent with body site      CARDIAC MARKERS ( 10 Apr 2025 01:15 )  x     / x     / x     / x     / 1.7 ng/mL      CAPILLARY BLOOD GLUCOSE  98 (09 Apr 2025 23:12)      POCT Blood Glucose.: 93 mg/dL (10 Apr 2025 05:41)      09 Apr 2025 07:01  -  10 Apr 2025 07:00  --------------------------------------------------------  IN:    Dexmedetomidine: 18.8 mL    Enteral Tube Flush: 60 mL    Glucerna 1.5: 585 mL    IV PiggyBack: 400 mL    Propofol: 351.2 mL  Total IN: 1415 mL    OUT:    Rectal Tube (mL): 175 mL    Voided (mL): 900 mL  Total OUT: 1075 mL    Total NET: 340 mL        PHYSICAL EXAM:    General: NAD, overweight habitus   Neuro: Alert and awake, off sedation, can follow commands.  HEENT: PERRLA b/l, mildly moist mucous membranes  Chest: nonTTP  Heart: S1/S2, RRR    Lungs: mild low pitched inspiratory breath sounds in upper lobes bilaterally, HFNC  Abd: soft, nonTTP, nondistended   Ext: trace pitting edema pretibial  Pulses: radial 2+ b/l   Lines/tubes/drains: 2 peripheral lines R arm, 1 peripheral line L arm    MEDICATIONS  (STANDING):  albuterol    0.083% 2.5 milliGRAM(s) Nebulizer every 4 hours  buDESOnide    Inhalation Suspension 0.5 milliGRAM(s) Inhalation two times a day  chlorhexidine 0.12% Liquid 15 milliLiter(s) Oral Mucosa every 12 hours  chlorhexidine 2% Cloths 1 Application(s) Topical <User Schedule>  dextrose 5%. 1000 milliLiter(s) (100 mL/Hr) IV Continuous <Continuous>  dextrose 5%. 1000 milliLiter(s) (50 mL/Hr) IV Continuous <Continuous>  dextrose 50% Injectable 25 Gram(s) IV Push once  dextrose 50% Injectable 12.5 Gram(s) IV Push once  dextrose 50% Injectable 25 Gram(s) IV Push once  enoxaparin Injectable 40 milliGRAM(s) SubCutaneous every 24 hours  glucagon  Injectable 1 milliGRAM(s) IntraMuscular once  influenza   Vaccine 0.5 milliLiter(s) IntraMuscular once  insulin lispro (ADMELOG) corrective regimen sliding scale   SubCutaneous every 6 hours  ketamine Infusion. 0.25 mG/kG/Hr (2.33 mL/Hr) IV Continuous <Continuous>  levothyroxine 75 MICROGram(s) Oral daily  methylPREDNISolone sodium succinate Injectable 20 milliGRAM(s) IV Push every 12 hours  multivitamin/minerals/iron Oral Solution (CENTRUM) 15 milliLiter(s) Oral daily  pantoprazole  Injectable 40 milliGRAM(s) IV Push daily  petrolatum Ophthalmic Ointment 1 Application(s) Both EYES daily  piperacillin/tazobactam IVPB.. 3.375 Gram(s) IV Intermittent every 8 hours  polyethylene glycol 3350 17 Gram(s) Oral two times a day  propofol Infusion 30 MICROgram(s)/kG/Min (16.8 mL/Hr) IV Continuous <Continuous>  QUEtiapine 25 milliGRAM(s) Oral two times a day  senna Syrup 10 milliLiter(s) Oral daily  sodium chloride 3%  Inhalation 4 milliLiter(s) Inhalation every 6 hours    MEDICATIONS  (PRN):  dextrose Oral Gel 15 Gram(s) Oral once PRN Blood Glucose LESS THAN 70 milliGRAM(s)/deciliter      ALLERGIES:  Allergies    No Known Allergies    Intolerances         RADIOLOGY & ADDITIONAL TESTS: No new imaging.

## 2025-04-11 LAB
ALBUMIN SERPL ELPH-MCNC: 3 G/DL — LOW (ref 3.3–5)
ALP SERPL-CCNC: 114 U/L — SIGNIFICANT CHANGE UP (ref 40–120)
ALT FLD-CCNC: 104 U/L — HIGH (ref 4–33)
ANION GAP SERPL CALC-SCNC: 11 MMOL/L — SIGNIFICANT CHANGE UP (ref 7–14)
APTT BLD: 22.9 SEC — LOW (ref 24.5–35.6)
AST SERPL-CCNC: 48 U/L — HIGH (ref 4–32)
BILIRUB SERPL-MCNC: 0.5 MG/DL — SIGNIFICANT CHANGE UP (ref 0.2–1.2)
BLOOD GAS VENOUS COMPREHENSIVE RESULT: SIGNIFICANT CHANGE UP
BUN SERPL-MCNC: 15 MG/DL — SIGNIFICANT CHANGE UP (ref 7–23)
CALCIUM SERPL-MCNC: 8.7 MG/DL — SIGNIFICANT CHANGE UP (ref 8.4–10.5)
CHLORIDE SERPL-SCNC: 107 MMOL/L — SIGNIFICANT CHANGE UP (ref 98–107)
CK MB BLD-MCNC: 0.3 % — SIGNIFICANT CHANGE UP (ref 0–2.5)
CK MB CFR SERPL CALC: 1.5 NG/ML — SIGNIFICANT CHANGE UP
CK SERPL-CCNC: 471 U/L — HIGH (ref 25–170)
CO2 SERPL-SCNC: 25 MMOL/L — SIGNIFICANT CHANGE UP (ref 22–31)
CREAT SERPL-MCNC: 0.59 MG/DL — SIGNIFICANT CHANGE UP (ref 0.5–1.3)
EGFR: 103 ML/MIN/1.73M2 — SIGNIFICANT CHANGE UP
EGFR: 103 ML/MIN/1.73M2 — SIGNIFICANT CHANGE UP
GLUCOSE BLDC GLUCOMTR-MCNC: 104 MG/DL — HIGH (ref 70–99)
GLUCOSE BLDC GLUCOMTR-MCNC: 107 MG/DL — HIGH (ref 70–99)
GLUCOSE BLDC GLUCOMTR-MCNC: 117 MG/DL — HIGH (ref 70–99)
GLUCOSE BLDC GLUCOMTR-MCNC: 54 MG/DL — CRITICAL LOW (ref 70–99)
GLUCOSE BLDC GLUCOMTR-MCNC: 70 MG/DL — SIGNIFICANT CHANGE UP (ref 70–99)
GLUCOSE BLDC GLUCOMTR-MCNC: 72 MG/DL — SIGNIFICANT CHANGE UP (ref 70–99)
GLUCOSE BLDC GLUCOMTR-MCNC: 75 MG/DL — SIGNIFICANT CHANGE UP (ref 70–99)
GLUCOSE SERPL-MCNC: 91 MG/DL — SIGNIFICANT CHANGE UP (ref 70–99)
HCT VFR BLD CALC: 36.7 % — SIGNIFICANT CHANGE UP (ref 34.5–45)
HGB BLD-MCNC: 11.5 G/DL — SIGNIFICANT CHANGE UP (ref 11.5–15.5)
INR BLD: 1.11 RATIO — SIGNIFICANT CHANGE UP (ref 0.85–1.16)
MAGNESIUM SERPL-MCNC: 2 MG/DL — SIGNIFICANT CHANGE UP (ref 1.6–2.6)
MCHC RBC-ENTMCNC: 30.7 PG — SIGNIFICANT CHANGE UP (ref 27–34)
MCHC RBC-ENTMCNC: 31.3 G/DL — LOW (ref 32–36)
MCV RBC AUTO: 97.9 FL — SIGNIFICANT CHANGE UP (ref 80–100)
NRBC # BLD AUTO: 0 K/UL — SIGNIFICANT CHANGE UP (ref 0–0)
NRBC # FLD: 0 K/UL — SIGNIFICANT CHANGE UP (ref 0–0)
NRBC BLD AUTO-RTO: 0 /100 WBCS — SIGNIFICANT CHANGE UP (ref 0–0)
PHOSPHATE SERPL-MCNC: 3.4 MG/DL — SIGNIFICANT CHANGE UP (ref 2.5–4.5)
PLATELET # BLD AUTO: 344 K/UL — SIGNIFICANT CHANGE UP (ref 150–400)
POTASSIUM SERPL-MCNC: 3.7 MMOL/L — SIGNIFICANT CHANGE UP (ref 3.5–5.3)
POTASSIUM SERPL-SCNC: 3.7 MMOL/L — SIGNIFICANT CHANGE UP (ref 3.5–5.3)
PROT SERPL-MCNC: 6.5 G/DL — SIGNIFICANT CHANGE UP (ref 6–8.3)
PROTHROM AB SERPL-ACNC: 12.9 SEC — SIGNIFICANT CHANGE UP (ref 9.9–13.4)
RBC # BLD: 3.75 M/UL — LOW (ref 3.8–5.2)
RBC # FLD: 15 % — HIGH (ref 10.3–14.5)
SODIUM SERPL-SCNC: 143 MMOL/L — SIGNIFICANT CHANGE UP (ref 135–145)
TROPONIN T, HIGH SENSITIVITY RESULT: 18 NG/L — SIGNIFICANT CHANGE UP
WBC # BLD: 14.72 K/UL — HIGH (ref 3.8–10.5)
WBC # FLD AUTO: 14.72 K/UL — HIGH (ref 3.8–10.5)

## 2025-04-11 RX ORDER — LEVOTHYROXINE SODIUM 300 MCG
55 TABLET ORAL
Refills: 0 | Status: DISCONTINUED | OUTPATIENT
Start: 2025-04-11 | End: 2025-04-13

## 2025-04-11 RX ORDER — SODIUM CHLORIDE 9 G/1000ML
1000 INJECTION, SOLUTION INTRAVENOUS
Refills: 0 | Status: DISCONTINUED | OUTPATIENT
Start: 2025-04-11 | End: 2025-04-13

## 2025-04-11 RX ADMIN — Medication 1.25 MILLIGRAM(S): at 23:37

## 2025-04-11 RX ADMIN — Medication 1 APPLICATION(S): at 06:12

## 2025-04-11 RX ADMIN — ENOXAPARIN SODIUM 40 MILLIGRAM(S): 100 INJECTION SUBCUTANEOUS at 13:42

## 2025-04-11 RX ADMIN — Medication 25 GRAM(S): at 21:26

## 2025-04-11 RX ADMIN — Medication 2.5 MILLIGRAM(S): at 07:21

## 2025-04-11 RX ADMIN — BUDESONIDE 0.5 MILLIGRAM(S): 90 AEROSOL, POWDER RESPIRATORY (INHALATION) at 07:22

## 2025-04-11 RX ADMIN — Medication 55 MICROGRAM(S): at 06:11

## 2025-04-11 RX ADMIN — Medication 25 GRAM(S): at 13:42

## 2025-04-11 RX ADMIN — Medication 2.5 MILLIGRAM(S): at 15:07

## 2025-04-11 RX ADMIN — Medication 2.5 MILLIGRAM(S): at 23:32

## 2025-04-11 RX ADMIN — Medication 2.5 MILLIGRAM(S): at 11:05

## 2025-04-11 RX ADMIN — BUDESONIDE 0.5 MILLIGRAM(S): 90 AEROSOL, POWDER RESPIRATORY (INHALATION) at 19:33

## 2025-04-11 RX ADMIN — Medication 25 GRAM(S): at 06:11

## 2025-04-11 RX ADMIN — Medication 4 MILLILITER(S): at 19:33

## 2025-04-11 RX ADMIN — SODIUM CHLORIDE 50 MILLILITER(S): 9 INJECTION, SOLUTION INTRAVENOUS at 06:17

## 2025-04-11 RX ADMIN — Medication 1.25 MILLIGRAM(S): at 11:43

## 2025-04-11 RX ADMIN — Medication 2.5 MILLIGRAM(S): at 02:58

## 2025-04-11 RX ADMIN — Medication 4 MILLILITER(S): at 11:05

## 2025-04-11 RX ADMIN — METHYLPREDNISOLONE ACETATE 30 MILLIGRAM(S): 80 INJECTION, SUSPENSION INTRA-ARTICULAR; INTRALESIONAL; INTRAMUSCULAR; SOFT TISSUE at 06:11

## 2025-04-11 RX ADMIN — Medication 2.5 MILLIGRAM(S): at 19:33

## 2025-04-11 RX ADMIN — Medication 1.25 MILLIGRAM(S): at 17:09

## 2025-04-11 RX ADMIN — Medication 1.25 MILLIGRAM(S): at 06:11

## 2025-04-11 RX ADMIN — Medication 4 MILLILITER(S): at 07:22

## 2025-04-11 NOTE — PHYSICAL THERAPY INITIAL EVALUATION ADULT - PERTINENT HX OF CURRENT PROBLEM, REHAB EVAL
59 year old Female with HTN, DM2, hypothyroid, and asthma here for status asthmaticus, unknown etiology.

## 2025-04-11 NOTE — PROGRESS NOTE ADULT - ATTENDING COMMENTS
1. Acute hypoxemic respiratory failure due to status asthmaticus.       Tolerating extubation, Pt severely weak and deconditioned.   2. HTN:  Continue IV Vasotec.    3.Severe Muscle deconditioning from steroids plus Nimbex, Plus intubation x 2.5 weeks.                PT consult. Pt to weak to swallow at this time. Place NGT    4.FEN: Continue tube feeds once NGT placed. .    5. ID. New LLL pneumonia. Continue Zosyn started last night. Continue 3% saline.    6. DVT prophylaxis: Lovenox  7; GOC :Full code.  8. PT consult for deconditioning.

## 2025-04-11 NOTE — SWALLOW BEDSIDE ASSESSMENT ADULT - COMMENTS
MICU Progress Note: "Patient with hypoglycemia events OVN and started on D5 +LR. Also extubated yesterday and de-escalated to 3 L NC."    Patient seen at bedside this afternoon for clinical swallow evaluation. Per discussion with RN prior to initiation of bedside eval, NG-tube was recently placed given concerns regarding secretion management. Patient received asleep and reclined in bed, receiving supplemental O2 via NC 3L. SpO2 100%, RR 21, HR 63. NG tube in place. Patient woke to name and was repositioned upright for swallow assessment. Of note, patient is completely aphonic. Therefore, unable to fully asses AO status. Patient presented with wet upper airway sounds at baseline.

## 2025-04-11 NOTE — PROGRESS NOTE ADULT - SUBJECTIVE AND OBJECTIVE BOX
INTERVAL HPI/OVERNIGHT EVENTS: Patient with hypoglycemia events OVN and started on D5 +LR. Also extubated yesterday and de-escalated to 3 L NC.    SUBJECTIVE: Patient seen and examined at bedside. ROS unremarkable with stools noted OVN in rectal tube  VITAL SIGNS:  ICU Vital Signs Last 24 Hrs  T(C): 37 (11 Apr 2025 04:00), Max: 37.8 (10 Apr 2025 12:00)  T(F): 98.6 (11 Apr 2025 04:00), Max: 100 (10 Apr 2025 12:00)  HR: 60 (11 Apr 2025 07:22) (60 - 99)  BP: 157/81 (11 Apr 2025 07:00) (120/63 - 165/86)  BP(mean): 102 (11 Apr 2025 07:00) (80 - 108)  ABP: --  ABP(mean): --  RR: 24 (11 Apr 2025 07:00) (13 - 29)  SpO2: 100% (11 Apr 2025 07:22) (100% - 100%)    O2 Parameters below as of 11 Apr 2025 07:22  Patient On (Oxygen Delivery Method): nasal cannula            Plateau pressure:   P/F ratio:     04-10 @ 07:01  -  04-11 @ 07:00  --------------------------------------------------------  IN: 1750 mL / OUT: 2625 mL / NET: -875 mL      CAPILLARY BLOOD GLUCOSE  70 (11 Apr 2025 06:01)      POCT Blood Glucose.: 75 mg/dL (11 Apr 2025 07:36)      ECG: reviewed.    PHYSICAL EXAM:    General: NAD, overweight habitus   Neuro: Alert and awake (AAOx2--not to date) and able to follow commands. Difficulty to speak s/p extubation  HEENT: PERRLA b/l, mildly moist mucous membranes  Chest: nonTTP  Heart: S1/S2, RRR    Lungs: mild low pitched inspiratory breath sounds in upper lobes bilaterally. Appears to wheezy  Abd: soft, nonTTP, nondistended   Ext: trace pitting edema pretibial  Pulses: radial 2+ b/l   Lines/tubes/drains: 2 peripheral lines R arm, 1 peripheral line L arm      MEDICATIONS:  MEDICATIONS  (STANDING):  albuterol    0.083% 2.5 milliGRAM(s) Nebulizer every 4 hours  buDESOnide    Inhalation Suspension 0.5 milliGRAM(s) Inhalation two times a day  chlorhexidine 2% Cloths 1 Application(s) Topical <User Schedule>  dextrose 5% + lactated ringers. 1000 milliLiter(s) (50 mL/Hr) IV Continuous <Continuous>  dextrose 5%. 1000 milliLiter(s) (100 mL/Hr) IV Continuous <Continuous>  dextrose 5%. 1000 milliLiter(s) (50 mL/Hr) IV Continuous <Continuous>  dextrose 50% Injectable 25 Gram(s) IV Push once  dextrose 50% Injectable 12.5 Gram(s) IV Push once  dextrose 50% Injectable 25 Gram(s) IV Push once  enalaprilat Injectable 1.25 milliGRAM(s) IV Push every 6 hours  enoxaparin Injectable 40 milliGRAM(s) SubCutaneous every 24 hours  glucagon  Injectable 1 milliGRAM(s) IntraMuscular once  influenza   Vaccine 0.5 milliLiter(s) IntraMuscular once  insulin lispro (ADMELOG) corrective regimen sliding scale   SubCutaneous every 6 hours  levothyroxine Injectable 55 MICROGram(s) IV Push <User Schedule>  methylPREDNISolone sodium succinate Injectable 30 milliGRAM(s) IV Push daily  methylPREDNISolone sodium succinate Injectable   IV Push   multivitamin/minerals/iron Oral Solution (CENTRUM) 15 milliLiter(s) Oral daily  petrolatum Ophthalmic Ointment 1 Application(s) Both EYES daily  piperacillin/tazobactam IVPB.. 3.375 Gram(s) IV Intermittent every 8 hours  polyethylene glycol 3350 17 Gram(s) Oral two times a day  senna Syrup 10 milliLiter(s) Oral daily  sodium chloride 3%  Inhalation 4 milliLiter(s) Inhalation every 6 hours    MEDICATIONS  (PRN):  dextrose Oral Gel 15 Gram(s) Oral once PRN Blood Glucose LESS THAN 70 milliGRAM(s)/deciliter      ALLERGIES:  Allergies    No Known Allergies    Intolerances        LABS:                        11.5   14.72 )-----------( 344      ( 11 Apr 2025 01:25 )             36.7     04-11    143  |  107  |  15  ----------------------------<  91  3.7   |  25  |  0.59    Ca    8.7      11 Apr 2025 01:25  Phos  3.4     04-11  Mg     2.00     04-11    TPro  6.5  /  Alb  3.0[L]  /  TBili  0.5  /  DBili  x   /  AST  48[H]  /  ALT  104[H]  /  AlkPhos  114  04-11    PT/INR - ( 11 Apr 2025 01:25 )   PT: 12.9 sec;   INR: 1.11 ratio         PTT - ( 11 Apr 2025 01:25 )  PTT:22.9 sec  Urinalysis Basic - ( 11 Apr 2025 01:25 )    Color: x / Appearance: x / SG: x / pH: x  Gluc: 91 mg/dL / Ketone: x  / Bili: x / Urobili: x   Blood: x / Protein: x / Nitrite: x   Leuk Esterase: x / RBC: x / WBC x   Sq Epi: x / Non Sq Epi: x / Bacteria: x      ABG:      vBG:  pH, Venous: 7.51 (04-11-25 @ 01:25)  pCO2, Venous: 32 mmHg (04-11-25 @ 01:25)  pO2, Venous: 163 mmHg (04-11-25 @ 01:25)  HCO3, Venous: 26 mmol/L (04-11-25 @ 01:25)    Micro:    Culture - Blood (collected 04-04-25 @ 04:19)  Source: Blood Blood-Peripheral  Final Report (04-09-25 @ 09:01):    No growth at 5 days    Culture - Blood (collected 04-04-25 @ 04:01)  Source: Blood Blood-Peripheral  Final Report (04-09-25 @ 10:01):    No growth at 5 days    Culture - Blood (collected 03-28-25 @ 16:39)  Source: Blood Blood-Peripheral  Final Report (04-02-25 @ 20:00):    No growth at 5 days    Culture - Blood (collected 03-28-25 @ 16:34)  Source: Blood Blood-Peripheral  Final Report (04-02-25 @ 20:00):    No growth at 5 days       Urinalysis with Rflx Culture (collected 03-28-25 @ 16:34)       Culture - Sputum (collected 04-08-25 @ 09:00)  Source: ET Tube ET Tube  Gram Stain (04-08-25 @ 22:17):    Rare polymorphonuclear leukocytes per low power field    Rare Squamous epithelial cells per low power field    No organisms seen  Final Report (04-10-25 @ 09:08):    Commensal maryann consistent with body site    Culture - Sputum (collected 04-07-25 @ 12:07)  Source: Trach Asp Tracheal Aspirate  Gram Stain (04-07-25 @ 18:51):    Rare polymorphonuclear leukocytes per low power field    No Squamous epithelial cells per low power field    No organisms seen per oil power field  Final Report (04-09-25 @ 06:39):    Commensal maryann consistent with body site        RADIOLOGY & ADDITIONAL TESTS: Reviewed.

## 2025-04-11 NOTE — PHYSICAL THERAPY INITIAL EVALUATION ADULT - LEVEL OF INDEPENDENCE: SIT/STAND, REHAB EVAL
Held due to weakness, poor sitting balance. Patient is lacking prerequisite strength to perform sit to stand transfer safely./unable to perform

## 2025-04-11 NOTE — SWALLOW BEDSIDE ASSESSMENT ADULT - SWALLOW EVAL: VELAR ELEVATION
intact Manual Repair Warning Statement: We plan on removing the manually selected variable below in favor of our much easier automatic structured text blocks found in the previous tab. We decided to do this to help make the flow better and give you the full power of structured data. Manual selection is never going to be ideal in our platform and I would encourage you to avoid using manual selection from this point on, especially since I will be sunsetting this feature. It is important that you do one of two things with the customized text below. First, you can save all of the text in a word file so you can have it for future reference. Second, transfer the text to the appropriate area in the Library tab. Lastly, if there is a flap or graft type which we do not have you need to let us know right away so I can add it in before the variable is hidden. No need to panic, we plan to give you roughly 6 months to make the change.

## 2025-04-11 NOTE — PROGRESS NOTE ADULT - ASSESSMENT
· Assessment	  59F with HTN, DM2, hypothyroid, and asthma here for status asthmaticus, unclear trigger; currently with gradual clinical improvement.     NEUROLOGIC   Extubated and no longer on sedation.   -Baseline AAOx3, currently awake and alert (AAOx2-not to date)  -Off all sedatives & paralytics  -D/fran Seroquel 25mg BID (04/10)  -CTM    CARDIOVASCULAR     # Vasoplegic Shock- RESOLVED   Off pressors    #Elevated troponin- 2/2 demand ischemia from vasopressors- RESOLVED   Plateaued tropnoins 50s to 252 to 180s. No events on telemetry. Peaked and downtrending.   -Monitor   -If increasing again or high clinical suspicion, can consider PE r/o       #HTN- labile with hypotensive and hypertensive episodes with tactile stimulation induced vs tussive bouts   -Vasotec 1.25 mg IVP q6H  -Continue monitoring BP       RESPIRATORY   #Status asthmaticus   -On NC and continue to wean off  -C/w  methylprednisolone taper  -C/w duonebs 3 mL q4  -Albuterol 2.5mg q4   -Completed empiric pna, s/p vanc and zosyn 4/7   -IPV and c/w 3% inhaled saline for secretions  - Holding off on CT angio for now.   -3/30 BAL without growth   -Bronchoscopy 3/30 showing secretions in L main and L lober with mucus aspirated out     # Subcutaneous Emphysema   -CXR 3/30 showing subcutaneous emphysema without pneumomediastinum  -subcutaneous emphysema on RUE and b/l chest wall resolved     GASTROINTESTINAL   -JS   -OGT (3/30) placed and removed (4/10)  -PO diet when tolerated. S & S eval placed  -D/c pantoprazole 40 mg IV qd for gastric mucosa ppx     #Gastric retention- 2/2 opioid induced constipation   - Had a BM.   -CTM stool count daily  -abd XR 4/3 with some stool burden    GENITOURINARY/RENAL   #HAGMA 2/2 hypercarbia and lactic acidosis- RESOLVED   #Respiratory acidosis- ventilator changes   ABG pH 7.40.   - On admission, VBG pH 6.98 and PCO2 125. Currently ABG 7.46.   - Extubated    #Jonas   -TOV done and jonas removed    ENDOCRINE   #DM   -Couple episodes of hypoglycemia (40s)  -Monitor glucose levels closely  -Low ISS  -Hold home metformin 500 mg po qd   - Hba1c 5.7     #Hypothyroid   -C/w Synthroid 75 mcg PO daily (or 55 mcg IV qd)      INFECTIOUS DISEASE   Not meeting criteria for SIRS. Unclear trigger for asthma.   -WBC on admission 11.21 and afebrile but 2 Febrile episode in since 4/7 AM  -Currently afebrile  -New leukocytosis 24, 4/8, downtrending  -Triple lumen taken out 4/7, A line taken out 4/8  -Blood cx NGTD 3/28  -Sputum cx no PMNs or organisms 3/30  -New sputum cultures/stains (4/7) = commensal maryann and no/rare PMNs with no organisms  -Urine showing 300 proteinuria and 20 hematuria; no evidence of infection   -Negative viral panel  - urine legionella negative   - urine streptococcus negative   -MRSA (-)  -Completed ceftriaxone 1000 mg IV qd x 5 days (started 3/29-4/3)   -Completed azithromycin 500 mg IV qd x 3 days (started 3/28-3/30)   -4/4 and full respiratory panel viral negative  -S/p zosyn and vanc 4/7  -Continue Zosyn 3.375g q8 course of 7 days (until 04/15)    HEMATOLOGIC/ONCOLOGIC   #Leukocytosis- likely 2/2 infection -- downtrending  -Rising suspicion for infection source given left basilar consolidation on CXR.   -Continue monitoring and complete Zosyn course    PROPHYLACTIC   -DVT ppx: enoxaparin 40 mg sq qd     PT consulted    ETHICS   -HCP daughter (Jojo)   -Full code      · Assessment	  59F with HTN, DM2, hypothyroid, and asthma here for status asthmaticus, unclear trigger; currently with gradual clinical improvement.     NEUROLOGIC   Extubated and no longer on sedation.   -Baseline AAOx3, currently awake and alert (AAOx2-not to date)  -Off all sedatives & paralytics  -D/fran Seroquel 25mg BID (04/10)  -CTM    CARDIOVASCULAR     # Vasoplegic Shock- RESOLVED   Off pressors    #Elevated troponin- 2/2 demand ischemia from vasopressors- RESOLVED   Plateaued tropnoins 50s to 252 to 180s. No events on telemetry. Peaked and downtrending.   -Monitor   -If increasing again or high clinical suspicion, can consider PE r/o       #HTN- labile with hypotensive and hypertensive episodes with tactile stimulation induced vs tussive bouts   -C/w Vasotec 1.25 mg IVP q6H  -Continue monitoring BP       RESPIRATORY   #Status asthmaticus   -On NC and continue to wean off  -C/w  methylprednisolone taper  -C/w duonebs 3 mL q4  -Albuterol 2.5mg q4   -Completed empiric pna, s/p vanc and zosyn 4/7   -IPV and c/w 3% inhaled saline for secretions  - Holding off on CT angio for now.   -3/30 BAL without growth   -Bronchoscopy 3/30 showing secretions in L main and L lober with mucus aspirated out     # Subcutaneous Emphysema   -CXR 3/30 showing subcutaneous emphysema without pneumomediastinum  -subcutaneous emphysema on RUE and b/l chest wall resolved     GASTROINTESTINAL   -JS   -OGT (3/30) placed and removed (4/10)  -S & S ordered and will place NGT and start TFs    #Gastric retention- 2/2 opioid induced constipation   - Had a BM.   -CTM stool count daily  -abd XR 4/3 with some stool burden    GENITOURINARY/RENAL   #HAGMA 2/2 hypercarbia and lactic acidosis- RESOLVED   #Respiratory acidosis- ventilator changes   ABG pH 7.40.   - On admission, VBG pH 6.98 and PCO2 125. Currently ABG 7.46.   - Extubated    #Jonas   -TOV done and jonas removed-Resolved    ENDOCRINE   #DM   -Couple episodes of hypoglycemia (40s)  -Monitor glucose levels closely  -Low ISS  -Hold home metformin 500 mg po qd   - Hba1c 5.7     #Hypothyroid   -C/w Synthroid 75 mcg PO daily (or 55 mcg IV qd)      INFECTIOUS DISEASE   Not meeting criteria for SIRS. Unclear trigger for asthma.   -WBC on admission 11.21 and afebrile but 2 Febrile episode in since 4/7 AM  -Currently afebrile  -New leukocytosis 24, 4/8, downtrending  -Triple lumen taken out 4/7, A line taken out 4/8  -Blood cx NGTD 3/28  -Sputum cx no PMNs or organisms 3/30  -New sputum cultures/stains (4/7) = commensal maryann and no/rare PMNs with no organisms  -Urine showing 300 proteinuria and 20 hematuria; no evidence of infection   -Negative viral panel  - urine legionella negative   - urine streptococcus negative   -MRSA (-)  -Completed ceftriaxone 1000 mg IV qd x 5 days (started 3/29-4/3)   -Completed azithromycin 500 mg IV qd x 3 days (started 3/28-3/30)   -4/4 and full respiratory panel viral negative  -S/p zosyn and vanc 4/7  -Continue Zosyn 3.375g q8 course of 7 days (until 04/15)    HEMATOLOGIC/ONCOLOGIC   #Leukocytosis- likely 2/2 infection -- downtrending  -Rising suspicion for infection source given left basilar consolidation on CXR.   -Continue monitoring and complete Zosyn course    PROPHYLACTIC   -DVT ppx: enoxaparin 40 mg sq qd     PT consulted    ETHICS   -HCP daughter (Jojo)   -Full code

## 2025-04-11 NOTE — PHYSICAL THERAPY INITIAL EVALUATION ADULT - ADDITIONAL COMMENTS
Lives with daughter in a house with 4 stairs to enter. Was independent with ambulation and ADLs.    After session, patient left semi supine in bed with all lines intact in NAD. RN aware.

## 2025-04-12 LAB
ALBUMIN SERPL ELPH-MCNC: 2.9 G/DL — LOW (ref 3.3–5)
ALP SERPL-CCNC: 124 U/L — HIGH (ref 40–120)
ALT FLD-CCNC: 123 U/L — HIGH (ref 4–33)
ANION GAP SERPL CALC-SCNC: 14 MMOL/L — SIGNIFICANT CHANGE UP (ref 7–14)
AST SERPL-CCNC: 74 U/L — HIGH (ref 4–32)
BILIRUB SERPL-MCNC: 0.5 MG/DL — SIGNIFICANT CHANGE UP (ref 0.2–1.2)
BLOOD GAS VENOUS COMPREHENSIVE RESULT: SIGNIFICANT CHANGE UP
BUN SERPL-MCNC: 17 MG/DL — SIGNIFICANT CHANGE UP (ref 7–23)
CALCIUM SERPL-MCNC: 9 MG/DL — SIGNIFICANT CHANGE UP (ref 8.4–10.5)
CHLORIDE SERPL-SCNC: 104 MMOL/L — SIGNIFICANT CHANGE UP (ref 98–107)
CK MB BLD-MCNC: 0.3 % — SIGNIFICANT CHANGE UP (ref 0–2.5)
CK MB CFR SERPL CALC: 1.4 NG/ML — SIGNIFICANT CHANGE UP
CK SERPL-CCNC: 461 U/L — HIGH (ref 25–170)
CO2 SERPL-SCNC: 24 MMOL/L — SIGNIFICANT CHANGE UP (ref 22–31)
CREAT SERPL-MCNC: 0.58 MG/DL — SIGNIFICANT CHANGE UP (ref 0.5–1.3)
EGFR: 104 ML/MIN/1.73M2 — SIGNIFICANT CHANGE UP
EGFR: 104 ML/MIN/1.73M2 — SIGNIFICANT CHANGE UP
GLUCOSE BLDC GLUCOMTR-MCNC: 103 MG/DL — HIGH (ref 70–99)
GLUCOSE BLDC GLUCOMTR-MCNC: 105 MG/DL — HIGH (ref 70–99)
GLUCOSE BLDC GLUCOMTR-MCNC: 80 MG/DL — SIGNIFICANT CHANGE UP (ref 70–99)
GLUCOSE BLDC GLUCOMTR-MCNC: 98 MG/DL — SIGNIFICANT CHANGE UP (ref 70–99)
GLUCOSE SERPL-MCNC: 111 MG/DL — HIGH (ref 70–99)
HCT VFR BLD CALC: 37.3 % — SIGNIFICANT CHANGE UP (ref 34.5–45)
HGB BLD-MCNC: 12 G/DL — SIGNIFICANT CHANGE UP (ref 11.5–15.5)
MAGNESIUM SERPL-MCNC: 2 MG/DL — SIGNIFICANT CHANGE UP (ref 1.6–2.6)
MCHC RBC-ENTMCNC: 30.6 PG — SIGNIFICANT CHANGE UP (ref 27–34)
MCHC RBC-ENTMCNC: 32.2 G/DL — SIGNIFICANT CHANGE UP (ref 32–36)
MCV RBC AUTO: 95.2 FL — SIGNIFICANT CHANGE UP (ref 80–100)
NRBC # BLD AUTO: 0 K/UL — SIGNIFICANT CHANGE UP (ref 0–0)
NRBC # FLD: 0 K/UL — SIGNIFICANT CHANGE UP (ref 0–0)
NRBC BLD AUTO-RTO: 0 /100 WBCS — SIGNIFICANT CHANGE UP (ref 0–0)
PHOSPHATE SERPL-MCNC: 3.4 MG/DL — SIGNIFICANT CHANGE UP (ref 2.5–4.5)
PLATELET # BLD AUTO: 346 K/UL — SIGNIFICANT CHANGE UP (ref 150–400)
POTASSIUM SERPL-MCNC: 3.9 MMOL/L — SIGNIFICANT CHANGE UP (ref 3.5–5.3)
POTASSIUM SERPL-SCNC: 3.9 MMOL/L — SIGNIFICANT CHANGE UP (ref 3.5–5.3)
PROT SERPL-MCNC: 6.4 G/DL — SIGNIFICANT CHANGE UP (ref 6–8.3)
RBC # BLD: 3.92 M/UL — SIGNIFICANT CHANGE UP (ref 3.8–5.2)
RBC # FLD: 14.5 % — SIGNIFICANT CHANGE UP (ref 10.3–14.5)
SODIUM SERPL-SCNC: 142 MMOL/L — SIGNIFICANT CHANGE UP (ref 135–145)
TROPONIN T, HIGH SENSITIVITY RESULT: 16 NG/L — SIGNIFICANT CHANGE UP
WBC # BLD: 13.76 K/UL — HIGH (ref 3.8–10.5)
WBC # FLD AUTO: 13.76 K/UL — HIGH (ref 3.8–10.5)

## 2025-04-12 RX ORDER — SODIUM CHLORIDE 9 G/1000ML
1000 INJECTION, SOLUTION INTRAVENOUS
Refills: 0 | Status: DISCONTINUED | OUTPATIENT
Start: 2025-04-12 | End: 2025-04-13

## 2025-04-12 RX ADMIN — Medication 2.5 MILLIGRAM(S): at 22:48

## 2025-04-12 RX ADMIN — Medication 1 APPLICATION(S): at 06:05

## 2025-04-12 RX ADMIN — Medication 2.5 MILLIGRAM(S): at 03:29

## 2025-04-12 RX ADMIN — BUDESONIDE 0.5 MILLIGRAM(S): 90 AEROSOL, POWDER RESPIRATORY (INHALATION) at 20:02

## 2025-04-12 RX ADMIN — Medication 2.5 MILLIGRAM(S): at 20:02

## 2025-04-12 RX ADMIN — Medication 55 MICROGRAM(S): at 06:04

## 2025-04-12 RX ADMIN — Medication 2.5 MILLIGRAM(S): at 11:00

## 2025-04-12 RX ADMIN — Medication 4 MILLILITER(S): at 07:00

## 2025-04-12 RX ADMIN — Medication 1.25 MILLIGRAM(S): at 11:46

## 2025-04-12 RX ADMIN — Medication 4 MILLILITER(S): at 20:02

## 2025-04-12 RX ADMIN — Medication 4 MILLILITER(S): at 03:29

## 2025-04-12 RX ADMIN — METHYLPREDNISOLONE ACETATE 30 MILLIGRAM(S): 80 INJECTION, SUSPENSION INTRA-ARTICULAR; INTRALESIONAL; INTRAMUSCULAR; SOFT TISSUE at 06:05

## 2025-04-12 RX ADMIN — Medication 25 GRAM(S): at 21:21

## 2025-04-12 RX ADMIN — ENOXAPARIN SODIUM 40 MILLIGRAM(S): 100 INJECTION SUBCUTANEOUS at 14:07

## 2025-04-12 RX ADMIN — Medication 40 MILLIGRAM(S): at 11:46

## 2025-04-12 RX ADMIN — Medication 1.25 MILLIGRAM(S): at 17:34

## 2025-04-12 RX ADMIN — Medication 4 MILLILITER(S): at 22:51

## 2025-04-12 RX ADMIN — Medication 1.25 MILLIGRAM(S): at 06:04

## 2025-04-12 RX ADMIN — Medication 25 GRAM(S): at 14:08

## 2025-04-12 RX ADMIN — Medication 4 MILLILITER(S): at 11:00

## 2025-04-12 RX ADMIN — POLYETHYLENE GLYCOL 3350 17 GRAM(S): 17 POWDER, FOR SOLUTION ORAL at 17:34

## 2025-04-12 RX ADMIN — Medication 2.5 MILLIGRAM(S): at 07:00

## 2025-04-12 RX ADMIN — Medication 2.5 MILLIGRAM(S): at 15:12

## 2025-04-12 RX ADMIN — BUDESONIDE 0.5 MILLIGRAM(S): 90 AEROSOL, POWDER RESPIRATORY (INHALATION) at 07:00

## 2025-04-12 RX ADMIN — Medication 15 MILLILITER(S): at 11:46

## 2025-04-12 RX ADMIN — Medication 25 GRAM(S): at 06:04

## 2025-04-12 NOTE — PROGRESS NOTE ADULT - ASSESSMENT
Assessment	  59F with HTN, DM2, hypothyroid, and asthma here for status asthmaticus, unclear trigger; currently with gradual clinical improvement s/p extubation on 04/10.     NEUROLOGIC   #Mental Status  -Extubated (04/10) - RESOLVED   -Baseline AAOx3, and currently AAOx3  -CTM    CARDIOVASCULAR     # Vasoplegic Shock- RESOLVED   Off pressors    #Elevated troponin- 2/2 demand ischemia from vasopressors- RESOLVED   Plateaued tropnoins 50s to 252 to 180s. No events on telemetry. Peaked and downtrending.   -Monitor   -If increasing again or high clinical suspicion, can consider PE r/o       #HTN- labile with hypotensive and hypertensive episodes with tactile stimulation induced vs tussive bouts   -C/w Vasotec 1.25 mg IVP q6H  -Continue monitoring BP       RESPIRATORY   #Status asthmaticus   -On 2L NC and continue to wean off  -C/w  methylprednisolone taper  -C/w duonebs 3 mL q4  -Albuterol 2.5mg q4   -Completed empiric pna, s/p vanc and zosyn 4/7   -IPV and c/w 3% inhaled saline for secretions  - Holding off on CT angio for now.   -3/30 BAL without growth   -Bronchoscopy 3/30 showing secretions in L main and L lober with mucus aspirated out     # Subcutaneous Emphysema   -CXR 3/30 showing subcutaneous emphysema without pneumomediastinum  -subcutaneous emphysema on RUE and b/l chest wall resolved     GASTROINTESTINAL   -JS   -OGT (3/30) placed and removed (4/10)  -S & S evaluated and patient NPO  -NGT in place and TFs started    #Gastric retention- 2/2 opioid induced constipation   - Had a BM.   -CTM stool count daily  -abd XR 4/3 with some stool burden    # Abdominal pain (started 04/11)  - PPI 40 mg qd  - Around  cc in rectal pouch (04/12/25)    GENITOURINARY/RENAL   #HAGMA 2/2 hypercarbia and lactic acidosis- RESOLVED   #Respiratory acidosis- ventilator changes   ABG pH 7.40.   - On admission, VBG pH 6.98 and PCO2 125. Improvement and patient extubated    #Jonas   -TOV done and jonas removed-Resolved    ENDOCRINE   #DM   -Couple episodes of hypoglycemia (40s)  -Monitor glucose levels closely  -Low ISS  -Hold home metformin 500 mg po qd   - Hba1c 5.7     #Hypothyroid   -C/w Synthroid 75 mcg PO daily (or 55 mcg IV qd)      INFECTIOUS DISEASE   Not meeting criteria for SIRS. Unclear trigger for asthma.   -WBC on admission 11.21 and afebrile but 2 Febrile episode in since 4/7 AM  -Currently afebrile  -New leukocytosis 24, 4/8, downtrending  -Triple lumen taken out 4/7, A line taken out 4/8  -Blood cx NGTD 3/28  -Sputum cx no PMNs or organisms 3/30  -New sputum cultures/stains (4/7) = commensal maryann and no/rare PMNs with no organisms  -Urine showing 300 proteinuria and 20 hematuria; no evidence of infection   -Negative viral panel  - urine legionella negative   - urine streptococcus negative   -MRSA (-)  -Completed ceftriaxone 1000 mg IV qd x 5 days (started 3/29-4/3)   -Completed azithromycin 500 mg IV qd x 3 days (started 3/28-3/30)   -4/4 and full respiratory panel viral negative  -S/p zosyn and vanc 4/7  -Continue Zosyn 3.375g q8 course of 7 days (until 04/15)    HEMATOLOGIC/ONCOLOGIC   #Leukocytosis- likely 2/2 infection -- downtrending  -Rising suspicion for infection source given left basilar consolidation on CXR.   -Continue monitoring and complete Zosyn course as above    PROPHYLACTIC   -DVT ppx: enoxaparin 40 mg sq qd     PT consulted    ETHICS   -HCP daughter (Jojo)   -Full code      Assessment	  59F with HTN, DM2, hypothyroid, and asthma here for status asthmaticus, unclear trigger; currently with gradual clinical improvement s/p extubation on 04/10.     NEUROLOGIC   #Mental Status  -Extubated (04/10) - RESOLVED   -Baseline AAOx3, and currently AAOx3  -CTM    CARDIOVASCULAR     # Vasoplegic Shock- RESOLVED   Off pressors    #Elevated troponin- 2/2 demand ischemia from vasopressors- RESOLVED   Plateaued tropnoins 50s to 252 to 180s. No events on telemetry. Peaked and downtrending.   -Monitor   -If increasing again or high clinical suspicion, can consider PE r/o       #HTN- labile with hypotensive and hypertensive episodes with tactile stimulation induced vs tussive bouts   -C/w Vasotec 1.25 mg IVP q6H  -Continue monitoring BP   -If patient able to tolerate PO, can restart low dose lisinopril.       RESPIRATORY   #Status asthmaticus   -On 2L NC and continue to wean off  -C/w  methylprednisolone taper  -C/w duonebs 3 mL q4  -Albuterol 2.5mg q4   -Completed empiric pna, s/p vanc and zosyn 4/7   -IPV and c/w 3% inhaled saline for secretions  - Holding off on CT angio for now.   -3/30 BAL without growth   -Bronchoscopy 3/30 showing secretions in L main and L lober with mucus aspirated out     # Subcutaneous Emphysema   -CXR 3/30 showing subcutaneous emphysema without pneumomediastinum  -subcutaneous emphysema on RUE and b/l chest wall resolved     GASTROINTESTINAL   -JS   -OGT (3/30) placed and removed (4/10)  -S & S evaluated and patient NPO  -NGT in place and TFs started    #Gastric retention- 2/2 opioid induced constipation   - Had a BM.   -CTM stool count daily  -abd XR 4/3 with some stool burden  -Repeat abd XR (04/12)    # Abdominal pain (started 04/11)  - PPI 40 mg qd  - Around  cc in rectal pouch (04/12/25)  - F/u abdominal XR (04/12)     GENITOURINARY/RENAL   #HAGMA 2/2 hypercarbia and lactic acidosis- RESOLVED   #Respiratory acidosis- ventilator changes   ABG pH 7.40.   - On admission, VBG pH 6.98 and PCO2 125. Improvement and patient extubated    #Jonas   -TOV done and jonas removed-Resolved    ENDOCRINE   #DM   -Couple episodes of hypoglycemia (40s)  -Monitor glucose levels closely  -Low ISS  -Hold home metformin 500 mg po qd   - Hba1c 5.7     #Hypothyroid   -C/w Synthroid 75 mcg PO daily (or 55 mcg IV qd)      INFECTIOUS DISEASE   Not meeting criteria for SIRS. Unclear trigger for asthma.   -WBC on admission 11.21 and afebrile but 2 Febrile episode in since 4/7 AM  -Currently afebrile  -New leukocytosis 24, 4/8, downtrending  -Triple lumen taken out 4/7, A line taken out 4/8  -Blood cx NGTD 3/28  -Sputum cx no PMNs or organisms 3/30  -New sputum cultures/stains (4/7) = commensal maryann and no/rare PMNs with no organisms  -Urine showing 300 proteinuria and 20 hematuria; no evidence of infection   -Negative viral panel  - urine legionella negative   - urine streptococcus negative   -MRSA (-)  -Completed ceftriaxone 1000 mg IV qd x 5 days (started 3/29-4/3)   -Completed azithromycin 500 mg IV qd x 3 days (started 3/28-3/30)   -4/4 and full respiratory panel viral negative  -S/p zosyn and vanc 4/7  -Continue Zosyn 3.375g q8 course of 7 days (until 04/15)    HEMATOLOGIC/ONCOLOGIC   #Leukocytosis- likely 2/2 infection -- downtrending  -Rising suspicion for infection source given left basilar consolidation on CXR.   -Continue monitoring and complete Zosyn course as above    PROPHYLACTIC   -DVT ppx: enoxaparin 40 mg sq qd     PT consulted    ETHICS   -HCP daughter (Jojo)   -Full code      Assessment	  59F with HTN, DM2, hypothyroid, and asthma here for status asthmaticus, unclear trigger; currently with gradual clinical improvement s/p extubation on 04/10.     NEUROLOGIC   #Mental Status  -Extubated (04/10) - RESOLVED   -Baseline AAOx3, and currently AAOx3  -CTM    CARDIOVASCULAR     # Vasoplegic Shock- RESOLVED   Off pressors    #Elevated troponin- 2/2 demand ischemia from vasopressors- RESOLVED   Plateaued tropnoins 50s to 252 to 180s. No events on telemetry. Peaked and downtrending.   -Monitor   -If increasing again or high clinical suspicion, can consider PE r/o       #HTN- labile with hypotensive and hypertensive episodes with tactile stimulation induced vs tussive bouts   -C/w Vasotec 1.25 mg IVP q6H  -Continue monitoring BP   -If patient able to tolerate PO, can restart low dose lisinopril.       RESPIRATORY   #Status asthmaticus   -On 2L NC and continue to wean off  -C/w  methylprednisolone taper  -C/w duonebs 3 mL q4  -Albuterol 2.5mg q4   -Completed empiric pna, s/p vanc and zosyn 4/7   -IPV and c/w 3% inhaled saline for secretions  - Holding off on CT angio for now.   -3/30 BAL without growth   -Bronchoscopy 3/30 showing secretions in L main and L lober with mucus aspirated out     # Subcutaneous Emphysema   -CXR 3/30 showing subcutaneous emphysema without pneumomediastinum  -subcutaneous emphysema on RUE and b/l chest wall resolved     GASTROINTESTINAL   -JS   -OGT (3/30) placed and removed (4/10)  -S & S evaluated and patient NPO  -NGT in place and TFs started    #Gastric retention- 2/2 opioid induced constipation   - Had a BM.   -CTM stool count daily  -abd XR 4/3 with some stool burden  - Abdominal XR (04/12): Ileus versus a partial low-grade small bowel obstruction.     # Abdominal pain (started 04/11)  - PPI 40 mg qd  - Around  cc in rectal pouch (04/12/25)  - Abdominal XR (04/12): Ileus versus a partial low-grade small bowel obstruction.       GENITOURINARY/RENAL   #HAGMA 2/2 hypercarbia and lactic acidosis- RESOLVED   #Respiratory acidosis- ventilator changes   ABG pH 7.40.   - On admission, VBG pH 6.98 and PCO2 125. Improvement and patient extubated    #Jonas   -TOV done and jonas removed-Resolved    ENDOCRINE   #DM   -Couple episodes of hypoglycemia (40s)  -Monitor glucose levels closely  -Low ISS  -Hold home metformin 500 mg po qd   - Hba1c 5.7     #Hypothyroid   -C/w Synthroid 75 mcg PO daily (or 55 mcg IV qd)      INFECTIOUS DISEASE   Not meeting criteria for SIRS. Unclear trigger for asthma.   -WBC on admission 11.21 and afebrile but 2 Febrile episode in since 4/7 AM  -Currently afebrile  -New leukocytosis 24, 4/8, downtrending  -Triple lumen taken out 4/7, A line taken out 4/8  -Blood cx NGTD 3/28  -Sputum cx no PMNs or organisms 3/30  -New sputum cultures/stains (4/7) = commensal maryann and no/rare PMNs with no organisms  -Urine showing 300 proteinuria and 20 hematuria; no evidence of infection   -Negative viral panel  - urine legionella negative   - urine streptococcus negative   -MRSA (-)  -Completed ceftriaxone 1000 mg IV qd x 5 days (started 3/29-4/3)   -Completed azithromycin 500 mg IV qd x 3 days (started 3/28-3/30)   -4/4 and full respiratory panel viral negative  -S/p zosyn and vanc 4/7  -Continue Zosyn 3.375g q8 course of 7 days (until 04/15)    HEMATOLOGIC/ONCOLOGIC   #Leukocytosis- likely 2/2 infection -- downtrending  -Rising suspicion for infection source given left basilar consolidation on CXR.   -Continue monitoring and complete Zosyn course as above    PROPHYLACTIC   -DVT ppx: enoxaparin 40 mg sq qd     PT consulted    ETHICS   -HCP daughter (Jojo)   -Full code

## 2025-04-12 NOTE — PROGRESS NOTE ADULT - SUBJECTIVE AND OBJECTIVE BOX
INTERVAL HPI/OVERNIGHT EVENTS/SUBJECTIVE/ROS: Patient still has secretions and coughing. New abdominal diffuse constant pain (10/10) that began yesterday. Otherwise, no other sxs.    VITAL SIGNS:  ICU Vital Signs Last 24 Hrs  T(C): 37.7 (12 Apr 2025 08:00), Max: 37.7 (12 Apr 2025 08:00)  T(F): 99.9 (12 Apr 2025 08:00), Max: 99.9 (12 Apr 2025 08:00)  HR: 83 (12 Apr 2025 08:00) (60 - 100)  BP: 166/94 (12 Apr 2025 08:00) (121/85 - 173/100)  BP(mean): 115 (12 Apr 2025 08:00) (91 - 120)  ABP: --  ABP(mean): --  RR: 22 (12 Apr 2025 08:00) (15 - 34)  SpO2: 100% (12 Apr 2025 08:00) (100% - 100%)    O2 Parameters below as of 12 Apr 2025 08:00  Patient On (Oxygen Delivery Method): nasal cannula  O2 Flow (L/min): 2          Plateau pressure:   P/F ratio:     04-11 @ 07:01  -  04-12 @ 07:00  --------------------------------------------------------  IN: 860 mL / OUT: 1600 mL / NET: -740 mL      CAPILLARY BLOOD GLUCOSE  70 (11 Apr 2025 06:01)      POCT Blood Glucose.: 105 mg/dL (12 Apr 2025 06:09)      ECG: reviewed.    PHYSICAL EXAM:  General: NAD, overweight habitus   Neuro: Alert and awake (AAOx3) and able to follow commands. Difficulty to speak s/p extubation  HEENT: PERRLA b/l, mildly moist mucous membranes  Chest: nonTTP  Heart: S1/S2, RRR    Lungs: Wheezing still present but has improved since yesterday.  Abd: Diffuse abdominal pain, soft, normoactive BS, no guarding noted.   Ext: Trace pitting edema pretibial  Pulses: radial 2+ b/l   Lines/tubes/drains: 2 peripheral lines R arm, 1 peripheral line L arm. NGT in place    MEDICATIONS:  MEDICATIONS  (STANDING):  albuterol    0.083% 2.5 milliGRAM(s) Nebulizer every 4 hours  buDESOnide    Inhalation Suspension 0.5 milliGRAM(s) Inhalation two times a day  chlorhexidine 2% Cloths 1 Application(s) Topical <User Schedule>  dextrose 5% + lactated ringers. 1000 milliLiter(s) (50 mL/Hr) IV Continuous <Continuous>  dextrose 5%. 1000 milliLiter(s) (50 mL/Hr) IV Continuous <Continuous>  dextrose 5%. 1000 milliLiter(s) (100 mL/Hr) IV Continuous <Continuous>  dextrose 50% Injectable 25 Gram(s) IV Push once  dextrose 50% Injectable 12.5 Gram(s) IV Push once  dextrose 50% Injectable 25 Gram(s) IV Push once  enalaprilat Injectable 1.25 milliGRAM(s) IV Push every 6 hours  enoxaparin Injectable 40 milliGRAM(s) SubCutaneous every 24 hours  glucagon  Injectable 1 milliGRAM(s) IntraMuscular once  influenza   Vaccine 0.5 milliLiter(s) IntraMuscular once  insulin lispro (ADMELOG) corrective regimen sliding scale   SubCutaneous every 6 hours  levothyroxine Injectable 55 MICROGram(s) IV Push <User Schedule>  methylPREDNISolone sodium succinate Injectable 30 milliGRAM(s) IV Push daily  methylPREDNISolone sodium succinate Injectable   IV Push   multivitamin/minerals/iron Oral Solution (CENTRUM) 15 milliLiter(s) Oral daily  pantoprazole   Suspension 40 milliGRAM(s) Oral daily  piperacillin/tazobactam IVPB.. 3.375 Gram(s) IV Intermittent every 8 hours  polyethylene glycol 3350 17 Gram(s) Oral two times a day  senna Syrup 10 milliLiter(s) Oral daily  sodium chloride 3%  Inhalation 4 milliLiter(s) Inhalation every 6 hours    MEDICATIONS  (PRN):  dextrose Oral Gel 15 Gram(s) Oral once PRN Blood Glucose LESS THAN 70 milliGRAM(s)/deciliter      ALLERGIES:  Allergies    No Known Allergies    Intolerances        LABS:                        12.0   13.76 )-----------( 346      ( 12 Apr 2025 01:44 )             37.3     04-12    142  |  104  |  17  ----------------------------<  111[H]  3.9   |  24  |  0.58    Ca    9.0      12 Apr 2025 01:44  Phos  3.4     04-12  Mg     2.00     04-12    TPro  6.4  /  Alb  2.9[L]  /  TBili  0.5  /  DBili  x   /  AST  74[H]  /  ALT  123[H]  /  AlkPhos  124[H]  04-12    PT/INR - ( 11 Apr 2025 01:25 )   PT: 12.9 sec;   INR: 1.11 ratio         PTT - ( 11 Apr 2025 01:25 )  PTT:22.9 sec  Urinalysis Basic - ( 12 Apr 2025 01:44 )    Color: x / Appearance: x / SG: x / pH: x  Gluc: 111 mg/dL / Ketone: x  / Bili: x / Urobili: x   Blood: x / Protein: x / Nitrite: x   Leuk Esterase: x / RBC: x / WBC x   Sq Epi: x / Non Sq Epi: x / Bacteria: x      ABG:      vBG:  pH, Venous: 7.45 (04-12-25 @ 02:15)  pCO2, Venous: 44 mmHg (04-12-25 @ 02:15)  pO2, Venous: 110 mmHg (04-12-25 @ 02:15)  HCO3, Venous: 31 mmol/L (04-12-25 @ 02:15)    Micro:    Culture - Blood (collected 04-04-25 @ 04:19)  Source: Blood Blood-Peripheral  Final Report (04-09-25 @ 09:01):    No growth at 5 days    Culture - Blood (collected 04-04-25 @ 04:01)  Source: Blood Blood-Peripheral  Final Report (04-09-25 @ 10:01):    No growth at 5 days    Culture - Blood (collected 03-28-25 @ 16:39)  Source: Blood Blood-Peripheral  Final Report (04-02-25 @ 20:00):    No growth at 5 days    Culture - Blood (collected 03-28-25 @ 16:34)  Source: Blood Blood-Peripheral  Final Report (04-02-25 @ 20:00):    No growth at 5 days       Urinalysis with Rflx Culture (collected 03-28-25 @ 16:34)       Culture - Sputum (collected 04-08-25 @ 09:00)  Source: ET Tube ET Tube  Gram Stain (04-08-25 @ 22:17):    Rare polymorphonuclear leukocytes per low power field    Rare Squamous epithelial cells per low power field    No organisms seen  Final Report (04-10-25 @ 09:08):    Commensal maryann consistent with body site    Culture - Sputum (collected 04-07-25 @ 12:07)  Source: Trach Asp Tracheal Aspirate  Gram Stain (04-07-25 @ 18:51):    Rare polymorphonuclear leukocytes per low power field    No Squamous epithelial cells per low power field    No organisms seen per oil power field  Final Report (04-09-25 @ 06:39):    Commensal maryann consistent with body site        RADIOLOGY & ADDITIONAL TESTS: Reviewed.

## 2025-04-12 NOTE — PROGRESS NOTE ADULT - ATTENDING COMMENTS
60 yo woman PMHx HTN, DM2, hypothyroidism, asthma, who presents with status asthmaticus requiring intubation from 3/29- 4/10, now extubated on 2 LNC.     Status asthmaticus- unclear trigger- resolving. S/p intubation from 3/29-4/10- currently on 2L NC. Aspiration precautions- NPO d/t SBO. Continue solumedrol taper, reduce by 10 mg q 3 days, currently on 20 mg qd. ATC nebs.     LLL pneumonia- Complete empiric abx coverage with zosyn for 7 d (4/7-4/15). Follow up sputum cultures    Low grade SBO- Keep NPO. Will hold off on [place] sump placement and placement to suction as clinically with improving abdominal pain and soft abdomen with bowel sounds. Convert all meds to IV. Serial abdominal XR- obtain in AM or if clinically worsening.     Severe muscle deconditioning d/t steroids and paralytics- continue PT/OT. Once SBO resolves, will need dedicated speech and swallow eval prior to starting diet.     Transfer to floors  Full Code  Lovenox  NPO 60 yo woman PMHx HTN, DM2, hypothyroidism, asthma, who presents with status asthmaticus requiring intubation from 3/29- 4/10, now extubated on 2 LNC.     Status asthmaticus- unclear trigger- resolving. S/p intubation from 3/29-4/10- currently on 2L NC. Aspiration precautions. Continue solumedrol taper, reduce by 10 mg q 3 days, currently on 20 mg qd. ATC nebs.     LLL pneumonia- Complete empiric abx coverage with zosyn for 7 d (4/7-4/15). Follow up sputum cultures    Ileus vs. low grade SBO seen on 4/12 abdominal XR- Keep NPO. Will hold off on [place] sump placement and placement to suction as clinically with improving abdominal pain and soft abdomen with bowel sounds. Convert all meds to IV. Serial abdominal XR- obtain in AM or if clinically worsening.     Severe muscle deconditioning d/t steroids and paralytics- continue PT/OT. Once SBO resolves, will need dedicated speech and swallow eval prior to starting diet.     Transfer to floors  Full Code  Lovenox  NPO

## 2025-04-12 NOTE — CHART NOTE - NSCHARTNOTEFT_GEN_A_CORE
MICU Downgrade Note  ---------------------------    Transfer from: MICU  Transfer to:  (x) Medicine    (  ) Telemetry    (  ) RCU    (  ) Palliative    (  ) Stroke Unit    ( x)   Accepting Physician:    HPI:  59F with DM, HTN, hypothyroid. [facility] for worsening SOB x few daysrequiring in field intubation by EMS with manual ventilation.     Per chart review, afebrile in ED. -124. SBP 87/74 and repeat 143/90. On MV RR 22-24 with Spo2 %. WBC 11.21. POCT 331. AST 77 and ALT 58. VBG pH 6/98/125/189/29/ 95.6 saturation. In ED got NS 1 L x1, continous albuterol nebulizer 4 mL/hr, iptratropimum 500 mg nebulizer x1, rocurinium 100 mg IV x1. Started on propofol drip 12 mL/hr and cisatrocium infusion 18 mL/hr.     Patient unable to engage in interview.     Per collateral with daughter, patient had been having worsening SOB for past few days and some cough. Has been using inhalers and prednisone without improvement. However SOB got worse and on day of presentation was unable to perform ADLs with significant dyspnea and daughter called EMS and when EMS was present, patient had LOC and was intubated on site and had IO placed. Denies fever, chills, nausea/vomit, diarrhea/constipation. Patient had 2 past inpatient admissions for asthma exacerbations within past 3 years but has no previous history of intubation.       INTERVAL EVENTS/MICU COURSE:  Bronchoscopy (3/30) showing secretions in L main and L lobar with mucus aspirated out. Due to possible concern for PNA, patient treated completed ceftriaxone 1000 mg IV qd x 5 days & azithromycin 500 mg IV qd x 3 days with noted improvement, but CXR (04/07-04/-08) with concerning findings for PNA vs atelectasis + uptrending leukocytosis warranted PNA coverage and patient started on 7-day Zosyn course (04/08 - 04/15) & steroid taper. During MICU stay, patient extubated on 04/07 but with respiratory failure requiring re-intubation the same evening. Patient eventually weaned off all pressor support, sedations, and eventually successfully extubated on 04/10. Patient evaluated by S & S and could not tolerate PO with NGT placed and TF started. On 04/12, patient with abdominal pain, but unconcerning physical exam and Abd XR with Ileus versus a partial low-grade small bowel obstruction requiring further work-up. TFs held and patient clinically stable to be downgraded to floors.       REVIEW OF SYSTEMS: + Abdominal Pain & dysphonia iso recent intubation    MEDICATIONS:  albuterol    0.083% 2.5 milliGRAM(s) Nebulizer every 4 hours  buDESOnide    Inhalation Suspension 0.5 milliGRAM(s) Inhalation two times a day  chlorhexidine 2% Cloths 1 Application(s) Topical <User Schedule>  dextrose 5% + lactated ringers. 1000 milliLiter(s) IV Continuous <Continuous>  dextrose 5% + lactated ringers. 1000 milliLiter(s) IV Continuous <Continuous>  dextrose 5%. 1000 milliLiter(s) IV Continuous <Continuous>  dextrose 5%. 1000 milliLiter(s) IV Continuous <Continuous>  dextrose 50% Injectable 25 Gram(s) IV Push once  dextrose 50% Injectable 12.5 Gram(s) IV Push once  dextrose 50% Injectable 25 Gram(s) IV Push once  dextrose Oral Gel 15 Gram(s) Oral once PRN  enalaprilat Injectable 1.25 milliGRAM(s) IV Push every 6 hours  enoxaparin Injectable 40 milliGRAM(s) SubCutaneous every 24 hours  glucagon  Injectable 1 milliGRAM(s) IntraMuscular once  influenza   Vaccine 0.5 milliLiter(s) IntraMuscular once  insulin lispro (ADMELOG) corrective regimen sliding scale   SubCutaneous every 6 hours  levothyroxine Injectable 55 MICROGram(s) IV Push <User Schedule>  methylPREDNISolone sodium succinate Injectable 30 milliGRAM(s) IV Push daily  methylPREDNISolone sodium succinate Injectable   IV Push   multivitamin/minerals/iron Oral Solution (CENTRUM) 15 milliLiter(s) Oral daily  pantoprazole   Suspension 40 milliGRAM(s) Oral daily  piperacillin/tazobactam IVPB.. 3.375 Gram(s) IV Intermittent every 8 hours  polyethylene glycol 3350 17 Gram(s) Oral two times a day  sodium chloride 3%  Inhalation 4 milliLiter(s) Inhalation every 6 hours      T(C): 37.6 (04-12-25 @ 12:00), Max: 37.7 (04-12-25 @ 08:00)  HR: 66 (04-12-25 @ 15:24) (61 - 100)  BP: 146/79 (04-12-25 @ 15:00) (137/76 - 173/100)  RR: 28 (04-12-25 @ 15:00) (17 - 34)  SpO2: 99% (04-12-25 @ 15:24) (99% - 100%)  Wt(kg): --Vital Signs Last 24 Hrs  T(C): 37.6 (12 Apr 2025 12:00), Max: 37.7 (12 Apr 2025 08:00)  T(F): 99.7 (12 Apr 2025 12:00), Max: 99.9 (12 Apr 2025 08:00)  HR: 66 (12 Apr 2025 15:24) (61 - 100)  BP: 146/79 (12 Apr 2025 15:00) (137/76 - 173/100)  BP(mean): 98 (12 Apr 2025 15:00) (94 - 120)  RR: 28 (12 Apr 2025 15:00) (17 - 34)  SpO2: 99% (12 Apr 2025 15:24) (99% - 100%)    Parameters below as of 12 Apr 2025 15:24  Patient On (Oxygen Delivery Method): nasal cannula        PHYSICAL EXAM:  General: NAD, overweight habitus   Neuro: Alert and awake (AAOx3) and able to follow commands. Difficulty to speak s/p extubation  HEENT: PERRLA b/l, mildly moist mucous membranes  Chest: nonTTP  Heart: S1/S2, RRR    Lungs: Wheezing still present but has improved since yesterday.  Abd: Diffuse abdominal pain, soft, normoactive BS, no guarding noted.   Ext: Trace pitting edema pretibial  Pulses: radial 2+ b/l   Lines/tubes/drains: 2 peripheral lines R arm, 1 peripheral line L arm. NGT in place      Consultant(s) Notes Reviewed:  [x ] YES  [ ] NO  Care Discussed with Consultants/Other Providers [ x] YES  [ ] NO    LABS:                        12.0   13.76 )-----------( 346      ( 12 Apr 2025 01:44 )             37.3     04-12    142  |  104  |  17  ----------------------------<  111[H]  3.9   |  24  |  0.58    Ca    9.0      12 Apr 2025 01:44  Phos  3.4     04-12  Mg     2.00     04-12    TPro  6.4  /  Alb  2.9[L]  /  TBili  0.5  /  DBili  x   /  AST  74[H]  /  ALT  123[H]  /  AlkPhos  124[H]  04-12    PT/INR - ( 11 Apr 2025 01:25 )   PT: 12.9 sec;   INR: 1.11 ratio         PTT - ( 11 Apr 2025 01:25 )  PTT:22.9 sec  Urinalysis Basic - ( 12 Apr 2025 01:44 )    Color: x / Appearance: x / SG: x / pH: x  Gluc: 111 mg/dL / Ketone: x  / Bili: x / Urobili: x   Blood: x / Protein: x / Nitrite: x   Leuk Esterase: x / RBC: x / WBC x   Sq Epi: x / Non Sq Epi: x / Bacteria: x      CAPILLARY BLOOD GLUCOSE      POCT Blood Glucose.: 103 mg/dL (12 Apr 2025 11:52)  POCT Blood Glucose.: 105 mg/dL (12 Apr 2025 06:09)  POCT Blood Glucose.: 104 mg/dL (11 Apr 2025 23:31)  POCT Blood Glucose.: 117 mg/dL (11 Apr 2025 16:56)        Urinalysis Basic - ( 12 Apr 2025 01:44 )    Color: x / Appearance: x / SG: x / pH: x  Gluc: 111 mg/dL / Ketone: x  / Bili: x / Urobili: x   Blood: x / Protein: x / Nitrite: x   Leuk Esterase: x / RBC: x / WBC x   Sq Epi: x / Non Sq Epi: x / Bacteria: x      Abdominal XR:   IMPRESSION: Ileus versus a partial low-grade small bowel obstruction.   Follow-up with CT abdomen and pelvis      For Follow-Up:  [ ] Steroid Taper & ABx course  [ ] Serial Abd XR to monitor SBO progression  [ ] PT recs      Assessment & PLAN:    Assessment	  59F with HTN, DM2, hypothyroid, and asthma here for status asthmaticus, unclear trigger; currently with gradual clinical improvement s/p extubation on 04/10.     NEUROLOGIC   #Mental Status  -Extubated (04/10) - RESOLVED   -Baseline AAOx3, and currently AAOx3  -CTM    CARDIOVASCULAR     # Vasoplegic Shock- RESOLVED   Off pressors    #Elevated troponin- 2/2 demand ischemia from vasopressors- RESOLVED   Plateaued tropnoins 50s to 252 to 180s. No events on telemetry. Peaked and downtrending.   -Monitor   -If increasing again or high clinical suspicion, can consider PE r/o       #HTN- labile with hypotensive and hypertensive episodes with tactile stimulation induced vs tussive bouts   -C/w Vasotec 1.25 mg IVP q6H  -Continue monitoring BP   -If patient able to tolerate PO, can restart low dose lisinopril.       RESPIRATORY   #Status asthmaticus   -On 2L NC and continue to wean off  -C/w  methylprednisolone taper  -C/w duonebs 3 mL q4  -Albuterol 2.5mg q4   -Completed empiric pna, s/p vanc and zosyn 4/7   -IPV and c/w 3% inhaled saline for secretions  - Holding off on CT angio for now.   -3/30 BAL without growth   -Bronchoscopy 3/30 showing secretions in L main and L lober with mucus aspirated out     # Subcutaneous Emphysema   -CXR 3/30 showing subcutaneous emphysema without pneumomediastinum  -subcutaneous emphysema on RUE and b/l chest wall resolved     GASTROINTESTINAL   -JS   -OGT (3/30) placed and removed (4/10)  -S & S evaluated and patient NPO  -NGT in place and TFs started    #Gastric retention- 2/2 opioid induced constipation   - Had a BM.   -CTM stool count daily  -abd XR 4/3 with some stool burden  - Abdominal XR (04/12): Ileus versus a partial low-grade small bowel obstruction.     # Abdominal pain (started 04/11)  - PPI 40 mg qd  - Around  cc in rectal pouch (04/12/25)  - Abdominal XR (04/12): Ileus versus a partial low-grade small bowel obstruction.       GENITOURINARY/RENAL   #HAGMA 2/2 hypercarbia and lactic acidosis- RESOLVED   #Respiratory acidosis- ventilator changes   ABG pH 7.40.   - On admission, VBG pH 6.98 and PCO2 125. Improvement and patient extubated    #Jonas   -TOV done and jonas removed-Resolved    ENDOCRINE   #DM   -Couple episodes of hypoglycemia (40s)  -Monitor glucose levels closely  -Low ISS  -Hold home metformin 500 mg po qd   - Hba1c 5.7     #Hypothyroid   -C/w Synthroid 75 mcg PO daily (or 55 mcg IV qd)      INFECTIOUS DISEASE   Not meeting criteria for SIRS. Unclear trigger for asthma.   -WBC on admission 11.21 and afebrile but 2 Febrile episode in since 4/7 AM  -Currently afebrile  -New leukocytosis 24, 4/8, downtrending  -Triple lumen taken out 4/7, A line taken out 4/8  -Blood cx NGTD 3/28  -Sputum cx no PMNs or organisms 3/30  -New sputum cultures/stains (4/7) = commensal maryann and no/rare PMNs with no organisms  -Urine showing 300 proteinuria and 20 hematuria; no evidence of infection   -Negative viral panel  - urine legionella negative   - urine streptococcus negative   -MRSA (-)  -Completed ceftriaxone 1000 mg IV qd x 5 days (started 3/29-4/3)   -Completed azithromycin 500 mg IV qd x 3 days (started 3/28-3/30)   -4/4 and full respiratory panel viral negative  -S/p zosyn and vanc 4/7  -Continue Zosyn 3.375g q8 course of 7 days (until 04/15)    HEMATOLOGIC/ONCOLOGIC   #Leukocytosis- likely 2/2 infection -- downtrending  -Rising suspicion for infection source given left basilar consolidation on CXR.   -Continue monitoring and complete Zosyn course as above    PROPHYLACTIC   -DVT ppx: enoxaparin 40 mg sq qd     PT consulted    ETHICS   -HCP daughter (Jojo)   -Full code MICU Downgrade Note  ---------------------------    Transfer from: MICU  Transfer to:  (x) Medicine    (  ) Telemetry    (  ) RCU    (  ) Palliative    (  ) Stroke Unit    ( x)   Accepting Physician: Dr. Kira Rush    HPI:  59F with DM, HTN, hypothyroid. [facility]B x few daysrequiring in field intubation by EMS with manual ventilation.     Per chart review, afebrile in ED. -124. SBP 87/74 and repeat 143/90. On MV RR 22-24 with Spo2 %. WBC 11.21. POCT 331. AST 77 and ALT 58. VBG pH 6/98/125/189/29/ 95.6 saturation. In ED got NS 1 L x1, continous albuterol nebulizer 4 mL/hr, iptratropimum 500 mg nebulizer x1, rocurinium 100 mg IV x1. Started on propofol drip 12 mL/hr and cisatrocium infusion 18 mL/hr.     Patient unable to engage in interview.     Per collateral with daughter, patient had been having worsening SOB for past few days and some cough. Has been using inhalers and prednisone without improvement. However SOB got worse and on day of presentation was unable to perform ADLs with significant dyspnea and daughter called EMS and when EMS was present, patient had LOC and was intubated on site and had IO placed. Denies fever, chills, nausea/vomit, diarrhea/constipation. Patient had 2 past inpatient admissions for asthma exacerbations within past 3 years but has no previous history of intubation.       INTERVAL EVENTS/MICU COURSE:  Bronchoscopy (3/30) showing secretions in L main and L lobar with mucus aspirated out. Due to possible concern for PNA, patient treated completed ceftriaxone 1000 mg IV qd x 5 days & azithromycin 500 mg IV qd x 3 days with noted improvement, but CXR (04/07-04/-08) with concerning findings for PNA vs atelectasis + uptrending leukocytosis warranted PNA coverage and patient started on 7-day Zosyn course (04/08 - 04/15) & steroid taper. During MICU stay, patient extubated on 04/07 but with respiratory failure requiring re-intubation the same evening. Patient eventually weaned off all pressor support, sedations, and eventually successfully extubated on 04/10. Patient evaluated by S & S and could not tolerate PO with NGT placed and TF started. On 04/12, patient with abdominal pain, but unconcerning physical exam and Abd XR with Ileus versus a partial low-grade small bowel obstruction requiring further work-up. TFs held and patient clinically stable to be downgraded to floors.       REVIEW OF SYSTEMS: + Abdominal Pain & dysphonia iso recent intubation    MEDICATIONS:  albuterol    0.083% 2.5 milliGRAM(s) Nebulizer every 4 hours  buDESOnide    Inhalation Suspension 0.5 milliGRAM(s) Inhalation two times a day  chlorhexidine 2% Cloths 1 Application(s) Topical <User Schedule>  dextrose 5% + lactated ringers. 1000 milliLiter(s) IV Continuous <Continuous>  dextrose 5% + lactated ringers. 1000 milliLiter(s) IV Continuous <Continuous>  dextrose 5%. 1000 milliLiter(s) IV Continuous <Continuous>  dextrose 5%. 1000 milliLiter(s) IV Continuous <Continuous>  dextrose 50% Injectable 25 Gram(s) IV Push once  dextrose 50% Injectable 12.5 Gram(s) IV Push once  dextrose 50% Injectable 25 Gram(s) IV Push once  dextrose Oral Gel 15 Gram(s) Oral once PRN  enalaprilat Injectable 1.25 milliGRAM(s) IV Push every 6 hours  enoxaparin Injectable 40 milliGRAM(s) SubCutaneous every 24 hours  glucagon  Injectable 1 milliGRAM(s) IntraMuscular once  influenza   Vaccine 0.5 milliLiter(s) IntraMuscular once  insulin lispro (ADMELOG) corrective regimen sliding scale   SubCutaneous every 6 hours  levothyroxine Injectable 55 MICROGram(s) IV Push <User Schedule>  methylPREDNISolone sodium succinate Injectable 30 milliGRAM(s) IV Push daily  methylPREDNISolone sodium succinate Injectable   IV Push   multivitamin/minerals/iron Oral Solution (CENTRUM) 15 milliLiter(s) Oral daily  pantoprazole   Suspension 40 milliGRAM(s) Oral daily  piperacillin/tazobactam IVPB.. 3.375 Gram(s) IV Intermittent every 8 hours  polyethylene glycol 3350 17 Gram(s) Oral two times a day  sodium chloride 3%  Inhalation 4 milliLiter(s) Inhalation every 6 hours      T(C): 37.6 (04-12-25 @ 12:00), Max: 37.7 (04-12-25 @ 08:00)  HR: 66 (04-12-25 @ 15:24) (61 - 100)  BP: 146/79 (04-12-25 @ 15:00) (137/76 - 173/100)  RR: 28 (04-12-25 @ 15:00) (17 - 34)  SpO2: 99% (04-12-25 @ 15:24) (99% - 100%)  Wt(kg): --Vital Signs Last 24 Hrs  T(C): 37.6 (12 Apr 2025 12:00), Max: 37.7 (12 Apr 2025 08:00)  T(F): 99.7 (12 Apr 2025 12:00), Max: 99.9 (12 Apr 2025 08:00)  HR: 66 (12 Apr 2025 15:24) (61 - 100)  BP: 146/79 (12 Apr 2025 15:00) (137/76 - 173/100)  BP(mean): 98 (12 Apr 2025 15:00) (94 - 120)  RR: 28 (12 Apr 2025 15:00) (17 - 34)  SpO2: 99% (12 Apr 2025 15:24) (99% - 100%)    Parameters below as of 12 Apr 2025 15:24  Patient On (Oxygen Delivery Method): nasal cannula        PHYSICAL EXAM:  General: NAD, overweight habitus   Neuro: Alert and awake (AAOx3) and able to follow commands. Difficulty to speak s/p extubation  HEENT: PERRLA b/l, mildly moist mucous membranes  Chest: nonTTP  Heart: S1/S2, RRR    Lungs: Wheezing still present but has improved since yesterday.  Abd: Diffuse abdominal pain, soft, normoactive BS, no guarding noted.   Ext: Trace pitting edema pretibial  Pulses: radial 2+ b/l   Lines/tubes/drains: 2 peripheral lines R arm, 1 peripheral line L arm. NGT in place      Consultant(s) Notes Reviewed:  [x ] YES  [ ] NO  Care Discussed with Consultants/Other Providers [ x] YES  [ ] NO    LABS:                        12.0   13.76 )-----------( 346      ( 12 Apr 2025 01:44 )             37.3     04-12    142  |  104  |  17  ----------------------------<  111[H]  3.9   |  24  |  0.58    Ca    9.0      12 Apr 2025 01:44  Phos  3.4     04-12  Mg     2.00     04-12    TPro  6.4  /  Alb  2.9[L]  /  TBili  0.5  /  DBili  x   /  AST  74[H]  /  ALT  123[H]  /  AlkPhos  124[H]  04-12    PT/INR - ( 11 Apr 2025 01:25 )   PT: 12.9 sec;   INR: 1.11 ratio         PTT - ( 11 Apr 2025 01:25 )  PTT:22.9 sec  Urinalysis Basic - ( 12 Apr 2025 01:44 )    Color: x / Appearance: x / SG: x / pH: x  Gluc: 111 mg/dL / Ketone: x  / Bili: x / Urobili: x   Blood: x / Protein: x / Nitrite: x   Leuk Esterase: x / RBC: x / WBC x   Sq Epi: x / Non Sq Epi: x / Bacteria: x      CAPILLARY BLOOD GLUCOSE      POCT Blood Glucose.: 103 mg/dL (12 Apr 2025 11:52)  POCT Blood Glucose.: 105 mg/dL (12 Apr 2025 06:09)  POCT Blood Glucose.: 104 mg/dL (11 Apr 2025 23:31)  POCT Blood Glucose.: 117 mg/dL (11 Apr 2025 16:56)        Urinalysis Basic - ( 12 Apr 2025 01:44 )    Color: x / Appearance: x / SG: x / pH: x  Gluc: 111 mg/dL / Ketone: x  / Bili: x / Urobili: x   Blood: x / Protein: x / Nitrite: x   Leuk Esterase: x / RBC: x / WBC x   Sq Epi: x / Non Sq Epi: x / Bacteria: x      Abdominal XR:   IMPRESSION: Ileus versus a partial low-grade small bowel obstruction.   Follow-up with CT abdomen and pelvis      For Follow-Up:  [ ] Steroid Taper & ABx course  [ ] Serial Abd XR to monitor SBO progression  [ ] PT recs      Assessment & PLAN:    Assessment	  59F with HTN, DM2, hypothyroid, and asthma here for status asthmaticus, unclear trigger; currently with gradual clinical improvement s/p extubation on 04/10.     NEUROLOGIC   #Mental Status  -Extubated (04/10) - RESOLVED   -Baseline AAOx3, and currently AAOx3  -CTM    CARDIOVASCULAR     # Vasoplegic Shock- RESOLVED   Off pressors    #Elevated troponin- 2/2 demand ischemia from vasopressors- RESOLVED   Plateaued tropnoins 50s to 252 to 180s. No events on telemetry. Peaked and downtrending.   -Monitor   -If increasing again or high clinical suspicion, can consider PE r/o       #HTN- labile with hypotensive and hypertensive episodes with tactile stimulation induced vs tussive bouts   -C/w Vasotec 1.25 mg IVP q6H  -Continue monitoring BP   -If patient able to tolerate PO, can restart low dose lisinopril.       RESPIRATORY   #Status asthmaticus   -On 2L NC and continue to wean off  -C/w  methylprednisolone taper  -C/w duonebs 3 mL q4  -Albuterol 2.5mg q4   -Completed empiric pna, s/p vanc and zosyn 4/7   -IPV and c/w 3% inhaled saline for secretions  - Holding off on CT angio for now.   -3/30 BAL without growth   -Bronchoscopy 3/30 showing secretions in L main and L lober with mucus aspirated out     # Subcutaneous Emphysema   -CXR 3/30 showing subcutaneous emphysema without pneumomediastinum  -subcutaneous emphysema on RUE and b/l chest wall resolved     GASTROINTESTINAL   -JS   -OGT (3/30) placed and removed (4/10)  -S & S evaluated and patient NPO  -NGT in place and TFs started    #Gastric retention- 2/2 opioid induced constipation   - Had a BM.   -CTM stool count daily  -abd XR 4/3 with some stool burden  - Abdominal XR (04/12): Ileus versus a partial low-grade small bowel obstruction.     # Abdominal pain (started 04/11)  - PPI 40 mg qd  - Around  cc in rectal pouch (04/12/25)  - Abdominal XR (04/12): Ileus versus a partial low-grade small bowel obstruction.       GENITOURINARY/RENAL   #HAGMA 2/2 hypercarbia and lactic acidosis- RESOLVED   #Respiratory acidosis- ventilator changes   ABG pH 7.40.   - On admission, VBG pH 6.98 and PCO2 125. Improvement and patient extubated    #Jonas   -TOV done and jonas removed-Resolved    ENDOCRINE   #DM   -Couple episodes of hypoglycemia (40s)  -Monitor glucose levels closely  -Low ISS  -Hold home metformin 500 mg po qd   - Hba1c 5.7     #Hypothyroid   -C/w Synthroid 75 mcg PO daily (or 55 mcg IV qd)      INFECTIOUS DISEASE   Not meeting criteria for SIRS. Unclear trigger for asthma.   -WBC on admission 11.21 and afebrile but 2 Febrile episode in since 4/7 AM  -Currently afebrile  -New leukocytosis 24, 4/8, downtrending  -Triple lumen taken out 4/7, A line taken out 4/8  -Blood cx NGTD 3/28  -Sputum cx no PMNs or organisms 3/30  -New sputum cultures/stains (4/7) = commensal maryann and no/rare PMNs with no organisms  -Urine showing 300 proteinuria and 20 hematuria; no evidence of infection   -Negative viral panel  - urine legionella negative   - urine streptococcus negative   -MRSA (-)  -Completed ceftriaxone 1000 mg IV qd x 5 days (started 3/29-4/3)   -Completed azithromycin 500 mg IV qd x 3 days (started 3/28-3/30)   -4/4 and full respiratory panel viral negative  -S/p zosyn and vanc 4/7  -Continue Zosyn 3.375g q8 course of 7 days (until 04/15)    HEMATOLOGIC/ONCOLOGIC   #Leukocytosis- likely 2/2 infection -- downtrending  -Rising suspicion for infection source given left basilar consolidation on CXR.   -Continue monitoring and complete Zosyn course as above    PROPHYLACTIC   -DVT ppx: enoxaparin 40 mg sq qd     PT consulted    ETHICS   -HCP daughter (Jojo)   -Full code MICU Downgrade Note  ---------------------------    Transfer from: MICU  Transfer to:  ( ) Medicine    (  ) Telemetry    (  ) RCU    (  ) Palliative    (  ) Stroke Unit    (X)   Accepting Physician: Dr. Kira Rush    HPI:  59F with DM, HTN, hypothyroid. [facility] x few days requiring in field intubation by EMS with manual ventilation.     Per chart review, afebrile in ED. -124. SBP 87/74 and repeat 143/90. On MV RR 22-24 with Spo2 %. WBC 11.21. POCT 331. AST 77 and ALT 58. VBG pH 6/98/125/189/29/ 95.6 saturation. In ED got NS 1 L x1, continous albuterol nebulizer 4 mL/hr, iptratropimum 500 mg nebulizer x1, rocurinium 100 mg IV x1. Started on propofol drip 12 mL/hr and cisatrocium infusion 18 mL/hr.     Patient unable to engage in interview.     Per collateral with daughter, patient had been having worsening SOB for past few days and some cough. Has been using inhalers and prednisone without improvement. However SOB got worse and on day of presentation was unable to perform ADLs with significant dyspnea and daughter called EMS and when EMS was present, patient had LOC and was intubated on site and had IO placed. Denies fever, chills, nausea/vomit, diarrhea/constipation. Patient had 2 past inpatient admissions for asthma exacerbations within past 3 years but has no previous history of intubation.       INTERVAL EVENTS/MICU COURSE:  Bronchoscopy (3/30) showing secretions in L main and L lobar with mucus aspirated out. Due to possible concern for PNA, patient treated completed ceftriaxone 1000 mg IV qd x 5 days & azithromycin 500 mg IV qd x 3 days with noted improvement, but CXR (04/07-04/-08) with concerning findings for PNA vs atelectasis + uptrending leukocytosis warranted PNA coverage and patient started on 7-day Zosyn course (04/08 - 04/15) & steroid taper. During MICU stay, patient extubated on 04/07 but with respiratory failure requiring re-intubation the same evening. Patient eventually weaned off all pressor support, sedations, and eventually successfully extubated on 04/10. Patient evaluated by S & S and could not tolerate PO with NGT placed and TF started. On 04/12, patient with abdominal pain, but unconcerning physical exam and Abd XR with Ileus versus a partial low-grade small bowel obstruction requiring further work-up. TFs held and patient clinically stable to be downgraded to floors.     For Follow-Up:  [ ] Steroid Taper  [ ] Monitor FS   [ ] L. Venous Duplex   [ ] PT recs      REVIEW OF SYSTEMS: + Abdominal Pain & dysphonia iso recent intubation    MEDICATIONS:  albuterol    0.083% 2.5 milliGRAM(s) Nebulizer every 4 hours  buDESOnide    Inhalation Suspension 0.5 milliGRAM(s) Inhalation two times a day  chlorhexidine 2% Cloths 1 Application(s) Topical <User Schedule>  dextrose 5% + lactated ringers. 1000 milliLiter(s) IV Continuous <Continuous>  dextrose 5% + lactated ringers. 1000 milliLiter(s) IV Continuous <Continuous>  dextrose 5%. 1000 milliLiter(s) IV Continuous <Continuous>  dextrose 5%. 1000 milliLiter(s) IV Continuous <Continuous>  dextrose 50% Injectable 25 Gram(s) IV Push once  dextrose 50% Injectable 12.5 Gram(s) IV Push once  dextrose 50% Injectable 25 Gram(s) IV Push once  dextrose Oral Gel 15 Gram(s) Oral once PRN  enalaprilat Injectable 1.25 milliGRAM(s) IV Push every 6 hours  enoxaparin Injectable 40 milliGRAM(s) SubCutaneous every 24 hours  glucagon  Injectable 1 milliGRAM(s) IntraMuscular once  influenza   Vaccine 0.5 milliLiter(s) IntraMuscular once  insulin lispro (ADMELOG) corrective regimen sliding scale   SubCutaneous every 6 hours  levothyroxine Injectable 55 MICROGram(s) IV Push <User Schedule>  methylPREDNISolone sodium succinate Injectable 30 milliGRAM(s) IV Push daily  methylPREDNISolone sodium succinate Injectable   IV Push   multivitamin/minerals/iron Oral Solution (CENTRUM) 15 milliLiter(s) Oral daily  pantoprazole   Suspension 40 milliGRAM(s) Oral daily  piperacillin/tazobactam IVPB.. 3.375 Gram(s) IV Intermittent every 8 hours  polyethylene glycol 3350 17 Gram(s) Oral two times a day  sodium chloride 3%  Inhalation 4 milliLiter(s) Inhalation every 6 hours      T(C): 37.6 (04-12-25 @ 12:00), Max: 37.7 (04-12-25 @ 08:00)  HR: 66 (04-12-25 @ 15:24) (61 - 100)  BP: 146/79 (04-12-25 @ 15:00) (137/76 - 173/100)  RR: 28 (04-12-25 @ 15:00) (17 - 34)  SpO2: 99% (04-12-25 @ 15:24) (99% - 100%)  Wt(kg): --Vital Signs Last 24 Hrs  T(C): 37.6 (12 Apr 2025 12:00), Max: 37.7 (12 Apr 2025 08:00)  T(F): 99.7 (12 Apr 2025 12:00), Max: 99.9 (12 Apr 2025 08:00)  HR: 66 (12 Apr 2025 15:24) (61 - 100)  BP: 146/79 (12 Apr 2025 15:00) (137/76 - 173/100)  BP(mean): 98 (12 Apr 2025 15:00) (94 - 120)  RR: 28 (12 Apr 2025 15:00) (17 - 34)  SpO2: 99% (12 Apr 2025 15:24) (99% - 100%)    Parameters below as of 12 Apr 2025 15:24  Patient On (Oxygen Delivery Method): nasal cannula        PHYSICAL EXAM:  General: NAD, overweight habitus   Neuro: Alert and awake (AAOx3) and able to follow commands. Difficulty to speak s/p extubation  HEENT: PERRLA b/l, mildly moist mucous membranes  Chest: nonTTP  Heart: S1/S2, RRR    Lungs: Wheezing still present but has improved since yesterday.  Abd: Diffuse abdominal pain, soft, normoactive BS, no guarding noted.   Ext: Trace pitting edema pretibial  Pulses: radial 2+ b/l   Lines/tubes/drains: 2 peripheral lines R arm, 1 peripheral line L arm. NGT in place      Consultant(s) Notes Reviewed:  [x ] YES  [ ] NO  Care Discussed with Consultants/Other Providers [ x] YES  [ ] NO    LABS:                        12.0   13.76 )-----------( 346      ( 12 Apr 2025 01:44 )             37.3     04-12    142  |  104  |  17  ----------------------------<  111[H]  3.9   |  24  |  0.58    Ca    9.0      12 Apr 2025 01:44  Phos  3.4     04-12  Mg     2.00     04-12    TPro  6.4  /  Alb  2.9[L]  /  TBili  0.5  /  DBili  x   /  AST  74[H]  /  ALT  123[H]  /  AlkPhos  124[H]  04-12    PT/INR - ( 11 Apr 2025 01:25 )   PT: 12.9 sec;   INR: 1.11 ratio         PTT - ( 11 Apr 2025 01:25 )  PTT:22.9 sec  Urinalysis Basic - ( 12 Apr 2025 01:44 )    Color: x / Appearance: x / SG: x / pH: x  Gluc: 111 mg/dL / Ketone: x  / Bili: x / Urobili: x   Blood: x / Protein: x / Nitrite: x   Leuk Esterase: x / RBC: x / WBC x   Sq Epi: x / Non Sq Epi: x / Bacteria: x      CAPILLARY BLOOD GLUCOSE      POCT Blood Glucose.: 103 mg/dL (12 Apr 2025 11:52)  POCT Blood Glucose.: 105 mg/dL (12 Apr 2025 06:09)  POCT Blood Glucose.: 104 mg/dL (11 Apr 2025 23:31)  POCT Blood Glucose.: 117 mg/dL (11 Apr 2025 16:56)        Urinalysis Basic - ( 12 Apr 2025 01:44 )    Color: x / Appearance: x / SG: x / pH: x  Gluc: 111 mg/dL / Ketone: x  / Bili: x / Urobili: x   Blood: x / Protein: x / Nitrite: x   Leuk Esterase: x / RBC: x / WBC x   Sq Epi: x / Non Sq Epi: x / Bacteria: x      Abdominal XR:   IMPRESSION: Ileus versus a partial low-grade small bowel obstruction.   Follow-up with CT abdomen and pelvis      Assessment & PLAN:    Assessment	  59F with HTN, DM2, hypothyroid, and asthma here for status asthmaticus, unclear trigger; currently with gradual clinical improvement s/p extubation on 04/10.     NEUROLOGIC   #Mental Status  -Extubated (04/10) - RESOLVED   -Baseline AAOx3, and currently AAOx3  -CTM    CARDIOVASCULAR     # Vasoplegic Shock- RESOLVED   Off pressors    #Elevated troponin- 2/2 demand ischemia from vasopressors- RESOLVED   Plateaued tropnoins 50s to 252 to 180s. No events on telemetry. Peaked and downtrending.   -Monitor   -If increasing again or high clinical suspicion, can consider PE r/o       #HTN- labile with hypotensive and hypertensive episodes with tactile stimulation induced vs tussive bouts   -C/w Vasotec 1.25 mg IVP q6H  -Continue monitoring BP   -If patient able to tolerate PO, can restart low dose lisinopril.       RESPIRATORY   #Status asthmaticus   -On 2L NC and continue to wean off  -C/w  methylprednisolone taper  -C/w duonebs 3 mL q4  -Albuterol 2.5mg q4   -Completed empiric pna, s/p vanc and zosyn 4/7   -IPV and c/w 3% inhaled saline for secretions  - Holding off on CT angio for now.   -3/30 BAL without growth   -Bronchoscopy 3/30 showing secretions in L main and L lober with mucus aspirated out     # Subcutaneous Emphysema   -CXR 3/30 showing subcutaneous emphysema without pneumomediastinum  -subcutaneous emphysema on RUE and b/l chest wall resolved     GASTROINTESTINAL   -JS   -OGT (3/30) placed and removed (4/10)  -S & S evaluated and patient NPO  -NGT in place and TFs started    #Gastric retention- 2/2 opioid induced constipation   - Had a BM.   -CTM stool count daily  -abd XR 4/3 with some stool burden  - Abdominal XR (04/12): Ileus versus a partial low-grade small bowel obstruction.     # Abdominal pain (started 04/11)  - PPI 40 mg qd  - Around  cc in rectal pouch (04/12/25)  - Abdominal XR (04/12): Ileus versus a partial low-grade small bowel obstruction.       GENITOURINARY/RENAL   #HAGMA 2/2 hypercarbia and lactic acidosis- RESOLVED   #Respiratory acidosis- ventilator changes   ABG pH 7.40.   - On admission, VBG pH 6.98 and PCO2 125. Improvement and patient extubated    #Jonas   -TOV done and jonas removed-Resolved    ENDOCRINE   #DM   -Couple episodes of hypoglycemia (40s)  -Monitor glucose levels closely  -Low ISS  -Hold home metformin 500 mg po qd   - Hba1c 5.7     #Hypothyroid   -C/w Synthroid 75 mcg PO daily (or 55 mcg IV qd)      INFECTIOUS DISEASE   Not meeting criteria for SIRS. Unclear trigger for asthma.   -WBC on admission 11.21 and afebrile but 2 Febrile episode in since 4/7 AM  -Currently afebrile  -New leukocytosis 24, 4/8, downtrending  -Triple lumen taken out 4/7, A line taken out 4/8  -Blood cx NGTD 3/28  -Sputum cx no PMNs or organisms 3/30  -New sputum cultures/stains (4/7) = commensal maryann and no/rare PMNs with no organisms  -Urine showing 300 proteinuria and 20 hematuria; no evidence of infection   -Negative viral panel  - urine legionella negative   - urine streptococcus negative   -MRSA (-)  -Completed ceftriaxone 1000 mg IV qd x 5 days (started 3/29-4/3)   -Completed azithromycin 500 mg IV qd x 3 days (started 3/28-3/30)   -4/4 and full respiratory panel viral negative  -S/p zosyn and vanc 4/7  -Continue Zosyn 3.375g q8 course of 7 days (until 04/15)    HEMATOLOGIC/ONCOLOGIC   #Leukocytosis- likely 2/2 infection -- downtrending  -Rising suspicion for infection source given left basilar consolidation on CXR.   -Continue monitoring and complete Zosyn course as above    PROPHYLACTIC   -DVT ppx: enoxaparin 40 mg sq qd     PT consulted    ETHICS   -HCP daughter (Jojo)   -Full code

## 2025-04-12 NOTE — SWALLOW BEDSIDE ASSESSMENT ADULT - COMMENTS
MICU REsident 4/12: "59F with HTN, DM2, hypothyroid, and asthma here for status asthmaticus, unclear trigger; currently with gradual clinical improvement s/p extubation on 04/10."    CXR 4/11:FINDINGS: 12:52 PM: NG tube enters the stomach and the tip is in the region of the pylorus or duodenal bulb. Visualized lower lungs are clear. No effusions or pneumothorax.    Patient received awake, alert, on O2 via nasal canula, remains aphonic with subjectively weak and congested cough noted, suspect reduced secretion management. Patient unwilling to answer questions, initially refusing to participate though accepted trials of puree with encouragement. MICU Resident 4/12: "59F with HTN, DM2, hypothyroid, and asthma here for status asthmaticus, unclear trigger; currently with gradual clinical improvement s/p extubation on 04/10."    CXR 4/11:FINDINGS: 12:52 PM: NG tube enters the stomach and the tip is in the region of the pylorus or duodenal bulb. Visualized lower lungs are clear. No effusions or pneumothorax.  Of Note; patient known to this service from this admission, seen for bedside swallow evaluation yesterday (see note for details).    Patient received awake, alert, on O2 via nasal canula, remains aphonic with subjectively weak and congested cough noted, suspect reduced secretion management. Patient unwilling to answer questions, initially refusing to participate though accepted trials of puree with encouragement.

## 2025-04-12 NOTE — SWALLOW BEDSIDE ASSESSMENT ADULT - ASR SWALLOW REFERRAL
at physician discretion given vocal changed post-extubation/ENT
ENT consult at MD discretion given patient aphonic following extubation/ENT

## 2025-04-12 NOTE — CHART NOTE - NSCHARTNOTEFT_GEN_A_CORE
MAR Accept Note      HPI: Transfer to:  (x) Medicine    (  ) Telemetry    (  ) RCU    (  ) Palliative    (  ) Stroke Unit    ( x)   Accepting Physician: Dr. Kira Rush    HPI:  59F with DM, HTN, hypothyroid. [facility] worsening SOB x few daysrequiring in field intubation by EMS with manual ventilation.     Per chart review, afebrile in ED. -124. SBP 87/74 and repeat 143/90. On MV RR 22-24 with Spo2 %. WBC 11.21. POCT 331. AST 77 and ALT 58. VBG pH 6/98/125/189/29/ 95.6 saturation. In ED got NS 1 L x1, continous albuterol nebulizer 4 mL/hr, iptratropimum 500 mg nebulizer x1, rocurinium 100 mg IV x1. Started on propofol drip 12 mL/hr and cisatrocium infusion 18 mL/hr.     Patient unable to engage in interview.     Per collateral with daughter, patient had been having worsening SOB for past few days and some cough. Has been using inhalers and prednisone without improvement. However SOB got worse and on day of presentation was unable to perform ADLs with significant dyspnea and daughter called EMS and when EMS was present, patient had LOC and was intubated on site and had IO placed. Denies fever, chills, nausea/vomit, diarrhea/constipation. Patient had 2 past inpatient admissions for asthma exacerbations within past 3 years but has no previous history of intubation.       INTERVAL EVENTS/MICU COURSE:  Bronchoscopy (3/30) showing secretions in L main and L lobar with mucus aspirated out. Due to possible concern for PNA, patient treated completed ceftriaxone 1000 mg IV qd x 5 days & azithromycin 500 mg IV qd x 3 days with noted improvement, but CXR (04/07-04/-08) with concerning findings for PNA vs atelectasis + uptrending leukocytosis warranted PNA coverage and patient started on 7-day Zosyn course (04/08 - 04/15) & steroid taper. During MICU stay, patient extubated on 04/07 but with respiratory failure requiring re-intubation the same evening. Patient eventually weaned off all pressor support, sedations, and eventually successfully extubated on 04/10. Patient evaluated by S & S and could not tolerate PO with NGT placed and TF started. On 04/12, patient with abdominal pain, but unconcerning physical exam and Abd XR with Ileus versus a partial low-grade small bowel obstruction requiring further work-up. TFs held and patient clinically stable to be downgraded to floors.       For Follow-Up:  [ ] Steroid Taper & ABx course  [ ] Serial Abd XR to monitor SBO progression  [ ] PT recs      SUBJECTIVE DATA:    OBJECTIVE DATA:    Vital Signs Last 24 Hrs  T(C): 37.6 (12 Apr 2025 16:00), Max: 37.7 (12 Apr 2025 08:00)  T(F): 99.7 (12 Apr 2025 16:00), Max: 99.9 (12 Apr 2025 08:00)  HR: 66 (12 Apr 2025 16:00) (61 - 100)  BP: 147/83 (12 Apr 2025 16:00) (137/76 - 173/100)  BP(mean): 99 (12 Apr 2025 16:00) (94 - 120)  RR: 23 (12 Apr 2025 16:00) (19 - 34)  SpO2: 100% (12 Apr 2025 16:00) (99% - 100%)    Parameters below as of 12 Apr 2025 16:00  Patient On (Oxygen Delivery Method): nasal cannula  O2 Flow (L/min): 2    I&O's Summary    11 Apr 2025 07:01  -  12 Apr 2025 07:00  --------------------------------------------------------  IN: 860 mL / OUT: 1600 mL / NET: -740 mL    12 Apr 2025 07:01  -  12 Apr 2025 16:08  --------------------------------------------------------  IN: 225 mL / OUT: 0 mL / NET: 225 mL        Allergies:      MEDICATIONS  (STANDING):  albuterol    0.083% 2.5 milliGRAM(s) Nebulizer every 4 hours  buDESOnide    Inhalation Suspension 0.5 milliGRAM(s) Inhalation two times a day  chlorhexidine 2% Cloths 1 Application(s) Topical <User Schedule>  dextrose 5% + lactated ringers. 1000 milliLiter(s) (50 mL/Hr) IV Continuous <Continuous>  dextrose 5% + lactated ringers. 1000 milliLiter(s) (50 mL/Hr) IV Continuous <Continuous>  dextrose 5%. 1000 milliLiter(s) (50 mL/Hr) IV Continuous <Continuous>  dextrose 5%. 1000 milliLiter(s) (100 mL/Hr) IV Continuous <Continuous>  dextrose 50% Injectable 25 Gram(s) IV Push once  dextrose 50% Injectable 12.5 Gram(s) IV Push once  dextrose 50% Injectable 25 Gram(s) IV Push once  enalaprilat Injectable 1.25 milliGRAM(s) IV Push every 6 hours  enoxaparin Injectable 40 milliGRAM(s) SubCutaneous every 24 hours  glucagon  Injectable 1 milliGRAM(s) IntraMuscular once  influenza   Vaccine 0.5 milliLiter(s) IntraMuscular once  insulin lispro (ADMELOG) corrective regimen sliding scale   SubCutaneous every 6 hours  levothyroxine Injectable 55 MICROGram(s) IV Push <User Schedule>  methylPREDNISolone sodium succinate Injectable 30 milliGRAM(s) IV Push daily  methylPREDNISolone sodium succinate Injectable   IV Push   multivitamin/minerals/iron Oral Solution (CENTRUM) 15 milliLiter(s) Oral daily  pantoprazole   Suspension 40 milliGRAM(s) Oral daily  piperacillin/tazobactam IVPB.. 3.375 Gram(s) IV Intermittent every 8 hours  polyethylene glycol 3350 17 Gram(s) Oral two times a day  sodium chloride 3%  Inhalation 4 milliLiter(s) Inhalation every 6 hours    MEDICATIONS  (PRN):  dextrose Oral Gel 15 Gram(s) Oral once PRN Blood Glucose LESS THAN 70 milliGRAM(s)/deciliter      LABS                                            12.0                  Neurophils% (auto):   x      (04-12 @ 01:44):    13.76)-----------(346          Lymphocytes% (auto):  x                                             37.3                   Eosinphils% (auto):   x        Manual%: Neutrophils x    ; Lymphocytes x    ; Eosinophils x    ; Bands%: x    ; Blasts x                                    142    |  104    |  17                  Calcium: 9.0   / iCa: x      (04-12 @ 01:44)    ----------------------------<  111       Magnesium: 2.00                             3.9     |  24     |  0.58             Phosphorous: 3.4      TPro  6.4    /  Alb  2.9    /  TBili  0.5    /  DBili  x      /  AST  74     /  ALT  123    /  AlkPhos  124    12 Apr 2025 01:44 Next appt 5/1/2023   Last appt 11-28-22    Refill Requested / Last Refill Info:  budesonide-formoterol (SYMBICORT) 80-4.5 MCG/ACT inhaler 3 each 3 2/28/2022     Sig - Route: Inhale 2 puffs into the lungs 2 times daily. - Inhalation      Refill unable to be completed per standing System's protocol due to:  Unable to refill due to: Please see reason under each med    Orders pended, and routed to provider for approval.   Please route any notes back to your nursing pool via patient call NOT Rx Auth.  Thank you, Refill Center Staff

## 2025-04-13 LAB
ALBUMIN SERPL ELPH-MCNC: 3 G/DL — LOW (ref 3.3–5)
ALP SERPL-CCNC: 109 U/L — SIGNIFICANT CHANGE UP (ref 40–120)
ALT FLD-CCNC: 90 U/L — HIGH (ref 4–33)
ANION GAP SERPL CALC-SCNC: 13 MMOL/L — SIGNIFICANT CHANGE UP (ref 7–14)
APTT BLD: 26.9 SEC — SIGNIFICANT CHANGE UP (ref 24.5–35.6)
AST SERPL-CCNC: 35 U/L — HIGH (ref 4–32)
BILIRUB SERPL-MCNC: 0.5 MG/DL — SIGNIFICANT CHANGE UP (ref 0.2–1.2)
BLD GP AB SCN SERPL QL: NEGATIVE — SIGNIFICANT CHANGE UP
BLOOD GAS ARTERIAL COMPREHENSIVE RESULT: SIGNIFICANT CHANGE UP
BUN SERPL-MCNC: 12 MG/DL — SIGNIFICANT CHANGE UP (ref 7–23)
CALCIUM SERPL-MCNC: 8.8 MG/DL — SIGNIFICANT CHANGE UP (ref 8.4–10.5)
CHLORIDE SERPL-SCNC: 102 MMOL/L — SIGNIFICANT CHANGE UP (ref 98–107)
CK MB BLD-MCNC: 0.3 % — SIGNIFICANT CHANGE UP (ref 0–2.5)
CK MB CFR SERPL CALC: 1 NG/ML — SIGNIFICANT CHANGE UP
CK SERPL-CCNC: 363 U/L — HIGH (ref 25–170)
CO2 SERPL-SCNC: 26 MMOL/L — SIGNIFICANT CHANGE UP (ref 22–31)
CREAT SERPL-MCNC: 0.58 MG/DL — SIGNIFICANT CHANGE UP (ref 0.5–1.3)
EGFR: 104 ML/MIN/1.73M2 — SIGNIFICANT CHANGE UP
EGFR: 104 ML/MIN/1.73M2 — SIGNIFICANT CHANGE UP
GLUCOSE BLDC GLUCOMTR-MCNC: 101 MG/DL — HIGH (ref 70–99)
GLUCOSE BLDC GLUCOMTR-MCNC: 125 MG/DL — HIGH (ref 70–99)
GLUCOSE BLDC GLUCOMTR-MCNC: 99 MG/DL — SIGNIFICANT CHANGE UP (ref 70–99)
GLUCOSE SERPL-MCNC: 107 MG/DL — HIGH (ref 70–99)
HCT VFR BLD CALC: 36.8 % — SIGNIFICANT CHANGE UP (ref 34.5–45)
HGB BLD-MCNC: 11.8 G/DL — SIGNIFICANT CHANGE UP (ref 11.5–15.5)
INR BLD: 1.24 RATIO — HIGH (ref 0.85–1.16)
MAGNESIUM SERPL-MCNC: 2 MG/DL — SIGNIFICANT CHANGE UP (ref 1.6–2.6)
MCHC RBC-ENTMCNC: 30.1 PG — SIGNIFICANT CHANGE UP (ref 27–34)
MCHC RBC-ENTMCNC: 32.1 G/DL — SIGNIFICANT CHANGE UP (ref 32–36)
MCV RBC AUTO: 93.9 FL — SIGNIFICANT CHANGE UP (ref 80–100)
NRBC # BLD AUTO: 0.02 K/UL — HIGH (ref 0–0)
NRBC # FLD: 0.02 K/UL — HIGH (ref 0–0)
NRBC BLD AUTO-RTO: 0 /100 WBCS — SIGNIFICANT CHANGE UP (ref 0–0)
PHOSPHATE SERPL-MCNC: 3.1 MG/DL — SIGNIFICANT CHANGE UP (ref 2.5–4.5)
PLATELET # BLD AUTO: 363 K/UL — SIGNIFICANT CHANGE UP (ref 150–400)
POTASSIUM SERPL-MCNC: 3.3 MMOL/L — LOW (ref 3.5–5.3)
POTASSIUM SERPL-SCNC: 3.3 MMOL/L — LOW (ref 3.5–5.3)
PROT SERPL-MCNC: 6.4 G/DL — SIGNIFICANT CHANGE UP (ref 6–8.3)
PROTHROM AB SERPL-ACNC: 14.4 SEC — HIGH (ref 9.9–13.4)
RBC # BLD: 3.92 M/UL — SIGNIFICANT CHANGE UP (ref 3.8–5.2)
RBC # FLD: 13.8 % — SIGNIFICANT CHANGE UP (ref 10.3–14.5)
RH IG SCN BLD-IMP: POSITIVE — SIGNIFICANT CHANGE UP
SODIUM SERPL-SCNC: 141 MMOL/L — SIGNIFICANT CHANGE UP (ref 135–145)
TROPONIN T, HIGH SENSITIVITY RESULT: 14 NG/L — SIGNIFICANT CHANGE UP
WBC # BLD: 14.12 K/UL — HIGH (ref 3.8–10.5)
WBC # FLD AUTO: 14.12 K/UL — HIGH (ref 3.8–10.5)

## 2025-04-13 RX ORDER — ACETAMINOPHEN 500 MG/5ML
1000 LIQUID (ML) ORAL ONCE
Refills: 0 | Status: COMPLETED | OUTPATIENT
Start: 2025-04-13 | End: 2025-04-13

## 2025-04-13 RX ORDER — SODIUM CHLORIDE 9 G/1000ML
1000 INJECTION, SOLUTION INTRAVENOUS
Refills: 0 | Status: DISCONTINUED | OUTPATIENT
Start: 2025-04-13 | End: 2025-04-13

## 2025-04-13 RX ORDER — LOSARTAN POTASSIUM 100 MG/1
25 TABLET, FILM COATED ORAL DAILY
Refills: 0 | Status: DISCONTINUED | OUTPATIENT
Start: 2025-04-13 | End: 2025-04-18

## 2025-04-13 RX ORDER — LEVOTHYROXINE SODIUM 300 MCG
75 TABLET ORAL DAILY
Refills: 0 | Status: DISCONTINUED | OUTPATIENT
Start: 2025-04-14 | End: 2025-04-18

## 2025-04-13 RX ADMIN — Medication 2.5 MILLIGRAM(S): at 10:53

## 2025-04-13 RX ADMIN — Medication 2.5 MILLIGRAM(S): at 19:29

## 2025-04-13 RX ADMIN — Medication 1 APPLICATION(S): at 05:36

## 2025-04-13 RX ADMIN — Medication 25 GRAM(S): at 14:08

## 2025-04-13 RX ADMIN — METHYLPREDNISOLONE ACETATE 30 MILLIGRAM(S): 80 INJECTION, SUSPENSION INTRA-ARTICULAR; INTRALESIONAL; INTRAMUSCULAR; SOFT TISSUE at 05:35

## 2025-04-13 RX ADMIN — LOSARTAN POTASSIUM 25 MILLIGRAM(S): 100 TABLET, FILM COATED ORAL at 11:42

## 2025-04-13 RX ADMIN — Medication 15 MILLILITER(S): at 11:42

## 2025-04-13 RX ADMIN — Medication 55 MICROGRAM(S): at 05:36

## 2025-04-13 RX ADMIN — Medication 40 MILLIEQUIVALENT(S): at 03:40

## 2025-04-13 RX ADMIN — Medication 4 MILLILITER(S): at 07:12

## 2025-04-13 RX ADMIN — Medication 2.5 MILLIGRAM(S): at 03:05

## 2025-04-13 RX ADMIN — Medication 40 MILLIGRAM(S): at 11:42

## 2025-04-13 RX ADMIN — Medication 1000 MILLIGRAM(S): at 23:00

## 2025-04-13 RX ADMIN — Medication 4 MILLILITER(S): at 19:29

## 2025-04-13 RX ADMIN — BUDESONIDE 0.5 MILLIGRAM(S): 90 AEROSOL, POWDER RESPIRATORY (INHALATION) at 07:13

## 2025-04-13 RX ADMIN — Medication 400 MILLIGRAM(S): at 22:09

## 2025-04-13 RX ADMIN — Medication 25 GRAM(S): at 05:36

## 2025-04-13 RX ADMIN — Medication 25 GRAM(S): at 21:48

## 2025-04-13 RX ADMIN — Medication 2.5 MILLIGRAM(S): at 07:09

## 2025-04-13 RX ADMIN — BUDESONIDE 0.5 MILLIGRAM(S): 90 AEROSOL, POWDER RESPIRATORY (INHALATION) at 19:30

## 2025-04-13 RX ADMIN — ENOXAPARIN SODIUM 40 MILLIGRAM(S): 100 INJECTION SUBCUTANEOUS at 14:08

## 2025-04-13 RX ADMIN — Medication 4 MILLILITER(S): at 15:07

## 2025-04-13 RX ADMIN — SODIUM CHLORIDE 50 MILLILITER(S): 9 INJECTION, SOLUTION INTRAVENOUS at 05:42

## 2025-04-13 RX ADMIN — Medication 2.5 MILLIGRAM(S): at 23:15

## 2025-04-13 RX ADMIN — Medication 2.5 MILLIGRAM(S): at 15:07

## 2025-04-13 NOTE — PROGRESS NOTE ADULT - SUBJECTIVE AND OBJECTIVE BOX
MICU Progress Note  --------------------  Joy Leung M.D.  Internal Medicine  PGY-1  --------------------      Patient: JET ONEAL, MRN: [id number], : 1965  Admitted on 25 for Asthma with acute exacerbation      LANGUAGE - English            ------------------------------------------------------------------------------------------------------------  SUMMARY (from last progress note through 25 @ 08:07): Failed S+S eval  ------------------------------------------------------------------------------------------------------------  OVERNIGHT EVENTS  NAEON    SUBJECTIVE  - No complaints, denies pain      ROS negative unless noted above.  ------------------------------------------------------------------------------------------------------------  OBJECTIVE:  Physical Exam  CONST:     NAD, well-appearing; well-developed; appears stated age  EYES:         Conjunctiva clear; PERRL; no conjunctival pallor; no lid lag  ENMT:       No thrush appreciated  NECK:        Supple, no LAD; no palpable masses; no thyromegaly  RESP :        Rhonchi bilaterally with poor cough effort  CHEST:       No TTP, no lines/ports; symmetric chest expansion  CARDIO:     RRR, normal S1 and S2, no M/R/G; apical impulse at MCL  VASC:         No JVD/AJR, no peripheral edema, pulses 2+ B/L, Cap refill <2s  ABD:           Soft, NT/ND, norm bowel sounds; no R/G; No HSM; No CVAT  MSK:          No clubbing of digits; no joint swelling or TTP  EXT:           WWP, no reduction in body hair, no LE skin changes  PSYCH        A&O to person, place, and time; affect appropriate  NEURO:     Non-focal; no gross sensory deficits; moving all extremities 4/5 strength bilaterally, proximal muscle weakness   SKIN:          No rashes; no palpable lesions; axillae not dry    Vital Signs Last 24 Hrs  T(F): 99.5, Max: 99.7 (25 @ 12:00)  HR: 74 (61 - 95)  BP: 139/82 (133/72 - 171/93)  RR: 25 (19 - 29)  SpO2: 98% (98% - 100%) on 2L        DAILY MEASUREMENTS:  I&O's Summary    2025 07:01  -  2025 07:00  --------------------------------------------------------  IN: 1025 mL / OUT: 1550 mL / NET: -525 mL      Daily     Daily Weight in k.4 (2025 04:00)    Orthostatic VS        LABS:                        11.8   14.12 )-----------( 363      ( 2025 01:20 )             36.8     Hgb Trend: 11.8<--, 12.0<--, 11.5<--, 11.5<--, 11.6<--  04-13    141  |  102  |  12  ----------------------------<  107[H]  3.3[L]   |  26  |  0.58    Ca    8.8      2025 01:20  Phos  3.1     -  Mg     2.00     -13    TPro  6.4  /  Alb  3.0[L]  /  TBili  0.5  /  DBili  x   /  AST  35[H]  /  ALT  90[H]  /  AlkPhos  109  04-13    PT/INR - ( 2025 01:20 )   PT: 14.4 sec;   INR: 1.24 ratio         PTT - ( 2025 01:20 )  PTT:26.9 sec  CAPILLARY BLOOD GLUCOSE      POCT Blood Glucose.: 99 mg/dL (2025 05:41)  POCT Blood Glucose.: 98 mg/dL (2025 23:39)  POCT Blood Glucose.: 80 mg/dL (2025 17:31)  POCT Blood Glucose.: 103 mg/dL (2025 11:52)    CARDIAC MARKERS ( 2025 01:20 )  x     / x     / x     / x     / 1.0 ng/mL  CARDIAC MARKERS ( 2025 01:44 )  x     / x     / x     / x     / 1.4 ng/mL      Urinalysis Basic - ( 2025 01:20 )    Color: x / Appearance: x / SG: x / pH: x  Gluc: 107 mg/dL / Ketone: x  / Bili: x / Urobili: x   Blood: x / Protein: x / Nitrite: x   Leuk Esterase: x / RBC: x / WBC x   Sq Epi: x / Non Sq Epi: x / Bacteria: x        Arterial Blood Gas:  25 @ 01:20 on FiO2 --  7.46/44/81/31/98.0/6.6  ABG lactate: --        ------------------------------------------------------------------------------------------------------------  MEDICATIONS  (STANDING):  albuterol    0.083% 2.5 milliGRAM(s) Nebulizer every 4 hours  buDESOnide    Inhalation Suspension 0.5 milliGRAM(s) Inhalation two times a day  chlorhexidine 2% Cloths 1 Application(s) Topical <User Schedule>  dextrose 5% + lactated ringers. 1000 milliLiter(s) (50 mL/Hr) IV Continuous <Continuous>  dextrose 5%. 1000 milliLiter(s) (50 mL/Hr) IV Continuous <Continuous>  dextrose 5%. 1000 milliLiter(s) (100 mL/Hr) IV Continuous <Continuous>  dextrose 50% Injectable 25 Gram(s) IV Push once  dextrose 50% Injectable 12.5 Gram(s) IV Push once  dextrose 50% Injectable 25 Gram(s) IV Push once  enalaprilat Injectable 1.25 milliGRAM(s) IV Push every 6 hours  enoxaparin Injectable 40 milliGRAM(s) SubCutaneous every 24 hours  glucagon  Injectable 1 milliGRAM(s) IntraMuscular once  influenza   Vaccine 0.5 milliLiter(s) IntraMuscular once  insulin lispro (ADMELOG) corrective regimen sliding scale   SubCutaneous every 6 hours  levothyroxine Injectable 55 MICROGram(s) IV Push <User Schedule>  methylPREDNISolone sodium succinate Injectable   IV Push   multivitamin/minerals/iron Oral Solution (CENTRUM) 15 milliLiter(s) Oral daily  pantoprazole   Suspension 40 milliGRAM(s) Oral daily  piperacillin/tazobactam IVPB.. 3.375 Gram(s) IV Intermittent every 8 hours  polyethylene glycol 3350 17 Gram(s) Oral two times a day  sodium chloride 3%  Inhalation 4 milliLiter(s) Inhalation every 6 hours    MEDICATIONS  (PRN):  dextrose Oral Gel 15 Gram(s) Oral once PRN Blood Glucose LESS THAN 70 milliGRAM(s)/deciliter    ------------------------------------------------------------------------------------------------------------  COORDINATION OF CARE:  Care discussed with consultants/other providers and notes reviewed [Y]

## 2025-04-13 NOTE — PROGRESS NOTE ADULT - ATTENDING COMMENTS
60 yo woman PMHx HTN, DM2, hypothyroidism, asthma, intubated for status asthmaticus on 3/29, self extubated on 4/7 and emergently reintubated, now extubated since 4/10 on 2 LNC.     Status asthmaticus- unclear trigger- resolving. S/p intubation from 3/29-4/10- currently on 2L NC. Aspiration precautions. Continue solumedrol taper, reduce by 10 mg q 3 days, currently on 20 mg qd. ATC nebs.     LLL pneumonia- Complete empiric abx coverage with zosyn for 7 d (4/7-4/15)    Dysphonia likely d/t VCD after intubation- appreciate ENT recs     Ileus vs. low grade SBO seen on 4/12 abdominal XR- resolved- continue TF, meds via NGT, pend S+S eval    Severe muscle deconditioning d/t steroids and paralytics- continue PT/OT.     Transfer to floors  Full Code  Lovenox  TF via NGT

## 2025-04-13 NOTE — PROGRESS NOTE ADULT - ASSESSMENT
Assessment	  59F with HTN, DM2, hypothyroid, and asthma here for status asthmaticus, unclear trigger; currently with gradual clinical improvement s/p extubation on 04/10.     NEUROLOGIC   #Mental Status  -Baseline AAOx3, and currently AAOx3  -CTM    CARDIOVASCULAR     #Elevated troponin- 2/2 demand ischemia from vasopressors- RESOLVED   Plateaued tropnoins 50s to 252 to 180s. No events on telemetry. Peaked and downtrending.   -Monitor   -If increasing again or high clinical suspicion, can consider PE r/o       #HTN- labile with hypotensive and hypertensive episodes with tactile stimulation induced vs tussive bouts   -Restart low dose lisinopril       RESPIRATORY   #Status asthmaticus   -On 2L NC and continue to wean off  -C/w  methylprednisolone taper  -Albuterol 2.5mg q4   -IPV and c/w 3% inhaled saline for secretions  -Pulmicort BID    # Subcutaneous Emphysema   -CXR 3/30 showing subcutaneous emphysema without pneumomediastinum  -subcutaneous emphysema on RUE and b/l chest wall resolved     GASTROINTESTINAL   #Dysphagia post extubation  -Very  hypophonic on exam, no obvious thrush  -ENT consult to eval for VC paralysis given multiple intubations  -NF via keofeed for now  -Eventual MBS after ENT eval    #Gastric retention- 2/2 opioid induced constipation   - Had a BM.   -CTM stool count daily  -abd XR 4/3 with some stool burden  - Abdominal XR (04/12): Ileus versus a partial low-grade small bowel obstruction.     # Abdominal pain (started 04/11)  -Resolved      GENITOURINARY/RENAL   -No active issues    ENDOCRINE   #DM   -Couple episodes of hypoglycemia (40s)  -Monitor glucose levels closely  -Low ISS  -Hold home metformin 500 mg po qd   - Hba1c 5.7     #Hypothyroid   -C/w Synthroid 75 mcg PO daily (or 55 mcg IV qd)      INFECTIOUS DISEASE   Not meeting criteria for SIRS. Unclear trigger for asthma.   -WBC on admission 11.21 and afebrile but 2 Febrile episode in since 4/7 AM  -Currently afebrile  -New leukocytosis 24, 4/8, downtrending  -Triple lumen taken out 4/7, A line taken out 4/8  -Blood cx NGTD 3/28  -Sputum cx no PMNs or organisms 3/30  -New sputum cultures/stains (4/7) = commensal maryann and no/rare PMNs with no organisms  -Urine showing 300 proteinuria and 20 hematuria; no evidence of infection   -Negative viral panel  - urine legionella negative   - urine streptococcus negative   -MRSA (-)  -Completed ceftriaxone 1000 mg IV qd x 5 days (started 3/29-4/3)   -Completed azithromycin 500 mg IV qd x 3 days (started 3/28-3/30)   -4/4 and full respiratory panel viral negative  -S/p zosyn and vanc 4/7  -Continue Zosyn 3.375g q8 course of 7 days (until 04/15)    HEMATOLOGIC/ONCOLOGIC   #Leukocytosis- likely 2/2 infection -- downtrending  -Rising suspicion for infection source given left basilar consolidation on CXR.   -Continue monitoring and complete Zosyn course as above    PROPHYLACTIC   -DVT ppx: enoxaparin 40 mg sq qd     PT consulted    ETHICS   -HCP daughter (Jojo)   -Full code      Assessment	  59F with HTN, DM2, hypothyroid, and asthma here for status asthmaticus, unclear trigger; currently with gradual clinical improvement s/p extubation on 04/10.     NEUROLOGIC   #Mental Status  -Baseline AAOx3, and currently AAOx3  -CTM    CARDIOVASCULAR   #HTN- labile with hypotensive and hypertensive episodes with tactile stimulation induced vs tussive bouts   -Change IV vasotec to losartan 25mg daily, avoid ACEi given asthma/chronic cough       RESPIRATORY   #Status asthmaticus   -On 2L NC and continue to wean off  -C/w  methylprednisolone taper  -Albuterol 2.5mg q4   -IPV and c/w 3% inhaled saline for secretions  -Pulmicort BID    # Subcutaneous Emphysema   -CXR 3/30 showing subcutaneous emphysema without pneumomediastinum  -subcutaneous emphysema on RUE and b/l chest wall resolved     GASTROINTESTINAL   #Dysphagia post extubation  -Very  hypophonic on exam, no obvious thrush  -ENT consult to eval for VC paralysis given multiple intubations  -NF via keofeed for now  -Eventual MBS after ENT eval    #Gastric retention- 2/2 opioid induced constipation   -CTM stool count daily  - Abdominal XR (04/12): Ileus versus a partial low-grade small bowel obstruction.     # Abdominal pain (started 04/11)  -Resolved    GENITOURINARY/RENAL   -No active issues    ENDOCRINE   #Steroid induced hyperglycemia  -Low ISS q6  -Hold home metformin 500 mg po qd   - Hba1c 5.7     #Hypothyroid   -C/w Synthroid 75 mcg PO daily (or 55 mcg IV qd)    INFECTIOUS DISEASE   Not meeting criteria for SIRS. Unclear trigger for asthma.   -WBC on admission 11.21 and afebrile but 2 Febrile episode in since 4/7 AM  -Currently afebrile  -New leukocytosis 24, 4/8, downtrending  -Triple lumen taken out 4/7, A line taken out 4/8  -Blood cx NGTD 3/28  -Sputum cx no PMNs or organisms 3/30  -New sputum cultures/stains (4/7) = commensal maryann and no/rare PMNs with no organisms  -Urine showing 300 proteinuria and 20 hematuria; no evidence of infection   -Negative viral panel  - urine legionella negative   - urine streptococcus negative   -MRSA (-)  -Completed ceftriaxone 1000 mg IV qd x 5 days (started 3/29-4/3)   -Completed azithromycin 500 mg IV qd x 3 days (started 3/28-3/30)   -4/4 and full respiratory panel viral negative  -S/p zosyn and vanc 4/7  -Continue Zosyn 3.375g q8 course of 7 days (until 04/15)    HEMATOLOGIC/ONCOLOGIC   #Leukocytosis- likely 2/2 infection -- downtrending  -Rising suspicion for infection source given left basilar consolidation on CXR.   -Continue monitoring and complete Zosyn course as above    PROPHYLACTIC   -DVT ppx: enoxaparin 40 mg sq qd     ETHICS   -HCP daughter (Jojo)   -Full code   -Pending transfer to medicine floor with   -f/u PT recs

## 2025-04-14 LAB
GLUCOSE BLDC GLUCOMTR-MCNC: 104 MG/DL — HIGH (ref 70–99)
GLUCOSE BLDC GLUCOMTR-MCNC: 111 MG/DL — HIGH (ref 70–99)
GLUCOSE BLDC GLUCOMTR-MCNC: 112 MG/DL — HIGH (ref 70–99)
GLUCOSE BLDC GLUCOMTR-MCNC: 126 MG/DL — HIGH (ref 70–99)
GLUCOSE BLDC GLUCOMTR-MCNC: 60 MG/DL — LOW (ref 70–99)
GLUCOSE BLDC GLUCOMTR-MCNC: 99 MG/DL — SIGNIFICANT CHANGE UP (ref 70–99)

## 2025-04-14 RX ORDER — INSULIN LISPRO 100 U/ML
INJECTION, SOLUTION INTRAVENOUS; SUBCUTANEOUS
Refills: 0 | Status: DISCONTINUED | OUTPATIENT
Start: 2025-04-14 | End: 2025-04-18

## 2025-04-14 RX ORDER — DIPHENHYDRAMINE HCL 12.5MG/5ML
25 ELIXIR ORAL ONCE
Refills: 0 | Status: COMPLETED | OUTPATIENT
Start: 2025-04-14 | End: 2025-04-14

## 2025-04-14 RX ORDER — IPRATROPIUM BROMIDE AND ALBUTEROL SULFATE .5; 2.5 MG/3ML; MG/3ML
3 SOLUTION RESPIRATORY (INHALATION) EVERY 6 HOURS
Refills: 0 | Status: DISCONTINUED | OUTPATIENT
Start: 2025-04-14 | End: 2025-04-18

## 2025-04-14 RX ORDER — SODIUM CHLORIDE 9 G/1000ML
1000 INJECTION, SOLUTION INTRAVENOUS
Refills: 0 | Status: DISCONTINUED | OUTPATIENT
Start: 2025-04-14 | End: 2025-04-15

## 2025-04-14 RX ORDER — DEXTROSE 50 % IN WATER 50 %
25 SYRINGE (ML) INTRAVENOUS ONCE
Refills: 0 | Status: COMPLETED | OUTPATIENT
Start: 2025-04-14 | End: 2025-04-14

## 2025-04-14 RX ORDER — ACETAMINOPHEN 500 MG/5ML
650 LIQUID (ML) ORAL EVERY 6 HOURS
Refills: 0 | Status: DISCONTINUED | OUTPATIENT
Start: 2025-04-14 | End: 2025-04-14

## 2025-04-14 RX ORDER — ACETAMINOPHEN 500 MG/5ML
650 LIQUID (ML) ORAL EVERY 6 HOURS
Refills: 0 | Status: DISCONTINUED | OUTPATIENT
Start: 2025-04-14 | End: 2025-04-18

## 2025-04-14 RX ADMIN — Medication 650 MILLIGRAM(S): at 12:03

## 2025-04-14 RX ADMIN — ENOXAPARIN SODIUM 40 MILLIGRAM(S): 100 INJECTION SUBCUTANEOUS at 13:01

## 2025-04-14 RX ADMIN — Medication 25 GRAM(S): at 21:37

## 2025-04-14 RX ADMIN — Medication 15 MILLILITER(S): at 11:13

## 2025-04-14 RX ADMIN — Medication 2.5 MILLIGRAM(S): at 11:55

## 2025-04-14 RX ADMIN — Medication 25 GRAM(S): at 13:01

## 2025-04-14 RX ADMIN — Medication 25 MILLIGRAM(S): at 19:43

## 2025-04-14 RX ADMIN — METHYLPREDNISOLONE ACETATE 20 MILLIGRAM(S): 80 INJECTION, SUSPENSION INTRA-ARTICULAR; INTRALESIONAL; INTRAMUSCULAR; SOFT TISSUE at 06:00

## 2025-04-14 RX ADMIN — Medication 2.5 MILLIGRAM(S): at 03:40

## 2025-04-14 RX ADMIN — Medication 40 MILLIGRAM(S): at 11:14

## 2025-04-14 RX ADMIN — Medication 4 MILLILITER(S): at 21:37

## 2025-04-14 RX ADMIN — IPRATROPIUM BROMIDE AND ALBUTEROL SULFATE 3 MILLILITER(S): .5; 2.5 SOLUTION RESPIRATORY (INHALATION) at 21:36

## 2025-04-14 RX ADMIN — Medication 25 GRAM(S): at 07:26

## 2025-04-14 RX ADMIN — LOSARTAN POTASSIUM 25 MILLIGRAM(S): 100 TABLET, FILM COATED ORAL at 06:00

## 2025-04-14 RX ADMIN — Medication 75 MICROGRAM(S): at 06:00

## 2025-04-14 RX ADMIN — SODIUM CHLORIDE 50 MILLILITER(S): 9 INJECTION, SOLUTION INTRAVENOUS at 11:12

## 2025-04-14 RX ADMIN — Medication 2.5 MILLIGRAM(S): at 07:30

## 2025-04-14 RX ADMIN — Medication 4 MILLILITER(S): at 16:32

## 2025-04-14 RX ADMIN — Medication 4 MILLILITER(S): at 03:40

## 2025-04-14 RX ADMIN — BUDESONIDE 0.5 MILLIGRAM(S): 90 AEROSOL, POWDER RESPIRATORY (INHALATION) at 21:37

## 2025-04-14 RX ADMIN — Medication 25 GRAM(S): at 06:00

## 2025-04-14 RX ADMIN — IPRATROPIUM BROMIDE AND ALBUTEROL SULFATE 3 MILLILITER(S): .5; 2.5 SOLUTION RESPIRATORY (INHALATION) at 16:32

## 2025-04-14 RX ADMIN — Medication 650 MILLIGRAM(S): at 12:57

## 2025-04-14 RX ADMIN — Medication 1 APPLICATION(S): at 05:59

## 2025-04-14 RX ADMIN — BUDESONIDE 0.5 MILLIGRAM(S): 90 AEROSOL, POWDER RESPIRATORY (INHALATION) at 09:52

## 2025-04-14 RX ADMIN — Medication 4 MILLILITER(S): at 09:52

## 2025-04-14 NOTE — PROGRESS NOTE ADULT - CRITICAL CARE ATTENDING COMMENT
Patient seen and examined with ICU team at bedside during rounds after lab data, medical records and radiology reports reviewed. I have read and agreeable in general with the documented note, assessment, and management plan which reflects my opinions from bedside rounds.      JET ONEAL is a 59y Female with PMH of asthma presenting with status asthmaticus requiring intubation and paralysis now recovered and extubated on 4/10.     -hypoglycemic event this am     # Status asthmatics, resolved  - continue solumedrol taper reducing 10mg q3 days (20mg)  #LLL pneumonia  - Pip-tazo 7 days for E: 15th   # Fluid Goal - even  # BM - even  # POCUS - see note  # DVT ppx - Enoxaparin  # GOC - full code, discussion today      This patient is non-critically ill. I have personally provided 62 minutes of critical care time concurrently with the resident/fellow/NP/PA. This time excludes time spent on separate procedures and time spent teaching. I have reviewed theresident/fellow/NP/PA’s documentation and I agree with the assessment and plan of care.    Satinder Dugan MD  Interventional Pulmonology & Critical Care Medicine Patient seen and examined with ICU team at bedside during rounds after lab data, medical records and radiology reports reviewed. I have read and agreeable in general with the documented note, assessment, and management plan which reflects my opinions from bedside rounds.      JET ONEAL is a 59y Female with PMH of asthma presenting with status asthmaticus requiring intubation and paralysis now recovered and extubated on 4/10.     -hypoglycemic event this am     # Status asthmatics, resolved  - continue solumedrol taper reducing 10mg q3 days (20mg)  #LLL pneumonia  - Pip-tazo 7 days for E: 15th   # Fluid Goal - even  # BM - even  # POCUS - see note  # DVT ppx - Enoxaparin  # GOC - full code, discussion today      This patient is non-critically ill. I have personally provided 62 minutes of non-critical care time concurrently with the resident/fellow/NP/PA. This time excludes time spent on separate procedures and time spent teaching. I have reviewed theresident/fellow/NP/PA’s documentation and I agree with the assessment and plan of care.    Satinder Dugan MD  Interventional Pulmonology & Critical Care Medicine

## 2025-04-14 NOTE — CHART NOTE - NSCHARTNOTEFT_GEN_A_CORE
NUTRITION FOLLOW UP NOTE    Patient is seen for a follow-up nutrition assessment.    SOURCE: [X] Patient  [X] Other (Chart Review)    Medical Course: Per chart review, patient is a 59y Female with PMH HTN, T2DM, hypothyroid, and asthma who presented to [facility] for status asthmaticus, unclear trigger. Currently with gradual clinical improvement s/p extubation on 04/10. Continues under MICU care.    Diet Order: NPO with Tube Feed: Tube Feeding Modality: Nasogastric  i2we Glucose Support 1.2 ([id number])  Total Volume for 24 Hours (mL): 1350  Continuous  Starting Tube Feed Rate {mL per Hour}: 20  Increase Tube Feed Rate by (mL): 10     Every 6 hours  Until Goal Tube Feed Rate (mL per Hour): 75  Tube Feed Duration (in Hours): 18  Tube Feed Start Time: 11:00  Tube Feed Stop Time: 05:00 (04-14-25 @ 10:15)      Nutrition Course:  - MAP >65mmHg. Patient s/p extubation 4/10. Noted SLP evaluation (4/11, 4/12) with the recommendations for "Continue NPO w/NGT." Consult for repeat SLP evaluation currently pending. Patient receiving enteral nutrition via NGT, on IV hydration support.  - Writer paged by RN today regarding patient's enteral nutrition. Informed patient prefers a plant-based formula and request to change accordingly. Additionally with episode of hypoglycemia this AM. Recommendations provided, updated diet order pended by writer, and notified covering provider via [employer] Teams; diet order activated. Writer called diet office to request new formula be sent to the unit.  - Patient seen at bedside by writer today, appeared withdrawn and minimally participative. States she has not consumed dairy in x45 years, and confirmed she prefers plant-based products. Denies any nausea, vomiting, diarrhea, constipation.    GI Distress: No report of nausea/vomiting/diarrhea/constipation.   Bowel Movement:  per RN flowsheet. Noted to be on a bowel regimen.       Pertinent Medications: MEDICATIONS  (STANDING):  albuterol    0.083% 2.5 milliGRAM(s) Nebulizer every 4 hours  buDESOnide    Inhalation Suspension 0.5 milliGRAM(s) Inhalation two times a day  chlorhexidine 2% Cloths 1 Application(s) Topical <User Schedule>  dextrose 5% + lactated ringers. 1000 milliLiter(s) (50 mL/Hr) IV Continuous <Continuous>  dextrose 5%. 1000 milliLiter(s) (100 mL/Hr) IV Continuous <Continuous>  dextrose 5%. 1000 milliLiter(s) (50 mL/Hr) IV Continuous <Continuous>  dextrose 50% Injectable 25 Gram(s) IV Push once  dextrose 50% Injectable 12.5 Gram(s) IV Push once  dextrose 50% Injectable 25 Gram(s) IV Push once  enoxaparin Injectable 40 milliGRAM(s) SubCutaneous every 24 hours  glucagon  Injectable 1 milliGRAM(s) IntraMuscular once  influenza   Vaccine 0.5 milliLiter(s) IntraMuscular once  insulin lispro (ADMELOG) corrective regimen sliding scale   SubCutaneous every 6 hours  levothyroxine 75 MICROGram(s) Oral daily  losartan 25 milliGRAM(s) Oral daily  methylPREDNISolone sodium succinate Injectable 20 milliGRAM(s) IV Push daily  methylPREDNISolone sodium succinate Injectable   IV Push   multivitamin/minerals/iron Oral Solution (CENTRUM) 15 milliLiter(s) Oral daily  pantoprazole   Suspension 40 milliGRAM(s) Oral daily  piperacillin/tazobactam IVPB.. 3.375 Gram(s) IV Intermittent every 8 hours  polyethylene glycol 3350 17 Gram(s) Oral two times a day  sodium chloride 3%  Inhalation 4 milliLiter(s) Inhalation every 6 hours    MEDICATIONS  (PRN):  dextrose Oral Gel 15 Gram(s) Oral once PRN Blood Glucose LESS THAN 70 milliGRAM(s)/deciliter      Pertinent Labs: 04-13 Na141 mmol/L Glu 107 mg/dL[H] K+ 3.3 mmol/L[L] Cr  0.58 mg/dL BUN 12 mg/dL 04-13 Phos 3.1 mg/dL 04-13 Alb 3.0 g/dL[L] 04-02 Chol --    LDL --    HDL --    Trig 226 mg/dL[H]    A1C with Estimated Average Glucose Result: 5.7 % (04-01-25 @ 11:35)    CAPILLARY BLOOD GLUCOSE  POCT Blood Glucose.: 111 mg/dL (14 Apr 2025 07:43)  POCT Blood Glucose.: 60 mg/dL (14 Apr 2025 07:12)  POCT Blood Glucose.: 104 mg/dL (14 Apr 2025 00:00)  POCT Blood Glucose.: 101 mg/dL (13 Apr 2025 17:33)  POCT Blood Glucose.: 125 mg/dL (13 Apr 2025 11:11)      Anthropometrics  Weights per RN flowsheet: 95.4kg (4/14), 95.4kg (4/13), 98.9kg (4/11), 100.3kg (4/10), 98.4kg (4/7)...  Weight Assessment: -3kg (3%) weight loss x1 week period of time; patient noted with severe edema, fluid shifts    Physical Assessment, per flowsheets:  Edema: 3+ (left leg, right leg)  Pressure Injury: None    Estimated Needs:   [X] Recalculated secondary to s/p extubation  Weight Used: Ideal Body Weight 130lb / 59kg  Energy Needs: 1475-1770kcal (based on 25-30kcal/kg)  Protein Needs: 82.6-94.4gm (based on 1.4-1.6g/kg)    Previous Nutrition Diagnosis: [X] Altered Nutrition Related Labs    Nutrition Diagnosis is [X] ongoing    New Nutrition Diagnosis: [X] not applicable       Interventions:   - Continue enteral nutrition regimen: i2we Glucose Support 1.2 Robert via NGT @ goal rate 75mL/hr x18hrs (provides 1350mL volume formula, 1620kcal, 86.4g protein, 1026mL free water)  --> If x24hr feeds are desired, would recommend i2we Glucose Support 1.2 Robert via NGT @ goal rate 55mL/hr x24hrs for equivalent nutrition provision  --> Defer free fluid management as per medical team  - Nutrition care to be aligned with patient/family goals of care    Monitor & Evaluate: EN tolerance nutrition related lab values, weight trends, BMs/GI distress, hydration status, skin integrity.    RD remains available, please consult as needed.    Zelda Mcarthur MS RDN CDN  Available on [employer] Teams (Preferred) or Pager #[phone]

## 2025-04-14 NOTE — SWALLOW BEDSIDE ASSESSMENT ADULT - SWALLOW EVAL: DIAGNOSIS
Mild oral dysphagia for puree marked by by adequate retrieval, slow anterior posterior transfer, and oral clearance. Moderate to severe pharyngeal dysphagia marked by suspected delay in swallow initiation, hyolaryngeal excursion upon digital palpation, repeat swallows per bolus, and cough response post-swallow suggestive of impaired airway protection.
1- Mild oral stage for puree, soft/bite solids, regular solids, mildly thick liquids, and thin liquids marked by adequate oral containment, slow bolus manipulation and mastication with slow anterior to posterior transfer, trace oral residue post solid which clears with liquid wash. 2- Pharyngeal stage for puree, soft/bite solids, and mildly thick liquids marked by initiation of the pharyngeal swallow with hyolaryngeal excursion upon palpation. 3- Moderate pharyngeal stage for regular solids and thin liquids marked by initiation of the pharyngeal swallow with hyolaryngeal excursion upon palpation with intermittent cough response post oral intake suggestive of impaired airway protection.
1. Functional oral stage for puree and moderately thick liquids characterized by adequate oral grading, adequate spoon stripping, adequate anterior-posterior transport, and timely bolus transfer. 2. Mild-moderate pharyngeal dysphagia characterized by suspected delayed swallow trigger, hyolaryngeal excursion upon digital palpation, and increase in wet upper airway sounds and cough response with moderately thick liquids.

## 2025-04-14 NOTE — PROVIDER CONTACT NOTE (HYPOGLYCEMIA EVENT) - NS PROVIDER CONTACT RECOMMEND-HYPO
Patient is not on lantus to adjust dose.  Possibly change tube feedings to 24 hour feeds.  MD Hay to put formal Speech and swallow eval in.  Patient at time unwilling to perform dysphagia screen. Patient is not on lantus to adjust dose.  Possibly change tube feedings to 24 hour feeds.  MD Hay to put formal Speech and swallow eval in.  start D5 LR @ 50ml/hr.

## 2025-04-14 NOTE — CHART NOTE - NSCHARTNOTEFT_GEN_A_CORE
: Karlo Beal    INDICATION: AHRF    PROCEDURE:  [x] LIMITED ECHO  [x] LIMITED CHEST  [ ] LIMITED RETROPERITONEAL  [ ] LIMITED ABDOMINAL  [ ] LIMITED DVT  [ ] NEEDLE GUIDANCE VASCULAR  [ ] NEEDLE GUIDANCE THORACENTESIS  [ ] NEEDLE GUIDANCE PARACENTESIS  [ ] NEEDLE GUIDANCE PERICARDIOCENTESIS  [ ] OTHER    FINDINGS/INTERPRETATION:  Lungs  Bilateral lung sliding  Bilateral A-line predominant    Heart  Normal LV systolic function : Karlo Beal    INDICATION: AHRF    PROCEDURE:  [x] LIMITED ECHO  [x] LIMITED CHEST  [ ] LIMITED RETROPERITONEAL  [ ] LIMITED ABDOMINAL  [ ] LIMITED DVT  [ ] NEEDLE GUIDANCE VASCULAR  [ ] NEEDLE GUIDANCE THORACENTESIS  [ ] NEEDLE GUIDANCE PARACENTESIS  [ ] NEEDLE GUIDANCE PERICARDIOCENTESIS  [ ] OTHER    FINDINGS/INTERPRETATION:  Lungs  Bilateral lung sliding  Bilateral A-line predominant    Heart  Normal LV systolic function      Attending Addendum:     I was present during the entire procedure and agree with the above findings and interpretation. TIme spent on the procedure was separate from the critical care time spent caring for the patient.       Satinder Dugan MD  Interventional Pulmonology & Critical Care Medicine  [facility]

## 2025-04-14 NOTE — PROVIDER CONTACT NOTE (HYPOGLYCEMIA EVENT) - NS PROVIDER CONTACT BACKGROUND-HYPO
Age: 59y    Gender: Female    POCT Blood Glucose:  111 mg/dL (04-14-25 @ 07:43)  0714- 62mg/dl  (not showing up in sunrise at this moment)  60 mg/dL (04-14-25 @ 07:12)  104 mg/dL (04-14-25 @ 00:00)  101 mg/dL (04-13-25 @ 17:33)  125 mg/dL (04-13-25 @ 11:11)      eMAR:  dextrose 50% Injectable   25 Gram(s) IV Push (04-14-25 @ 07:26)    levothyroxine   75 MICROGram(s) Oral (04-14-25 @ 06:00)    methylPREDNISolone sodium succinate Injectable   20 milliGRAM(s) IV Push (04-14-25 @ 06:00)

## 2025-04-14 NOTE — SWALLOW BEDSIDE ASSESSMENT ADULT - H & P REVIEW
59F with DM, HTN, hypothyroid. [facility] worsening SOB x few daysrequiring in field intubation by EMS with manual ventilation. Per chart review, afebrile in ED. -124. SBP 87/74 and repeat 143/90. On MV RR 22-24 with Spo2 %. WBC 11.21. POCT 331. AST 77 and ALT 58. VBG pH 6/98/125/189/29/ 95.6 saturation. In ED got NS 1 L x1, continous albuterol nebulizer 4 mL/hr, iptratropimum 500 mg nebulizer x1, rocurinium 100 mg IV x1. Started on propofol drip 12 mL/hr and cisatrocium infusion 18 mL/hr. Patient unable to engage in interview. Per collateral with daughter, patient had been having worsening SOB for past few days and some cough. Has been using inhalers and prednisone without improvement. However SOB got worse and on day of presentation was unable to perform ADLs with significant dyspnea and daughter called EMS and when EMS was present, patient had LOC and was intubated on site and had IO placed. Denies fever, chills, nausea/vomit, diarrhea/constipation. Patient had 2 past inpatient admissions for asthma exacerbations within past 3 years but has no previous history of intubation./yes
yes
yes

## 2025-04-14 NOTE — SWALLOW BEDSIDE ASSESSMENT ADULT - SWALLOW EVAL: RECOMMENDED DIET
Continue NPO w/NGT
Continue NPO and short-term non-oral means of nutrition
Easy to Chew Solids with Mildly Thick Liquids

## 2025-04-14 NOTE — SWALLOW BEDSIDE ASSESSMENT ADULT - COMMENTS
Progress Note- MICU 4/14: "59F with HTN, DM2, hypothyroid, and asthma here for status asthmaticus, unclear trigger; currently with gradual clinical improvement s/p extubation on 04/10."    Of note, patient seen for clinical swallow evaluations on 4/11 and 4/12 with recent recommendations to continue NPO with NGT (see notes for details)     CXR 4/11: "FINDINGS: 12:52 PM: NG tube enters the stomach and the tip is in the region of the pylorus or duodenal bulb. Visualized lower lungs are clear. No effusions or pneumothorax."    Patient seen awake/alert to verbal stimuli this PM in MICU with patient's daughter present at bedside. Patient's daughter reports the patient has been taking sips of water intermittently. Patient receptive to PO trials.

## 2025-04-14 NOTE — PROGRESS NOTE ADULT - SUBJECTIVE AND OBJECTIVE BOX
INTERVAL HPI/OVERNIGHT EVENTS: No BM in last 24 hrs and abdominal pain present (8/10). Patient also hypoglycemic (60s) but improved s/p D50 administration.     SUBJECTIVE: Patient seen and examined at bedside. Only complains of abd pain (which she attributes to gas),    ROS: All negative except as listed above.    VITAL SIGNS:  ICU Vital Signs Last 24 Hrs  T(C): 37.5 (14 Apr 2025 04:00), Max: 37.6 (13 Apr 2025 12:00)  T(F): 99.5 (14 Apr 2025 04:00), Max: 99.7 (13 Apr 2025 12:00)  HR: 51 (14 Apr 2025 07:31) (51 - 90)  BP: 142/69 (14 Apr 2025 06:08) (113/59 - 153/78)  BP(mean): 91 (14 Apr 2025 06:08) (76 - 101)  ABP: --  ABP(mean): --  RR: 16 (14 Apr 2025 06:08) (15 - 26)  SpO2: 98% (14 Apr 2025 07:31) (73% - 100%)    O2 Parameters below as of 14 Apr 2025 07:31  Patient On (Oxygen Delivery Method): nasal cannula, 2 l/m            Plateau pressure:   P/F ratio:     04-13 @ 07:01  -  04-14 @ 07:00  --------------------------------------------------------  IN: 1140 mL / OUT: 1450 mL / NET: -310 mL      CAPILLARY BLOOD GLUCOSE      POCT Blood Glucose.: 111 mg/dL (14 Apr 2025 07:43)      ECG: reviewed.    PHYSICAL EXAM:  General: NAD, overweight habitus   Neuro: Alert and awake (AAOx3) and able to follow commands. Difficulty to speak s/p extubation bet dysphonia improved greatly.  HEENT: PERRLA b/l, mildly moist mucous membranes  Chest: nonTTP  Heart: S1/S2, RRR    Lungs: No adventitious sounds appreciated  Abd: Diffuse abdominal pain (8/10), soft, normoactive BS, no guarding noted.   Ext: Trace pitting edema pretibial  Pulses: radial 2+ b/l   Lines/tubes/drains: 2 peripheral lines R arm, 1 peripheral line L arm. NGT in place      MEDICATIONS:  MEDICATIONS  (STANDING):  albuterol    0.083% 2.5 milliGRAM(s) Nebulizer every 4 hours  buDESOnide    Inhalation Suspension 0.5 milliGRAM(s) Inhalation two times a day  chlorhexidine 2% Cloths 1 Application(s) Topical <User Schedule>  dextrose 5%. 1000 milliLiter(s) (100 mL/Hr) IV Continuous <Continuous>  dextrose 5%. 1000 milliLiter(s) (50 mL/Hr) IV Continuous <Continuous>  dextrose 50% Injectable 25 Gram(s) IV Push once  dextrose 50% Injectable 12.5 Gram(s) IV Push once  dextrose 50% Injectable 25 Gram(s) IV Push once  enoxaparin Injectable 40 milliGRAM(s) SubCutaneous every 24 hours  glucagon  Injectable 1 milliGRAM(s) IntraMuscular once  influenza   Vaccine 0.5 milliLiter(s) IntraMuscular once  insulin lispro (ADMELOG) corrective regimen sliding scale   SubCutaneous every 6 hours  levothyroxine 75 MICROGram(s) Oral daily  losartan 25 milliGRAM(s) Oral daily  methylPREDNISolone sodium succinate Injectable 20 milliGRAM(s) IV Push daily  methylPREDNISolone sodium succinate Injectable   IV Push   multivitamin/minerals/iron Oral Solution (CENTRUM) 15 milliLiter(s) Oral daily  pantoprazole   Suspension 40 milliGRAM(s) Oral daily  piperacillin/tazobactam IVPB.. 3.375 Gram(s) IV Intermittent every 8 hours  polyethylene glycol 3350 17 Gram(s) Oral two times a day  sodium chloride 3%  Inhalation 4 milliLiter(s) Inhalation every 6 hours    MEDICATIONS  (PRN):  dextrose Oral Gel 15 Gram(s) Oral once PRN Blood Glucose LESS THAN 70 milliGRAM(s)/deciliter      ALLERGIES:  Allergies    No Known Allergies    Intolerances        LABS:                        11.8   14.12 )-----------( 363      ( 13 Apr 2025 01:20 )             36.8     04-13    141  |  102  |  12  ----------------------------<  107[H]  3.3[L]   |  26  |  0.58    Ca    8.8      13 Apr 2025 01:20  Phos  3.1     04-13  Mg     2.00     04-13    TPro  6.4  /  Alb  3.0[L]  /  TBili  0.5  /  DBili  x   /  AST  35[H]  /  ALT  90[H]  /  AlkPhos  109  04-13    PT/INR - ( 13 Apr 2025 01:20 )   PT: 14.4 sec;   INR: 1.24 ratio         PTT - ( 13 Apr 2025 01:20 )  PTT:26.9 sec  Urinalysis Basic - ( 13 Apr 2025 01:20 )    Color: x / Appearance: x / SG: x / pH: x  Gluc: 107 mg/dL / Ketone: x  / Bili: x / Urobili: x   Blood: x / Protein: x / Nitrite: x   Leuk Esterase: x / RBC: x / WBC x   Sq Epi: x / Non Sq Epi: x / Bacteria: x      ABG:      vBG:    Micro:    Culture - Blood (collected 04-04-25 @ 04:19)  Source: Blood Blood-Peripheral  Final Report (04-09-25 @ 09:01):    No growth at 5 days    Culture - Blood (collected 04-04-25 @ 04:01)  Source: Blood Blood-Peripheral  Final Report (04-09-25 @ 10:01):    No growth at 5 days    Culture - Blood (collected 03-28-25 @ 16:39)  Source: Blood Blood-Peripheral  Final Report (04-02-25 @ 20:00):    No growth at 5 days    Culture - Blood (collected 03-28-25 @ 16:34)  Source: Blood Blood-Peripheral  Final Report (04-02-25 @ 20:00):    No growth at 5 days       Urinalysis with Rflx Culture (collected 03-28-25 @ 16:34)       Culture - Sputum (collected 04-08-25 @ 09:00)  Source: ET Tube ET Tube  Gram Stain (04-08-25 @ 22:17):    Rare polymorphonuclear leukocytes per low power field    Rare Squamous epithelial cells per low power field    No organisms seen  Final Report (04-10-25 @ 09:08):    Commensal maryann consistent with body site    Culture - Sputum (collected 04-07-25 @ 12:07)  Source: Trach Asp Tracheal Aspirate  Gram Stain (04-07-25 @ 18:51):    Rare polymorphonuclear leukocytes per low power field    No Squamous epithelial cells per low power field    No organisms seen per oil power field  Final Report (04-09-25 @ 06:39):    Commensal maryann consistent with body site       INTERVAL HPI/OVERNIGHT EVENTS: No BM in last 24 hrs and abdominal pain present (8/10). Patient also hypoglycemic (60s) but improved s/p D50 administration.     SUBJECTIVE: Patient seen and examined at bedside. Only complains of abd pain (which she attributes to gas),    ROS: All negative except as listed above.    VITAL SIGNS:  ICU Vital Signs Last 24 Hrs  T(C): 37.5 (14 Apr 2025 04:00), Max: 37.6 (13 Apr 2025 12:00)  T(F): 99.5 (14 Apr 2025 04:00), Max: 99.7 (13 Apr 2025 12:00)  HR: 51 (14 Apr 2025 07:31) (51 - 90)  BP: 142/69 (14 Apr 2025 06:08) (113/59 - 153/78)  BP(mean): 91 (14 Apr 2025 06:08) (76 - 101)  ABP: --  ABP(mean): --  RR: 16 (14 Apr 2025 06:08) (15 - 26)  SpO2: 98% (14 Apr 2025 07:31) (73% - 100%)    O2 Parameters below as of 14 Apr 2025 07:31  Patient On (Oxygen Delivery Method): nasal cannula, 2 l/m            Plateau pressure:   P/F ratio:     04-13 @ 07:01  -  04-14 @ 07:00  --------------------------------------------------------  IN: 1140 mL / OUT: 1450 mL / NET: -310 mL    I & Os   7A-7P 7P-7A 24h 7A-   Enteral Tube F... 90 120 210 0   Glucerna 1.5 315 315 630 0   IV PiggyBack 100 200 300 0   Total In  0   Voided  0   Total Out  0   TOTAL NET -445 135 -310 0       CAPILLARY BLOOD GLUCOSE      POCT Blood Glucose.: 111 mg/dL (14 Apr 2025 07:43)      ECG: reviewed.    PHYSICAL EXAM:  General: NAD, overweight habitus   Neuro: Alert and awake (AAOx3) and able to follow commands. Difficulty to speak s/p extubation bet dysphonia improved greatly.  HEENT: PERRLA b/l, mildly moist mucous membranes  Chest: nonTTP  Heart: S1/S2, RRR    Lungs: No adventitious sounds appreciated  Abd: Diffuse abdominal pain (8/10), soft, normoactive BS, no guarding noted.   Ext: Trace pitting edema pretibial  Pulses: radial 2+ b/l   Lines/tubes/drains: 2 peripheral lines R arm, 1 peripheral line L arm. NGT in place      MEDICATIONS:  MEDICATIONS  (STANDING):  albuterol    0.083% 2.5 milliGRAM(s) Nebulizer every 4 hours  buDESOnide    Inhalation Suspension 0.5 milliGRAM(s) Inhalation two times a day  chlorhexidine 2% Cloths 1 Application(s) Topical <User Schedule>  dextrose 5%. 1000 milliLiter(s) (100 mL/Hr) IV Continuous <Continuous>  dextrose 5%. 1000 milliLiter(s) (50 mL/Hr) IV Continuous <Continuous>  dextrose 50% Injectable 25 Gram(s) IV Push once  dextrose 50% Injectable 12.5 Gram(s) IV Push once  dextrose 50% Injectable 25 Gram(s) IV Push once  enoxaparin Injectable 40 milliGRAM(s) SubCutaneous every 24 hours  glucagon  Injectable 1 milliGRAM(s) IntraMuscular once  influenza   Vaccine 0.5 milliLiter(s) IntraMuscular once  insulin lispro (ADMELOG) corrective regimen sliding scale   SubCutaneous every 6 hours  levothyroxine 75 MICROGram(s) Oral daily  losartan 25 milliGRAM(s) Oral daily  methylPREDNISolone sodium succinate Injectable 20 milliGRAM(s) IV Push daily  methylPREDNISolone sodium succinate Injectable   IV Push   multivitamin/minerals/iron Oral Solution (CENTRUM) 15 milliLiter(s) Oral daily  pantoprazole   Suspension 40 milliGRAM(s) Oral daily  piperacillin/tazobactam IVPB.. 3.375 Gram(s) IV Intermittent every 8 hours  polyethylene glycol 3350 17 Gram(s) Oral two times a day  sodium chloride 3%  Inhalation 4 milliLiter(s) Inhalation every 6 hours    MEDICATIONS  (PRN):  dextrose Oral Gel 15 Gram(s) Oral once PRN Blood Glucose LESS THAN 70 milliGRAM(s)/deciliter      ALLERGIES:  Allergies    No Known Allergies    Intolerances        LABS:                        11.8   14.12 )-----------( 363      ( 13 Apr 2025 01:20 )             36.8     04-13    141  |  102  |  12  ----------------------------<  107[H]  3.3[L]   |  26  |  0.58    Ca    8.8      13 Apr 2025 01:20  Phos  3.1     04-13  Mg     2.00     04-13    TPro  6.4  /  Alb  3.0[L]  /  TBili  0.5  /  DBili  x   /  AST  35[H]  /  ALT  90[H]  /  AlkPhos  109  04-13    PT/INR - ( 13 Apr 2025 01:20 )   PT: 14.4 sec;   INR: 1.24 ratio         PTT - ( 13 Apr 2025 01:20 )  PTT:26.9 sec  Urinalysis Basic - ( 13 Apr 2025 01:20 )    Color: x / Appearance: x / SG: x / pH: x  Gluc: 107 mg/dL / Ketone: x  / Bili: x / Urobili: x   Blood: x / Protein: x / Nitrite: x   Leuk Esterase: x / RBC: x / WBC x   Sq Epi: x / Non Sq Epi: x / Bacteria: x      ABG:      vBG:    Micro:    Culture - Blood (collected 04-04-25 @ 04:19)  Source: Blood Blood-Peripheral  Final Report (04-09-25 @ 09:01):    No growth at 5 days    Culture - Blood (collected 04-04-25 @ 04:01)  Source: Blood Blood-Peripheral  Final Report (04-09-25 @ 10:01):    No growth at 5 days    Culture - Blood (collected 03-28-25 @ 16:39)  Source: Blood Blood-Peripheral  Final Report (04-02-25 @ 20:00):    No growth at 5 days    Culture - Blood (collected 03-28-25 @ 16:34)  Source: Blood Blood-Peripheral  Final Report (04-02-25 @ 20:00):    No growth at 5 days       Urinalysis with Rflx Culture (collected 03-28-25 @ 16:34)       Culture - Sputum (collected 04-08-25 @ 09:00)  Source: ET Tube ET Tube  Gram Stain (04-08-25 @ 22:17):    Rare polymorphonuclear leukocytes per low power field    Rare Squamous epithelial cells per low power field    No organisms seen  Final Report (04-10-25 @ 09:08):    Commensal maryann consistent with body site    Culture - Sputum (collected 04-07-25 @ 12:07)  Source: Trach Asp Tracheal Aspirate  Gram Stain (04-07-25 @ 18:51):    Rare polymorphonuclear leukocytes per low power field    No Squamous epithelial cells per low power field    No organisms seen per oil power field  Final Report (04-09-25 @ 06:39):    Commensal maryann consistent with body site

## 2025-04-14 NOTE — SWALLOW BEDSIDE ASSESSMENT ADULT - ASR SWALLOW RECOMMEND DIAG
Objective testing not warranted at this time given patient with overt signs/symptoms of penetration/aspiration
objective testing not warranted given presence of overt s/s impaired airway protection on most conservative consistency
Cinesophagram to objectively assess given intubation which can exacerbate the swallowing mechanism/VFSS/MBS

## 2025-04-14 NOTE — PROGRESS NOTE ADULT - ASSESSMENT
· Assessment	  Assessment	  59F with HTN, DM2, hypothyroid, and asthma here for status asthmaticus, unclear trigger; currently with gradual clinical improvement s/p extubation on 04/10.     NEUROLOGIC   #Mental Status  -Baseline AAOx3, and currently AAOx3  -CTM    CARDIOVASCULAR   #HTN- labile with hypotensive and hypertensive episodes with tactile stimulation induced vs tussive bouts  -C/w losartan 25mg daily, avoid ACEi given asthma/chronic cough       RESPIRATORY   #Status asthmaticus   -On 2L NC and continue to wean off  -C/w  methylprednisolone taper  -Albuterol 2.5mg q4   -IPV and c/w 3% inhaled saline for secretions  -Pulmicort BID    # Subcutaneous Emphysema   -CXR 3/30 showing subcutaneous emphysema without pneumomediastinum  -subcutaneous emphysema on RUE and b/l chest wall resolved     GASTROINTESTINAL   #Dysphagia post extubation  #Dysphonia  -Very hypophonic on exam, no obvious thrush  -ENT consult to eval for VC paralysis given multiple intubations. Patient denied scope but dysphonia improving.  -S/p S&S with placement of NGT w/ TF  -S&S re-eval. Patient wants to attempt oral intake.    #Gastric retention- 2/2 opioid induced constipation   -CTM stool count daily  - Abdominal XR (04/12): Ileus versus a partial low-grade small bowel obstruction.   - Abd XR (04/13): The gaseous distention of small bowel loops has decreased. There is increase in gas throughout the colon. No free air or other new radiographic abnormality      # Abdominal pain (started 04/11)  -Improved since 04/11 and believes due to gas.   -Abd XR as above  -PPI 40 mg qd    GENITOURINARY/RENAL   -No active issues    ENDOCRINE   #Steroid induced hyperglycemia  -Low ISS q6  -Hold home metformin 500 mg po qd   - Hba1c 5.7     #Hypothyroid   -C/w Synthroid 75 mcg PO daily (or 55 mcg IV qd)    INFECTIOUS DISEASE   Not meeting criteria for SIRS. Unclear trigger for asthma.   -WBC on admission 11.21 and afebrile but 2 Febrile episode in since 4/7 AM  -Currently afebrile  -New leukocytosis 24, 4/8, downtrending  -Triple lumen taken out 4/7, A line taken out 4/8  -Blood cx NGTD 3/28  -Sputum cx no PMNs or organisms 3/30  -New sputum cultures/stains (4/7) = commensal maryann and no/rare PMNs with no organisms  -Urine showing 300 proteinuria and 20 hematuria; no evidence of infection   -Negative viral panel  - urine legionella negative   - urine streptococcus negative   -MRSA (-)  -Completed ceftriaxone 1000 mg IV qd x 5 days (started 3/29-4/3)   -Completed azithromycin 500 mg IV qd x 3 days (started 3/28-3/30)   -4/4 and full respiratory panel viral negative  -S/p zosyn and vanc 4/7  -Continue Zosyn 3.375g q8 course of 7 days (until 04/15)    HEMATOLOGIC/ONCOLOGIC   #Leukocytosis- likely 2/2 infection -- downtrending  -Rising suspicion for infection source given left basilar consolidation on CXR.   -Continue monitoring and complete Zosyn course as above    PROPHYLACTIC   -DVT ppx: enoxaparin 40 mg sq qd     ETHICS   -HCP daughter (Jojo)   -Full code   -Pending transfer to medicine floor with   -f/u PT recs

## 2025-04-14 NOTE — SWALLOW BEDSIDE ASSESSMENT ADULT - SWALLOW EVAL: RECOMMENDED FEEDING/EATING TECHNIQUES
Swallowing Guidelines: Seated Upright during Mealtimes: Slow Pacing; Take Small Bites; Single Sips; One Bite/Sip at a Time; Maintain Adequate Oral Hygiene

## 2025-04-14 NOTE — SWALLOW BEDSIDE ASSESSMENT ADULT - ADDITIONAL RECOMMENDATIONS
MICU team advised to reconsult this service as patient becomes medically optimized.
This department to follow up as schedule permits to assess for diet advancement. Medical team further advised to reconsult if there is a change in medical status and/or observed change in patient's tolerance of recommended diet.
1. This service to follow as schedule permits to assess for PO candidacy as patient becomes medically optimized. 2. Medical team advised to reconsult as patient becomes medically optimized.

## 2025-04-15 LAB
ALBUMIN SERPL ELPH-MCNC: 2.9 G/DL — LOW (ref 3.3–5)
ALP SERPL-CCNC: 100 U/L — SIGNIFICANT CHANGE UP (ref 40–120)
ALT FLD-CCNC: 73 U/L — HIGH (ref 4–33)
ANION GAP SERPL CALC-SCNC: 10 MMOL/L — SIGNIFICANT CHANGE UP (ref 7–14)
APTT BLD: 27.6 SEC — SIGNIFICANT CHANGE UP (ref 24.5–35.6)
AST SERPL-CCNC: 34 U/L — HIGH (ref 4–32)
BILIRUB SERPL-MCNC: 0.4 MG/DL — SIGNIFICANT CHANGE UP (ref 0.2–1.2)
BLOOD GAS ARTERIAL COMPREHENSIVE RESULT: SIGNIFICANT CHANGE UP
BUN SERPL-MCNC: 10 MG/DL — SIGNIFICANT CHANGE UP (ref 7–23)
CALCIUM SERPL-MCNC: 8.9 MG/DL — SIGNIFICANT CHANGE UP (ref 8.4–10.5)
CHLORIDE SERPL-SCNC: 103 MMOL/L — SIGNIFICANT CHANGE UP (ref 98–107)
CO2 SERPL-SCNC: 26 MMOL/L — SIGNIFICANT CHANGE UP (ref 22–31)
CREAT SERPL-MCNC: 0.59 MG/DL — SIGNIFICANT CHANGE UP (ref 0.5–1.3)
EGFR: 103 ML/MIN/1.73M2 — SIGNIFICANT CHANGE UP
EGFR: 103 ML/MIN/1.73M2 — SIGNIFICANT CHANGE UP
GLUCOSE BLDC GLUCOMTR-MCNC: 112 MG/DL — HIGH (ref 70–99)
GLUCOSE BLDC GLUCOMTR-MCNC: 112 MG/DL — HIGH (ref 70–99)
GLUCOSE BLDC GLUCOMTR-MCNC: 114 MG/DL — HIGH (ref 70–99)
GLUCOSE BLDC GLUCOMTR-MCNC: 71 MG/DL — SIGNIFICANT CHANGE UP (ref 70–99)
GLUCOSE BLDC GLUCOMTR-MCNC: 71 MG/DL — SIGNIFICANT CHANGE UP (ref 70–99)
GLUCOSE SERPL-MCNC: 94 MG/DL — SIGNIFICANT CHANGE UP (ref 70–99)
HCT VFR BLD CALC: 35.7 % — SIGNIFICANT CHANGE UP (ref 34.5–45)
HGB BLD-MCNC: 11.3 G/DL — LOW (ref 11.5–15.5)
INR BLD: 1.21 RATIO — HIGH (ref 0.85–1.16)
MAGNESIUM SERPL-MCNC: 2 MG/DL — SIGNIFICANT CHANGE UP (ref 1.6–2.6)
MCHC RBC-ENTMCNC: 30.4 PG — SIGNIFICANT CHANGE UP (ref 27–34)
MCHC RBC-ENTMCNC: 31.7 G/DL — LOW (ref 32–36)
MCV RBC AUTO: 96 FL — SIGNIFICANT CHANGE UP (ref 80–100)
NRBC # BLD AUTO: 0 K/UL — SIGNIFICANT CHANGE UP (ref 0–0)
NRBC # FLD: 0 K/UL — SIGNIFICANT CHANGE UP (ref 0–0)
NRBC BLD AUTO-RTO: 0 /100 WBCS — SIGNIFICANT CHANGE UP (ref 0–0)
PHOSPHATE SERPL-MCNC: 2.7 MG/DL — SIGNIFICANT CHANGE UP (ref 2.5–4.5)
PLATELET # BLD AUTO: 345 K/UL — SIGNIFICANT CHANGE UP (ref 150–400)
POTASSIUM SERPL-MCNC: 3.3 MMOL/L — LOW (ref 3.5–5.3)
POTASSIUM SERPL-SCNC: 3.3 MMOL/L — LOW (ref 3.5–5.3)
PROT SERPL-MCNC: 6.1 G/DL — SIGNIFICANT CHANGE UP (ref 6–8.3)
PROTHROM AB SERPL-ACNC: 14.4 SEC — HIGH (ref 9.9–13.4)
RBC # BLD: 3.72 M/UL — LOW (ref 3.8–5.2)
RBC # FLD: 14.3 % — SIGNIFICANT CHANGE UP (ref 10.3–14.5)
SODIUM SERPL-SCNC: 139 MMOL/L — SIGNIFICANT CHANGE UP (ref 135–145)
WBC # BLD: 13.42 K/UL — HIGH (ref 3.8–10.5)
WBC # FLD AUTO: 13.42 K/UL — HIGH (ref 3.8–10.5)

## 2025-04-15 RX ORDER — PREDNISONE 20 MG/1
TABLET ORAL
Refills: 0 | Status: DISCONTINUED | OUTPATIENT
Start: 2025-04-16 | End: 2025-04-18

## 2025-04-15 RX ORDER — PREDNISONE 20 MG/1
10 TABLET ORAL DAILY
Refills: 0 | Status: DISCONTINUED | OUTPATIENT
Start: 2025-04-17 | End: 2025-04-18

## 2025-04-15 RX ORDER — SOD PHOS DI, MONO/K PHOS MONO 250 MG
2 TABLET ORAL ONCE
Refills: 0 | Status: COMPLETED | OUTPATIENT
Start: 2025-04-15 | End: 2025-04-15

## 2025-04-15 RX ORDER — PREDNISONE 20 MG/1
20 TABLET ORAL DAILY
Refills: 0 | Status: COMPLETED | OUTPATIENT
Start: 2025-04-16 | End: 2025-04-16

## 2025-04-15 RX ADMIN — IPRATROPIUM BROMIDE AND ALBUTEROL SULFATE 3 MILLILITER(S): .5; 2.5 SOLUTION RESPIRATORY (INHALATION) at 03:18

## 2025-04-15 RX ADMIN — LOSARTAN POTASSIUM 25 MILLIGRAM(S): 100 TABLET, FILM COATED ORAL at 05:05

## 2025-04-15 RX ADMIN — Medication 40 MILLIEQUIVALENT(S): at 06:18

## 2025-04-15 RX ADMIN — Medication 1 APPLICATION(S): at 05:06

## 2025-04-15 RX ADMIN — Medication 2 PACKET(S): at 12:19

## 2025-04-15 RX ADMIN — BUDESONIDE 0.5 MILLIGRAM(S): 90 AEROSOL, POWDER RESPIRATORY (INHALATION) at 09:52

## 2025-04-15 RX ADMIN — IPRATROPIUM BROMIDE AND ALBUTEROL SULFATE 3 MILLILITER(S): .5; 2.5 SOLUTION RESPIRATORY (INHALATION) at 15:54

## 2025-04-15 RX ADMIN — Medication 1 DOSE(S): at 22:22

## 2025-04-15 RX ADMIN — METHYLPREDNISOLONE ACETATE 20 MILLIGRAM(S): 80 INJECTION, SUSPENSION INTRA-ARTICULAR; INTRALESIONAL; INTRAMUSCULAR; SOFT TISSUE at 05:05

## 2025-04-15 RX ADMIN — Medication 100 MILLIEQUIVALENT(S): at 02:12

## 2025-04-15 RX ADMIN — Medication 15 MILLILITER(S): at 12:19

## 2025-04-15 RX ADMIN — Medication 75 MICROGRAM(S): at 05:05

## 2025-04-15 RX ADMIN — IPRATROPIUM BROMIDE AND ALBUTEROL SULFATE 3 MILLILITER(S): .5; 2.5 SOLUTION RESPIRATORY (INHALATION) at 22:16

## 2025-04-15 RX ADMIN — Medication 40 MILLIEQUIVALENT(S): at 05:06

## 2025-04-15 RX ADMIN — ENOXAPARIN SODIUM 40 MILLIGRAM(S): 100 INJECTION SUBCUTANEOUS at 14:13

## 2025-04-15 RX ADMIN — IPRATROPIUM BROMIDE AND ALBUTEROL SULFATE 3 MILLILITER(S): .5; 2.5 SOLUTION RESPIRATORY (INHALATION) at 09:53

## 2025-04-15 NOTE — PROGRESS NOTE ADULT - SUBJECTIVE AND OBJECTIVE BOX
INTERVAL HPI/OVERNIGHT EVENTS/ROS: Abdominal pain resolved and one BM OVN. Patient only complains of slight pain with right peripheral IVs and left one removed yesterday. ROS otherwise negative      VITAL SIGNS:  ICU Vital Signs Last 24 Hrs  T(C): 36.8 (15 Apr 2025 08:00), Max: 36.8 (15 Apr 2025 00:00)  T(F): 98.2 (15 Apr 2025 08:00), Max: 98.2 (15 Apr 2025 00:00)  HR: 68 (15 Apr 2025 08:00) (53 - 72)  BP: 120/58 (15 Apr 2025 08:00) (116/77 - 135/73)  BP(mean): 70 (15 Apr 2025 08:00) (70 - 93)  ABP: --  ABP(mean): --  RR: 14 (15 Apr 2025 08:00) (14 - 20)  SpO2: 100% (15 Apr 2025 08:00) (98% - 100%)    O2 Parameters below as of 15 Apr 2025 08:00  Patient On (Oxygen Delivery Method): nasal cannula  O2 Flow (L/min): 2        Plateau pressure:   P/F ratio:     04-14 @ 07:01  -  04-15 @ 07:00  --------------------------------------------------------  IN: 430 mL / OUT: 1600 mL / NET: -1170 mL     I & Os   7A-7P 7P-7A 24h 7A-   dextrose 5% + ... 250 0 250 0   Glucerna 1.5 80 0 80 0   IV PiggyBack 0 100 100 0   Total In 330 100 430 0   Voided 9221 681 8891 0   Total Out 5844 377 5142 0   TOTAL NET -670 -500 -1170 0         CAPILLARY BLOOD GLUCOSE  99 (14 Apr 2025 21:46)      POCT Blood Glucose.: 114 mg/dL (15 Apr 2025 08:23)      ECG: reviewed.    PHYSICAL EXAM:    General: NAD, overweight habitus   Neuro: Alert and awake (AAOx3) and able to follow commands. Improved dysphonia  HEENT: PERRLA b/l, mildly moist mucous membranes  Chest: nonTTP  Heart: S1/S2, RRR    Lungs: No adventitious sounds appreciated. Vesicular BS  Abd:  Soft, normoactive BS, no guarding noted. Abdominal pain improved.  Lower Ext: Trace pitting edema pretibial  Upper EXT: Left peripheral line removed given swelling of LUE and pruritus.   Pulses: radial 2+ b/l         MEDICATIONS:  MEDICATIONS  (STANDING):  albuterol/ipratropium for Nebulization 3 milliLiter(s) Nebulizer every 6 hours  buDESOnide    Inhalation Suspension 0.5 milliGRAM(s) Inhalation two times a day  chlorhexidine 2% Cloths 1 Application(s) Topical <User Schedule>  dextrose 5% + lactated ringers. 1000 milliLiter(s) (50 mL/Hr) IV Continuous <Continuous>  dextrose 5%. 1000 milliLiter(s) (50 mL/Hr) IV Continuous <Continuous>  dextrose 5%. 1000 milliLiter(s) (100 mL/Hr) IV Continuous <Continuous>  dextrose 50% Injectable 25 Gram(s) IV Push once  dextrose 50% Injectable 12.5 Gram(s) IV Push once  dextrose 50% Injectable 25 Gram(s) IV Push once  enoxaparin Injectable 40 milliGRAM(s) SubCutaneous every 24 hours  glucagon  Injectable 1 milliGRAM(s) IntraMuscular once  influenza   Vaccine 0.5 milliLiter(s) IntraMuscular once  insulin lispro (ADMELOG) corrective regimen sliding scale   SubCutaneous Before meals and at bedtime  levothyroxine 75 MICROGram(s) Oral daily  losartan 25 milliGRAM(s) Oral daily  methylPREDNISolone sodium succinate Injectable 20 milliGRAM(s) IV Push daily  methylPREDNISolone sodium succinate Injectable   IV Push   multivitamin/minerals/iron Oral Solution (CENTRUM) 15 milliLiter(s) Oral daily  pantoprazole   Suspension 40 milliGRAM(s) Oral daily  polyethylene glycol 3350 17 Gram(s) Oral two times a day    MEDICATIONS  (PRN):  acetaminophen   Oral Liquid .. 650 milliGRAM(s) Oral every 6 hours PRN Mild Pain (1 - 3)  dextrose Oral Gel 15 Gram(s) Oral once PRN Blood Glucose LESS THAN 70 milliGRAM(s)/deciliter      ALLERGIES:  Allergies    No Known Allergies    Intolerances        LABS:                        11.3   13.42 )-----------( 345      ( 15 Apr 2025 00:58 )             35.7     04-15    139  |  103  |  10  ----------------------------<  94  3.3[L]   |  26  |  0.59    Ca    8.9      15 Apr 2025 00:58  Phos  2.7     04-15  Mg     2.00     04-15    TPro  6.1  /  Alb  2.9[L]  /  TBili  0.4  /  DBili  x   /  AST  34[H]  /  ALT  73[H]  /  AlkPhos  100  04-15    PT/INR - ( 15 Apr 2025 00:58 )   PT: 14.4 sec;   INR: 1.21 ratio         PTT - ( 15 Apr 2025 00:58 )  PTT:27.6 sec  Urinalysis Basic - ( 15 Apr 2025 00:58 )    Color: x / Appearance: x / SG: x / pH: x  Gluc: 94 mg/dL / Ketone: x  / Bili: x / Urobili: x   Blood: x / Protein: x / Nitrite: x   Leuk Esterase: x / RBC: x / WBC x   Sq Epi: x / Non Sq Epi: x / Bacteria: x      ABG:  pH, Arterial: 7.46 (04-15-25 @ 00:58)  pCO2, Arterial: 44 mmHg (04-15-25 @ 00:58)  pO2, Arterial: 123 mmHg (04-15-25 @ 00:58)      vBG:    Micro:    Culture - Blood (collected 04-04-25 @ 04:19)  Source: Blood Blood-Peripheral  Final Report (04-09-25 @ 09:01):    No growth at 5 days    Culture - Blood (collected 04-04-25 @ 04:01)  Source: Blood Blood-Peripheral  Final Report (04-09-25 @ 10:01):    No growth at 5 days    Culture - Blood (collected 03-28-25 @ 16:39)  Source: Blood Blood-Peripheral  Final Report (04-02-25 @ 20:00):    No growth at 5 days    Culture - Blood (collected 03-28-25 @ 16:34)  Source: Blood Blood-Peripheral  Final Report (04-02-25 @ 20:00):    No growth at 5 days       Urinalysis with Rflx Culture (collected 03-28-25 @ 16:34)       Culture - Sputum (collected 04-08-25 @ 09:00)  Source: ET Tube ET Tube  Gram Stain (04-08-25 @ 22:17):    Rare polymorphonuclear leukocytes per low power field    Rare Squamous epithelial cells per low power field    No organisms seen  Final Report (04-10-25 @ 09:08):    Commensal maryann consistent with body site    Culture - Sputum (collected 04-07-25 @ 12:07)  Source: Trach Asp Tracheal Aspirate  Gram Stain (04-07-25 @ 18:51):    Rare polymorphonuclear leukocytes per low power field    No Squamous epithelial cells per low power field    No organisms seen per oil power field  Final Report (04-09-25 @ 06:39):    Commensal maryann consistent with body site       INTERVAL HPI/OVERNIGHT EVENTS/ROS: Abdominal pain resolved and one BM OVN. Patient only complains of slight pain with right peripheral IVs and left one removed yesterday. ROS otherwise negative      VITAL SIGNS:  ICU Vital Signs Last 24 Hrs  T(C): 36.8 (15 Apr 2025 08:00), Max: 36.8 (15 Apr 2025 00:00)  T(F): 98.2 (15 Apr 2025 08:00), Max: 98.2 (15 Apr 2025 00:00)  HR: 68 (15 Apr 2025 08:00) (53 - 72)  BP: 120/58 (15 Apr 2025 08:00) (116/77 - 135/73)  BP(mean): 70 (15 Apr 2025 08:00) (70 - 93)  ABP: --  ABP(mean): --  RR: 14 (15 Apr 2025 08:00) (14 - 20)  SpO2: 100% (15 Apr 2025 08:00) (98% - 100%)    O2 Parameters below as of 15 Apr 2025 08:00  Patient On (Oxygen Delivery Method): nasal cannula  O2 Flow (L/min): 2        Plateau pressure:   P/F ratio:     04-14 @ 07:01  -  04-15 @ 07:00  --------------------------------------------------------  IN: 430 mL / OUT: 1600 mL / NET: -1170 mL     I & Os   7A-7P 7P-7A 24h 7A-   dextrose 5% + ... 250 0 250 0   Glucerna 1.5 80 0 80 0   IV PiggyBack 0 100 100 0   Total In 330 100 430 0   Voided 1709 512 5215 0   Total Out 0686 055 8573 0   TOTAL NET -670 -500 -1170 0         CAPILLARY BLOOD GLUCOSE  99 (14 Apr 2025 21:46)      POCT Blood Glucose.: 114 mg/dL (15 Apr 2025 08:23)      ECG: reviewed.    PHYSICAL EXAM:    General: NAD, overweight habitus   Neuro: Alert and awake (AAOx3) and able to follow commands. Improved dysphonia  HEENT: PERRLA b/l, mildly moist mucous membranes  Chest: nonTTP  Heart: S1/S2, RRR    Lungs: No adventitious sounds appreciated. Vesicular BS  Abd:  Soft, normoactive BS, no guarding noted. Abdominal pain improved.  Lower Ext: Trace pitting edema pretibial  Upper EXT: Left peripheral line removed given swelling of LUE and pruritus.   Pulses: radial 2+ b/l       MEDICATIONS:  MEDICATIONS  (STANDING):  albuterol/ipratropium for Nebulization 3 milliLiter(s) Nebulizer every 6 hours  buDESOnide    Inhalation Suspension 0.5 milliGRAM(s) Inhalation two times a day  chlorhexidine 2% Cloths 1 Application(s) Topical <User Schedule>  dextrose 5% + lactated ringers. 1000 milliLiter(s) (50 mL/Hr) IV Continuous <Continuous>  dextrose 5%. 1000 milliLiter(s) (50 mL/Hr) IV Continuous <Continuous>  dextrose 5%. 1000 milliLiter(s) (100 mL/Hr) IV Continuous <Continuous>  dextrose 50% Injectable 25 Gram(s) IV Push once  dextrose 50% Injectable 12.5 Gram(s) IV Push once  dextrose 50% Injectable 25 Gram(s) IV Push once  enoxaparin Injectable 40 milliGRAM(s) SubCutaneous every 24 hours  glucagon  Injectable 1 milliGRAM(s) IntraMuscular once  influenza   Vaccine 0.5 milliLiter(s) IntraMuscular once  insulin lispro (ADMELOG) corrective regimen sliding scale   SubCutaneous Before meals and at bedtime  levothyroxine 75 MICROGram(s) Oral daily  losartan 25 milliGRAM(s) Oral daily  methylPREDNISolone sodium succinate Injectable 20 milliGRAM(s) IV Push daily  methylPREDNISolone sodium succinate Injectable   IV Push   multivitamin/minerals/iron Oral Solution (CENTRUM) 15 milliLiter(s) Oral daily  pantoprazole   Suspension 40 milliGRAM(s) Oral daily  polyethylene glycol 3350 17 Gram(s) Oral two times a day    MEDICATIONS  (PRN):  acetaminophen   Oral Liquid .. 650 milliGRAM(s) Oral every 6 hours PRN Mild Pain (1 - 3)  dextrose Oral Gel 15 Gram(s) Oral once PRN Blood Glucose LESS THAN 70 milliGRAM(s)/deciliter      ALLERGIES:  Allergies    No Known Allergies    Intolerances        LABS:                        11.3   13.42 )-----------( 345      ( 15 Apr 2025 00:58 )             35.7     04-15    139  |  103  |  10  ----------------------------<  94  3.3[L]   |  26  |  0.59    Ca    8.9      15 Apr 2025 00:58  Phos  2.7     04-15  Mg     2.00     04-15    TPro  6.1  /  Alb  2.9[L]  /  TBili  0.4  /  DBili  x   /  AST  34[H]  /  ALT  73[H]  /  AlkPhos  100  04-15    PT/INR - ( 15 Apr 2025 00:58 )   PT: 14.4 sec;   INR: 1.21 ratio         PTT - ( 15 Apr 2025 00:58 )  PTT:27.6 sec  Urinalysis Basic - ( 15 Apr 2025 00:58 )    Color: x / Appearance: x / SG: x / pH: x  Gluc: 94 mg/dL / Ketone: x  / Bili: x / Urobili: x   Blood: x / Protein: x / Nitrite: x   Leuk Esterase: x / RBC: x / WBC x   Sq Epi: x / Non Sq Epi: x / Bacteria: x      ABG:  pH, Arterial: 7.46 (04-15-25 @ 00:58)  pCO2, Arterial: 44 mmHg (04-15-25 @ 00:58)  pO2, Arterial: 123 mmHg (04-15-25 @ 00:58)      vBG:    Micro:    Culture - Blood (collected 04-04-25 @ 04:19)  Source: Blood Blood-Peripheral  Final Report (04-09-25 @ 09:01):    No growth at 5 days    Culture - Blood (collected 04-04-25 @ 04:01)  Source: Blood Blood-Peripheral  Final Report (04-09-25 @ 10:01):    No growth at 5 days    Culture - Blood (collected 03-28-25 @ 16:39)  Source: Blood Blood-Peripheral  Final Report (04-02-25 @ 20:00):    No growth at 5 days    Culture - Blood (collected 03-28-25 @ 16:34)  Source: Blood Blood-Peripheral  Final Report (04-02-25 @ 20:00):    No growth at 5 days       Urinalysis with Rflx Culture (collected 03-28-25 @ 16:34)       Culture - Sputum (collected 04-08-25 @ 09:00)  Source: ET Tube ET Tube  Gram Stain (04-08-25 @ 22:17):    Rare polymorphonuclear leukocytes per low power field    Rare Squamous epithelial cells per low power field    No organisms seen  Final Report (04-10-25 @ 09:08):    Commensal maryann consistent with body site    Culture - Sputum (collected 04-07-25 @ 12:07)  Source: Trach Asp Tracheal Aspirate  Gram Stain (04-07-25 @ 18:51):    Rare polymorphonuclear leukocytes per low power field    No Squamous epithelial cells per low power field    No organisms seen per oil power field  Final Report (04-09-25 @ 06:39):    Commensal maryann consistent with body site

## 2025-04-15 NOTE — SWALLOW VFSS/MBS ASSESSMENT ADULT - COMMENTS
MICU Note 4/14/2025 - 59F with HTN, DM2, hypothyroid, and asthma here for status asthmaticus, unclear trigger; currently with gradual clinical improvement s/p extubation on 04/10.     Of Note: Patient was seen for a Clinical Swallow Eval on 4/14/2025 (See Consult).    Cinesophagram will be discontinued/cancelled as per discussion with MICU/MD.  Medical Team advised to Reconsult if there is a change in medical status. This service to follow as schedule permits for diet tolerance/advancement. MICU Note 4/14/2025 - 59F with HTN, DM2, hypothyroid, and asthma here for status asthmaticus, unclear trigger; currently with gradual clinical improvement s/p extubation on 04/10.     Of Note: Patient was seen for a Clinical Swallow Eval on 4/14/2025 (See Consult).    Cinesophagram will be discontinued/cancelled as per MICU/MD.  Medical Team advised to Reconsult if there is a change in medical status. This service to follow as schedule permits for diet tolerance/advancement.

## 2025-04-15 NOTE — PROGRESS NOTE ADULT - ASSESSMENT
Assessment	  59F with HTN, DM2, hypothyroid, and asthma here for status asthmaticus, unclear trigger; currently with gradual clinical improvement s/p extubation on 04/10.     NEUROLOGIC   #Mental Status  -Baseline AAOx3, and currently AAOx3  -CTM    CARDIOVASCULAR   #HTN- labile with hypotensive and hypertensive episodes with tactile stimulation induced vs tussive bouts  -C/w losartan 25mg daily, avoid ACEi given asthma/chronic cough     RESPIRATORY   #Status asthmaticus   -On 2L NC and continue to wean off  -C/w  methylprednisolone taper  -Albuterol 2.5mg q4   -Pulmicort BID    # Subcutaneous Emphysema   -CXR 3/30 showing subcutaneous emphysema without pneumomediastinum  -subcutaneous emphysema on RUE and b/l chest wall resolved     GASTROINTESTINAL   #Dysphagia post extubation  #Dysphonia  -Very hypophonic on exam, no obvious thrush  -ENT consult to eval for VC paralysis given multiple intubations. Patient denied scope but dysphonia improving.  -S&S re-eval. Easy to chew diet started    #Gastric retention- 2/2 opioid induced constipation   -CTM stool count daily  - Abdominal XR (04/12): Ileus versus a partial low-grade small bowel obstruction.   - Abd XR (04/13): The gaseous distention of small bowel loops has decreased. There is increase in gas throughout the colon. No free air or other new radiographic abnormality      # Abdominal pain (started 04/11)  -Improved since 04/11 and believes due to gas.   -Abd XR as above  -PPI 40 mg qd    GENITOURINARY/RENAL   -No active issues    ENDOCRINE   #Steroid induced hyperglycemia  -Low ISS q6  -Hold home metformin 500 mg po qd   - Hba1c 5.7     #Hypothyroid   -C/w Synthroid 75 mcg PO daily (or 55 mcg IV qd)    INFECTIOUS DISEASE   Not meeting criteria for SIRS. Unclear trigger for asthma.   -WBC on admission 11.21 and afebrile but 2 Febrile episode in since 4/7 AM  -Currently afebrile  -New leukocytosis 24, 4/8, downtrending  -Triple lumen taken out 4/7, A line taken out 4/8  -Blood cx NGTD 3/28  -Sputum cx no PMNs or organisms 3/30  -New sputum cultures/stains (4/7) = commensal maryann and no/rare PMNs with no organisms  -Urine showing 300 proteinuria and 20 hematuria; no evidence of infection   -Negative viral panel  - urine legionella negative   - urine streptococcus negative   -MRSA (-)  -Completed ceftriaxone 1000 mg IV qd x 5 days (started 3/29-4/3)   -Completed azithromycin 500 mg IV qd x 3 days (started 3/28-3/30)   -4/4 and full respiratory panel viral negative  -S/p zosyn and vanc 4/7  -Continue Zosyn 3.375g q8 course of 7 days (until 04/15). Completed course.    HEMATOLOGIC/ONCOLOGIC   #Leukocytosis- likely 2/2 infection -- downtrending  -Rising suspicion for infection source given left basilar consolidation on CXR.   -Continue monitoring and complete Zosyn course as above    PROPHYLACTIC   -DVT ppx: enoxaparin 40 mg sq qd     MISCELLANEOUS:  #Peripheral Lines  Replace any peripheral lines with that have infiltrated or causing pain.     ETHICS   -HCP daughter (Jojo)   -Full code   -Pending transfer to medicine floor with   -f/u PT recs   Assessment	  59F with HTN, DM2, hypothyroid, and asthma here for status asthmaticus, unclear trigger; currently with gradual clinical improvement s/p extubation on 04/10.     NEUROLOGIC   #Mental Status  -Baseline AAOx3, and currently AAOx3  -CTM    CARDIOVASCULAR   #HTN- labile with hypotensive and hypertensive episodes with tactile stimulation induced vs tussive bouts  -C/w losartan 25mg daily, avoid ACEi given asthma/chronic cough     RESPIRATORY   #Status asthmaticus   -On 2L NC and continue to wean off  -C/w  methylprednisolone taper  -Albuterol 2.5mg q4   -Pulmicort BID    # Subcutaneous Emphysema   -CXR 3/30 showing subcutaneous emphysema without pneumomediastinum  -subcutaneous emphysema on RUE and b/l chest wall resolved     GASTROINTESTINAL   #Dysphagia post extubation  #Dysphonia  -Very hypophonic on exam, no obvious thrush  -ENT consult to eval for VC paralysis given multiple intubations. Patient denied scope but dysphonia improving.  -S&S re-eval. Easy to chew diet started    #Gastric retention- 2/2 opioid induced constipation   -CTM stool count daily  - Abdominal XR (04/12): Ileus versus a partial low-grade small bowel obstruction.   - Abd XR (04/13): The gaseous distention of small bowel loops has decreased. There is increase in gas throughout the colon. No free air or other new radiographic abnormality  -RESOLVED        # Abdominal pain (started 04/11)  -Improved since 04/11 and believes due to gas.   -Abd XR as above  -PPI 40 mg qd    GENITOURINARY/RENAL   -No active issues    ENDOCRINE   #Steroid induced hyperglycemia  -Low ISS q6  -Hold home metformin 500 mg po qd   - Hba1c 5.7     #Hypothyroid   -C/w Synthroid 75 mcg PO daily (or 55 mcg IV qd)    INFECTIOUS DISEASE   Not meeting criteria for SIRS. Unclear trigger for asthma.   -WBC on admission 11.21 and afebrile but 2 Febrile episode in since 4/7 AM  -Currently afebrile  -New leukocytosis 24, 4/8, downtrending  -Triple lumen taken out 4/7, A line taken out 4/8  -Blood cx NGTD 3/28  -Sputum cx no PMNs or organisms 3/30  -New sputum cultures/stains (4/7) = commensal maryann and no/rare PMNs with no organisms  -Urine showing 300 proteinuria and 20 hematuria; no evidence of infection   -Negative viral panel  - urine legionella negative   - urine streptococcus negative   -MRSA (-)  -Completed ceftriaxone 1000 mg IV qd x 5 days (started 3/29-4/3)   -Completed azithromycin 500 mg IV qd x 3 days (started 3/28-3/30)   -4/4 and full respiratory panel viral negative  -S/p zosyn and vanc 4/7  -Continue Zosyn 3.375g q8 course of 7 days (until 04/15). Completed course.    HEMATOLOGIC/ONCOLOGIC   #Leukocytosis- likely 2/2 infection -- downtrending  -Rising suspicion for infection source given left basilar consolidation on CXR.   -Continue monitoring and complete Zosyn course as above    PROPHYLACTIC   -DVT ppx: enoxaparin 40 mg sq qd     MISCELLANEOUS:  #Peripheral Lines - pain & possible infiltration  Replace any peripheral lines with that have infiltrated or causing pain.   F/u EMILIAE Duplex     ETHICS   -HCP daughter (Jojo)   -Full code   -Pending transfer to medicine floor with   -F/u PT recs   Assessment	  59F with HTN, DM2, hypothyroid, and asthma here for status asthmaticus, unclear trigger; S/p extubation on 04/10 and on RA and able to tolerate PO.     NEUROLOGIC   #Mental Status  -Baseline AAOx3, and currently AAOx3  -CTM    CARDIOVASCULAR   #HTN- labile with hypotensive and hypertensive episodes with tactile stimulation induced vs tussive bouts  -C/w losartan 25mg daily, avoid ACEi given asthma/chronic cough     RESPIRATORY   #Status asthmaticus   -On RA. Will require OP follow up  -C/w  prednisone taper  -Duonebs Q6H  -Advair 250/50 BID    # Subcutaneous Emphysema   -CXR 3/30 showing subcutaneous emphysema without pneumomediastinum  -subcutaneous emphysema on RUE and b/l chest wall resolved     GASTROINTESTINAL   #Dysphagia post extubation  #Dysphonia  -Very hypophonic on exam, no obvious thrush  -ENT consult to eval for VC paralysis given multiple intubations. Patient denied scope but dysphonia improving.  -S&S re-eval. Easy to chew diet started and advance as tolerated. Hold off on MBS    #Gastric retention- 2/2 opioid induced constipation   -CTM stool count daily  - Abdominal XR (04/12): Ileus versus a partial low-grade small bowel obstruction.   - Abd XR (04/13): The gaseous distention of small bowel loops has decreased. There is increase in gas throughout the colon. No free air or other new radiographic abnormality  -RESOLVED    # Abdominal pain (started 04/11)  -Improved since 04/11 and believes due to gas.   -Abd XR as above  -C/w PPI 40 mg qd    GENITOURINARY/RENAL   -No active issues    ENDOCRINE   #Steroid induced hyperglycemia  -Low ISS TIDAC + QHS  -Hold home metformin 500 mg po qd   - Hba1c 5.7     #Hypothyroid   -C/w Synthroid 75 mcg PO daily     INFECTIOUS DISEASE   Not meeting criteria for SIRS. Unclear trigger for asthma.   -WBC on admission 11.21 and afebrile but 2 Febrile episode in since 4/7 AM  -Currently afebrile  -New leukocytosis 24, 4/8, downtrending  -Triple lumen taken out 4/7, A line taken out 4/8  -Blood cx NGTD 3/28  -Sputum cx no PMNs or organisms 3/30  -New sputum cultures/stains (4/7) = commensal maryann and no/rare PMNs with no organisms  -Urine showing 300 proteinuria and 20 hematuria; no evidence of infection   -Negative viral panel  - urine legionella negative   - urine streptococcus negative   -MRSA (-)  -Completed ceftriaxone 1000 mg IV qd x 5 days (started 3/29-4/3)   -Completed azithromycin 500 mg IV qd x 3 days (started 3/28-3/30)   -4/4 and full respiratory panel viral negative  -S/p zosyn and vanc 4/7  -S/p completion of Zosyn 3.375g q8 course of 7 days (until 04/15).     HEMATOLOGIC/ONCOLOGIC   #Leukocytosis- likely 2/2 infection -- downtrending  -Rising suspicion for infection source given left basilar consolidation on CXR.   -Completed ABXs course as above    PROPHYLACTIC   -DVT ppx: enoxaparin 40 mg sq qd     MISCELLANEOUS:  #Peripheral Lines - pain & possible infiltration  Replace any peripheral lines with that have infiltrated or causing pain.   F/u UVALDO Duplex     ETHICS   -HCP daughter (Jojo)   -Full code   -Pending transfer to medicine floor with   -F/u PT recs   Assessment	  59F with HTN, DM2, hypothyroid, and asthma here for status asthmaticus, unclear trigger; S/p extubation on 04/10 and on RA and able to tolerate PO.     NEUROLOGIC   #Mental Status  -Baseline AAOx3, and currently AAOx3  -CTM    CARDIOVASCULAR   #HTN- labile with hypotensive and hypertensive episodes with tactile stimulation induced vs tussive bouts  -C/w losartan 25mg daily, avoid ACEi given asthma/chronic cough     RESPIRATORY   #Status asthmaticus   -On RA. Will require OP follow up  -C/w  prednisone taper  -Duonebs Q6H  -Advair 250/50 BID    # Subcutaneous Emphysema   -CXR 3/30 showing subcutaneous emphysema without pneumomediastinum  -subcutaneous emphysema on RUE and b/l chest wall resolved     GASTROINTESTINAL   #Dysphagia post extubation  #Dysphonia  -Very hypophonic on exam, no obvious thrush  -ENT consult to eval for VC paralysis given multiple intubations. Patient denied scope but dysphonia improving.  -S&S re-eval. Easy to chew diet started and advance as tolerated. Hold off on MBS    #Gastric retention- 2/2 opioid induced constipation   -CTM stool count daily  - Abdominal XR (04/12): Ileus versus a partial low-grade small bowel obstruction.   - Abd XR (04/13): The gaseous distention of small bowel loops has decreased. There is increase in gas throughout the colon. No free air or other new radiographic abnormality  -RESOLVED    # Abdominal pain (started 04/11)---RESOLVED  -Improved since 04/11 and believes due to gas.   -Abd XR as above  -CTM daily    GENITOURINARY/RENAL   -No active issues    ENDOCRINE   #Steroid induced hyperglycemia  -Low ISS TIDAC + QHS  -Hold home metformin 500 mg po qd   - Hba1c 5.7     #Hypothyroid   -C/w Synthroid 75 mcg PO daily     INFECTIOUS DISEASE   Not meeting criteria for SIRS. Unclear trigger for asthma.   -WBC on admission 11.21 and afebrile but 2 Febrile episode in since 4/7 AM  -Currently afebrile  -New leukocytosis 24, 4/8, downtrending  -Triple lumen taken out 4/7, A line taken out 4/8  -Blood cx NGTD 3/28  -Sputum cx no PMNs or organisms 3/30  -New sputum cultures/stains (4/7) = commensal maryann and no/rare PMNs with no organisms  -Urine showing 300 proteinuria and 20 hematuria; no evidence of infection   -Negative viral panel  - urine legionella negative   - urine streptococcus negative   -MRSA (-)  -Completed ceftriaxone 1000 mg IV qd x 5 days (started 3/29-4/3)   -Completed azithromycin 500 mg IV qd x 3 days (started 3/28-3/30)   -4/4 and full respiratory panel viral negative  -S/p zosyn and vanc 4/7  -S/p completion of Zosyn 3.375g q8 course of 7 days (until 04/15).     HEMATOLOGIC/ONCOLOGIC   #Leukocytosis- likely 2/2 infection -- downtrending  -Rising suspicion for infection source given left basilar consolidation on CXR.   -Completed ABXs course as above    PROPHYLACTIC   -DVT ppx: enoxaparin 40 mg sq qd     MISCELLANEOUS:  #Peripheral Lines - pain & possible infiltration  Replace any peripheral lines with that have infiltrated or causing pain.   F/u UVALDO Duplex     ETHICS   -HCP daughter (Jojo)   -Full code   -Pending transfer to medicine floor with   -F/u PT recs

## 2025-04-15 NOTE — PROGRESS NOTE ADULT - ATTENDING COMMENTS
Patient seen and examined with ICU team at bedside during rounds after lab data, medical records and radiology reports reviewed. I have read and agreeable in general with the documented note, assessment, and management plan which reflects my opinions from bedside rounds.      JET ONEAL is a 59y Female with PMH of asthma presenting with status asthmaticus requiring intubation and paralysis now recovered and extubated on 4/10.     -no major events overnight    # Status asthmatics, resolved  - continue solumedrol taper reducing 10mg q3 days (20mg)  #LLL pneumonia  - Complete Pip-tazo (s/p 7 days)   # Fluid Goal - even  # BM - even  # POCUS - see note  # DVT ppx - Enoxaparin  # GOC - full code, discussion today      This patient is non-critically ill. I have personally provided 64 minutes of non-critical care time concurrently with the resident/fellow/NP/PA. This time excludes time spent on separate procedures and time spent teaching. I have reviewed the resident/fellow/NP/PA’s documentation and I agree with the assessment and plan of care.    Satinder Dugan MD  Interventional Pulmonology & Critical Care Medicine. Patient seen and examined with ICU team at bedside during rounds after lab data, medical records and radiology reports reviewed. I have read and agreeable in general with the documented note, assessment, and management plan which reflects my opinions from bedside rounds.      JET ONEAL is a 59y Female with PMH of asthma presenting with status asthmaticus requiring intubation and paralysis now recovered and extubated on 4/10.     -no major events overnight    # Status asthmatics, resolved  - switch to prednisone to complete taper.   #LLL pneumonia  - Complete Pip-tazo (s/p 7 days)   # Fluid Goal - even  # BM - even  # POCUS - see note  # DVT ppx - Enoxaparin  # GOC - full code, discussion today      This patient is non-critically ill. I have personally provided 64 minutes of non-critical care time concurrently with the resident/fellow/NP/PA. This time excludes time spent on separate procedures and time spent teaching. I have reviewed the resident/fellow/NP/PA’s documentation and I agree with the assessment and plan of care.    Satinder Dugan MD  Interventional Pulmonology & Critical Care Medicine.

## 2025-04-16 DIAGNOSIS — R60.0 LOCALIZED EDEMA: ICD-10-CM

## 2025-04-16 DIAGNOSIS — R13.10 DYSPHAGIA, UNSPECIFIED: ICD-10-CM

## 2025-04-16 DIAGNOSIS — R29.898 OTHER SYMPTOMS AND SIGNS INVOLVING THE MUSCULOSKELETAL SYSTEM: ICD-10-CM

## 2025-04-16 DIAGNOSIS — I10 ESSENTIAL (PRIMARY) HYPERTENSION: ICD-10-CM

## 2025-04-16 DIAGNOSIS — E03.9 HYPOTHYROIDISM, UNSPECIFIED: ICD-10-CM

## 2025-04-16 DIAGNOSIS — R49.0 DYSPHONIA: ICD-10-CM

## 2025-04-16 DIAGNOSIS — E11.9 TYPE 2 DIABETES MELLITUS WITHOUT COMPLICATIONS: ICD-10-CM

## 2025-04-16 DIAGNOSIS — J45.909 UNSPECIFIED ASTHMA, UNCOMPLICATED: ICD-10-CM

## 2025-04-16 DIAGNOSIS — J45.901 UNSPECIFIED ASTHMA WITH (ACUTE) EXACERBATION: ICD-10-CM

## 2025-04-16 DIAGNOSIS — Z29.9 ENCOUNTER FOR PROPHYLACTIC MEASURES, UNSPECIFIED: ICD-10-CM

## 2025-04-16 DIAGNOSIS — J18.9 PNEUMONIA, UNSPECIFIED ORGANISM: ICD-10-CM

## 2025-04-16 LAB
GLUCOSE BLDC GLUCOMTR-MCNC: 101 MG/DL — HIGH (ref 70–99)
GLUCOSE BLDC GLUCOMTR-MCNC: 125 MG/DL — HIGH (ref 70–99)
GLUCOSE BLDC GLUCOMTR-MCNC: 132 MG/DL — HIGH (ref 70–99)
GLUCOSE BLDC GLUCOMTR-MCNC: 79 MG/DL — SIGNIFICANT CHANGE UP (ref 70–99)

## 2025-04-16 RX ADMIN — Medication 650 MILLIGRAM(S): at 18:00

## 2025-04-16 RX ADMIN — IPRATROPIUM BROMIDE AND ALBUTEROL SULFATE 3 MILLILITER(S): .5; 2.5 SOLUTION RESPIRATORY (INHALATION) at 03:41

## 2025-04-16 RX ADMIN — Medication 1 APPLICATION(S): at 06:30

## 2025-04-16 RX ADMIN — PREDNISONE 20 MILLIGRAM(S): 20 TABLET ORAL at 06:03

## 2025-04-16 RX ADMIN — Medication 1 DOSE(S): at 21:44

## 2025-04-16 RX ADMIN — Medication 650 MILLIGRAM(S): at 19:00

## 2025-04-16 RX ADMIN — IPRATROPIUM BROMIDE AND ALBUTEROL SULFATE 3 MILLILITER(S): .5; 2.5 SOLUTION RESPIRATORY (INHALATION) at 11:14

## 2025-04-16 RX ADMIN — ENOXAPARIN SODIUM 40 MILLIGRAM(S): 100 INJECTION SUBCUTANEOUS at 13:25

## 2025-04-16 RX ADMIN — LOSARTAN POTASSIUM 25 MILLIGRAM(S): 100 TABLET, FILM COATED ORAL at 06:03

## 2025-04-16 RX ADMIN — Medication 75 MICROGRAM(S): at 06:03

## 2025-04-16 RX ADMIN — IPRATROPIUM BROMIDE AND ALBUTEROL SULFATE 3 MILLILITER(S): .5; 2.5 SOLUTION RESPIRATORY (INHALATION) at 20:54

## 2025-04-16 RX ADMIN — Medication 1 DOSE(S): at 13:25

## 2025-04-16 RX ADMIN — Medication 15 MILLILITER(S): at 13:25

## 2025-04-16 NOTE — PROVIDER CONTACT NOTE (OTHER) - ASSESSMENT
Pt A&Ox3. Stating that she did labs the day prior and has difficult veins. Unwilling to allow RN to assess vasculature at this time.

## 2025-04-16 NOTE — PROGRESS NOTE ADULT - PROBLEM SELECTOR PLAN 6
-CXR 3/30 showing subcutaneous emphysema without pneumomediastinum  -subcutaneous emphysema on RUE and b/l chest wall resolved

## 2025-04-16 NOTE — PROGRESS NOTE ADULT - PROBLEM SELECTOR PLAN 1
Status asthmaticus   On RA.   -C/w  prednisone taper  -Duonebs Q6H  -Advair 250/50 BID  -Outpatient follow up Male Status asthmaticus   patient with worsening SOB x few days   s/p  in field intubation by EMS with manual ventilation   s/p continous albuterol nebulizer 4 mL/hr, iptratropimum 500 mg nebulizer x1, rocurinium 100 mg IV x1.   s/p  propofol drip  and cisatrocium infusion   extubated on 04/07 but with respiratory failure requiring re-intubation the same evening.   weaned off all pressor support, sedations, and eventually successfully extubated on 04/10.    On RA.   -C/w  prednisone taper  -Duonebs Q6H  -Advair 250/50 BID  -Outpatient follow up Yes

## 2025-04-16 NOTE — PROGRESS NOTE ADULT - SUBJECTIVE AND OBJECTIVE BOX
Division of Hospital Medicine  Maris Noel MD  Available via MS Teams  Pager: [phone]      Patient is a 59y old  Female who presents with a chief complaint of status asthmaticus (15 Apr 2025 08:20)      SUBJECTIVE / OVERNIGHT EVENTS: No acute overnight events. Pt seen and examined. Denies fevers, chills, CP, SOB, Abdominal pain, N/V, Constipation, Diarrhea    ADDITIONAL REVIEW OF SYSTEMS:    MEDICATIONS  (STANDING):  albuterol/ipratropium for Nebulization 3 milliLiter(s) Nebulizer every 6 hours  chlorhexidine 2% Cloths 1 Application(s) Topical <User Schedule>  dextrose 5%. 1000 milliLiter(s) (50 mL/Hr) IV Continuous <Continuous>  dextrose 5%. 1000 milliLiter(s) (100 mL/Hr) IV Continuous <Continuous>  dextrose 50% Injectable 25 Gram(s) IV Push once  dextrose 50% Injectable 12.5 Gram(s) IV Push once  dextrose 50% Injectable 25 Gram(s) IV Push once  enoxaparin Injectable 40 milliGRAM(s) SubCutaneous every 24 hours  fluticasone propionate/ salmeterol 250-50 MICROgram(s) Diskus 1 Dose(s) Inhalation two times a day  glucagon  Injectable 1 milliGRAM(s) IntraMuscular once  influenza   Vaccine 0.5 milliLiter(s) IntraMuscular once  insulin lispro (ADMELOG) corrective regimen sliding scale   SubCutaneous Before meals and at bedtime  levothyroxine 75 MICROGram(s) Oral daily  losartan 25 milliGRAM(s) Oral daily  multivitamin/minerals/iron Oral Solution (CENTRUM) 15 milliLiter(s) Oral daily  polyethylene glycol 3350 17 Gram(s) Oral two times a day  predniSONE   Tablet   Oral     MEDICATIONS  (PRN):  acetaminophen   Oral Liquid .. 650 milliGRAM(s) Oral every 6 hours PRN Mild Pain (1 - 3)  dextrose Oral Gel 15 Gram(s) Oral once PRN Blood Glucose LESS THAN 70 milliGRAM(s)/deciliter      I&O's Summary    15 Apr 2025 07:01  -  16 Apr 2025 07:00  --------------------------------------------------------  IN: 240 mL / OUT: 1100 mL / NET: -860 mL        PHYSICAL EXAM:  Vital Signs Last 24 Hrs  T(C): 36.5 (16 Apr 2025 17:59), Max: 36.8 (15 Apr 2025 20:00)  T(F): 97.7 (16 Apr 2025 17:59), Max: 98.2 (15 Apr 2025 20:00)  HR: 76 (16 Apr 2025 17:59) (57 - 90)  BP: 135/52 (16 Apr 2025 17:59) (119/98 - 151/65)  BP(mean): 106 (15 Apr 2025 20:00) (106 - 106)  RR: 17 (16 Apr 2025 17:59) (17 - 28)  SpO2: 95% (16 Apr 2025 17:59) (95% - 100%)    Parameters below as of 16 Apr 2025 17:59  Patient On (Oxygen Delivery Method): room air      CONSTITUTIONAL: NAD, well-groomed  EYES: PERRLA; conjunctiva and sclera clear  ENMT: Moist oral mucosa, no pharyngeal injection or exudates; normal dentition  NECK: Supple, no palpable masses;  RESPIRATORY: Normal respiratory effort; lungs are clear to auscultation bilaterally  CARDIOVASCULAR: Regular rate and rhythm, normal S1 and S2, no murmur/rub/gallop; No lower extremity edema; Peripheral pulses are 2+ bilaterally  ABDOMEN: Nontender to palpation, normoactive bowel sounds, no rebound/guarding; No hepatosplenomegaly  MUSCULOSKELETAL:   no clubbing or cyanosis of digits; no joint swelling or tenderness to palpation  PSYCH: A+O to person, place, and time; affect appropriate  NEUROLOGY: CN 2-12 are intact and symmetric; no gross sensory deficits   SKIN: No rashes; no palpable lesions    LABS:                        11.3   13.42 )-----------( 345      ( 15 Apr 2025 00:58 )             35.7     04-15    139  |  103  |  10  ----------------------------<  94  3.3[L]   |  26  |  0.59    Ca    8.9      15 Apr 2025 00:58  Phos  2.7     04-15  Mg     2.00     04-15    TPro  6.1  /  Alb  2.9[L]  /  TBili  0.4  /  DBili  x   /  AST  34[H]  /  ALT  73[H]  /  AlkPhos  100  04-15    PT/INR - ( 15 Apr 2025 00:58 )   PT: 14.4 sec;   INR: 1.21 ratio         PTT - ( 15 Apr 2025 00:58 )  PTT:27.6 sec      Urinalysis Basic - ( 15 Apr 2025 00:58 )    Color: x / Appearance: x / SG: x / pH: x  Gluc: 94 mg/dL / Ketone: x  / Bili: x / Urobili: x   Blood: x / Protein: x / Nitrite: x   Leuk Esterase: x / RBC: x / WBC x   Sq Epi: x / Non Sq Epi: x / Bacteria: x        SARS-CoV-2: NotDetec (04 Apr 2025 05:47)  SARS-CoV-2: NotDetec (28 Mar 2025 17:53)      RADIOLOGY & ADDITIONAL TESTS:  New Imaging Personally Reviewed Today:  New Electrocardiogram Personally Reviewed Today:  Other Results Reviewed Today:   Prior or Outpatient Records Reviewed Today with Summary:    COORDINATION OF CARE:  Consultant Communication and Details of Discussion (where applicable):     Division of Hospital Medicine  Maris Noel MD  Available via MS Teams  Pager: [phone]      Patient is a 59y old  Female who presents with a chief complaint of status asthmaticus (15 Apr 2025 08:20)      SUBJECTIVE / OVERNIGHT EVENTS: No acute overnight events. Pt seen and examined. She states the she feels well. Her daughter is fed her Maddox's today and she tolerated the meal.  Denies fevers, chills, CP, SOB, Abdominal pain, N/V, Constipation, Diarrhea    ADDITIONAL REVIEW OF SYSTEMS:    MEDICATIONS  (STANDING):  albuterol/ipratropium for Nebulization 3 milliLiter(s) Nebulizer every 6 hours  chlorhexidine 2% Cloths 1 Application(s) Topical <User Schedule>  dextrose 5%. 1000 milliLiter(s) (50 mL/Hr) IV Continuous <Continuous>  dextrose 5%. 1000 milliLiter(s) (100 mL/Hr) IV Continuous <Continuous>  dextrose 50% Injectable 25 Gram(s) IV Push once  dextrose 50% Injectable 12.5 Gram(s) IV Push once  dextrose 50% Injectable 25 Gram(s) IV Push once  enoxaparin Injectable 40 milliGRAM(s) SubCutaneous every 24 hours  fluticasone propionate/ salmeterol 250-50 MICROgram(s) Diskus 1 Dose(s) Inhalation two times a day  glucagon  Injectable 1 milliGRAM(s) IntraMuscular once  influenza   Vaccine 0.5 milliLiter(s) IntraMuscular once  insulin lispro (ADMELOG) corrective regimen sliding scale   SubCutaneous Before meals and at bedtime  levothyroxine 75 MICROGram(s) Oral daily  losartan 25 milliGRAM(s) Oral daily  multivitamin/minerals/iron Oral Solution (CENTRUM) 15 milliLiter(s) Oral daily  polyethylene glycol 3350 17 Gram(s) Oral two times a day  predniSONE   Tablet   Oral     MEDICATIONS  (PRN):  acetaminophen   Oral Liquid .. 650 milliGRAM(s) Oral every 6 hours PRN Mild Pain (1 - 3)  dextrose Oral Gel 15 Gram(s) Oral once PRN Blood Glucose LESS THAN 70 milliGRAM(s)/deciliter      I&O's Summary    15 Apr 2025 07:01  -  16 Apr 2025 07:00  --------------------------------------------------------  IN: 240 mL / OUT: 1100 mL / NET: -860 mL        PHYSICAL EXAM:  Vital Signs Last 24 Hrs  T(C): 36.5 (16 Apr 2025 17:59), Max: 36.8 (15 Apr 2025 20:00)  T(F): 97.7 (16 Apr 2025 17:59), Max: 98.2 (15 Apr 2025 20:00)  HR: 76 (16 Apr 2025 17:59) (57 - 90)  BP: 135/52 (16 Apr 2025 17:59) (119/98 - 151/65)  BP(mean): 106 (15 Apr 2025 20:00) (106 - 106)  RR: 17 (16 Apr 2025 17:59) (17 - 28)  SpO2: 95% (16 Apr 2025 17:59) (95% - 100%)    Parameters below as of 16 Apr 2025 17:59  Patient On (Oxygen Delivery Method): room air      CONSTITUTIONAL: Older woman, in NAD, resting in bed  EYES: PERRLA; conjunctiva and sclera clear  ENMT: Moist oral mucosa  NECK: Supple  RESPIRATORY: Normal respiratory effort; lungs are clear to auscultation bilaterally, no rales or rhonchi   CARDIOVASCULAR: Regular rate and rhythm, normal S1 and S2, no murmur/rub/gallop  ABDOMEN: Nontender to palpation, normoactive bowel sounds, no rebound/guarding;   MUSCULOSKELETAL: no clubbing or cyanosis of digits; no joint swelling or tenderness to palpation, LUE +edema  PSYCH: A+O to person, place, and time; affect appropriate  NEUROLOGY: CN 2-12 are intact and symmetric; no gross sensory deficits   SKIN: No rashes; no palpable lesions    LABS:                        11.3   13.42 )-----------( 345      ( 15 Apr 2025 00:58 )             35.7     04-15    139  |  103  |  10  ----------------------------<  94  3.3[L]   |  26  |  0.59    Ca    8.9      15 Apr 2025 00:58  Phos  2.7     04-15  Mg     2.00     04-15    TPro  6.1  /  Alb  2.9[L]  /  TBili  0.4  /  DBili  x   /  AST  34[H]  /  ALT  73[H]  /  AlkPhos  100  04-15    PT/INR - ( 15 Apr 2025 00:58 )   PT: 14.4 sec;   INR: 1.21 ratio         PTT - ( 15 Apr 2025 00:58 )  PTT:27.6 sec      Urinalysis Basic - ( 15 Apr 2025 00:58 )    Color: x / Appearance: x / SG: x / pH: x  Gluc: 94 mg/dL / Ketone: x  / Bili: x / Urobili: x   Blood: x / Protein: x / Nitrite: x   Leuk Esterase: x / RBC: x / WBC x   Sq Epi: x / Non Sq Epi: x / Bacteria: x        SARS-CoV-2: NotDetec (04 Apr 2025 05:47)  SARS-CoV-2: NotDetec (28 Mar 2025 17:53)      RADIOLOGY & ADDITIONAL TESTS:  New Imaging Personally Reviewed Today:  New Electrocardiogram Personally Reviewed Today:  Other Results Reviewed Today:   Prior or Outpatient Records Reviewed Today with Summary:    COORDINATION OF CARE:  Consultant Communication and Details of Discussion (where applicable):

## 2025-04-16 NOTE — PROGRESS NOTE ADULT - PROBLEM SELECTOR PLAN 10
labile with hypotensive and hypertensive episodes with tactile stimulation induced vs tussive bouts  BP stable  -C/w losartan 25mg daily, avoid ACEi given asthma/chronic cough

## 2025-04-16 NOTE — PROGRESS NOTE ADULT - ASSESSMENT
59F with HTN, DM2, hypothyroid, and asthma here for status asthmaticus, unclear trigger; S/p extubation on 04/10 and on RA and able to tolerate PO.

## 2025-04-17 ENCOUNTER — RESULT REVIEW (OUTPATIENT)
Age: 60
End: 2025-04-17

## 2025-04-17 LAB
ANION GAP SERPL CALC-SCNC: 17 MMOL/L — HIGH (ref 7–14)
BUN SERPL-MCNC: 7 MG/DL — SIGNIFICANT CHANGE UP (ref 7–23)
CALCIUM SERPL-MCNC: 8.9 MG/DL — SIGNIFICANT CHANGE UP (ref 8.4–10.5)
CHLORIDE SERPL-SCNC: 103 MMOL/L — SIGNIFICANT CHANGE UP (ref 98–107)
CO2 SERPL-SCNC: 20 MMOL/L — LOW (ref 22–31)
CREAT SERPL-MCNC: 0.53 MG/DL — SIGNIFICANT CHANGE UP (ref 0.5–1.3)
EGFR: 106 ML/MIN/1.73M2 — SIGNIFICANT CHANGE UP
EGFR: 106 ML/MIN/1.73M2 — SIGNIFICANT CHANGE UP
GLUCOSE BLDC GLUCOMTR-MCNC: 160 MG/DL — HIGH (ref 70–99)
GLUCOSE BLDC GLUCOMTR-MCNC: 75 MG/DL — SIGNIFICANT CHANGE UP (ref 70–99)
GLUCOSE BLDC GLUCOMTR-MCNC: 85 MG/DL — SIGNIFICANT CHANGE UP (ref 70–99)
GLUCOSE BLDC GLUCOMTR-MCNC: 86 MG/DL — SIGNIFICANT CHANGE UP (ref 70–99)
GLUCOSE SERPL-MCNC: 174 MG/DL — HIGH (ref 70–99)
MAGNESIUM SERPL-MCNC: 1.9 MG/DL — SIGNIFICANT CHANGE UP (ref 1.6–2.6)
PHOSPHATE SERPL-MCNC: 2.6 MG/DL — SIGNIFICANT CHANGE UP (ref 2.5–4.5)
POTASSIUM SERPL-MCNC: 3.9 MMOL/L — SIGNIFICANT CHANGE UP (ref 3.5–5.3)
POTASSIUM SERPL-SCNC: 3.9 MMOL/L — SIGNIFICANT CHANGE UP (ref 3.5–5.3)
SODIUM SERPL-SCNC: 140 MMOL/L — SIGNIFICANT CHANGE UP (ref 135–145)

## 2025-04-17 RX ADMIN — IPRATROPIUM BROMIDE AND ALBUTEROL SULFATE 3 MILLILITER(S): .5; 2.5 SOLUTION RESPIRATORY (INHALATION) at 09:37

## 2025-04-17 RX ADMIN — Medication 15 MILLILITER(S): at 13:20

## 2025-04-17 RX ADMIN — ENOXAPARIN SODIUM 40 MILLIGRAM(S): 100 INJECTION SUBCUTANEOUS at 13:21

## 2025-04-17 RX ADMIN — LOSARTAN POTASSIUM 25 MILLIGRAM(S): 100 TABLET, FILM COATED ORAL at 05:49

## 2025-04-17 RX ADMIN — Medication 1 DOSE(S): at 13:21

## 2025-04-17 RX ADMIN — Medication 1 DOSE(S): at 22:16

## 2025-04-17 RX ADMIN — IPRATROPIUM BROMIDE AND ALBUTEROL SULFATE 3 MILLILITER(S): .5; 2.5 SOLUTION RESPIRATORY (INHALATION) at 21:30

## 2025-04-17 RX ADMIN — Medication 1 APPLICATION(S): at 05:51

## 2025-04-17 RX ADMIN — Medication 75 MICROGRAM(S): at 05:49

## 2025-04-17 RX ADMIN — PREDNISONE 10 MILLIGRAM(S): 20 TABLET ORAL at 05:49

## 2025-04-17 RX ADMIN — IPRATROPIUM BROMIDE AND ALBUTEROL SULFATE 3 MILLILITER(S): .5; 2.5 SOLUTION RESPIRATORY (INHALATION) at 03:11

## 2025-04-17 NOTE — CHART NOTE - NSCHARTNOTEFT_GEN_A_CORE
Medicine PA Note    Patient will require a rolling walker at home due to their diagnosis chronic Asthma causing her to get weak and SOB to help complete her MRADLs.    Ronnie Barrera PA-C  x [phone] Medicine PA Note    Patient will require a rolling walker at home due to their diagnosis diabetes and diabetic neuropathy to help complete her MRADLs.    Ronnie Barrera PA-C  x [phone]

## 2025-04-17 NOTE — CONSULT NOTE ADULT - SUBJECTIVE AND OBJECTIVE BOX
Patient is a 59y old  Female who presents with a chief complaint of status asthmaticus (16 Apr 2025 18:17)      HPI:  59F with DM, HTN, hypothyroid. [facility] for worsening SOB x few daysrequiring in field intubation by EMS with manual ventilation.     Per chart review, afebrile in ED. -124. SBP 87/74 and repeat 143/90. On MV RR 22-24 with Spo2 %. WBC 11.21. POCT 331. AST 77 and ALT 58. VBG pH 6/98/125/189/29/ 95.6 saturation. In ED got NS 1 L x1, continous albuterol nebulizer 4 mL/hr, iptratropimum 500 mg nebulizer x1, rocurinium 100 mg IV x1. Started on propofol drip 12 mL/hr and cisatrocium infusion 18 mL/hr.     Patient unable to engage in interview.     Per collateral with daughter, patient had been having worsening SOB for past few days and some cough. Has been using inhalers and prednisone without improvement. However SOB got worse and on day of presentation was unable to perform ADLs with significant dyspnea and daughter called EMS and when EMS was present, patient had LOC and was intubated on site and had IO placed. Denies fever, chills, nausea/vomit, diarrhea/constipation. Patient had 2 past inpatient admissions for asthma exacerbations within past 3 years but has no previous history of intubation.  (28 Mar 2025 17:29)    Interval History:  Patient seen and evaluated at bedside.  Patient's daughter was also present.  Discussed the role of the rehab team.  Patient reports her legs feel like jello but improving each day.  She reports walking up and down the hallway with PT.  She is not interested in AR or TENA at discharge and prefers to go home.      REVIEW OF SYSTEMS: No chest pain, shortness of breath, nausea, vomiting or diarrhea other ROS neg     PAST MEDICAL & SURGICAL HISTORY  Diabetes mellitus    Hypertension    Asthma    Ectopic pregnancy    Hypothyroid    No pertinent past surgical history        FUNCTIONAL HISTORY:   Lives in [place] with adult daughter.  Patient was fully independent prior to this hospital admission    CURRENT FUNCTIONAL STATUS:  SLP 4/14  Diet :Easy to Chew Solids with Mildly Thick Liquids    PT 4/17  Bed Mobility  Rolling/Turning: contact guard;  1 person assist  Training Scooting: contact guard;  1 person assist  Sit-to-Supine: contact guard;  1 person assist  Supine-to-Sit: contact guard;  1 person assist    Transfers   Sit-to-Stand Transfer: minimum assist (75% patient effort);  1 person assist;  rolling walker  Stand-to-Sit Transfer: minimum assist (75% patient effort);  1 person assist;  rolling walker    Gait Training  Gait Training: contact guard;  1 person assist;  rolling walker;  75 feet      MEDICATIONS   acetaminophen   Oral Liquid .. 650 milliGRAM(s) Oral every 6 hours PRN  albuterol/ipratropium for Nebulization 3 milliLiter(s) Nebulizer every 6 hours  chlorhexidine 2% Cloths 1 Application(s) Topical <User Schedule>  dextrose 5%. 1000 milliLiter(s) IV Continuous <Continuous>  dextrose 5%. 1000 milliLiter(s) IV Continuous <Continuous>  dextrose 50% Injectable 25 Gram(s) IV Push once  dextrose 50% Injectable 12.5 Gram(s) IV Push once  dextrose 50% Injectable 25 Gram(s) IV Push once  dextrose Oral Gel 15 Gram(s) Oral once PRN  enoxaparin Injectable 40 milliGRAM(s) SubCutaneous every 24 hours  fluticasone propionate/ salmeterol 250-50 MICROgram(s) Diskus 1 Dose(s) Inhalation two times a day  glucagon  Injectable 1 milliGRAM(s) IntraMuscular once  influenza   Vaccine 0.5 milliLiter(s) IntraMuscular once  insulin lispro (ADMELOG) corrective regimen sliding scale   SubCutaneous Before meals and at bedtime  levothyroxine 75 MICROGram(s) Oral daily  losartan 25 milliGRAM(s) Oral daily  multivitamin/minerals/iron Oral Solution (CENTRUM) 15 milliLiter(s) Oral daily  polyethylene glycol 3350 17 Gram(s) Oral two times a day  predniSONE   Tablet 10 milliGRAM(s) Oral daily  predniSONE   Tablet   Oral       ALLERGIES  No Known Allergies      VITALS  T(C): 36.7 (04-17-25 @ 05:35), Max: 36.7 (04-17-25 @ 05:35)  HR: 72 (04-17-25 @ 09:40) (65 - 76)  BP: 148/71 (04-17-25 @ 05:35) (130/60 - 148/71)  RR: 17 (04-17-25 @ 05:35) (17 - 17)  SpO2: 100% (04-17-25 @ 09:40) (95% - 100%)  Wt(kg): --    PHYSICAL EXAM  Constitutional - NAD, Comfortable  HEENT - NCAT, EOMI  Neck - Supple, No limited ROM  Chest - breathing comfortably on RA  Cardiovascular - RRR, S1S2, No murmurs  Abdomen - BS+, Soft, NTND  Extremities - No C/C/E, No calf tenderness   Neurologic Exam -                    Cognitive - Awake, Alert, AAO to self, place, date, year, situation     Communication - Fluent, No dysarthria, no aphasia        Motor -5/5 throughout with exception of 2/5 bilateral hip flexors and left 4/5 dorsiflexion (chronic per patient)      Sensory - Intact to LT    Psychiatric - Mood stable, Affect WNL        ------------------------------------------------------------------------------------------------  ASSESSMENT/PLAN  59y Female with functional deficits after status asthmaticus  PT- ROM, Bed Mob, Transfers, Amb w AD   OT- ADLs,  SLP- Dysphagia/hypophonia eval and treat  Prec- Falls, Cardiac  DVT PPX - lovenox  Rehab -   Expect patient to achieve functional goals for DC HOME with HOME CARE

## 2025-04-17 NOTE — PROGRESS NOTE ADULT - PROBLEM SELECTOR PLAN 9
C/w Synthroid 75 mcg PO daily Steroid induced hyperglycemia  -Low ISS TIDAC + QHS  -Hold home metformin 500 mg po qd   - Hba1c 5.7

## 2025-04-17 NOTE — PROGRESS NOTE ADULT - REASON FOR ADMISSION
status asthmaticus

## 2025-04-17 NOTE — PROGRESS NOTE ADULT - PROBLEM SELECTOR PLAN 1
Status asthmaticus   patient with worsening SOB x few days   s/p  in field intubation by EMS with manual ventilation   s/p continous albuterol nebulizer 4 mL/hr, iptratropimum 500 mg nebulizer x1, rocurinium 100 mg IV x1.   s/p  propofol drip  and cisatrocium infusion   extubated on 04/07 but with respiratory failure requiring re-intubation the same evening.   weaned off all pressor support, sedations, and eventually successfully extubated on 04/10.    On RA.   -C/w  prednisone taper  -Duonebs Q6H  -Advair 250/50 BID  -Outpatient follow up

## 2025-04-17 NOTE — PROGRESS NOTE ADULT - PROBLEM SELECTOR PLAN 2
Not meeting criteria for SIRS. Unclear trigger for asthma.   Bronchoscopy (3/30) showing secretions in L main and L lobar with mucus aspirated out. Due to possible concern for PNA,   WBC on admission 11.21 and afebrile but 2 Febrile episode  4/7   New leukocytosis 24, 4/8, downtrending  Triple lumen taken out 4/7, A line taken out 4/8  Sputum cx no PMNs or organisms 3/30  New sputum cultures/stains (4/7) = commensal maryann and no/rare PMNs with no organisms  u/a negative  Negative viral panel  urine legionella negative    urine streptococcus negative, MRSA (-)  -S/p zosyn and vanc 4/7  -Currently afebrile  -Blood cx negative  -Completed ceftriaxone 1000 mg IV qd x 5 days (started 3/29-4/3)  -Completed azithromycin 500 mg IV qd x 3 days (started 3/28-3/30)   -S/p completion of Zosyn 3.375g q8 course of 7 days (until 04/15).   on room air

## 2025-04-17 NOTE — PROGRESS NOTE ADULT - PROBLEM SELECTOR PLAN 11
DVT: Lovenox  Diet: CC  Dispo: pending clinical improvement, PT recommends TENA labile with hypotensive and hypertensive episodes with tactile stimulation induced vs tussive bouts  BP stable  -C/w losartan 25mg daily, avoid ACEi given asthma/chronic cough

## 2025-04-17 NOTE — PROGRESS NOTE ADULT - PROBLEM SELECTOR PLAN 8
Steroid induced hyperglycemia  -Low ISS TIDAC + QHS  -Hold home metformin 500 mg po qd   - Hba1c 5.7 Gastric retention- 2/2 opioid induced constipation   - Abdominal XR (04/12): Ileus versus a partial low-grade small bowel obstruction.   - Abd XR (04/13): The gaseous distention of small bowel loops has decreased. There is increase in gas throughout the colon. No free air or other new radiographic abnormality  -CTM stool count daily  -Improved since 04/11 and believes due to gas.   -CTM daily

## 2025-04-17 NOTE — PROGRESS NOTE ADULT - PROBLEM SELECTOR PLAN 4
post extubation  -S&S re-eval. Easy to chew diet started and advance as tolerated. pain & possible infiltration   LUE Duplex: No DVT, Well-circumscribed, avascular, complex collection measuring ~1.6 cm is noted within the left upper extremity, possibly hematoma

## 2025-04-17 NOTE — PROGRESS NOTE ADULT - PROBLEM SELECTOR PLAN 10
labile with hypotensive and hypertensive episodes with tactile stimulation induced vs tussive bouts  BP stable  -C/w losartan 25mg daily, avoid ACEi given asthma/chronic cough C/w Synthroid 75 mcg PO daily

## 2025-04-17 NOTE — CONSULT NOTE ADULT - ATTENDING COMMENTS
Patient is a 59 year old h/o DM, HTN, hypothyroid with functional deficits after hospitalization for asthma exacerbation, intubation  patient seen today, daughter at bedside, eating lunch  patient feels she has improved since yesterday, walked in hallway with PT  daughter will be at home for assist  plan is discharge to home with home PT, family support for mobility, ADLs

## 2025-04-17 NOTE — PROGRESS NOTE ADULT - SUBJECTIVE AND OBJECTIVE BOX
Division of Hospital Medicine  Maris Noel MD  Available via MS Teams  Pager: [phone]      Patient is a 59y old  Female who presents with a chief complaint of status asthmaticus (17 Apr 2025 15:12)      SUBJECTIVE / OVERNIGHT EVENTS: No acute overnight events. Pt seen and examined. She has no acute complaints. Her arm swelling has improved. Denies fevers, chills, CP, SOB, Abdominal pain, N/V, Constipation, Diarrhea    ADDITIONAL REVIEW OF SYSTEMS:    MEDICATIONS  (STANDING):  albuterol/ipratropium for Nebulization 3 milliLiter(s) Nebulizer every 6 hours  chlorhexidine 2% Cloths 1 Application(s) Topical <User Schedule>  dextrose 5%. 1000 milliLiter(s) (100 mL/Hr) IV Continuous <Continuous>  dextrose 5%. 1000 milliLiter(s) (50 mL/Hr) IV Continuous <Continuous>  dextrose 50% Injectable 25 Gram(s) IV Push once  dextrose 50% Injectable 12.5 Gram(s) IV Push once  dextrose 50% Injectable 25 Gram(s) IV Push once  enoxaparin Injectable 40 milliGRAM(s) SubCutaneous every 24 hours  fluticasone propionate/ salmeterol 250-50 MICROgram(s) Diskus 1 Dose(s) Inhalation two times a day  glucagon  Injectable 1 milliGRAM(s) IntraMuscular once  influenza   Vaccine 0.5 milliLiter(s) IntraMuscular once  insulin lispro (ADMELOG) corrective regimen sliding scale   SubCutaneous Before meals and at bedtime  levothyroxine 75 MICROGram(s) Oral daily  losartan 25 milliGRAM(s) Oral daily  multivitamin/minerals/iron Oral Solution (CENTRUM) 15 milliLiter(s) Oral daily  polyethylene glycol 3350 17 Gram(s) Oral two times a day  predniSONE   Tablet 10 milliGRAM(s) Oral daily  predniSONE   Tablet   Oral     MEDICATIONS  (PRN):  acetaminophen   Oral Liquid .. 650 milliGRAM(s) Oral every 6 hours PRN Mild Pain (1 - 3)  dextrose Oral Gel 15 Gram(s) Oral once PRN Blood Glucose LESS THAN 70 milliGRAM(s)/deciliter      I&O's Summary      PHYSICAL EXAM:  Vital Signs Last 24 Hrs  T(C): 36.7 (17 Apr 2025 05:35), Max: 36.7 (17 Apr 2025 05:35)  T(F): 98.1 (17 Apr 2025 05:35), Max: 98.1 (17 Apr 2025 05:35)  HR: 72 (17 Apr 2025 09:40) (65 - 76)  BP: 148/71 (17 Apr 2025 05:35) (130/60 - 148/71)  BP(mean): --  RR: 17 (17 Apr 2025 05:35) (17 - 17)  SpO2: 100% (17 Apr 2025 09:40) (95% - 100%)    Parameters below as of 17 Apr 2025 09:40  Patient On (Oxygen Delivery Method): room air      CONSTITUTIONAL: Older woman, in NAD, resting in bed  EYES: PERRLA; conjunctiva and sclera clear  ENMT: Moist oral mucosa  RESPIRATORY: Normal respiratory effort; lungs are clear to auscultation bilaterally, no rales or rhonchi   CARDIOVASCULAR: Regular rate and rhythm, normal S1 and S2, no murmur/rub/gallop  ABDOMEN: Nontender to palpation, normoactive bowel sounds, no rebound/guarding;   MUSCULOSKELETAL: no clubbing or cyanosis of digits; no joint swelling or tenderness to palpation, LUE +improved edema  PSYCH: A+O to person, place, and time; affect appropriate  NEUROLOGY: CN 2-12 are intact and symmetric; no gross sensory deficits   SKIN: No rashes; no palpable lesions    LABS:    04-17    140  |  103  |  7   ----------------------------<  174[H]  3.9   |  20[L]  |  0.53    Ca    8.9      17 Apr 2025 15:40  Phos  2.6     04-17  Mg     1.90     04-17            Urinalysis Basic - ( 17 Apr 2025 15:40 )    Color: x / Appearance: x / SG: x / pH: x  Gluc: 174 mg/dL / Ketone: x  / Bili: x / Urobili: x   Blood: x / Protein: x / Nitrite: x   Leuk Esterase: x / RBC: x / WBC x   Sq Epi: x / Non Sq Epi: x / Bacteria: x        SARS-CoV-2: NotDetec (04 Apr 2025 05:47)  SARS-CoV-2: NotDetec (28 Mar 2025 17:53)      RADIOLOGY & ADDITIONAL TESTS:  New Imaging Personally Reviewed Today:  New Electrocardiogram Personally Reviewed Today:  Other Results Reviewed Today:   Prior or Outpatient Records Reviewed Today with Summary:    COORDINATION OF CARE:  Consultant Communication and Details of Discussion (where applicable):

## 2025-04-17 NOTE — PROGRESS NOTE ADULT - PROBLEM SELECTOR PLAN 6
-CXR 3/30 showing subcutaneous emphysema without pneumomediastinum  -subcutaneous emphysema on RUE and b/l chest wall resolved -Very hypophonic on exam, no obvious thrush  -ENT consult to eval for VC paralysis given multiple intubations. Patient denied scope but dysphonia improving.

## 2025-04-17 NOTE — PROGRESS NOTE ADULT - PROBLEM SELECTOR PLAN 5
-Very hypophonic on exam, no obvious thrush  -ENT consult to eval for VC paralysis given multiple intubations. Patient denied scope but dysphonia improving.
post extubation  -S&S re-eval. Easy to chew diet started and advance as tolerated.

## 2025-04-17 NOTE — PROGRESS NOTE ADULT - PROBLEM SELECTOR PLAN 3
pain & possible infiltration   LUE Duplex: No DVT, Well-circumscribed, avascular, complex collection measuring ~1.6 cm is noted within the left upper extremity, possibly hematoma Not meeting criteria for SIRS. Unclear trigger for asthma.   Bronchoscopy (3/30) showing secretions in L main and L lobar with mucus aspirated out. Due to possible concern for PNA,   WBC on admission 11.21 and afebrile but 2 Febrile episode  4/7   New leukocytosis 24, 4/8, downtrending  Triple lumen taken out 4/7, A line taken out 4/8  Sputum cx no PMNs or organisms 3/30  New sputum cultures/stains (4/7) = commensal maryann and no/rare PMNs with no organisms  u/a negative  Negative viral panel  urine legionella negative    urine streptococcus negative, MRSA (-)  -S/p zosyn and vanc 4/7  -Currently afebrile  -Blood cx negative  -Completed ceftriaxone 1000 mg IV qd x 5 days (started 3/29-4/3)  -Completed azithromycin 500 mg IV qd x 3 days (started 3/28-3/30)   -S/p completion of Zosyn 3.375g q8 course of 7 days (until 04/15).   on room air

## 2025-04-17 NOTE — PROGRESS NOTE ADULT - TIME BILLING
chart and data review, clinical assessment, and coordination of care. This excludes any time spent on separate procedures or teaching.
Time spent on review of vitals, physical exam, documentation, and discussion of plan of care with patient and patient family.
Time spent on review of vitals, physical exam, documentation, and discussion of plan of care with patient and patient family.

## 2025-04-18 ENCOUNTER — TRANSCRIPTION ENCOUNTER (OUTPATIENT)
Age: 60
End: 2025-04-18

## 2025-04-18 VITALS
HEART RATE: 64 BPM | SYSTOLIC BLOOD PRESSURE: 130 MMHG | OXYGEN SATURATION: 97 % | DIASTOLIC BLOOD PRESSURE: 64 MMHG | RESPIRATION RATE: 18 BRPM | TEMPERATURE: 99 F

## 2025-04-18 DIAGNOSIS — J45.902 UNSPECIFIED ASTHMA WITH STATUS ASTHMATICUS: ICD-10-CM

## 2025-04-18 LAB
GLUCOSE BLDC GLUCOMTR-MCNC: 103 MG/DL — HIGH (ref 70–99)
GLUCOSE BLDC GLUCOMTR-MCNC: 108 MG/DL — HIGH (ref 70–99)
GLUCOSE BLDC GLUCOMTR-MCNC: 62 MG/DL — LOW (ref 70–99)

## 2025-04-18 RX ORDER — PHENTERMINE HCL 8 MG
1 TABLET ORAL
Refills: 0 | DISCHARGE

## 2025-04-18 RX ORDER — BUDESONIDE AND FORMOTEROL FUMARATE DIHYDRATE 80; 4.5 UG/1; UG/1
2 AEROSOL RESPIRATORY (INHALATION)
Refills: 0 | DISCHARGE

## 2025-04-18 RX ORDER — LISINOPRIL 5 MG/1
1 TABLET ORAL
Refills: 0 | DISCHARGE

## 2025-04-18 RX ORDER — PREDNISONE 20 MG/1
1 TABLET ORAL
Qty: 1 | Refills: 0
Start: 2025-04-18 | End: 2025-04-18

## 2025-04-18 RX ORDER — LOSARTAN POTASSIUM 100 MG/1
1 TABLET, FILM COATED ORAL
Qty: 14 | Refills: 0
Start: 2025-04-18 | End: 2025-05-01

## 2025-04-18 RX ADMIN — ENOXAPARIN SODIUM 40 MILLIGRAM(S): 100 INJECTION SUBCUTANEOUS at 13:55

## 2025-04-18 RX ADMIN — PREDNISONE 10 MILLIGRAM(S): 20 TABLET ORAL at 06:23

## 2025-04-18 RX ADMIN — Medication 1 APPLICATION(S): at 06:51

## 2025-04-18 RX ADMIN — Medication 1 DOSE(S): at 09:47

## 2025-04-18 RX ADMIN — Medication 75 MICROGRAM(S): at 06:22

## 2025-04-18 RX ADMIN — LOSARTAN POTASSIUM 25 MILLIGRAM(S): 100 TABLET, FILM COATED ORAL at 06:23

## 2025-04-18 RX ADMIN — IPRATROPIUM BROMIDE AND ALBUTEROL SULFATE 3 MILLILITER(S): .5; 2.5 SOLUTION RESPIRATORY (INHALATION) at 09:46

## 2025-04-18 NOTE — DISCHARGE NOTE NURSING/CASE MANAGEMENT/SOCIAL WORK - FINANCIAL ASSISTANCE
[facility] provides services at a reduced cost to those who are determined to be eligible through [facility]’s financial assistance program. Information regarding [facility]’s financial assistance program can be found by going to [email or web address] or by calling [phone].

## 2025-04-18 NOTE — DISCHARGE NOTE NURSING/CASE MANAGEMENT/SOCIAL WORK - NSSCTYPOFSERV_GEN_ALL_CORE
Visitng nurse to visit day after discharge.  Physical therapy will visit once authorization received.

## 2025-04-18 NOTE — DISCHARGE NOTE NURSING/CASE MANAGEMENT/SOCIAL WORK - PATIENT PORTAL LINK FT
You can access the FollowMyHealth Patient Portal offered by [facility] by registering at the following website: [email or web address]. By joining [facility]’s FollowMyHealth portal, you will also be able to view your health information using other applications (apps) compatible with our system.

## 2025-04-18 NOTE — DISCHARGE NOTE PROVIDER - NSDCMRMEDTOKEN_GEN_ALL_CORE_FT
Combivent Respimat 20 mcg-100 mcg/inh inhalation aerosol: 1 puff(s) inhaled 4 times a day  levothyroxine 75 mcg (0.075 mg) oral tablet: 1 tab(s) orally once a day  losartan 25 mg oral tablet: 1 tab(s) orally once a day  metFORMIN 500 mg oral tablet: 1 tab(s) orally 2 times a day  predniSONE 10 mg oral tablet: 1 tab(s) orally once a day through 4/19/2025  Symbicort 160 mcg-4.5 mcg/inh inhalation aerosol: 2 puff(s) inhaled 2 times a day   Combivent Respimat 20 mcg-100 mcg/inh inhalation aerosol: 1 puff(s) inhaled 4 times a day  fluticasone-salmeterol 250 mcg-50 mcg/inh inhalation powder: 1 puff(s) inhaled 2 times a day  levothyroxine 75 mcg (0.075 mg) oral tablet: 1 tab(s) orally once a day  losartan 25 mg oral tablet: 1 tab(s) orally once a day  metFORMIN 500 mg oral tablet: 1 tab(s) orally 2 times a day  predniSONE 10 mg oral tablet: 1 tab(s) orally once a day through 4/19/2025

## 2025-04-18 NOTE — DISCHARGE NOTE PROVIDER - ATTENDING DISCHARGE PHYSICAL EXAMINATION:
CONSTITUTIONAL: Older woman, in NAD, resting in bed  EYES: PERRLA; conjunctiva and sclera clear  ENMT: Moist oral mucosa  RESPIRATORY: Normal respiratory effort; lungs are clear to auscultation bilaterally, no rales or rhonchi   CARDIOVASCULAR: Regular rate and rhythm, normal S1 and S2, no murmur/rub/gallop  ABDOMEN: Nontender to palpation, normoactive bowel sounds, no rebound/guarding;   MUSCULOSKELETAL: no clubbing or cyanosis of digits; no joint swelling or tenderness to palpation, LUE +improved edema  PSYCH: A+O to person, place, and time; affect appropriate  NEUROLOGY: CN 2-12 are intact and symmetric; no gross sensory deficits   SKIN: No rashes; no palpable lesions

## 2025-04-18 NOTE — CHART NOTE - NSCHARTNOTESELECT_GEN_ALL_CORE
Follow Up/Nutrition Services
Follow Up/Nutrition Services
Nutrition Services
POCUS/Event Note
A-line Removal/Event Note
Bedside POCUS Exam/Event Note
Event Note
Follow-up/Nutrition Services
MAR Accept/Event Note
MICU downgrade/Event Note
POCUS Bedside/Event Note
POCUS/Event Note
POCUS/Event Note

## 2025-04-18 NOTE — DISCHARGE NOTE PROVIDER - NSDCFUADDAPPT_GEN_ALL_CORE_FT
APPTS ARE READY TO BE MADE: [x] YES    Best Family or Patient Contact (if needed):    Additional Information about above appointments (if needed):    1: HOME   2:   3:     Other comments or requests:        Other comments or requests:        Other comments or requests:     Patient was outreached but did not answer. A voicemail was left for the patient to return our call.    Prior to outreaching the patient, it was visible that the patient has secured a follow up appointment which was not scheduled by our team. Patient is scheduled with Dr. Cannon 4/19 1:00pm [address] [place] [address]

## 2025-04-18 NOTE — DISCHARGE NOTE PROVIDER - PROVIDER TOKENS
PROVIDER:[TOKEN:[[id number]],FOLLOWUP:[2 weeks]] PROVIDER:[TOKEN:[[id number]],FOLLOWUP:[2 weeks]],FREE:[LAST:[[facility]],PHONE:[(   )    -],FAX:[(   )    -],ADDRESS:[[facility]  A representative from [facility] will be reaching out to assist in making a pulmonary appt],FOLLOWUP:[2 weeks]] PROVIDER:[TOKEN:[[id number]],FOLLOWUP:[2 weeks]],FREE:[LAST:[Health system [facility]],PHONE:[(   )    -],FAX:[(   )    -],ADDRESS:[yessenia paredes w/ Dr. Mcrbide 4/19 @1pm]]

## 2025-04-18 NOTE — DISCHARGE NOTE PROVIDER - CARE PROVIDER_API CALL
Detweiler, Allan Scott  [facility]  [address] [place] [address]  Phone: [phone]  Fax: [phone]  Follow Up Time: 2 weeks   Detweiler, Allan Scott  [facility]  [address] [place] [address]  Phone: [phone]  Fax: [phone]  Follow Up Time: 2 weeks    [facility],   [facility]  A representative from [facility] will be reaching out to assist in making a pulmonary appt  Phone: (   )    -  Fax: (   )    -  Follow Up Time: 2 weeks   Detweiler, Allan Scott  [facility]  [address] [place] [address]  Phone: [phone]  Fax: [phone]  Follow Up Time: 2 weeks    [facility],   yessenia appt w/ Dr. Mcbride 4/19 @1pm  Phone: (   )    -  Fax: (   )    -  Follow Up Time:

## 2025-04-18 NOTE — CHART NOTE - NSCHARTNOTEFT_GEN_A_CORE
Nutrition Follow-Up Chart Note         Source: Patient A&Ox4    Chart [x ]     Pt is seen for nutrition follow up due to severe/moderate malnutrition, per protocol.    __________________ Medical Course__________________  59F with HTN, DM2, hypothyroid, and asthma here for status asthmaticus, unclear trigger; S/p extubation on 04/10 and on RA and able to tolerate PO.     Diet, Consistent Carbohydrate w/Evening Snack:   Easy to Chew (EASYTOCHEW)  Moderately Thick Liquids (MODTHICKLIQS) (04-14-25 @ 14:15)         __________________ Nutrition Course__________________   Patient seen at bedside. Reports her appetite has decreased, noted pt previously OG tube in ICU from 3/31 to 4/14. Pt s/p Swallow Eval 4/14/25 with recommendation for oral diet- Easy to chew with mildly thick liquids. Diet has been initiated on Easy to chew to moderately thick as per medical discretion. Pt reports her PO intake is fair, and she doesn't consume dairy products. Pt reports planned 30lbs weight loss over the past 1 yr. Pt reports she" is not planning to gain any weight, and trying to lose more weight in the hospital". Discussed the importance to consume adequate nutrient to meeting her needs in the hospital. RD to remain available for further nutritional interventions as indicated.     __________________ Pertinent Medications__________________   MEDICATIONS  (STANDING):  albuterol/ipratropium for Nebulization 3 milliLiter(s) Nebulizer every 6 hours  chlorhexidine 2% Cloths 1 Application(s) Topical <User Schedule>  dextrose 5%. 1000 milliLiter(s) (50 mL/Hr) IV Continuous <Continuous>  dextrose 5%. 1000 milliLiter(s) (100 mL/Hr) IV Continuous <Continuous>  dextrose 50% Injectable 25 Gram(s) IV Push once  dextrose 50% Injectable 12.5 Gram(s) IV Push once  dextrose 50% Injectable 25 Gram(s) IV Push once  enoxaparin Injectable 40 milliGRAM(s) SubCutaneous every 24 hours  fluticasone propionate/ salmeterol 250-50 MICROgram(s) Diskus 1 Dose(s) Inhalation two times a day  glucagon  Injectable 1 milliGRAM(s) IntraMuscular once  influenza   Vaccine 0.5 milliLiter(s) IntraMuscular once  insulin lispro (ADMELOG) corrective regimen sliding scale   SubCutaneous Before meals and at bedtime  levothyroxine 75 MICROGram(s) Oral daily  losartan 25 milliGRAM(s) Oral daily  multivitamin/minerals/iron Oral Solution (CENTRUM) 15 milliLiter(s) Oral daily  polyethylene glycol 3350 17 Gram(s) Oral two times a day  predniSONE   Tablet 10 milliGRAM(s) Oral daily  predniSONE   Tablet   Oral     MEDICATIONS  (PRN):  acetaminophen   Oral Liquid .. 650 milliGRAM(s) Oral every 6 hours PRN Mild Pain (1 - 3)  dextrose Oral Gel 15 Gram(s) Oral once PRN Blood Glucose LESS THAN 70 milliGRAM(s)/deciliter      __________________ Pertinent Labs__________________   04-17    140  |  103  |  7   ----------------------------<  174[H]  3.9   |  20[L]  |  0.53    Ca    8.9      17 Apr 2025 15:40  Phos  2.6     04-17  Mg     1.90     04-17             POCT Blood Glucose.: 103 mg/dL (04-18-25 @ 08:12)  POCT Blood Glucose.: 86 mg/dL (04-17-25 @ 22:34)  POCT Blood Glucose.: 75 mg/dL (04-17-25 @ 17:35)  POCT Blood Glucose.: 160 mg/dL (04-17-25 @ 15:38)  POCT Blood Glucose.: 85 mg/dL (04-17-25 @ 08:37)  POCT Blood Glucose.: 79 mg/dL (04-16-25 @ 22:47)  POCT Blood Glucose.: 101 mg/dL (04-16-25 @ 17:36)  POCT Blood Glucose.: 125 mg/dL (04-16-25 @ 12:07)  POCT Blood Glucose.: 132 mg/dL (04-16-25 @ 08:17)  POCT Blood Glucose.: 112 mg/dL (04-15-25 @ 23:04)  POCT Blood Glucose.: 71 mg/dL (04-15-25 @ 21:24)  POCT Blood Glucose.: 71 mg/dL (04-15-25 @ 17:04)  POCT Blood Glucose.: 112 mg/dL (04-15-25 @ 12:17)        __________________ Anthropometrics__________________     Anthropometrics: Height (cm): 167.6 (03-28)  Weight (kg): 93.2 (03-28)  BMI (kg/m2): 33.2 (03-28)  IBW:  142 +/- 10%    Weight Assessment: No updated weight for assessment.    Edema: 1+ left UE and 2+ b/l feet edema as per RN flow sheet     Skin: None noted at present as per RN flow sheet.     Estimated Needs Assessment:   [ x ] No change in need assessment    Weight Used: IBW 142lbs /64.5kg   Estimated Energy: 1806- 2064 Kcal/kg/day (28-32  kcal/kg)  Estimated Protein: 77-90 gm/kg/day (1.2-1.4 gm/kg)  Estimated Fluid: per Medical and team discretion.    Nutrition Focused Physical Exam: [ x ] not applicable;        Previous Nutrition Diagnosis:     Altered Nutrition Related Lab Values    Nutrition Diagnosis is : [ x ] ongoing     New Nutrition Diagnosis : Inadequate oral intake [ x ] ongoing     Education:  [ x ] Not warranted at present    Recommendations:  [ x ] Continue present PO diet as it remains appropriate at this time  [ x ] Encourage PO intake and honor food preferences as able.   [ x ] RD to remain available for further nutritional interventions as indicated.     Monitoring and Evaluation:   [ x ] Monitor PO intake, skin integrity, bowel regimen, and nutrition pertinent labs.  [ x ] Tolerance to diet prescription [ x ] weights [ x ] follow up per protocol

## 2025-04-18 NOTE — DISCHARGE NOTE PROVIDER - NSRESEARCHGRANT_OVERRIDEREC_GEN_A_CORE
IMPROVE-DD Application Not Available To be completed by attending physician 89 year old gentleman with hypothyroidism, HTN, CHF, colon resection, laryngeal Ca s/p XRT, vocal cord surgery and trach and bladder ca s/p chemotherapy, pulmonary embolism 13 years ago treated with AC for one year presenting with shortness of breath x 24 hours. Found to be influenza positive.      DX: 	influenza A       	JASON 89 year old gentleman with hypothyroidism, HTN, CHF, colon resection, laryngeal Ca s/p XRT, vocal cord surgery and trach and bladder ca s/p chemotherapy, pulmonary embolism 13 years ago treated with AC for one year presenting with shortness of breath x 24 hours. Found to be influenza positive.      DX: 	influenza A       	JASON  3/22	RVP + for flu.  CXR with tortuous trachea, lung fields clear.  Patient received 	oseltamivir, 500cc NS and IV in ED  	vigorous pulmonary toilet  	duonebs q6h scheduled for now  3/23	C/w Tamiflu.   3/23 	Pulm ( [facility]): c/w Tamiflu/Duoneb, Keep SaO2 > 90%, renal f/u, freq pulm toilet.  3/24  	ID cleared for D/c with tamiflu.  	Pul: Keep O2 sat > 90. C/w Duoneb and Tamiflu. Prednisone 40Po x5 days .  3/27	Continue 5 days steroids, d/c planning tmrw   3/28	d/c home

## 2025-04-18 NOTE — DISCHARGE NOTE PROVIDER - CARE PROVIDERS DIRECT ADDRESSES
,[email or web address]

## 2025-04-18 NOTE — DISCHARGE NOTE PROVIDER - NSDCCPCAREPLAN_GEN_ALL_CORE_FT
PRINCIPAL DISCHARGE DIAGNOSIS  Diagnosis: Unspecified asthma with status asthmaticus  Assessment and Plan of Treatment: You presented with asthma exacerbation and required intubation & ICU level care.  You were extubated on 4/10 and transitioned and remained stable on room air.  Continue your Prednisone taper through 4/19/2025, and inhalers as prescribed.  Follow up with your PCP & Pulmonologist within 2 weeks for further evaluation and treatment.      SECONDARY DISCHARGE DIAGNOSES  Diagnosis: LLL pneumonia  Assessment and Plan of Treatment: A Chest xray was done which showed pneumonia. Your respirtatory panel was negative for viruses. Your blood cultures were negative.   You were treated with a full courses of IV antibiotics.  Follow up with your PCP & Pulmonlogist within 2 weeks for further evaluaiton and treatment.    Diagnosis: HTN (hypertension)  Assessment and Plan of Treatment: Your Lisinopril was changed to Losartan as Lisinopril can worsen coughing.   Continue blood pressure medication regimen as directed. Monitor for any visual changes, headaches or dizziness.  Monitor blood pressure regularly.  Follow up with your PCP for further management for high blood pressure.    Diagnosis: Edema of left upper arm  Assessment and Plan of Treatment: An ultrasound of your arm was performed which was negative for a deep venous thrombosis.  Use warm packs to the area.  Follow up with your PCP within 2 weeks for further evaluaiton and treatment.    Diagnosis: Type 2 diabetes mellitus  Assessment and Plan of Treatment: Continue consistent carbohydrate diet.  Monitor blood glucose levels throughout the day before meals and at bedtime.  Record blood sugars and bring to outpatient providers appointment in order to be reviewed by your doctor for management modifications.  Be aware of diabetes management symptoms including feeling cool and clammy may be related to low glucose levels.  Feeling hot and dry may indicate high glucose levels.  If  you feel these symptoms, check your blood sugar.  Make regular podiatry appointments in order to have feet checked for wounds and toe nails cut by a doctor to prevent infections.    Diagnosis: Hypothyroid  Assessment and Plan of Treatment: Continue your medication as prescribed    Diagnosis: Acute respiratory failure with hypercapnia  Assessment and Plan of Treatment: You presented with asthma exacerbation and required intubation & ICU level care.  You were extubated on 4/10 and transitioned and remained stable on room air.  Continue your Prednisone taper, and inhalers as prescribed.  Follow up with your PCP & Pulmonologist within 2 weeks for further evaluation and treatment.     PRINCIPAL DISCHARGE DIAGNOSIS  Diagnosis: Unspecified asthma with status asthmaticus  Assessment and Plan of Treatment: You presented with asthma exacerbation and required intubation & ICU level care.  You were extubated on 4/10 and transitioned and remained stable on room air.  Continue your Prednisone taper through 4/19/2025, and inhalers as prescribed.  Follow up with your PCP & Pulmonologist within 2 weeks for further evaluation and treatment. Please attend your virtual appointment with Dr. Cannon on 4/19 @1      SECONDARY DISCHARGE DIAGNOSES  Diagnosis: Acute respiratory failure with hypercapnia  Assessment and Plan of Treatment: You presented with asthma exacerbation and required intubation & ICU level care.  You were extubated on 4/10 and transitioned and remained stable on room air.  Continue your Prednisone taper, and inhalers as prescribed.  Follow up with your PCP & Pulmonologist within 2 weeks for further evaluation and treatment.    Diagnosis: HTN (hypertension)  Assessment and Plan of Treatment: Your Lisinopril was changed to Losartan as Lisinopril can worsen coughing.   Continue blood pressure medication regimen as directed. Monitor for any visual changes, headaches or dizziness.  Monitor blood pressure regularly.  Follow up with your PCP for further management for high blood pressure.    Diagnosis: Edema of left upper arm  Assessment and Plan of Treatment: An ultrasound of your arm was performed which was negative for a deep venous thrombosis.  Use warm packs to the area.  Follow up with your PCP within 2 weeks for further evaluaiton and treatment.    Diagnosis: LLL pneumonia  Assessment and Plan of Treatment: A Chest xray was done which showed pneumonia. Your respirtatory panel was negative for viruses. Your blood cultures were negative.   You were treated with a full courses of IV antibiotics.  Follow up with your PCP & Pulmonlogist within 2 weeks for further evaluaiton and treatment.    Diagnosis: Type 2 diabetes mellitus  Assessment and Plan of Treatment: Continue consistent carbohydrate diet.  Monitor blood glucose levels throughout the day before meals and at bedtime.  Record blood sugars and bring to outpatient providers appointment in order to be reviewed by your doctor for management modifications.  Be aware of diabetes management symptoms including feeling cool and clammy may be related to low glucose levels.  Feeling hot and dry may indicate high glucose levels.  If  you feel these symptoms, check your blood sugar.  Make regular podiatry appointments in order to have feet checked for wounds and toe nails cut by a doctor to prevent infections.    Diagnosis: Hypothyroid  Assessment and Plan of Treatment: Continue your medication as prescribed

## 2025-04-18 NOTE — DISCHARGE NOTE PROVIDER - HOSPITAL COURSE
59F with HTN, DM2, hypothyroid, and asthma here for status asthmaticus, unclear trigger; S/p extubation on 04/10 and on RA and able to tolerate PO.     Acute asthma exacerbation.   - S/p MICU for intubation, extubated on 4/10, transitioned to RA  - C/w prednisone taper  -Duonebs Q6H & Advair 250/50 BID  -Outpatient follow up.    LLL pneumonia.   - Bronchoscopy (3/30) showing secretions in L main and L lobar with mucus aspirated out. Due to possible concern for PNA,   New sputum cultures/stains (4/7) = commensal maryann and no/rare PMNs with no organisms  urine legionella & streptococcus negative, MRSA (-)  -S/p zosyn and vanc 4/7  -Completed ceftriaxone 1000 mg IV qd x 5 days (started 3/29-4/3)  -Completed azithromycin 500 mg IV qd x 3 days (started 3/28-3/30)   -S/p completion of Zosyn 3.375g q8 course of 7 days (until 04/15).     Edema of left upper arm  - LUE Duplex: No DVT, Well-circumscribed, avascular, complex collection measuring ~1.6 cm is noted within the left upper extremity, possibly hematoma.    Dysphagia.   -S&S re-eval. Easy to chew diet started and advance as tolerated.    Dysphonia  -ENT consult to eval for VC paralysis given multiple intubations. Patient denied scope but dysphonia improving.    Subcutaneous emphysema  - CXR 3/30 showing subcutaneous emphysema without pneumomediastinum  - subcutaneous emphysema on RUE and b/l chest wall resolved.    Abdominal pain  - Gastric retention- 2/2 opioid induced constipation   - Abdominal XR (04/12): Ileus versus a partial low-grade small bowel obstruction.   - Abd XR (04/13): The gaseous distention of small bowel loops has decreased. There is increase in gas throughout the colon. No free air or other new radiographic abnormality  -CTM stool count daily  -Improved since 04/11 and believes due to gas.     Type 2 diabetes mellitus.   - Metformin resumed on DC    Hypothyroid.   - C/w Synthroid 75 mcg PO daily.    HTN (hypertension).   - C/w losartan 25mg daily, avoid ACEi given asthma/chronic cough.    Case discussed with Dr. Noel on 4/18/2025. Pt medically cleared for dc to home w/ home PT. Reviewed discharge medications with patient; All new medications requiring new prescription sent to pharmacy of patients choice. Reviewed need for prescription for previous home medication and new prescriptions sent if requested. Patient in agreement and understands.    59F with HTN, DM2, hypothyroid, and asthma here for status asthmaticus, unclear trigger; S/p extubation on 04/10 and on RA and able to tolerate PO.     Acute asthma exacerbation.   - S/p MICU for intubation, extubated on 4/10, transitioned to RA  - C/w prednisone taper  -Duonebs Q6H & Advair 250/50 BID  -Outpatient follow up.    LLL pneumonia.   - Bronchoscopy (3/30) showing secretions in L main and L lobar with mucus aspirated out. Due to possible concern for PNA,   New sputum cultures/stains (4/7) = commensal maryann and no/rare PMNs with no organisms  urine legionella & streptococcus negative, MRSA (-)  -S/p zosyn and vanc 4/7  -Completed ceftriaxone 1000 mg IV qd x 5 days (started 3/29-4/3)  -Completed azithromycin 500 mg IV qd x 3 days (started 3/28-3/30)   -S/p completion of Zosyn 3.375g q8 course of 7 days (until 04/15).     Edema of left upper arm  - LUE Duplex: No DVT, Well-circumscribed, avascular, complex collection measuring ~1.6 cm is noted within the left upper extremity, possibly hematoma.    Dysphagia.   -S&S re-eval. Easy to chew diet started and advance as tolerated.    Dysphonia  -ENT consult to eval for VC paralysis given multiple intubations. Patient denied scope but dysphonia improving.    Subcutaneous emphysema  - CXR 3/30 showing subcutaneous emphysema without pneumomediastinum  - subcutaneous emphysema on RUE and b/l chest wall resolved.    Abdominal pain  - Gastric retention- 2/2 opioid induced constipation   - Abdominal XR (04/12): Ileus versus a partial low-grade small bowel obstruction.   - Abd XR (04/13): The gaseous distention of small bowel loops has decreased. There is increase in gas throughout the colon. No free air or other new radiographic abnormality  -CTM stool count daily  -Improved since 04/11 and believes due to gas.     Type 2 diabetes mellitus.   - Metformin resumed on DC    Hypothyroid.   - C/w Synthroid 75 mcg PO daily.    HTN (hypertension).   - C/w losartan 25mg daily, avoid ACEi given asthma/chronic cough.    Case discussed with Dr. Noel on 4/18/2025. Pt medically cleared for dc to home w/ home PT. Reviewed discharge medications with patient; All new medications requiring new prescription sent to pharmacy of patients choice. Reviewed need for prescription for previous home medication and new prescriptions sent if requested. Patient in agreement and understands.       Important Medication Changes and Reason:  start prednisone  start losartan  start advair    Active or Pending Issues Requiring Follow-up:  Pulmonology    Advanced Directives:   [x] Full code  [ ] DNR  [ ] Hospice    Discharge Diagnoses:  #Unspecified asthma with status asthmaticus  #LLL pneumonia  #Edema of left upper arm  # Dysphagia  # Dysphonia  #Subcutaneous emphysema  #Abdominal pain

## 2025-04-19 ENCOUNTER — APPOINTMENT (OUTPATIENT)
Dept: PULMONOLOGY | Facility: CLINIC | Age: 60
End: 2025-04-19

## 2025-04-19 ENCOUNTER — NON-APPOINTMENT (OUTPATIENT)
Age: 60
End: 2025-04-19

## 2025-04-19 DIAGNOSIS — J96.02 ACUTE RESPIRATORY FAILURE WITH HYPERCAPNIA: ICD-10-CM

## 2025-04-19 DIAGNOSIS — J96.01 ACUTE RESPIRATORY FAILURE WITH HYPOXIA: ICD-10-CM

## 2025-04-19 DIAGNOSIS — Z87.09 PERSONAL HISTORY OF OTHER DISEASES OF THE RESPIRATORY SYSTEM: ICD-10-CM

## 2025-04-19 DIAGNOSIS — J45.21 MILD INTERMITTENT ASTHMA WITH (ACUTE) EXACERBATION: ICD-10-CM

## 2025-04-19 DIAGNOSIS — J18.9 PNEUMONIA, UNSPECIFIED ORGANISM: ICD-10-CM

## 2025-04-19 PROCEDURE — 99496 TRANSJ CARE MGMT HIGH F2F 7D: CPT | Mod: 95

## 2025-04-29 ENCOUNTER — LABORATORY RESULT (OUTPATIENT)
Age: 60
End: 2025-04-29

## 2025-04-29 ENCOUNTER — APPOINTMENT (OUTPATIENT)
Dept: PULMONOLOGY | Facility: CLINIC | Age: 60
End: 2025-04-29
Payer: MEDICAID

## 2025-04-29 VITALS
OXYGEN SATURATION: 96 % | HEIGHT: 68 IN | BODY MASS INDEX: 27.28 KG/M2 | HEART RATE: 82 BPM | WEIGHT: 180 LBS | DIASTOLIC BLOOD PRESSURE: 85 MMHG | RESPIRATION RATE: 17 BRPM | SYSTOLIC BLOOD PRESSURE: 158 MMHG

## 2025-04-29 DIAGNOSIS — R63.0 ANOREXIA: ICD-10-CM

## 2025-04-29 DIAGNOSIS — R63.6 UNDERWEIGHT: ICD-10-CM

## 2025-04-29 DIAGNOSIS — J45.909 UNSPECIFIED ASTHMA, UNCOMPLICATED: ICD-10-CM

## 2025-04-29 PROCEDURE — G2211 COMPLEX E/M VISIT ADD ON: CPT | Mod: NC

## 2025-04-29 PROCEDURE — 36415 COLL VENOUS BLD VENIPUNCTURE: CPT

## 2025-04-29 PROCEDURE — 99215 OFFICE O/P EST HI 40 MIN: CPT

## 2025-04-29 RX ORDER — MONTELUKAST 10 MG/1
10 TABLET, FILM COATED ORAL
Qty: 30 | Refills: 3 | Status: ACTIVE | COMMUNITY
Start: 2025-04-29 | End: 1900-01-01

## 2025-04-29 RX ORDER — ALBUTEROL SULFATE 90 UG/1
108 (90 BASE) INHALANT RESPIRATORY (INHALATION)
Qty: 1 | Refills: 6 | Status: ACTIVE | COMMUNITY
Start: 2025-04-29 | End: 1900-01-01

## 2025-04-29 RX ORDER — MIRTAZAPINE 15 MG/1
15 TABLET, FILM COATED ORAL
Qty: 30 | Refills: 0 | Status: ACTIVE | COMMUNITY
Start: 2025-04-29 | End: 1900-01-01

## 2025-04-29 RX ORDER — FLUTICASONE PROPIONATE AND SALMETEROL 250; 50 UG/1; UG/1
250-50 POWDER RESPIRATORY (INHALATION)
Qty: 1 | Refills: 6 | Status: ACTIVE | COMMUNITY
Start: 2025-04-29 | End: 1900-01-01

## 2025-04-30 LAB
BASOPHILS # BLD AUTO: 0.05 K/UL
BASOPHILS NFR BLD AUTO: 0.7 %
EOSINOPHIL # BLD AUTO: 0.24 K/UL
EOSINOPHIL NFR BLD AUTO: 3.1 %
HCT VFR BLD CALC: 39.4 %
HGB BLD-MCNC: 11.9 G/DL
IMM GRANULOCYTES NFR BLD AUTO: 1.3 %
LYMPHOCYTES # BLD AUTO: 1.44 K/UL
LYMPHOCYTES NFR BLD AUTO: 18.9 %
MAN DIFF?: NORMAL
MCHC RBC-ENTMCNC: 29.9 PG
MCHC RBC-ENTMCNC: 30.2 G/DL
MCV RBC AUTO: 99 FL
MONOCYTES # BLD AUTO: 0.93 K/UL
MONOCYTES NFR BLD AUTO: 12.2 %
NEUTROPHILS # BLD AUTO: 4.86 K/UL
NEUTROPHILS NFR BLD AUTO: 63.8 %
PLATELET # BLD AUTO: 484 K/UL
RBC # BLD: 3.98 M/UL
RBC # FLD: 13.7 %
WBC # FLD AUTO: 7.62 K/UL

## 2025-05-05 LAB
A ALTERNATA IGE QN: <0.1 KUA/L
A FUMIGATUS IGE QN: <0.1 KUA/L
C ALBICANS IGE QN: <0.1 KUA/L
C HERBARUM IGE QN: <0.1 KUA/L
CAT DANDER IGE QN: 0.2 KUA/L
COMMON RAGWEED IGE QN: <0.1 KUA/L
D FARINAE IGE QN: <0.1 KUA/L
D PTERONYSS IGE QN: <0.1 KUA/L
DEPRECATED A ALTERNATA IGE RAST QL: 0
DEPRECATED A FUMIGATUS IGE RAST QL: 0
DEPRECATED C ALBICANS IGE RAST QL: 0
DEPRECATED C HERBARUM IGE RAST QL: 0
DEPRECATED CAT DANDER IGE RAST QL: NORMAL
DEPRECATED COMMON RAGWEED IGE RAST QL: 0
DEPRECATED D FARINAE IGE RAST QL: 0
DEPRECATED D PTERONYSS IGE RAST QL: 0
DEPRECATED DOG DANDER IGE RAST QL: 0
DEPRECATED M RACEMOSUS IGE RAST QL: 0
DEPRECATED ROACH IGE RAST QL: 0
DEPRECATED TIMOTHY IGE RAST QL: 0
DEPRECATED WHITE OAK IGE RAST QL: 0
DOG DANDER IGE QN: <0.1 KUA/L
M RACEMOSUS IGE QN: <0.1 KUA/L
ROACH IGE QN: <0.1 KUA/L
TIMOTHY IGE QN: <0.1 KUA/L
WHITE OAK IGE QN: <0.1 KUA/L

## 2025-05-27 ENCOUNTER — RX RENEWAL (OUTPATIENT)
Age: 60
End: 2025-05-27

## 2025-06-03 NOTE — ED ADULT TRIAGE NOTE - BP NONINVASIVE DIASTOLIC (MM HG)
[facility] Internal Medicine  Juan F Mandujano MD; Fantasma Sosa MD; Carlos Enrique Murphy MD;  Kasey Asher MD; Aspen Mosqueda MD  [facility] Internal Medicine   IN-PATIENT SERVICE  [facility]                 Date:   6/3/2025  Patientname:  Yessenia Caballero  Date of admission:  6/3/2025  1:43 PM  MRN:   [id number]  Account:  [id number]  YOB: 1933  PCP:    Aspen Mosqueda MD  Room:   2043/2043-01  [facility]ica [facility], APRN - CNP  6/3/2025  6:27 PM    Please note that this chart was generated using voice recognition Dragon dictation software.  Although every

## 2025-07-22 ENCOUNTER — APPOINTMENT (OUTPATIENT)
Dept: PULMONOLOGY | Facility: CLINIC | Age: 60
End: 2025-07-22